# Patient Record
Sex: FEMALE | Race: WHITE | NOT HISPANIC OR LATINO
[De-identification: names, ages, dates, MRNs, and addresses within clinical notes are randomized per-mention and may not be internally consistent; named-entity substitution may affect disease eponyms.]

---

## 2017-04-26 ENCOUNTER — APPOINTMENT (OUTPATIENT)
Dept: ENDOCRINOLOGY | Facility: CLINIC | Age: 52
End: 2017-04-26

## 2017-11-16 ENCOUNTER — EMERGENCY (EMERGENCY)
Facility: HOSPITAL | Age: 52
LOS: 0 days | Discharge: NURSING, REHAB OR LONG-TERM CARE | End: 2017-11-16

## 2017-11-16 DIAGNOSIS — Z79.899 OTHER LONG TERM (CURRENT) DRUG THERAPY: ICD-10-CM

## 2017-11-16 DIAGNOSIS — Z91.013 ALLERGY TO SEAFOOD: ICD-10-CM

## 2017-11-16 DIAGNOSIS — K50.90 CROHN'S DISEASE, UNSPECIFIED, WITHOUT COMPLICATIONS: ICD-10-CM

## 2017-11-16 DIAGNOSIS — J44.1 CHRONIC OBSTRUCTIVE PULMONARY DISEASE WITH (ACUTE) EXACERBATION: ICD-10-CM

## 2017-11-16 DIAGNOSIS — K21.9 GASTRO-ESOPHAGEAL REFLUX DISEASE WITHOUT ESOPHAGITIS: ICD-10-CM

## 2017-11-16 DIAGNOSIS — E03.9 HYPOTHYROIDISM, UNSPECIFIED: ICD-10-CM

## 2017-11-16 DIAGNOSIS — E11.9 TYPE 2 DIABETES MELLITUS WITHOUT COMPLICATIONS: ICD-10-CM

## 2017-11-16 DIAGNOSIS — Z88.1 ALLERGY STATUS TO OTHER ANTIBIOTIC AGENTS STATUS: ICD-10-CM

## 2017-11-16 DIAGNOSIS — Z91.040 LATEX ALLERGY STATUS: ICD-10-CM

## 2017-11-16 DIAGNOSIS — J45.909 UNSPECIFIED ASTHMA, UNCOMPLICATED: ICD-10-CM

## 2017-11-16 DIAGNOSIS — R06.02 SHORTNESS OF BREATH: ICD-10-CM

## 2017-11-16 DIAGNOSIS — Z88.8 ALLERGY STATUS TO OTHER DRUGS, MEDICAMENTS AND BIOLOGICAL SUBSTANCES STATUS: ICD-10-CM

## 2017-11-16 DIAGNOSIS — F20.9 SCHIZOPHRENIA, UNSPECIFIED: ICD-10-CM

## 2017-11-16 DIAGNOSIS — R10.9 UNSPECIFIED ABDOMINAL PAIN: ICD-10-CM

## 2017-11-16 DIAGNOSIS — F32.9 MAJOR DEPRESSIVE DISORDER, SINGLE EPISODE, UNSPECIFIED: ICD-10-CM

## 2017-11-16 DIAGNOSIS — Z88.0 ALLERGY STATUS TO PENICILLIN: ICD-10-CM

## 2017-11-16 DIAGNOSIS — Z79.4 LONG TERM (CURRENT) USE OF INSULIN: ICD-10-CM

## 2018-02-13 ENCOUNTER — EMERGENCY (EMERGENCY)
Facility: HOSPITAL | Age: 53
LOS: 0 days | Discharge: NURSING, REHAB OR LONG-TERM CARE | End: 2018-02-13
Attending: EMERGENCY MEDICINE

## 2018-02-13 VITALS
RESPIRATION RATE: 17 BRPM | OXYGEN SATURATION: 97 % | HEART RATE: 109 BPM | WEIGHT: 195.99 LBS | HEIGHT: 64 IN | TEMPERATURE: 98 F | DIASTOLIC BLOOD PRESSURE: 78 MMHG | SYSTOLIC BLOOD PRESSURE: 117 MMHG

## 2018-02-13 DIAGNOSIS — Z79.899 OTHER LONG TERM (CURRENT) DRUG THERAPY: ICD-10-CM

## 2018-02-13 DIAGNOSIS — R07.89 OTHER CHEST PAIN: ICD-10-CM

## 2018-02-13 DIAGNOSIS — E11.9 TYPE 2 DIABETES MELLITUS WITHOUT COMPLICATIONS: ICD-10-CM

## 2018-02-13 DIAGNOSIS — Z98.890 OTHER SPECIFIED POSTPROCEDURAL STATES: Chronic | ICD-10-CM

## 2018-02-13 DIAGNOSIS — Z91.011 ALLERGY TO MILK PRODUCTS: ICD-10-CM

## 2018-02-13 DIAGNOSIS — Z90.49 ACQUIRED ABSENCE OF OTHER SPECIFIED PARTS OF DIGESTIVE TRACT: ICD-10-CM

## 2018-02-13 DIAGNOSIS — Z91.040 LATEX ALLERGY STATUS: ICD-10-CM

## 2018-02-13 DIAGNOSIS — Z88.5 ALLERGY STATUS TO NARCOTIC AGENT: ICD-10-CM

## 2018-02-13 DIAGNOSIS — Z91.013 ALLERGY TO SEAFOOD: ICD-10-CM

## 2018-02-13 DIAGNOSIS — Z79.2 LONG TERM (CURRENT) USE OF ANTIBIOTICS: ICD-10-CM

## 2018-02-13 DIAGNOSIS — R06.02 SHORTNESS OF BREATH: ICD-10-CM

## 2018-02-13 LAB
ALBUMIN SERPL ELPH-MCNC: 3.9 G/DL — SIGNIFICANT CHANGE UP (ref 3–5.5)
ALP SERPL-CCNC: 81 U/L — SIGNIFICANT CHANGE UP (ref 30–115)
ALT FLD-CCNC: 56 U/L — HIGH (ref 0–41)
ANION GAP SERPL CALC-SCNC: 11 MMOL/L — SIGNIFICANT CHANGE UP (ref 7–14)
AST SERPL-CCNC: 39 U/L — SIGNIFICANT CHANGE UP (ref 0–41)
BASOPHILS # BLD AUTO: 0.02 K/UL — SIGNIFICANT CHANGE UP (ref 0–0.2)
BASOPHILS NFR BLD AUTO: 0.3 % — SIGNIFICANT CHANGE UP (ref 0–1)
BILIRUB SERPL-MCNC: 0.2 MG/DL — SIGNIFICANT CHANGE UP (ref 0.2–1.2)
BUN SERPL-MCNC: 9 MG/DL — LOW (ref 10–20)
CALCIUM SERPL-MCNC: 9.9 MG/DL — SIGNIFICANT CHANGE UP (ref 8.5–10.1)
CHLORIDE SERPL-SCNC: 101 MMOL/L — SIGNIFICANT CHANGE UP (ref 98–110)
CO2 SERPL-SCNC: 28 MMOL/L — SIGNIFICANT CHANGE UP (ref 17–32)
CREAT SERPL-MCNC: 0.5 MG/DL — LOW (ref 0.7–1.5)
EOSINOPHIL # BLD AUTO: 0.07 K/UL — SIGNIFICANT CHANGE UP (ref 0–0.7)
EOSINOPHIL NFR BLD AUTO: 0.9 % — SIGNIFICANT CHANGE UP (ref 0–8)
GLUCOSE SERPL-MCNC: 193 MG/DL — HIGH (ref 70–110)
HCT VFR BLD CALC: 39.6 % — SIGNIFICANT CHANGE UP (ref 37–47)
HGB BLD-MCNC: 12.2 G/DL — LOW (ref 14–18)
IMM GRANULOCYTES NFR BLD AUTO: 0.3 % — SIGNIFICANT CHANGE UP (ref 0.1–0.3)
LIDOCAIN IGE QN: 24 U/L — SIGNIFICANT CHANGE UP (ref 7–60)
LYMPHOCYTES # BLD AUTO: 2.16 K/UL — SIGNIFICANT CHANGE UP (ref 1.2–3.4)
LYMPHOCYTES # BLD AUTO: 27.4 % — SIGNIFICANT CHANGE UP (ref 20.5–51.1)
MCHC RBC-ENTMCNC: 22.1 PG — LOW (ref 27–31)
MCHC RBC-ENTMCNC: 30.8 G/DL — LOW (ref 32–37)
MCV RBC AUTO: 71.7 FL — LOW (ref 81–91)
MONOCYTES # BLD AUTO: 0.45 K/UL — SIGNIFICANT CHANGE UP (ref 0.1–0.6)
MONOCYTES NFR BLD AUTO: 5.7 % — SIGNIFICANT CHANGE UP (ref 1.7–9.3)
NEUTROPHILS # BLD AUTO: 5.15 K/UL — SIGNIFICANT CHANGE UP (ref 1.4–6.5)
NEUTROPHILS NFR BLD AUTO: 65.4 % — SIGNIFICANT CHANGE UP (ref 42.2–75.2)
NRBC # BLD: 0 /100 WBCS — SIGNIFICANT CHANGE UP (ref 0–0)
PLATELET # BLD AUTO: 311 K/UL — SIGNIFICANT CHANGE UP (ref 130–400)
POTASSIUM SERPL-MCNC: 3.9 MMOL/L — SIGNIFICANT CHANGE UP (ref 3.5–5)
POTASSIUM SERPL-SCNC: 3.9 MMOL/L — SIGNIFICANT CHANGE UP (ref 3.5–5)
PROT SERPL-MCNC: 6.5 G/DL — SIGNIFICANT CHANGE UP (ref 6–8)
RBC # BLD: 5.52 M/UL — HIGH (ref 4.2–5.4)
RBC # FLD: 18.5 % — HIGH (ref 11.5–14.5)
SODIUM SERPL-SCNC: 140 MMOL/L — SIGNIFICANT CHANGE UP (ref 135–146)
TROPONIN I SERPL-MCNC: <0.02 NG/ML — SIGNIFICANT CHANGE UP (ref 0–0.05)
WBC # BLD: 7.87 K/UL — SIGNIFICANT CHANGE UP (ref 4.8–10.8)
WBC # FLD AUTO: 7.87 K/UL — SIGNIFICANT CHANGE UP (ref 4.8–10.8)

## 2018-02-13 RX ORDER — SODIUM CHLORIDE 9 MG/ML
3 INJECTION INTRAMUSCULAR; INTRAVENOUS; SUBCUTANEOUS ONCE
Qty: 0 | Refills: 0 | Status: COMPLETED | OUTPATIENT
Start: 2018-02-13 | End: 2018-02-13

## 2018-02-13 RX ADMIN — SODIUM CHLORIDE 3 MILLILITER(S): 9 INJECTION INTRAMUSCULAR; INTRAVENOUS; SUBCUTANEOUS at 16:36

## 2018-02-13 NOTE — ED PROVIDER NOTE - PHYSICAL EXAMINATION
Vital Signs: I have reviewed the initial vital signs.  Constitutional: well-nourished, appears stated age, no acute distress  Cardiovascular: regular rate, regular rhythm, well-perfused extremities  Respiratory: unlabored respiratory effort, clear to auscultation bilaterally  Gastrointestinal: soft, non-tender abdomen, no pulsatile mass  Musculoskeletal: supple neck, no lower extremity edema  Integumentary: warm, dry, no rash  Neurologic: awake, alert, cranial nerves II-XII grossly intact, extremities’ motor and sensory functions grossly intact  Psychiatric: appropriate mood, appropriate affect

## 2018-02-13 NOTE — ED PROVIDER NOTE - OBJECTIVE STATEMENT
53 y/o diabetic female c/o chest pain radiating to back started 30 min PTA while eating peanut butter and jelly sandwhich. patient had angiogram 2 weeks ago which was normal. patient denies any diaphoresis, sob

## 2018-02-13 NOTE — ED PROVIDER NOTE - ATTENDING CONTRIBUTION TO CARE
Pt is a 53yo female who comes in for mid-sternal chest pain radiating to back that began at noon while eating a PB&J sandwich and has been constant and non-radiating.  Mild SOB, no syncope.  Pt reports having normal coronary angiogram on 2/1 at [facility].  No other complaints.    Exam: soft abdomen, mild epigastric soreness, clear lungs, no LE edema, no calf tenderness, noraml cardiac exam, 2+ radial pulses  Imp: r/o ACS, likely esophageal spasm  Plan: labs, EKG, XR chest

## 2018-02-13 NOTE — ED ADULT NURSE NOTE - PMH
Diabetes    GERD (gastroesophageal reflux disease) Anxiety    Bladder disorder    Constipation    Dementia    Depression    Diabetes    Diabetes mellitus, type 2    GERD (gastroesophageal reflux disease)    Hypercholesteremia    Hypothyroid    Iron deficiency    ANISHA (obstructive sleep apnea)    Polyneuropathy    Schizophrenia    Sciatica    Vitamin D deficiency

## 2018-02-13 NOTE — ED PROVIDER NOTE - NS ED ROS FT
Review of Systems    Constitutional: (-) fever  Eyes/ENT: (-) blurry vision, (-) epistaxis  Cardiovascular: (-) chest pain, (-) syncope  Respiratory: (-) cough, (-) shortness of breath  Gastrointestinal: (-) vomiting, (-) diarrhea  Musculoskeletal: (-) neck pain, (-) back pain, (-) joint pain  Integumentary: (-) rash, (-) edema  Neurological: (-) headache, (-) altered mental status  Psychiatric: (-) hallucinations  Allergic/Immunologic: (-) pruritus

## 2018-02-13 NOTE — ED PROVIDER NOTE - MEDICAL DECISION MAKING DETAILS
Patient to be discharged from ED. Any available test results were discussed with patient and/or family. Verbal instructions given, including instructions to return to ED immediately for any new, worsening, or concerning symptoms. Patient endorsed understanding. Written discharge instructions additionally given, including follow-up plan.     Chart finished.

## 2018-03-17 ENCOUNTER — EMERGENCY (EMERGENCY)
Facility: HOSPITAL | Age: 53
LOS: 0 days | Discharge: NURSING, REHAB OR LONG-TERM CARE | End: 2018-03-17
Attending: EMERGENCY MEDICINE

## 2018-03-17 VITALS
RESPIRATION RATE: 18 BRPM | OXYGEN SATURATION: 95 % | HEART RATE: 101 BPM | TEMPERATURE: 99 F | DIASTOLIC BLOOD PRESSURE: 58 MMHG | SYSTOLIC BLOOD PRESSURE: 96 MMHG

## 2018-03-17 DIAGNOSIS — Z79.899 OTHER LONG TERM (CURRENT) DRUG THERAPY: ICD-10-CM

## 2018-03-17 DIAGNOSIS — E78.00 PURE HYPERCHOLESTEROLEMIA, UNSPECIFIED: ICD-10-CM

## 2018-03-17 DIAGNOSIS — F03.90 UNSPECIFIED DEMENTIA WITHOUT BEHAVIORAL DISTURBANCE: ICD-10-CM

## 2018-03-17 DIAGNOSIS — Z98.890 OTHER SPECIFIED POSTPROCEDURAL STATES: Chronic | ICD-10-CM

## 2018-03-17 DIAGNOSIS — K21.9 GASTRO-ESOPHAGEAL REFLUX DISEASE WITHOUT ESOPHAGITIS: ICD-10-CM

## 2018-03-17 DIAGNOSIS — E11.9 TYPE 2 DIABETES MELLITUS WITHOUT COMPLICATIONS: ICD-10-CM

## 2018-03-17 DIAGNOSIS — Z88.0 ALLERGY STATUS TO PENICILLIN: ICD-10-CM

## 2018-03-17 DIAGNOSIS — R07.89 OTHER CHEST PAIN: ICD-10-CM

## 2018-03-17 DIAGNOSIS — E03.9 HYPOTHYROIDISM, UNSPECIFIED: ICD-10-CM

## 2018-03-17 DIAGNOSIS — Z90.49 ACQUIRED ABSENCE OF OTHER SPECIFIED PARTS OF DIGESTIVE TRACT: ICD-10-CM

## 2018-03-17 DIAGNOSIS — Z88.5 ALLERGY STATUS TO NARCOTIC AGENT: ICD-10-CM

## 2018-03-17 DIAGNOSIS — F17.200 NICOTINE DEPENDENCE, UNSPECIFIED, UNCOMPLICATED: ICD-10-CM

## 2018-03-17 DIAGNOSIS — R06.02 SHORTNESS OF BREATH: ICD-10-CM

## 2018-03-17 LAB
ALBUMIN SERPL ELPH-MCNC: 4.5 G/DL — SIGNIFICANT CHANGE UP (ref 3.5–5.2)
ALP SERPL-CCNC: 83 U/L — SIGNIFICANT CHANGE UP (ref 30–115)
ALT FLD-CCNC: 45 U/L — HIGH (ref 0–41)
ANION GAP SERPL CALC-SCNC: 20 MMOL/L — HIGH (ref 7–14)
AST SERPL-CCNC: 29 U/L — SIGNIFICANT CHANGE UP (ref 0–41)
BASOPHILS # BLD AUTO: 0.03 K/UL — SIGNIFICANT CHANGE UP (ref 0–0.2)
BASOPHILS NFR BLD AUTO: 0.4 % — SIGNIFICANT CHANGE UP (ref 0–1)
BILIRUB DIRECT SERPL-MCNC: <0.2 MG/DL — SIGNIFICANT CHANGE UP (ref 0–0.2)
BILIRUB INDIRECT FLD-MCNC: >0 MG/DL — LOW (ref 0.2–1.2)
BILIRUB SERPL-MCNC: 0.2 MG/DL — SIGNIFICANT CHANGE UP (ref 0.2–1.2)
BUN SERPL-MCNC: 18 MG/DL — SIGNIFICANT CHANGE UP (ref 10–20)
CALCIUM SERPL-MCNC: 9.8 MG/DL — SIGNIFICANT CHANGE UP (ref 8.5–10.1)
CHLORIDE SERPL-SCNC: 97 MMOL/L — LOW (ref 98–110)
CK MB BLD-MCNC: <0 % — SIGNIFICANT CHANGE UP (ref 0–4)
CK MB BLD-MCNC: SIGNIFICANT CHANGE UP % (ref 0–4)
CK MB CFR SERPL CALC: <0.1 NG/ML — LOW (ref 0.6–6.3)
CK MB CFR SERPL CALC: <0.1 NG/ML — LOW (ref 0.6–6.3)
CK SERPL-CCNC: 43 U/L — SIGNIFICANT CHANGE UP (ref 0–225)
CK SERPL-CCNC: 47 U/L — SIGNIFICANT CHANGE UP (ref 0–225)
CO2 SERPL-SCNC: 24 MMOL/L — SIGNIFICANT CHANGE UP (ref 17–32)
CREAT SERPL-MCNC: 0.6 MG/DL — LOW (ref 0.7–1.5)
EOSINOPHIL # BLD AUTO: 0.09 K/UL — SIGNIFICANT CHANGE UP (ref 0–0.7)
EOSINOPHIL NFR BLD AUTO: 1.2 % — SIGNIFICANT CHANGE UP (ref 0–8)
GLUCOSE SERPL-MCNC: 125 MG/DL — HIGH (ref 70–110)
HCT VFR BLD CALC: 41.4 % — SIGNIFICANT CHANGE UP (ref 37–47)
HGB BLD-MCNC: 12.5 G/DL — SIGNIFICANT CHANGE UP (ref 12–16)
IMM GRANULOCYTES NFR BLD AUTO: 0.3 % — SIGNIFICANT CHANGE UP (ref 0.1–0.3)
LIDOCAIN IGE QN: 33 U/L — SIGNIFICANT CHANGE UP (ref 7–60)
LYMPHOCYTES # BLD AUTO: 2.73 K/UL — SIGNIFICANT CHANGE UP (ref 1.2–3.4)
LYMPHOCYTES # BLD AUTO: 37.3 % — SIGNIFICANT CHANGE UP (ref 20.5–51.1)
MCHC RBC-ENTMCNC: 21.9 PG — LOW (ref 27–31)
MCHC RBC-ENTMCNC: 30.2 G/DL — LOW (ref 32–37)
MCV RBC AUTO: 72.6 FL — LOW (ref 81–99)
MONOCYTES # BLD AUTO: 0.5 K/UL — SIGNIFICANT CHANGE UP (ref 0.1–0.6)
MONOCYTES NFR BLD AUTO: 6.8 % — SIGNIFICANT CHANGE UP (ref 1.7–9.3)
NEUTROPHILS # BLD AUTO: 3.94 K/UL — SIGNIFICANT CHANGE UP (ref 1.4–6.5)
NEUTROPHILS NFR BLD AUTO: 54 % — SIGNIFICANT CHANGE UP (ref 42.2–75.2)
NRBC # BLD: 0 /100 WBCS — SIGNIFICANT CHANGE UP (ref 0–0)
PLATELET # BLD AUTO: 414 K/UL — HIGH (ref 130–400)
POTASSIUM SERPL-MCNC: 4.2 MMOL/L — SIGNIFICANT CHANGE UP (ref 3.5–5)
POTASSIUM SERPL-SCNC: 4.2 MMOL/L — SIGNIFICANT CHANGE UP (ref 3.5–5)
PROT SERPL-MCNC: 6.4 G/DL — SIGNIFICANT CHANGE UP (ref 6–8)
RBC # BLD: 5.7 M/UL — HIGH (ref 4.2–5.4)
RBC # FLD: 18 % — HIGH (ref 11.5–14.5)
SODIUM SERPL-SCNC: 141 MMOL/L — SIGNIFICANT CHANGE UP (ref 135–146)
TROPONIN T SERPL-MCNC: <0.01 NG/ML — SIGNIFICANT CHANGE UP
TROPONIN T SERPL-MCNC: <0.01 NG/ML — SIGNIFICANT CHANGE UP
WBC # BLD: 7.31 K/UL — SIGNIFICANT CHANGE UP (ref 4.8–10.8)
WBC # FLD AUTO: 7.31 K/UL — SIGNIFICANT CHANGE UP (ref 4.8–10.8)

## 2018-03-17 RX ORDER — SODIUM CHLORIDE 9 MG/ML
3 INJECTION INTRAMUSCULAR; INTRAVENOUS; SUBCUTANEOUS ONCE
Qty: 0 | Refills: 0 | Status: COMPLETED | OUTPATIENT
Start: 2018-03-17 | End: 2018-03-17

## 2018-03-17 RX ADMIN — SODIUM CHLORIDE 3 MILLILITER(S): 9 INJECTION INTRAMUSCULAR; INTRAVENOUS; SUBCUTANEOUS at 15:42

## 2018-03-17 RX ADMIN — SODIUM CHLORIDE 3 MILLILITER(S): 9 INJECTION INTRAMUSCULAR; INTRAVENOUS; SUBCUTANEOUS at 15:41

## 2018-03-17 NOTE — ED PROVIDER NOTE - ATTENDING CONTRIBUTION TO CARE
I personally evaluated the patient. I reviewed the Resident’s or Physician Assistant’s note (as assigned above), and agree with the findings and plan except as documented in my note.  52y female above PMH had 1 hour episode of chest tightness while eating PBJ, since resolved, has h/o same, had clean catheterization (per pt) last month), has had EGD in past showing "inflammed stomach," pt currently asymptomatic and wants to eat, on exam vs appreciated (reports this is her baseline bp), conj pink neck supple cor rrr lungs sl diminished at bases otherwise clear abd snt/nd calves nontender, likely GI but will check ekg, enzymes x 2

## 2018-03-17 NOTE — ED PROVIDER NOTE - PROGRESS NOTE DETAILS
endorsed to Dr Gutierrez repeat enzymes No chest pain in ER. Cardiac enzymes x 2 negative. Repeat EKG no acute changes. Will D/C to follow up with pvt cardiology.

## 2018-03-17 NOTE — ED ADULT NURSE NOTE - PMH
Anxiety    Bladder disorder    Constipation    Dementia    Depression    Diabetes    Diabetes mellitus, type 2    GERD (gastroesophageal reflux disease)    Hypercholesteremia    Hypothyroid    Iron deficiency    ANISHA (obstructive sleep apnea)    Polyneuropathy    Schizophrenia    Sciatica    Vitamin D deficiency

## 2018-03-17 NOTE — ED ADULT NURSE NOTE - NS ED NURSE DC TEACHING
Bilateral Helical Rim Advancement Flap Text: The defect edges were debeveled with a #15 blade scalpel.  Given the location of the defect and the proximity to free margins (helical rim) a bilateral helical rim advancement flap was deemed most appropriate.  Using a sterile surgical marker, the appropriate advancement flaps were drawn incorporating the defect and placing the expected incisions between the helical rim and antihelix where possible.  The area thus outlined was incised through and through with a #15 scalpel blade.  With a skin hook and iris scissors, the flaps were gently and sharply undermined and freed up. Other:/see dc

## 2018-03-17 NOTE — ED ADULT TRIAGE NOTE - CHIEF COMPLAINT QUOTE
AS per patient: 'I was having lunch when I started having chest pain. I feel better now after the aspirin and Nitro."

## 2018-03-17 NOTE — ED PROVIDER NOTE - OBJECTIVE STATEMENT
Pertinent PMH/PSH/FHx/SHx and Review of Systems contained within:  Patient presents to the ED from [facility] for evaluation of shortness of breath and chest tightness since 12 pm today while eating a sandwich.  As per patient tightness and shortness of breath improved after treatment at home but called her cardiologist and instructed to come to the ED for evaluation.  Patient denies headache, chest pain, abdominal pain, nausea, vomiting, diarrhea, fever/chills.   Review of Systems  Constitutional: (-) fever  Cardiovascular: (-) chest pain  Respiratory: (-) cough  Gastrointestinal: (-) vomiting, (-) diarrhea  Integumentary: (-) edema  Neurological: (-) headache  Allergic/Immunologic: (-) pruritus  PE  Gen: Alert, NAD, well appearing  Neck: +supple  Pulm: Bilateral BS, normal resp effort, no wheeze/stridor/retractions  CV: RRR, no M/R/G, +dist pulses  Abd: soft, NT/ND, +BS  Mskel: no edema  Skin: no rash  Neuro: AAOx3

## 2018-03-17 NOTE — ED PROVIDER NOTE - MEDICAL DECISION MAKING DETAILS
I personally evaluated the patient. I reviewed the Resident’s or Physician Assistant’s note (as assigned above), and agree with the findings and plan except as documented in my note.  Chart reviewed. Cardiac enzymes x 2 negative. Repeat EKG no acute changes. Chest pain free in ER. Will D/C to follow up with t cardiologist.

## 2018-07-04 ENCOUNTER — EMERGENCY (EMERGENCY)
Facility: HOSPITAL | Age: 53
LOS: 0 days | Discharge: HOME | End: 2018-07-04
Attending: EMERGENCY MEDICINE | Admitting: EMERGENCY MEDICINE

## 2018-07-04 VITALS
SYSTOLIC BLOOD PRESSURE: 132 MMHG | DIASTOLIC BLOOD PRESSURE: 70 MMHG | HEART RATE: 90 BPM | RESPIRATION RATE: 20 BRPM | OXYGEN SATURATION: 96 % | TEMPERATURE: 98 F

## 2018-07-04 VITALS
RESPIRATION RATE: 18 BRPM | DIASTOLIC BLOOD PRESSURE: 70 MMHG | TEMPERATURE: 98 F | OXYGEN SATURATION: 93 % | SYSTOLIC BLOOD PRESSURE: 120 MMHG | HEIGHT: 64 IN | HEART RATE: 94 BPM | WEIGHT: 203.93 LBS

## 2018-07-04 DIAGNOSIS — F20.9 SCHIZOPHRENIA, UNSPECIFIED: ICD-10-CM

## 2018-07-04 DIAGNOSIS — J44.1 CHRONIC OBSTRUCTIVE PULMONARY DISEASE WITH (ACUTE) EXACERBATION: ICD-10-CM

## 2018-07-04 DIAGNOSIS — Z79.899 OTHER LONG TERM (CURRENT) DRUG THERAPY: ICD-10-CM

## 2018-07-04 DIAGNOSIS — Z91.040 LATEX ALLERGY STATUS: ICD-10-CM

## 2018-07-04 DIAGNOSIS — E78.00 PURE HYPERCHOLESTEROLEMIA, UNSPECIFIED: ICD-10-CM

## 2018-07-04 DIAGNOSIS — K21.9 GASTRO-ESOPHAGEAL REFLUX DISEASE WITHOUT ESOPHAGITIS: ICD-10-CM

## 2018-07-04 DIAGNOSIS — R07.89 OTHER CHEST PAIN: ICD-10-CM

## 2018-07-04 DIAGNOSIS — Z88.0 ALLERGY STATUS TO PENICILLIN: ICD-10-CM

## 2018-07-04 DIAGNOSIS — Z98.890 OTHER SPECIFIED POSTPROCEDURAL STATES: Chronic | ICD-10-CM

## 2018-07-04 DIAGNOSIS — Z88.1 ALLERGY STATUS TO OTHER ANTIBIOTIC AGENTS STATUS: ICD-10-CM

## 2018-07-04 DIAGNOSIS — E03.9 HYPOTHYROIDISM, UNSPECIFIED: ICD-10-CM

## 2018-07-04 DIAGNOSIS — Z79.84 LONG TERM (CURRENT) USE OF ORAL HYPOGLYCEMIC DRUGS: ICD-10-CM

## 2018-07-04 DIAGNOSIS — Z91.013 ALLERGY TO SEAFOOD: ICD-10-CM

## 2018-07-04 DIAGNOSIS — E11.9 TYPE 2 DIABETES MELLITUS WITHOUT COMPLICATIONS: ICD-10-CM

## 2018-07-04 RX ORDER — DILTIAZEM HCL 120 MG
1 CAPSULE, EXT RELEASE 24 HR ORAL
Qty: 0 | Refills: 0 | COMMUNITY

## 2018-07-04 RX ORDER — IPRATROPIUM/ALBUTEROL SULFATE 18-103MCG
3 AEROSOL WITH ADAPTER (GRAM) INHALATION ONCE
Qty: 0 | Refills: 0 | Status: COMPLETED | OUTPATIENT
Start: 2018-07-04 | End: 2018-07-04

## 2018-07-04 RX ORDER — IPRATROPIUM/ALBUTEROL SULFATE 18-103MCG
3 AEROSOL WITH ADAPTER (GRAM) INHALATION ONCE
Qty: 0 | Refills: 0 | Status: DISCONTINUED | OUTPATIENT
Start: 2018-07-04 | End: 2018-07-04

## 2018-07-04 RX ADMIN — Medication 3 MILLILITER(S): at 20:09

## 2018-07-04 RX ADMIN — Medication 60 MILLIGRAM(S): at 19:38

## 2018-07-04 RX ADMIN — Medication 3 MILLILITER(S): at 19:40

## 2018-07-04 NOTE — ED ADULT NURSE NOTE - PMH
Anxiety    Bladder disorder    Constipation    COPD (chronic obstructive pulmonary disease)    Depression    Diabetes mellitus, type 2    GERD (gastroesophageal reflux disease)    Hypercholesteremia    Hypothyroid    Iron deficiency    ANISHA (obstructive sleep apnea)    Polyneuropathy    Schizophrenia    Sciatica    Vitamin D deficiency

## 2018-07-04 NOTE — ED PROVIDER NOTE - ATTENDING CONTRIBUTION TO CARE
Pt is a 52yo female with 4d of cough productive of greenish phelgm and chest tightness consistent with COPD exacerbation.  No fever, no travel, no sick contacts.  No other complaints.    Exam: b/l wheezes with crackles on R, no tachypnea/retractions, speaking in full sentences, cap refill <2s  Imp: COPD exacerbation, r/o PNA  Plan: XR chest, nebs, steroid

## 2018-07-04 NOTE — ED PROVIDER NOTE - OBJECTIVE STATEMENT
52 y/o female with hx Asthma, COPD, DM presents to the ED c/o "I have cough, congestion, SOB and chest tightness since Friday. My cough has green phlegm." no fever/ chills/ weakness

## 2018-07-09 ENCOUNTER — OUTPATIENT (OUTPATIENT)
Dept: OUTPATIENT SERVICES | Facility: HOSPITAL | Age: 53
LOS: 1 days | Discharge: HOME | End: 2018-07-09

## 2018-07-09 DIAGNOSIS — E78.9 DISORDER OF LIPOPROTEIN METABOLISM, UNSPECIFIED: ICD-10-CM

## 2018-07-09 DIAGNOSIS — E11.65 TYPE 2 DIABETES MELLITUS WITH HYPERGLYCEMIA: ICD-10-CM

## 2018-07-09 DIAGNOSIS — K21.9 GASTRO-ESOPHAGEAL REFLUX DISEASE WITHOUT ESOPHAGITIS: ICD-10-CM

## 2018-07-09 DIAGNOSIS — E55.9 VITAMIN D DEFICIENCY, UNSPECIFIED: ICD-10-CM

## 2018-07-09 DIAGNOSIS — Z98.890 OTHER SPECIFIED POSTPROCEDURAL STATES: Chronic | ICD-10-CM

## 2018-07-09 DIAGNOSIS — E03.9 HYPOTHYROIDISM, UNSPECIFIED: ICD-10-CM

## 2018-07-16 ENCOUNTER — OUTPATIENT (OUTPATIENT)
Dept: OUTPATIENT SERVICES | Facility: HOSPITAL | Age: 53
LOS: 1 days | Discharge: HOME | End: 2018-07-16

## 2018-07-16 DIAGNOSIS — D64.9 ANEMIA, UNSPECIFIED: ICD-10-CM

## 2018-07-16 DIAGNOSIS — E03.9 HYPOTHYROIDISM, UNSPECIFIED: ICD-10-CM

## 2018-07-16 DIAGNOSIS — Z98.890 OTHER SPECIFIED POSTPROCEDURAL STATES: Chronic | ICD-10-CM

## 2018-07-16 DIAGNOSIS — R94.5 ABNORMAL RESULTS OF LIVER FUNCTION STUDIES: ICD-10-CM

## 2018-07-17 PROBLEM — F03.90 UNSPECIFIED DEMENTIA WITHOUT BEHAVIORAL DISTURBANCE: Chronic | Status: INACTIVE | Noted: 2018-02-13 | Resolved: 2018-07-04

## 2018-07-17 PROBLEM — E11.9 TYPE 2 DIABETES MELLITUS WITHOUT COMPLICATIONS: Chronic | Status: INACTIVE | Noted: 2018-02-13 | Resolved: 2018-07-04

## 2018-07-17 PROBLEM — F03.90 UNSPECIFIED DEMENTIA, UNSPECIFIED SEVERITY, WITHOUT BEHAVIORAL DISTURBANCE, PSYCHOTIC DISTURBANCE, MOOD DISTURBANCE, AND ANXIETY: Chronic | Status: INACTIVE | Noted: 2018-02-13 | Resolved: 2018-07-04

## 2018-08-24 PROBLEM — E03.9 HYPOTHYROIDISM, UNSPECIFIED: Chronic | Status: ACTIVE | Noted: 2018-02-13

## 2018-08-24 PROBLEM — J44.9 CHRONIC OBSTRUCTIVE PULMONARY DISEASE, UNSPECIFIED: Chronic | Status: ACTIVE | Noted: 2018-07-04

## 2018-08-24 PROBLEM — M54.30 SCIATICA, UNSPECIFIED SIDE: Chronic | Status: ACTIVE | Noted: 2018-02-13

## 2018-08-24 PROBLEM — K59.00 CONSTIPATION, UNSPECIFIED: Chronic | Status: ACTIVE | Noted: 2018-02-13

## 2018-08-24 PROBLEM — E61.1 IRON DEFICIENCY: Chronic | Status: ACTIVE | Noted: 2018-02-13

## 2018-08-24 PROBLEM — N32.9 BLADDER DISORDER, UNSPECIFIED: Chronic | Status: ACTIVE | Noted: 2018-02-13

## 2018-08-24 PROBLEM — F41.9 ANXIETY DISORDER, UNSPECIFIED: Chronic | Status: ACTIVE | Noted: 2018-02-13

## 2018-08-24 PROBLEM — G47.33 OBSTRUCTIVE SLEEP APNEA (ADULT) (PEDIATRIC): Chronic | Status: ACTIVE | Noted: 2018-02-13

## 2018-08-24 PROBLEM — F32.9 MAJOR DEPRESSIVE DISORDER, SINGLE EPISODE, UNSPECIFIED: Chronic | Status: ACTIVE | Noted: 2018-02-13

## 2018-08-24 PROBLEM — E55.9 VITAMIN D DEFICIENCY, UNSPECIFIED: Chronic | Status: ACTIVE | Noted: 2018-02-13

## 2018-08-24 PROBLEM — E78.00 PURE HYPERCHOLESTEROLEMIA, UNSPECIFIED: Chronic | Status: ACTIVE | Noted: 2018-02-13

## 2018-08-24 PROBLEM — K21.9 GASTRO-ESOPHAGEAL REFLUX DISEASE WITHOUT ESOPHAGITIS: Chronic | Status: ACTIVE | Noted: 2018-02-13

## 2018-08-24 PROBLEM — F20.9 SCHIZOPHRENIA, UNSPECIFIED: Chronic | Status: ACTIVE | Noted: 2018-02-13

## 2018-08-24 PROBLEM — G62.9 POLYNEUROPATHY, UNSPECIFIED: Chronic | Status: ACTIVE | Noted: 2018-02-13

## 2018-08-24 PROBLEM — E11.9 TYPE 2 DIABETES MELLITUS WITHOUT COMPLICATIONS: Chronic | Status: ACTIVE | Noted: 2018-02-13

## 2018-08-29 ENCOUNTER — OUTPATIENT (OUTPATIENT)
Dept: OUTPATIENT SERVICES | Facility: HOSPITAL | Age: 53
LOS: 1 days | Discharge: HOME | End: 2018-08-29

## 2018-08-29 DIAGNOSIS — K50.00 CROHN'S DISEASE OF SMALL INTESTINE WITHOUT COMPLICATIONS: ICD-10-CM

## 2018-08-29 DIAGNOSIS — Z98.890 OTHER SPECIFIED POSTPROCEDURAL STATES: Chronic | ICD-10-CM

## 2018-09-03 ENCOUNTER — EMERGENCY (EMERGENCY)
Facility: HOSPITAL | Age: 53
LOS: 0 days | Discharge: HOME | End: 2018-09-03
Attending: EMERGENCY MEDICINE | Admitting: EMERGENCY MEDICINE

## 2018-09-03 VITALS
WEIGHT: 203.93 LBS | TEMPERATURE: 98 F | HEIGHT: 64 IN | SYSTOLIC BLOOD PRESSURE: 100 MMHG | HEART RATE: 89 BPM | DIASTOLIC BLOOD PRESSURE: 58 MMHG | OXYGEN SATURATION: 96 % | RESPIRATION RATE: 17 BRPM

## 2018-09-03 DIAGNOSIS — R07.81 PLEURODYNIA: ICD-10-CM

## 2018-09-03 DIAGNOSIS — E11.9 TYPE 2 DIABETES MELLITUS WITHOUT COMPLICATIONS: ICD-10-CM

## 2018-09-03 DIAGNOSIS — M25.561 PAIN IN RIGHT KNEE: ICD-10-CM
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
## 2018-09-03 DIAGNOSIS — E78.00 PURE HYPERCHOLESTEROLEMIA, UNSPECIFIED: ICD-10-CM
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
## 2018-09-03 DIAGNOSIS — E03.9 HYPOTHYROIDISM, UNSPECIFIED: ICD-10-CM
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
## 2018-09-03 DIAGNOSIS — Z23 ENCOUNTER FOR IMMUNIZATION: ICD-10-CM

## 2018-09-03 DIAGNOSIS — K21.9 GASTRO-ESOPHAGEAL REFLUX DISEASE WITHOUT ESOPHAGITIS: ICD-10-CM

## 2018-09-03 DIAGNOSIS — J44.9 CHRONIC OBSTRUCTIVE PULMONARY DISEASE, UNSPECIFIED: ICD-10-CM

## 2018-09-03 DIAGNOSIS — Z98.890 OTHER SPECIFIED POSTPROCEDURAL STATES: Chronic | ICD-10-CM

## 2018-09-03 RX ORDER — KETOROLAC TROMETHAMINE 30 MG/ML
30 SYRINGE (ML) INJECTION ONCE
Qty: 0 | Refills: 0 | Status: DISCONTINUED | OUTPATIENT
Start: 2018-09-03 | End: 2018-09-03

## 2018-09-03 RX ORDER — TETANUS TOXOID, REDUCED DIPHTHERIA TOXOID AND ACELLULAR PERTUSSIS VACCINE, ADSORBED 5; 2.5; 8; 8; 2.5 [IU]/.5ML; [IU]/.5ML; UG/.5ML; UG/.5ML; UG/.5ML
0.5 SUSPENSION INTRAMUSCULAR ONCE
Qty: 0 | Refills: 0 | Status: COMPLETED | OUTPATIENT
Start: 2018-09-03 | End: 2018-09-03

## 2018-09-03 RX ADMIN — Medication 30 MILLIGRAM(S): at 11:30

## 2018-09-03 RX ADMIN — TETANUS TOXOID, REDUCED DIPHTHERIA TOXOID AND ACELLULAR PERTUSSIS VACCINE, ADSORBED 0.5 MILLILITER(S): 5; 2.5; 8; 8; 2.5 SUSPENSION INTRAMUSCULAR at 11:31

## 2018-09-03 NOTE — ED ADULT NURSE NOTE - NSIMPLEMENTINTERV_GEN_ALL_ED
Implemented All Universal Safety Interventions:  [place] to call system. Call bell, personal items and telephone within reach. Instruct patient to call for assistance. Room bathroom lighting operational. Non-slip footwear when patient is off stretcher. Physically safe environment: no spills, clutter or unnecessary equipment. Stretcher in lowest position, wheels locked, appropriate side rails in place.

## 2018-09-03 NOTE — ED PROVIDER NOTE - CARE PLAN
Principal Discharge DX:	Acute pain of right knee  Secondary Diagnosis:	Back pain  Secondary Diagnosis:	Fall, initial encounter

## 2018-09-03 NOTE — ED PROVIDER NOTE - ATTENDING CONTRIBUTION TO CARE
fall 6 days ago while walking, no loc, no vomiting she has righ tknee pain and left lower rib pain posteriorly since the fall, no swelling, no abd pain. pain has been persistent so she came to ED> on exam she has mild tenderness to right knee but able to bend to 90 degrees and able to ambulate wihtout difficulty, she minimal tenderness to mid to lower ribs on left side but lungs cta, abd soft, no cva tedneress, and no evidence of trauma. plan is for xray.

## 2018-09-03 NOTE — ED PROVIDER NOTE - OBJECTIVE STATEMENT
52 yo F with PMHx of anxiety, COPD, DM, GERD, hypothyroidism, schizophrenia and hypercholesterolemia presents to the ED c/o right knee pain and left mid-back pain. Pt tripped and fell 6 days ago and have had pain since. Pt denies taking medication to improve her symptoms. Pt denies fever, chills, nausea, vomiting, abdominal pain, headache, dizziness, weakness, chest pain, SOB, LOC, urinary symptoms, cough, calf pain/swelling, recent travel, recent surgery.

## 2018-09-03 NOTE — ED PROVIDER NOTE - PHYSICAL EXAMINATION
VITAL SIGNS: I have reviewed nursing notes and confirm.  CONSTITUTIONAL: Well-developed; well-nourished; in no acute distress.  SKIN: Skin exam is warm and dry, no acute rash.  HEAD: Normocephalic; atraumatic.  EYES: PERRL, EOM intact; conjunctiva and sclera clear.  ENT: No nasal discharge; airway clear.   NECK: Supple; non tender.  CARD: S1, S2 normal; no murmurs, gallops, or rubs. Regular rate and rhythm. (+) TTP to left lower posterior ribs. No crepitus or ecchymosis.   RESP: No wheezes, rales or rhonchi. Speaking in full sentences.   EXT: Normal ROM. No clubbing, cyanosis or edema. (+) TTP to right knee without deformity, ecchymosis or erythema. FROM. DP 2+.   BACK: No midline TTP.  NEURO: Alert, oriented. Grossly unremarkable. No focal deficits.

## 2018-11-05 ENCOUNTER — OUTPATIENT (OUTPATIENT)
Dept: OUTPATIENT SERVICES | Facility: HOSPITAL | Age: 53
LOS: 1 days | Discharge: HOME | End: 2018-11-05

## 2018-11-05 DIAGNOSIS — R42 DIZZINESS AND GIDDINESS: ICD-10-CM

## 2018-11-05 DIAGNOSIS — R94.5 ABNORMAL RESULTS OF LIVER FUNCTION STUDIES: ICD-10-CM

## 2018-11-05 DIAGNOSIS — Z98.890 OTHER SPECIFIED POSTPROCEDURAL STATES: Chronic | ICD-10-CM

## 2018-11-05 DIAGNOSIS — E11.65 TYPE 2 DIABETES MELLITUS WITH HYPERGLYCEMIA: ICD-10-CM

## 2018-11-05 DIAGNOSIS — E03.9 HYPOTHYROIDISM, UNSPECIFIED: ICD-10-CM

## 2018-11-05 DIAGNOSIS — E55.9 VITAMIN D DEFICIENCY, UNSPECIFIED: ICD-10-CM

## 2018-11-19 ENCOUNTER — OUTPATIENT (OUTPATIENT)
Dept: OUTPATIENT SERVICES | Facility: HOSPITAL | Age: 53
LOS: 1 days | Discharge: HOME | End: 2018-11-19

## 2018-11-19 DIAGNOSIS — Z98.890 OTHER SPECIFIED POSTPROCEDURAL STATES: Chronic | ICD-10-CM

## 2018-11-19 DIAGNOSIS — E03.9 HYPOTHYROIDISM, UNSPECIFIED: ICD-10-CM

## 2018-11-30 ENCOUNTER — EMERGENCY (EMERGENCY)
Facility: HOSPITAL | Age: 53
LOS: 0 days | Discharge: HOME | End: 2018-11-30
Attending: EMERGENCY MEDICINE | Admitting: INTERNAL MEDICINE

## 2018-11-30 VITALS — HEIGHT: 64 IN | WEIGHT: 184.97 LBS

## 2018-11-30 VITALS
RESPIRATION RATE: 19 BRPM | OXYGEN SATURATION: 95 % | TEMPERATURE: 98 F | DIASTOLIC BLOOD PRESSURE: 59 MMHG | HEART RATE: 93 BPM | SYSTOLIC BLOOD PRESSURE: 113 MMHG

## 2018-11-30 DIAGNOSIS — Z98.890 OTHER SPECIFIED POSTPROCEDURAL STATES: Chronic | ICD-10-CM

## 2018-11-30 DIAGNOSIS — Z02.9 ENCOUNTER FOR ADMINISTRATIVE EXAMINATIONS, UNSPECIFIED: ICD-10-CM

## 2018-11-30 LAB
ACETONE SERPL-MCNC: NEGATIVE — SIGNIFICANT CHANGE UP
ALBUMIN SERPL ELPH-MCNC: 5.2 G/DL — SIGNIFICANT CHANGE UP (ref 3.5–5.2)
ALP SERPL-CCNC: 85 U/L — SIGNIFICANT CHANGE UP (ref 30–115)
ALT FLD-CCNC: 14 U/L — SIGNIFICANT CHANGE UP (ref 0–41)
ANION GAP SERPL CALC-SCNC: 19 MMOL/L — HIGH (ref 7–14)
AST SERPL-CCNC: 12 U/L — SIGNIFICANT CHANGE UP (ref 0–41)
BASOPHILS # BLD AUTO: 0.02 K/UL — SIGNIFICANT CHANGE UP (ref 0–0.2)
BASOPHILS NFR BLD AUTO: 0.3 % — SIGNIFICANT CHANGE UP (ref 0–1)
BILIRUB SERPL-MCNC: 0.4 MG/DL — SIGNIFICANT CHANGE UP (ref 0.2–1.2)
BUN SERPL-MCNC: 22 MG/DL — HIGH (ref 10–20)
CALCIUM SERPL-MCNC: 10.8 MG/DL — HIGH (ref 8.5–10.1)
CHLORIDE SERPL-SCNC: 98 MMOL/L — SIGNIFICANT CHANGE UP (ref 98–110)
CO2 SERPL-SCNC: 28 MMOL/L — SIGNIFICANT CHANGE UP (ref 17–32)
CREAT SERPL-MCNC: 1 MG/DL — SIGNIFICANT CHANGE UP (ref 0.7–1.5)
EOSINOPHIL # BLD AUTO: 0.04 K/UL — SIGNIFICANT CHANGE UP (ref 0–0.7)
EOSINOPHIL NFR BLD AUTO: 0.6 % — SIGNIFICANT CHANGE UP (ref 0–8)
GLUCOSE SERPL-MCNC: 180 MG/DL — HIGH (ref 70–99)
HCT VFR BLD CALC: 47.8 % — HIGH (ref 37–47)
HGB BLD-MCNC: 14.4 G/DL — SIGNIFICANT CHANGE UP (ref 12–16)
IMM GRANULOCYTES NFR BLD AUTO: 0.1 % — SIGNIFICANT CHANGE UP (ref 0.1–0.3)
LYMPHOCYTES # BLD AUTO: 2.87 K/UL — SIGNIFICANT CHANGE UP (ref 1.2–3.4)
LYMPHOCYTES # BLD AUTO: 41.2 % — SIGNIFICANT CHANGE UP (ref 20.5–51.1)
MAGNESIUM SERPL-MCNC: 1.8 MG/DL — SIGNIFICANT CHANGE UP (ref 1.8–2.4)
MCHC RBC-ENTMCNC: 22.5 PG — LOW (ref 27–31)
MCHC RBC-ENTMCNC: 30.1 G/DL — LOW (ref 32–37)
MCV RBC AUTO: 74.8 FL — LOW (ref 81–99)
MONOCYTES # BLD AUTO: 0.5 K/UL — SIGNIFICANT CHANGE UP (ref 0.1–0.6)
MONOCYTES NFR BLD AUTO: 7.2 % — SIGNIFICANT CHANGE UP (ref 1.7–9.3)
NEUTROPHILS # BLD AUTO: 3.52 K/UL — SIGNIFICANT CHANGE UP (ref 1.4–6.5)
NEUTROPHILS NFR BLD AUTO: 50.6 % — SIGNIFICANT CHANGE UP (ref 42.2–75.2)
NRBC # BLD: 0 /100 WBCS — SIGNIFICANT CHANGE UP (ref 0–0)
PLATELET # BLD AUTO: 420 K/UL — HIGH (ref 130–400)
POTASSIUM SERPL-MCNC: 4.6 MMOL/L — SIGNIFICANT CHANGE UP (ref 3.5–5)
POTASSIUM SERPL-SCNC: 4.6 MMOL/L — SIGNIFICANT CHANGE UP (ref 3.5–5)
PROT SERPL-MCNC: 8.1 G/DL — HIGH (ref 6–8)
RBC # BLD: 6.39 M/UL — HIGH (ref 4.2–5.4)
RBC # FLD: 19 % — HIGH (ref 11.5–14.5)
SODIUM SERPL-SCNC: 145 MMOL/L — SIGNIFICANT CHANGE UP (ref 135–146)
WBC # BLD: 6.96 K/UL — SIGNIFICANT CHANGE UP (ref 4.8–10.8)
WBC # FLD AUTO: 6.96 K/UL — SIGNIFICANT CHANGE UP (ref 4.8–10.8)

## 2018-11-30 RX ORDER — DULOXETINE HYDROCHLORIDE 30 MG/1
0 CAPSULE, DELAYED RELEASE ORAL
Qty: 0 | Refills: 0 | COMMUNITY

## 2018-11-30 RX ORDER — MECLIZINE HCL 12.5 MG
25 TABLET ORAL ONCE
Qty: 0 | Refills: 0 | Status: COMPLETED | OUTPATIENT
Start: 2018-11-30 | End: 2018-11-30

## 2018-11-30 RX ORDER — MESALAMINE 400 MG
1 TABLET, DELAYED RELEASE (ENTERIC COATED) ORAL
Qty: 0 | Refills: 0 | COMMUNITY

## 2018-11-30 RX ORDER — SODIUM CHLORIDE 9 MG/ML
1000 INJECTION INTRAMUSCULAR; INTRAVENOUS; SUBCUTANEOUS ONCE
Qty: 0 | Refills: 0 | Status: COMPLETED | OUTPATIENT
Start: 2018-11-30 | End: 2018-11-30

## 2018-11-30 RX ADMIN — SODIUM CHLORIDE 1000 MILLILITER(S): 9 INJECTION INTRAMUSCULAR; INTRAVENOUS; SUBCUTANEOUS at 18:38

## 2018-11-30 RX ADMIN — Medication 25 MILLIGRAM(S): at 18:38

## 2018-11-30 NOTE — ED PROVIDER NOTE - OBJECTIVE STATEMENT
Patient c/o dizziness for several mos, Seen by Dr Martinez neuro multiple times, Work up neg, Saw Dr Martinez today. No HA, no chest pain, no fever or recent illness.   Patient Had FS of 350 today so came to ED for eval, Did not take her insulin after meal today.

## 2018-11-30 NOTE — ED PROVIDER NOTE - ATTENDING CONTRIBUTION TO CARE
Pt with chronic dizziness. Followed by neurology as outpatient. No new symptoms. No nystagmus on exam. No TM abnormality. No cardiac or respirtory symptoms. Consider central vs peripheral vertigo. Low suspicion for acute vascular abnormilty given chronic nature - Pt improved with meclizine. Ambulates without difficulty. Patient was given strict return and follow up precautions. The patient has been informed of all concerning signs and symptoms to return to Emergency Department, the necessity to follow up with PMD/Clinic/follow up provided within 2-3 days was explained, and the patient reports understanding of above with capacity and insight.

## 2018-11-30 NOTE — ED ADULT TRIAGE NOTE - CHIEF COMPLAINT QUOTE
BIBA via [employer] from home, patient complaining of dizziness x 3 months PTA, patient states she pressed the alert button because her blood glucose was elevated at 350.  patient states she has no other complaints at this time.

## 2018-11-30 NOTE — ED PROVIDER NOTE - NSFOLLOWUPINSTRUCTIONS_ED_ALL_ED_FT
take you meds as prescribed, Take you insulin after meals as prescribed . See you PMD this week for follow up.

## 2018-11-30 NOTE — ED ADULT NURSE NOTE - NSIMPLEMENTINTERV_GEN_ALL_ED
Implemented All Fall with Harm Risk Interventions:  [place] to call system. Call bell, personal items and telephone within reach. Instruct patient to call for assistance. Room bathroom lighting operational. Non-slip footwear when patient is off stretcher. Physically safe environment: no spills, clutter or unnecessary equipment. Stretcher in lowest position, wheels locked, appropriate side rails in place. Provide visual cue, wrist band, yellow gown, etc. Monitor gait and stability. Monitor for mental status changes and reorient to person, place, and time. Review medications for side effects contributing to fall risk. Reinforce activity limits and safety measures with patient and family. Provide visual clues: red socks.

## 2018-12-01 ENCOUNTER — INPATIENT (INPATIENT)
Facility: HOSPITAL | Age: 53
LOS: 4 days | Discharge: HOME HEALTH | End: 2018-12-06
Attending: INTERNAL MEDICINE | Admitting: INTERNAL MEDICINE

## 2018-12-01 VITALS
HEIGHT: 64 IN | SYSTOLIC BLOOD PRESSURE: 118 MMHG | RESPIRATION RATE: 20 BRPM | HEART RATE: 99 BPM | DIASTOLIC BLOOD PRESSURE: 65 MMHG | WEIGHT: 184.97 LBS | TEMPERATURE: 99 F | OXYGEN SATURATION: 98 %

## 2018-12-01 DIAGNOSIS — Z98.890 OTHER SPECIFIED POSTPROCEDURAL STATES: Chronic | ICD-10-CM

## 2018-12-01 RX ORDER — LEVOTHYROXINE SODIUM 125 MCG
100 TABLET ORAL DAILY
Qty: 0 | Refills: 0 | Status: DISCONTINUED | OUTPATIENT
Start: 2018-12-01 | End: 2018-12-06

## 2018-12-01 RX ORDER — METFORMIN HYDROCHLORIDE 850 MG/1
1000 TABLET ORAL
Qty: 0 | Refills: 0 | Status: DISCONTINUED | OUTPATIENT
Start: 2018-12-01 | End: 2018-12-02

## 2018-12-01 RX ORDER — CELECOXIB 200 MG/1
200 CAPSULE ORAL DAILY
Qty: 0 | Refills: 0 | Status: DISCONTINUED | OUTPATIENT
Start: 2018-12-01 | End: 2018-12-06

## 2018-12-01 RX ORDER — MIDODRINE HYDROCHLORIDE 2.5 MG/1
10 TABLET ORAL THREE TIMES A DAY
Qty: 0 | Refills: 0 | Status: DISCONTINUED | OUTPATIENT
Start: 2018-12-01 | End: 2018-12-06

## 2018-12-01 RX ORDER — PIOGLITAZONE HYDROCHLORIDE 15 MG/1
45 TABLET ORAL DAILY
Qty: 0 | Refills: 0 | Status: DISCONTINUED | OUTPATIENT
Start: 2018-12-01 | End: 2018-12-02

## 2018-12-01 RX ORDER — SODIUM CHLORIDE 9 MG/ML
1000 INJECTION INTRAMUSCULAR; INTRAVENOUS; SUBCUTANEOUS
Qty: 0 | Refills: 0 | Status: DISCONTINUED | OUTPATIENT
Start: 2018-12-01 | End: 2018-12-02

## 2018-12-01 RX ORDER — MECLIZINE HCL 12.5 MG
25 TABLET ORAL THREE TIMES A DAY
Qty: 0 | Refills: 0 | Status: DISCONTINUED | OUTPATIENT
Start: 2018-12-01 | End: 2018-12-06

## 2018-12-01 RX ORDER — MECLIZINE HCL 12.5 MG
25 TABLET ORAL ONCE
Qty: 0 | Refills: 0 | Status: COMPLETED | OUTPATIENT
Start: 2018-12-01 | End: 2018-12-01

## 2018-12-01 RX ORDER — DULOXETINE HYDROCHLORIDE 30 MG/1
60 CAPSULE, DELAYED RELEASE ORAL DAILY
Qty: 0 | Refills: 0 | Status: DISCONTINUED | OUTPATIENT
Start: 2018-12-01 | End: 2018-12-03

## 2018-12-01 RX ADMIN — MIDODRINE HYDROCHLORIDE 10 MILLIGRAM(S): 2.5 TABLET ORAL at 21:41

## 2018-12-01 RX ADMIN — Medication 15 MILLIGRAM(S): at 21:41

## 2018-12-01 RX ADMIN — SODIUM CHLORIDE 500 MILLILITER(S): 9 INJECTION INTRAMUSCULAR; INTRAVENOUS; SUBCUTANEOUS at 18:27

## 2018-12-01 RX ADMIN — SODIUM CHLORIDE 1000 MILLILITER(S): 9 INJECTION INTRAMUSCULAR; INTRAVENOUS; SUBCUTANEOUS at 20:10

## 2018-12-01 RX ADMIN — Medication 25 MILLIGRAM(S): at 21:41

## 2018-12-01 RX ADMIN — Medication 25 MILLIGRAM(S): at 02:31

## 2018-12-01 RX ADMIN — METFORMIN HYDROCHLORIDE 1000 MILLIGRAM(S): 850 TABLET ORAL at 21:41

## 2018-12-01 NOTE — ED CDU PROVIDER INITIAL DAY NOTE - ATTENDING CONTRIBUTION TO CARE
Pt placed into observation for dizziness. Had two episodes of near syncope yesterday. She has a history of DM, psych disorder, hypothyroidism, back pain, dizziness for the last 4 months, PT was evaluated by Dr. Aguiar of EP who did a tilt table test Nov 2018 which was positive and he prescribed Midodrine 5 mg TID. Pt states she is taking but still dizzy. Hx of diabetes for 27 years. Likely pt has autonomic dysfunction. On questioning pt states she lost 25 lbs over the last year.

## 2018-12-01 NOTE — ED PROVIDER NOTE - PROGRESS NOTE DETAILS
Discussed case with neurology.  recommends observation with psych consult.  will see pt this afternoon.

## 2018-12-01 NOTE — ED PROVIDER NOTE - ATTENDING CONTRIBUTION TO CARE
54 yo f with pmh of schizophrenia, dm, gerd, hypothyroidism, chronic dizziness, presents with c/o worsening dizziness x 2 days.  pt says she sees dr. llamas for neuro and had seen ent in the past.  [facility], but no explanation for dizziness.  reports dizziness worse with movement and feels her balance is off.  no cp, no sob, no headache, no neck pain, no abd pain, no n/v/d, no back pain, no leg swelling or pain.  exam: nad, ncat, perrl, eomi, mmm, rrr, ctab, abd soft, nt,nd, aox3, cn2-12 normal, 5/5 strength all ext, sensation intact, finger to nose normal, romberg neg, imp: pt with dizziness, will check labs and ct head 52 yo f with pmh of schizophrenia, dm, gerd, hypothyroidism, chronic dizziness, presents with c/o worsening dizziness x 2 days.  pt says she sees dr. llamas for neuro and had seen ent in the past.  had mri 3 months ago at [facility], but no explanation for dizziness.  reports dizziness worse with movement and feels her balance is off.  no cp, no sob, no headache, no neck pain, no abd pain, no n/v/d, no back pain, no leg swelling or pain.  tried 25mg of meclizine at home pta.  exam: nad, ncat, perrl, eomi, mmm, rrr, ctab, abd soft, nt,nd, aox3, cn2-12 normal, 5/5 strength all ext, sensation intact, finger to nose normal, romberg neg, imp: pt with dizziness, will check labs and ct head.  pt refuses to walk because feeling dizzy

## 2018-12-01 NOTE — CONSULT NOTE ADULT - ASSESSMENT
A 52 yo woman with multiple comorbidities came to ER 2x yesterday for chronic vertigo, started 3-4 mo ago, no improvement, was w/u by neurology, last visit was yesterday with Dr. Martinez, as per pt all w/o was neg. Pt is on polypharmacy including multiple psych meds. Dizziness is not changing with body position ( the same laying, moving, standing) no precipitating factors, no N/V, change in vision or speech, no focal neuro sxs. Non-focal neuro exam. Pt was on Midodrine in the past according to the records. Pt provides inconsistent hx (changing).    IMPRESSION:  - chronic dizziness/vertigo, prob side effect of polypharmacy, possibly psychogenic cause, r/o orthostatic BP changes, r/o cardiogenic causes, r/o vascular abnormality  - h/o depression, anxiety, schizophrenia, COPD, sciatica PN, hypothyroidism    АЛЕКСАНДР:  - orthostatic BP check  - MRA head w/o contrast  - cardiology eval  - change Meclizine to Phenergan ( if OK with psych)  - call psych to review meds  - f/u with neurology as OP after d/c  - please call us for any questions A 52 yo woman with multiple comorbidities came to ER 2x yesterday for chronic vertigo, started 3-4 mo ago, no improvement, was w/u by neurology, last visit was yesterday with Dr. Martinez, as per pt all w/o was neg. Pt is on polypharmacy including multiple psych meds. Dizziness is not changing with body position ( the same laying, moving, standing) no precipitating factors, no N/V, change in vision or speech, no focal neuro sxs. Non-focal neuro exam. Pt was on Midodrine in the past according to the records. Pt provides inconsistent hx (changing).    IMPRESSION:  - chronic dizziness/vertigo, prob side effect of polypharmacy, possibly psychogenic cause, r/o orthostatic BP changes, r/o cardiogenic causes, r/o vascular abnormality  - h/o depression, anxiety, schizophrenia, COPD, sciatica PN, hypothyroidism    АЛЕКСАНДР:  - orthostatic BP check  - MRA head w/o contrast  - cardiology eval  - change Meclizine to Phenergan ( if OK with psych) 25mg po bid prn  - call psych to review meds  - f/u with neurology as OP after d/c  - please call us for any questions

## 2018-12-01 NOTE — ED CDU PROVIDER INITIAL DAY NOTE - CHPI ED SYMPTOMS NEG
no numbness/no confusion/no vomiting/no change in level of consciousness/no loss of consciousness/no nausea/no blurred vision/no weakness

## 2018-12-01 NOTE — ED PROVIDER NOTE - NS ED ROS FT
Constitutional: See HPI.  Eyes: No visual changes, eye pain or discharge.   ENMT: No hearing changes, pain, discharge or infections.   Cardiac: No SOB or edema. No chest pain with exertion.  Respiratory: No cough or respiratory distress.   GI: No nausea, vomiting, diarrhea or abdominal pain.  : No dysuria, frequency or burning. No Discharge  MS: No myalgia, muscle weakness, joint pain or back pain.  Neuro: + dizziness. No headache or weakness. No LOC.  Skin: No skin rash.  Except as documented in the HPI, all other systems are negative.

## 2018-12-01 NOTE — ED PROVIDER NOTE - OBJECTIVE STATEMENT
53 y.o female with hx of schizophrenia, chronic dizziness presents to the ED for evaluation of dizziness x months.  Pt states that she has been dizzy for months which is currently being worked up by Dr. Martinez, neuro which includes negative CT/MRI.  Yesterday dizziness was worse so she saw Dr. Martinez who recommend no acute intervention.  Had blood glucose in the 200s so she went to [facility] ED with negative work up and was d/c.  Pt returns to ED tonight with same sxs.  Not worse in intensity.  Worse with movement of head.  States that gait in unsteady and does not feel safe walking.  Took meclizine 25 mg PTA.

## 2018-12-01 NOTE — ED PROVIDER NOTE - MEDICAL DECISION MAKING DETAILS
pt with chronic dizziness, says worse now, imaging and labs neg.  dr. anderson will see pt in obs today

## 2018-12-01 NOTE — ED PROVIDER NOTE - PHYSICAL EXAMINATION
CONST: Well appearing in NAD  EYES: PERRL, EOMI, Sclera and conjunctiva clear.  NECK: Non-tender, no meningeal signs, supple,   CARD: Normal S1 S2; Normal rate and rhythm  RESP: Equal BS B/L, No wheezes, rhonchi or rales. No distress  GI: Soft, non-tender, non-distended.  MS: Normal ROM in all extremities. No edema of lower extremities, no calf pain, radial pulses 2+ bilaterally  SKIN: Warm, dry, no acute rashes. Good turgor  NEURO: A&Ox3, CN II-XII intact, no focal deficits, no facial asymmetry, no pronator drift, normal finger to nose, no nystagmus, gross sensation intact, UE/LE strength 5/5, pt refuses to walk secondary to dizziness, no difficulty standing in place.

## 2018-12-01 NOTE — ED CDU PROVIDER INITIAL DAY NOTE - OBJECTIVE STATEMENT
53 y.o. female with pmx of dm, schizophrenia, gerd, copd, follows up with dr. llamas for dizziness x 3 months, states was recently at [facility] ER d/c home and returned to [place] ed for same complaint. as per ed initial eval states spoke to dr. llamas no workup immediate attention will see patient, recommend psych consultation.

## 2018-12-01 NOTE — CONSULT NOTE ADULT - SUBJECTIVE AND OBJECTIVE BOX
Neurology consult    SEJAL [id number]    HPI:  53 y.o female with hx of schizophrenia, chronic dizziness presents to the ED for evaluation of dizziness x months.  Pt states that she has been dizzy for months which is currently being worked up by Dr. Martinez, neuro which includes negative CT/MRI.  Yesterday dizziness was worse so she saw Dr. Martinez who recommend no acute intervention.  Had blood glucose in the 200s so she went to [facility] ED with negative work up and was d/c.  Pt returns to ED tonight with same sxs.  Not worse in intensity.  Worse with movement of head.  States that gait in unsteady and does not feel safe walking.  Took meclizine 25 mg PTA.    MEDICATIONS  ome Medications:   * Incomplete Medication History as of 04-Jul-2018 19:06 documented in Structured Notes  · 	predniSONE 50 mg oral tablet: 1 tab(s) orally once a day   · 	meclizine 25 mg oral tablet: 1 tab(s) orally 3 times a day  · 	busPIRone 15 mg oral tablet: 1 tab(s) orally 2 times a day  · 	atorvastatin 40 mg oral tablet: 1 tab(s) orally once a day  · 	levothyroxine 100 mcg (0.1 mg) oral tablet: 1 tab(s) orally once a day  · 	pioglitazone 45 mg oral tablet: 1 tab(s) orally once a day  · 	Apriso 0.375 g oral capsule, extended release: 1 tab(s) orally once a day (at bedtime)  · 	SUMAtriptan 100 mg oral tablet: 1 tab(s) orally once, As Needed  · 	traMADol 50 mg oral tablet:   · 	Cymbalta 60 mg oral delayed release capsule: orally once a day  · 	raNITIdine 150 mg oral tablet: orally once a day  · 	Amitiza 8 mcg oral capsule: orally once a day  · 	midodrine 5 mg oral tablet: orally 3 times a day  · 	meloxicam 7.5 mg oral tablet: 1 tab(s) orally once a day  · 	vitamnin d2:   · 	montelukast 10 mg oral tablet: 1 tab(s) orally once a day  · 	loperamide 2 mg oral capsule: orally once a day  · 	Invokana 300 mg oral tablet: 1 tab(s) orally once a day  · 	Feosol 325 mg (65 mg elemental iron) oral tablet: orally once a day  · 	folic acid 1 mg oral tablet: 1 tab(s) orally once a day  · 	Melatonin with Vitamin B6 oral tablet: 1 tab(s) orally once a day (at bedtime)  · 	Protonix 40 mg oral delayed release tablet: 1 tab(s) orally once a day  · 	pyridoxine 100 mg oral tablet: 2 tab(s) orally once a day  · 	Synthroid 150 mcg (0.15 mg) oral tablet: 1 tab(s) orally once a day  · 	Vitamin B12 100 mcg oral tablet: 1 tab(s) orally once a day  · 	cetirizine 10 mg oral tablet: 1 tab(s) orally once a day  · 	BuSpar 10 mg oral tablet: 1 tab(s) orally 2 times a day  · 	Haldol 2 mg oral tablet: 1 tab(s) orally 2 times a day  · 	metFORMIN 1000 mg oral tablet: 1 tab(s) orally 2 times a day  · 	gabapentin 300 mg oral capsule: orally 2 times a day  · 	Breo Ellipta 200 mcg-25 mcg/inh inhalation powder: 1 puff(s) inhaled once a day  · 	Incruse Ellipta 62.5 mcg/inh inhalation powder: inhaled once a day  · 	mirtazapine 15 mg oral tablet: 1 tab(s) orally once a day (at bedtime)  · 	ergocalciferol 50,000 intl units (1.25 mg) oral capsule: 1 cap(s) orally once a week  midodrine 10 milliGRAM(s) Oral three times a day  sodium chloride 0.9%. 1000 milliLiter(s) IV Continuous <Continuous>      PAST MEDICAL & SURGICAL HISTORY:  COPD (chronic obstructive pulmonary disease)  Depression  Anxiety  Schizophrenia  ANISHA (obstructive sleep apnea)  Polyneuropathy  Bladder disorder  Sciatica  Constipation  Iron deficiency  Hypercholesteremia  Hypothyroid  Diabetes mellitus, type 2  Vitamin D deficiency  GERD (gastroesophageal reflux disease)  History of cholecystectomy       Family history: No history of dementia, strokes, or seizures   FAMILY HISTORY:  No pertinent family history in first degree relatives    SOCIAL HISTORY --     Allergies:  ciprofloxacin (Unknown)  codeine (Unknown)  Fish Products (Unknown)  lactose (Unknown)  latex (Unknown)  penicillins (Unknown)  tetracycline (Unknown)  Zoloft (Other)    Height (cm): 162.56 (12-01 @ 01:16), 162.56 (11-30 @ 17:16)  Weight (kg): 83.9 (12-01 @ 01:16), 83.9 (11-30 @ 17:16)  BMI (kg/m2): 31.7 (12-01 @ 01:16), 31.7 (11-30 @ 17:16)    Vital Signs Last 24 Hrs  T(C): 37.1 (01 Dec 2018 07:14), Max: 37.1 (01 Dec 2018 01:16)  T(F): 98.8 (01 Dec 2018 07:14), Max: 98.8 (01 Dec 2018 01:16)  HR: 101 (01 Dec 2018 07:14) (93 - 101)  BP: 118/73 (01 Dec 2018 07:14) (113/59 - 118/73)  BP(mean): --  RR: 20 (01 Dec 2018 07:14) (19 - 20)  SpO2: 95% (01 Dec 2018 07:14) (95% - 98%)    ^DIZZY FAINT  No pertinent family history in first degree relatives  MEWS Score  COPD (chronic obstructive pulmonary disease)  Depression  Anxiety  Schizophrenia  ANISHA (obstructive sleep apnea)  Polyneuropathy  Bladder disorder  Sciatica  Constipation  Iron deficiency  Hypercholesteremia  Hypothyroid  Diabetes mellitus, type 2  Vitamin D deficiency  Dementia  GERD (gastroesophageal reflux disease)  Diabetes  No pertinent past medical history  Dizziness  History of cholecystectomy  DIZZY FAINT  0      REVIEW OF SYSTEMS:    Constitutional: No fever, chills, fatigue, weakness  Eyes: no eye pain, visual disturbances, or discharge  ENT:  No difficulty hearing, tinnitus, vertigo; No sinus or throat pain  Neck: No pain or stiffness  Respiratory: No cough, dyspnea, wheezing   Cardiovascular: No chest pain, palpitations,   Gastrointestinal: No abdominal or epigastric pain. No nausea, vomiting  No diarrhea or constipation.   Genitourinary: No dysuria, frequency, hematuria or incontinence  Neurological has lightheadedness, vertigo  Psychiatric: has mental d/o  Musculoskeletal: No joint pain or swelling; No muscle, back or extremity pain  Skin: No itching, burning, rashes or lesions   Lymph Nodes: No enlarged glands  Endocrine: No heat or cold intolerance; No hair loss, No h/o diabetes or thyroid dysfunction  Allergy and Immunologic: No hives or eczema    PHYSICAL EXAMINATION:  General:  Well-developed, well nourished, in no acute distress.  Eyes: Conjunctiva and sclera clear.  Cardiovascular: Regular rate and rhythm; S1 and S2 Normal; No murmurs, gallops or rubs.  Neurologic:  - Mental Status:  Alert, awake, oriented to person, place, and time; Speech is fluent with intact naming, repetition, and comprehension.  - Cranial Nerves II-XII:  PERRLA, EOMI, no nystagmus, face simmetrical, hearing intact to finger rub bl, tongue midline  - Motor:  Strength is 5/5 throughout.  There is no pronator drift.  Normal muscle bulk and tone throughout.  - Reflexes:  2+ symmetric throughout.  Plantar responses flexor.  - Sensory:  Intact to light touch, pin prick  sense throughout.  - Coordination:  Finger-nose-finger and heel-knee-shin intact without dysmetria.  Rapid alternating hand movements intact.  - Gait:  refused    LABS:  CBC Full  -  ( 30 Nov 2018 18:25 )  WBC Count : 6.96 K/uL  Hemoglobin : 14.4 g/dL  Hematocrit : 47.8 %  Platelet Count - Automated : 420 K/uL  Mean Cell Volume : 74.8 fL  Mean Cell Hemoglobin : 22.5 pg  Mean Cell Hemoglobin Concentration : 30.1 g/dL  Auto Neutrophil # : 3.52 K/uL  Auto Lymphocyte # : 2.87 K/uL  Auto Monocyte # : 0.50 K/uL  Auto Eosinophil # : 0.04 K/uL  Auto Basophil # : 0.02 K/uL  Auto Neutrophil % : 50.6 %  Auto Lymphocyte % : 41.2 %  Auto Monocyte % : 7.2 %  Auto Eosinophil % : 0.6 %  Auto Basophil % : 0.3 %      11-30    145  |  98  |  22<H>  ----------------------------<  180<H>  4.6   |  28  |  1.0    Ca    10.8<H>      30 Nov 2018 18:25  Mg     1.8     11-30    TPro  8.1<H>  /  Alb  5.2  /  TBili  0.4  /  DBili  x   /  AST  12  /  ALT  14  /  AlkPhos  85  11-30    Hemoglobin A1C:     LIVER FUNCTIONS - ( 30 Nov 2018 18:25 )  Alb: 5.2 g/dL / Pro: 8.1 g/dL / ALK PHOS: 85 U/L / ALT: 14 U/L / AST: 12 U/L / GGT: x           Vitamin B12       RADIOLOGY:  < from: CT Head No Cont (12.01.18 @ 03:30) >  No evidence of intracranial hemorrhage, territorial infarct, or mass   effect.  < end of copied text >    EKG:  < from: 12 Lead ECG (12.01.18 @ 01:19) >  Ventricular Rate 99 BPM    Atrial Rate 99 BPM    P-R Interval 122 ms    QRS Duration 78 ms    Q-T Interval 334 ms    QTC Calculation(Bezet) 428 ms    P Axis 47 degrees    R Axis 18 degrees    T Axis 57 degrees    Diagnosis Line Normal sinus rhythm  Nonspecific ST and T wave abnormality  Abnormal ECG    < end of copied text >

## 2018-12-01 NOTE — ED CDU PROVIDER INITIAL DAY NOTE - PROGRESS NOTE DETAILS
Psych called to inquire about seeing pt. Advised consult called earlier and pt should be seen . At this time I have not seen the pt. Pt complaining of chronic dizziness. no other complaints at this time. pending MRA.  will continue to monitor.

## 2018-12-01 NOTE — ED ADULT NURSE NOTE - NSIMPLEMENTINTERV_GEN_ALL_ED
Implemented All Fall with Harm Risk Interventions:  [place] to call system. Call bell, personal items and telephone within reach. Instruct patient to call for assistance. Room bathroom lighting operational. Non-slip footwear when patient is off stretcher. Physically safe environment: no spills, clutter or unnecessary equipment. Stretcher in lowest position, wheels locked, appropriate side rails in place. Provide visual cue, wrist band, yellow gown, etc. Monitor gait and stability. Monitor for mental status changes and reorient to person, place, and time. Review medications for side effects contributing to fall risk. Reinforce activity limits and safety measures with patient and family. Provide visual clues: red socks.

## 2018-12-01 NOTE — ED CDU PROVIDER INITIAL DAY NOTE - MEDICAL DECISION MAKING DETAILS
Spoke to Dr. Weiss> no neuro intervention. Advised psych to evaluate medications. I feel pt will likely need an increase in dose of midodrine, hydration.

## 2018-12-02 LAB — GLUCOSE BLDC GLUCOMTR-MCNC: 149 MG/DL — HIGH (ref 70–99)

## 2018-12-02 RX ORDER — CHLORHEXIDINE GLUCONATE 213 G/1000ML
1 SOLUTION TOPICAL
Qty: 0 | Refills: 0 | Status: DISCONTINUED | OUTPATIENT
Start: 2018-12-02 | End: 2018-12-06

## 2018-12-02 RX ORDER — PANTOPRAZOLE SODIUM 20 MG/1
40 TABLET, DELAYED RELEASE ORAL
Qty: 0 | Refills: 0 | Status: DISCONTINUED | OUTPATIENT
Start: 2018-12-02 | End: 2018-12-06

## 2018-12-02 RX ORDER — SUMATRIPTAN SUCCINATE 4 MG/.5ML
1 INJECTION, SOLUTION SUBCUTANEOUS
Qty: 0 | Refills: 0 | COMMUNITY

## 2018-12-02 RX ORDER — MIRTAZAPINE 45 MG/1
1 TABLET, ORALLY DISINTEGRATING ORAL
Qty: 0 | Refills: 0 | COMMUNITY

## 2018-12-02 RX ORDER — INSULIN LISPRO 100/ML
VIAL (ML) SUBCUTANEOUS
Qty: 0 | Refills: 0 | Status: DISCONTINUED | OUTPATIENT
Start: 2018-12-02 | End: 2018-12-06

## 2018-12-02 RX ORDER — UMECLIDINIUM 62.5 UG/1
0 AEROSOL, POWDER ORAL
Qty: 0 | Refills: 0 | COMMUNITY

## 2018-12-02 RX ORDER — GLUCAGON INJECTION, SOLUTION 0.5 MG/.1ML
1 INJECTION, SOLUTION SUBCUTANEOUS ONCE
Qty: 0 | Refills: 0 | Status: DISCONTINUED | OUTPATIENT
Start: 2018-12-02 | End: 2018-12-06

## 2018-12-02 RX ORDER — FOLIC ACID 0.8 MG
1 TABLET ORAL DAILY
Qty: 0 | Refills: 0 | Status: DISCONTINUED | OUTPATIENT
Start: 2018-12-02 | End: 2018-12-06

## 2018-12-02 RX ORDER — SODIUM CHLORIDE 9 MG/ML
1000 INJECTION, SOLUTION INTRAVENOUS
Qty: 0 | Refills: 0 | Status: DISCONTINUED | OUTPATIENT
Start: 2018-12-02 | End: 2018-12-06

## 2018-12-02 RX ORDER — DEXTROSE 50 % IN WATER 50 %
25 SYRINGE (ML) INTRAVENOUS ONCE
Qty: 0 | Refills: 0 | Status: DISCONTINUED | OUTPATIENT
Start: 2018-12-02 | End: 2018-12-06

## 2018-12-02 RX ORDER — FERROUS SULFATE 325(65) MG
325 TABLET ORAL DAILY
Qty: 0 | Refills: 0 | Status: DISCONTINUED | OUTPATIENT
Start: 2018-12-02 | End: 2018-12-06

## 2018-12-02 RX ORDER — PREGABALIN 225 MG/1
1 CAPSULE ORAL
Qty: 0 | Refills: 0 | COMMUNITY

## 2018-12-02 RX ORDER — INSULIN LISPRO 100/ML
5 VIAL (ML) SUBCUTANEOUS
Qty: 0 | Refills: 0 | Status: DISCONTINUED | OUTPATIENT
Start: 2018-12-02 | End: 2018-12-06

## 2018-12-02 RX ORDER — MESALAMINE 400 MG
400 TABLET, DELAYED RELEASE (ENTERIC COATED) ORAL AT BEDTIME
Qty: 0 | Refills: 0 | Status: DISCONTINUED | OUTPATIENT
Start: 2018-12-02 | End: 2018-12-06

## 2018-12-02 RX ORDER — ENOXAPARIN SODIUM 100 MG/ML
40 INJECTION SUBCUTANEOUS DAILY
Qty: 0 | Refills: 0 | Status: DISCONTINUED | OUTPATIENT
Start: 2018-12-02 | End: 2018-12-06

## 2018-12-02 RX ORDER — HALOPERIDOL DECANOATE 100 MG/ML
1 INJECTION INTRAMUSCULAR
Qty: 0 | Refills: 0 | COMMUNITY

## 2018-12-02 RX ORDER — TRAMADOL HYDROCHLORIDE 50 MG/1
0 TABLET ORAL
Qty: 0 | Refills: 0 | COMMUNITY

## 2018-12-02 RX ORDER — DEXTROSE 50 % IN WATER 50 %
12.5 SYRINGE (ML) INTRAVENOUS ONCE
Qty: 0 | Refills: 0 | Status: DISCONTINUED | OUTPATIENT
Start: 2018-12-02 | End: 2018-12-06

## 2018-12-02 RX ORDER — FLUTICASONE FUROATE AND VILANTEROL TRIFENATATE 100; 25 UG/1; UG/1
1 POWDER RESPIRATORY (INHALATION)
Qty: 0 | Refills: 0 | COMMUNITY

## 2018-12-02 RX ORDER — ATORVASTATIN CALCIUM 80 MG/1
40 TABLET, FILM COATED ORAL AT BEDTIME
Qty: 0 | Refills: 0 | Status: DISCONTINUED | OUTPATIENT
Start: 2018-12-02 | End: 2018-12-06

## 2018-12-02 RX ORDER — TITANIUM DIOXIDE, OCTINOXATE, ZINC OXIDE 4.61; 1.6; .78 G/40ML; G/40ML; G/40ML
1 CREAM TOPICAL
Qty: 0 | Refills: 0 | COMMUNITY

## 2018-12-02 RX ORDER — MONTELUKAST 4 MG/1
10 TABLET, CHEWABLE ORAL DAILY
Qty: 0 | Refills: 0 | Status: DISCONTINUED | OUTPATIENT
Start: 2018-12-02 | End: 2018-12-06

## 2018-12-02 RX ORDER — GABAPENTIN 400 MG/1
0 CAPSULE ORAL
Qty: 0 | Refills: 0 | COMMUNITY

## 2018-12-02 RX ORDER — LOPERAMIDE HCL 2 MG
2 TABLET ORAL DAILY
Qty: 0 | Refills: 0 | Status: DISCONTINUED | OUTPATIENT
Start: 2018-12-02 | End: 2018-12-06

## 2018-12-02 RX ORDER — DEXTROSE 50 % IN WATER 50 %
15 SYRINGE (ML) INTRAVENOUS ONCE
Qty: 0 | Refills: 0 | Status: DISCONTINUED | OUTPATIENT
Start: 2018-12-02 | End: 2018-12-06

## 2018-12-02 RX ORDER — PREGABALIN 225 MG/1
1000 CAPSULE ORAL DAILY
Qty: 0 | Refills: 0 | Status: DISCONTINUED | OUTPATIENT
Start: 2018-12-02 | End: 2018-12-06

## 2018-12-02 RX ORDER — LEVOTHYROXINE SODIUM 125 MCG
1 TABLET ORAL
Qty: 0 | Refills: 0 | COMMUNITY

## 2018-12-02 RX ORDER — IPRATROPIUM/ALBUTEROL SULFATE 18-103MCG
3 AEROSOL WITH ADAPTER (GRAM) INHALATION EVERY 6 HOURS
Qty: 0 | Refills: 0 | Status: DISCONTINUED | OUTPATIENT
Start: 2018-12-02 | End: 2018-12-06

## 2018-12-02 RX ORDER — PYRIDOXINE HCL (VITAMIN B6) 100 MG
2 TABLET ORAL
Qty: 0 | Refills: 0 | COMMUNITY

## 2018-12-02 RX ORDER — SODIUM CHLORIDE 9 MG/ML
1000 INJECTION INTRAMUSCULAR; INTRAVENOUS; SUBCUTANEOUS
Qty: 0 | Refills: 0 | Status: DISCONTINUED | OUTPATIENT
Start: 2018-12-02 | End: 2018-12-02

## 2018-12-02 RX ORDER — INSULIN GLARGINE 100 [IU]/ML
15 INJECTION, SOLUTION SUBCUTANEOUS AT BEDTIME
Qty: 0 | Refills: 0 | Status: DISCONTINUED | OUTPATIENT
Start: 2018-12-03 | End: 2018-12-06

## 2018-12-02 RX ADMIN — ATORVASTATIN CALCIUM 40 MILLIGRAM(S): 80 TABLET, FILM COATED ORAL at 22:47

## 2018-12-02 RX ADMIN — SODIUM CHLORIDE 500 MILLILITER(S): 9 INJECTION INTRAMUSCULAR; INTRAVENOUS; SUBCUTANEOUS at 07:07

## 2018-12-02 RX ADMIN — Medication 25 MILLIGRAM(S): at 22:47

## 2018-12-02 RX ADMIN — MIDODRINE HYDROCHLORIDE 10 MILLIGRAM(S): 2.5 TABLET ORAL at 22:47

## 2018-12-02 RX ADMIN — Medication 15 MILLIGRAM(S): at 06:23

## 2018-12-02 RX ADMIN — Medication 25 MILLIGRAM(S): at 06:23

## 2018-12-02 RX ADMIN — SODIUM CHLORIDE 1000 MILLILITER(S): 9 INJECTION INTRAMUSCULAR; INTRAVENOUS; SUBCUTANEOUS at 07:07

## 2018-12-02 RX ADMIN — SODIUM CHLORIDE 250 MILLILITER(S): 9 INJECTION INTRAMUSCULAR; INTRAVENOUS; SUBCUTANEOUS at 18:39

## 2018-12-02 RX ADMIN — Medication 100 MICROGRAM(S): at 06:23

## 2018-12-02 RX ADMIN — MIDODRINE HYDROCHLORIDE 10 MILLIGRAM(S): 2.5 TABLET ORAL at 13:43

## 2018-12-02 RX ADMIN — Medication 25 MILLIGRAM(S): at 11:30

## 2018-12-02 RX ADMIN — DULOXETINE HYDROCHLORIDE 60 MILLIGRAM(S): 30 CAPSULE, DELAYED RELEASE ORAL at 11:29

## 2018-12-02 RX ADMIN — Medication 400 MILLIGRAM(S): at 22:47

## 2018-12-02 RX ADMIN — PIOGLITAZONE HYDROCHLORIDE 45 MILLIGRAM(S): 15 TABLET ORAL at 11:29

## 2018-12-02 RX ADMIN — Medication 15 MILLIGRAM(S): at 18:32

## 2018-12-02 RX ADMIN — MIDODRINE HYDROCHLORIDE 10 MILLIGRAM(S): 2.5 TABLET ORAL at 06:23

## 2018-12-02 RX ADMIN — METFORMIN HYDROCHLORIDE 1000 MILLIGRAM(S): 850 TABLET ORAL at 06:23

## 2018-12-02 RX ADMIN — CELECOXIB 200 MILLIGRAM(S): 200 CAPSULE ORAL at 11:29

## 2018-12-02 NOTE — ED CDU PROVIDER DISPOSITION NOTE - CLINICAL COURSE
Pt placed into observation for lightheadedness. While in observation pt was on continuous cardiac monitoring. Pt on repeated checks became dizzy on standing. NO chest pain. EKG low voltage. Hx of + tilt table test. Pt states she passed out twice this Saturday.

## 2018-12-02 NOTE — ED ADULT NURSE REASSESSMENT NOTE - NS ED NURSE REASSESS COMMENT FT1
Patient is alert,oriented . MAR  at bedside doing interview . Patet is admitted, waiting for admitted bed

## 2018-12-02 NOTE — H&P ADULT - PMH
Anxiety    Bladder disorder    Constipation    COPD (chronic obstructive pulmonary disease)    Crohn disease    Depression    Diabetes mellitus, type 2    GERD (gastroesophageal reflux disease)    Hypercholesteremia    Hypothyroid    Iron deficiency    Migraine    ANISHA (obstructive sleep apnea)    Polyneuropathy    Schizophrenia    Sciatica    Vitamin D deficiency

## 2018-12-02 NOTE — ED CDU PROVIDER SUBSEQUENT DAY NOTE - HISTORY
2 MN pt here for dizziness. Given recommendations from neuro.  waiting for psych input.  MRI preformed.  waiting for read.  pt still complaining of dizziness with no nystagmus and is not made worse with moving head.  refusing to ambulate.

## 2018-12-02 NOTE — H&P ADULT - HISTORY OF PRESENT ILLNESS
53F with PMHx, DMII, hypothyroidism, ANISHA on CPAP, depression, gastritis, COPD, crohns disease, chronic back pain, migraines, chronic dizziness presents for syncopal episode on Saturday. Patient states she had 3 episodes of syncope after standing. Each episode lasted around 10 minutes. She did not have any preceding symptoms. She has not had these episodes in the past. She also admits to feeling unsteady while ambulating. She denies any dizziness at this time. She also denies any fevers, chills, nausea, vomiting, headaches, palpitations, visual changes. Of note she was recently evaluated by cardiology and neurology for dizziness, with tilt table test completed on 11/14 which was positive. She also had recent MRI/CT scans as per neurology which was negative.

## 2018-12-02 NOTE — H&P ADULT - NSHPLABSRESULTS_GEN_ALL_CORE
Vitals:    Vital Signs Last 24 Hrs  T(C): 36.8 (02 Dec 2018 19:36), Max: 36.8 (02 Dec 2018 19:36)  T(F): 98.2 (02 Dec 2018 19:36), Max: 98.2 (02 Dec 2018 19:36)  HR: 78 (02 Dec 2018 19:36) (58 - 78)  BP: 100/50 (02 Dec 2018 19:36) (100/50 - 111/57)  BP(mean): --  RR: 18 (02 Dec 2018 19:36) (18 - 18)  SpO2: 98% (02 Dec 2018 19:36) (98% - 98%)    Labs:     Comprehensive Metabolic Panel (11.30.18 @ 18:25)    Sodium, Serum: 145 mmol/L    Potassium, Serum: 4.6 mmol/L    Chloride, Serum: 98 mmol/L    Carbon Dioxide, Serum: 28 mmol/L    Anion Gap, Serum: 19 mmol/L    Blood Urea Nitrogen, Serum: 22 mg/dL    Creatinine, Serum: 1.0 mg/dL    Glucose, Serum: 180 mg/dL    Calcium, Total Serum: 10.8 mg/dL    Protein Total, Serum: 8.1 g/dL    Albumin, Serum: 5.2 g/dL    Bilirubin Total, Serum: 0.4 mg/dL    Alkaline Phosphatase, Serum: 85 U/L    Aspartate Aminotransferase (AST/SGOT): 12 U/L    Alanine Aminotransferase (ALT/SGPT): 14 U/L    Complete Blood Count + Automated Diff (11.30.18 @ 18:25)    WBC Count: 6.96 K/uL    RBC Count: 6.39 M/uL    Hemoglobin: 14.4 g/dL    Hematocrit: 47.8 %    Mean Cell Volume: 74.8 fL    Mean Cell Hemoglobin: 22.5 pg    Mean Cell Hemoglobin Conc: 30.1 g/dL    Red Cell Distrib Width: 19.0 %    Platelet Count - Automated: 420 K/uL    Auto Neutrophil #: 3.52 K/uL    Auto Lymphocyte #: 2.87 K/uL    Auto Monocyte #: 0.50 K/uL    Auto Eosinophil #: 0.04 K/uL    Auto Basophil #: 0.02 K/uL    Auto Neutrophil %: 50.6: Differential percentages must be correlated with absolute numbers for  clinical significance. %    Auto Lymphocyte %: 41.2 %    Auto Monocyte %: 7.2 %    Auto Eosinophil %: 0.6 %    Auto Basophil %: 0.3 %    Auto Immature Granulocyte %: 0.1 %    RADIOLOGY    < from: CT Head No Cont (12.01.18 @ 03:30) >    Impression:       No evidence of intracranial hemorrhage, territorial infarct, or mass   effect.      < end of copied text >        < from: MR Angio Head No Cont (12.01.18 @ 22:07) >    IMPRESSION:     Unremarkable MRA of the brain without contrast.    < end of copied text >

## 2018-12-02 NOTE — H&P ADULT - ATTENDING COMMENTS
53F with PMH of DMII, COPD, hypothyroidism, ANISHA on CPAP, depression, Crohn's disease, chronic back pain, migraines, chronic dizziness presents for syncopal episode on Saturday. Patient states she had 3 episodes of syncope in the AM after standing. She did not have any preceding symptoms. She denies seizure like activity, tongue bite, urinary or fecal incontinence. She has not had these episodes in the past. She also admits to feeling unsteady while ambulating. She denies fevers, chills, nausea, vomiting, headaches, palpitations, visual changes. Of note she was recently evaluated by cardiology and neurology for dizziness, with tilt table test completed on 11/14 which was positive. She also had recent MRI/CT scans as per neurology which was negative.    PHYSICAL EXAM:  GENERAL: NAD, well-developed, obese.   HEAD:  Atraumatic, Normocephalic  EYES: EOMI, PERRLA, conjunctiva and sclera clear  NECK: Supple, No JVD  CHEST/LUNG: Clear to auscultation bilaterally; No wheeze  HEART: Regular rate and rhythm; No murmurs, rubs, or gallops  ABDOMEN: Soft, Nontender, Nondistended; Bowel sounds present  EXTREMITIES:  2+ Peripheral Pulses, No clubbing, cyanosis, or edema  PSYCH: AAOx3  NEUROLOGY: non-focal    A/P:   1. Syncopal Episodes: story is unclear, unwitnessed episodes, unsure if real syncope.   Chronic Dizziness:   Heat CT negative, normal Neuro exam, normal EKG, No orthostatic changes.   Possible Vasovagal, she had positive Tilt test in the past.   Neurology follow up  Psych follow up for possible Psych medications adjustment.  Continue her medications for DM, COPD

## 2018-12-03 DIAGNOSIS — F25.9 SCHIZOAFFECTIVE DISORDER, UNSPECIFIED: ICD-10-CM

## 2018-12-03 DIAGNOSIS — F41.1 GENERALIZED ANXIETY DISORDER: ICD-10-CM

## 2018-12-03 LAB
ANION GAP SERPL CALC-SCNC: 12 MMOL/L — SIGNIFICANT CHANGE UP (ref 7–14)
BASOPHILS # BLD AUTO: 0.02 K/UL — SIGNIFICANT CHANGE UP (ref 0–0.2)
BASOPHILS NFR BLD AUTO: 0.3 % — SIGNIFICANT CHANGE UP (ref 0–1)
BUN SERPL-MCNC: 12 MG/DL — SIGNIFICANT CHANGE UP (ref 10–20)
CALCIUM SERPL-MCNC: 9.6 MG/DL — SIGNIFICANT CHANGE UP (ref 8.5–10.1)
CHLORIDE SERPL-SCNC: 104 MMOL/L — SIGNIFICANT CHANGE UP (ref 98–110)
CO2 SERPL-SCNC: 27 MMOL/L — SIGNIFICANT CHANGE UP (ref 17–32)
CREAT SERPL-MCNC: 0.5 MG/DL — LOW (ref 0.7–1.5)
EOSINOPHIL # BLD AUTO: 0.16 K/UL — SIGNIFICANT CHANGE UP (ref 0–0.7)
EOSINOPHIL NFR BLD AUTO: 2.6 % — SIGNIFICANT CHANGE UP (ref 0–8)
ESTIMATED AVERAGE GLUCOSE: 157 MG/DL — HIGH (ref 68–114)
GLUCOSE BLDC GLUCOMTR-MCNC: 156 MG/DL — HIGH (ref 70–99)
GLUCOSE BLDC GLUCOMTR-MCNC: 159 MG/DL — HIGH (ref 70–99)
GLUCOSE BLDC GLUCOMTR-MCNC: 173 MG/DL — HIGH (ref 70–99)
GLUCOSE BLDC GLUCOMTR-MCNC: 179 MG/DL — HIGH (ref 70–99)
GLUCOSE BLDC GLUCOMTR-MCNC: 262 MG/DL — HIGH (ref 70–99)
GLUCOSE SERPL-MCNC: 155 MG/DL — HIGH (ref 70–99)
HBA1C BLD-MCNC: 7.1 % — HIGH (ref 4–5.6)
HCT VFR BLD CALC: 41 % — SIGNIFICANT CHANGE UP (ref 37–47)
HGB BLD-MCNC: 12.6 G/DL — SIGNIFICANT CHANGE UP (ref 12–16)
IMM GRANULOCYTES NFR BLD AUTO: 0.2 % — SIGNIFICANT CHANGE UP (ref 0.1–0.3)
LYMPHOCYTES # BLD AUTO: 2.31 K/UL — SIGNIFICANT CHANGE UP (ref 1.2–3.4)
LYMPHOCYTES # BLD AUTO: 37.7 % — SIGNIFICANT CHANGE UP (ref 20.5–51.1)
MCHC RBC-ENTMCNC: 22.5 PG — LOW (ref 27–31)
MCHC RBC-ENTMCNC: 30.7 G/DL — LOW (ref 32–37)
MCV RBC AUTO: 73.3 FL — LOW (ref 81–99)
MONOCYTES # BLD AUTO: 0.38 K/UL — SIGNIFICANT CHANGE UP (ref 0.1–0.6)
MONOCYTES NFR BLD AUTO: 6.2 % — SIGNIFICANT CHANGE UP (ref 1.7–9.3)
NEUTROPHILS # BLD AUTO: 3.24 K/UL — SIGNIFICANT CHANGE UP (ref 1.4–6.5)
NEUTROPHILS NFR BLD AUTO: 53 % — SIGNIFICANT CHANGE UP (ref 42.2–75.2)
NRBC # BLD: 0 /100 WBCS — SIGNIFICANT CHANGE UP (ref 0–0)
PLATELET # BLD AUTO: 349 K/UL — SIGNIFICANT CHANGE UP (ref 130–400)
POTASSIUM SERPL-MCNC: 4.2 MMOL/L — SIGNIFICANT CHANGE UP (ref 3.5–5)
POTASSIUM SERPL-SCNC: 4.2 MMOL/L — SIGNIFICANT CHANGE UP (ref 3.5–5)
RBC # BLD: 5.59 M/UL — HIGH (ref 4.2–5.4)
RBC # FLD: 19.1 % — HIGH (ref 11.5–14.5)
SODIUM SERPL-SCNC: 143 MMOL/L — SIGNIFICANT CHANGE UP (ref 135–146)
WBC # BLD: 6.12 K/UL — SIGNIFICANT CHANGE UP (ref 4.8–10.8)
WBC # FLD AUTO: 6.12 K/UL — SIGNIFICANT CHANGE UP (ref 4.8–10.8)

## 2018-12-03 RX ORDER — DULOXETINE HYDROCHLORIDE 30 MG/1
30 CAPSULE, DELAYED RELEASE ORAL DAILY
Qty: 0 | Refills: 0 | Status: DISCONTINUED | OUTPATIENT
Start: 2018-12-04 | End: 2018-12-06

## 2018-12-03 RX ORDER — INSULIN GLARGINE 100 [IU]/ML
15 INJECTION, SOLUTION SUBCUTANEOUS ONCE
Qty: 0 | Refills: 0 | Status: COMPLETED | OUTPATIENT
Start: 2018-12-03 | End: 2018-12-03

## 2018-12-03 RX ORDER — HALOPERIDOL DECANOATE 100 MG/ML
2 INJECTION INTRAMUSCULAR
Qty: 0 | Refills: 0 | Status: DISCONTINUED | OUTPATIENT
Start: 2018-12-03 | End: 2018-12-06

## 2018-12-03 RX ADMIN — Medication 5 UNIT(S): at 09:02

## 2018-12-03 RX ADMIN — INSULIN GLARGINE 15 UNIT(S): 100 INJECTION, SOLUTION SUBCUTANEOUS at 00:32

## 2018-12-03 RX ADMIN — Medication 15 MILLIGRAM(S): at 17:59

## 2018-12-03 RX ADMIN — Medication 1: at 12:09

## 2018-12-03 RX ADMIN — ATORVASTATIN CALCIUM 40 MILLIGRAM(S): 80 TABLET, FILM COATED ORAL at 21:30

## 2018-12-03 RX ADMIN — Medication 1 MILLIGRAM(S): at 12:00

## 2018-12-03 RX ADMIN — CELECOXIB 200 MILLIGRAM(S): 200 CAPSULE ORAL at 12:00

## 2018-12-03 RX ADMIN — Medication 1: at 09:03

## 2018-12-03 RX ADMIN — Medication 25 MILLIGRAM(S): at 14:54

## 2018-12-03 RX ADMIN — Medication 15 MILLIGRAM(S): at 06:37

## 2018-12-03 RX ADMIN — PREGABALIN 1000 MICROGRAM(S): 225 CAPSULE ORAL at 12:00

## 2018-12-03 RX ADMIN — ENOXAPARIN SODIUM 40 MILLIGRAM(S): 100 INJECTION SUBCUTANEOUS at 12:01

## 2018-12-03 RX ADMIN — DULOXETINE HYDROCHLORIDE 60 MILLIGRAM(S): 30 CAPSULE, DELAYED RELEASE ORAL at 12:00

## 2018-12-03 RX ADMIN — Medication 25 MILLIGRAM(S): at 06:28

## 2018-12-03 RX ADMIN — Medication 1: at 17:59

## 2018-12-03 RX ADMIN — MIDODRINE HYDROCHLORIDE 10 MILLIGRAM(S): 2.5 TABLET ORAL at 21:30

## 2018-12-03 RX ADMIN — Medication 2 MILLIGRAM(S): at 12:00

## 2018-12-03 RX ADMIN — Medication 25 MILLIGRAM(S): at 21:30

## 2018-12-03 RX ADMIN — Medication 325 MILLIGRAM(S): at 12:01

## 2018-12-03 RX ADMIN — MIDODRINE HYDROCHLORIDE 10 MILLIGRAM(S): 2.5 TABLET ORAL at 14:54

## 2018-12-03 RX ADMIN — PANTOPRAZOLE SODIUM 40 MILLIGRAM(S): 20 TABLET, DELAYED RELEASE ORAL at 06:30

## 2018-12-03 RX ADMIN — HALOPERIDOL DECANOATE 2 MILLIGRAM(S): 100 INJECTION INTRAMUSCULAR at 17:59

## 2018-12-03 RX ADMIN — MIDODRINE HYDROCHLORIDE 10 MILLIGRAM(S): 2.5 TABLET ORAL at 06:27

## 2018-12-03 RX ADMIN — Medication 5 UNIT(S): at 12:08

## 2018-12-03 RX ADMIN — Medication 5 UNIT(S): at 17:58

## 2018-12-03 RX ADMIN — Medication 400 MILLIGRAM(S): at 21:30

## 2018-12-03 RX ADMIN — INSULIN GLARGINE 15 UNIT(S): 100 INJECTION, SOLUTION SUBCUTANEOUS at 21:29

## 2018-12-03 RX ADMIN — MONTELUKAST 10 MILLIGRAM(S): 4 TABLET, CHEWABLE ORAL at 12:09

## 2018-12-03 RX ADMIN — Medication 100 MICROGRAM(S): at 06:28

## 2018-12-03 NOTE — BEHAVIORAL HEALTH ASSESSMENT NOTE - RISK ASSESSMENT
Patient is at elevated risk given history of mental illness and history of inpatient admissions mitigated by lack of current mood episode, lack of acute thought disorder, positive therapeutic alliance, compliance with outpatient medications and willingness to seek ED services.

## 2018-12-03 NOTE — BEHAVIORAL HEALTH ASSESSMENT NOTE - DIFFERENTIAL
Schizoaffective DO   Depression  Schizophrenia Schizoaffective DO   Generalized Anxiety Disorder   Depression  Schizophrenia

## 2018-12-03 NOTE — BEHAVIORAL HEALTH ASSESSMENT NOTE - DETAILS
Patient reports history of suicidal thoughts-denies any attempts +Dizziness Stomach pain, constipation "My legs are always restless"

## 2018-12-03 NOTE — CONSULT NOTE ADULT - SUBJECTIVE AND OBJECTIVE BOX
Date of Admission:    CHIEF COMPLAINT:    HISTORY OF PRESENT ILLNESS: 53F with PMHx, DMII, hypothyroidism, ANISHA on CPAP, depression, gastritis, COPD, crohns disease, chronic back pain, migraines, chronic dizziness presents for syncopal episode on Saturday. Patient states she had 3 episodes of syncope after standing. Each episode lasted around 10 minutes. She did not have any preceding symptoms. She has not had these episodes in the past. She also admits to feeling unsteady while ambulating. She denies any dizziness at this time. She also denies any fevers, chills, nausea, vomiting, headaches, palpitations, visual changes. Of note she was recently evaluated by cardiology and neurology for dizziness, with tilt table test completed on 11/14 which was positive. She also had recent MRI/CT scans as per neurology which was negative.       PAST MEDICAL & SURGICAL HISTORY:  Migraine  Crohn disease  COPD (chronic obstructive pulmonary disease)  Depression  Anxiety  Schizophrenia  ANISHA (obstructive sleep apnea)  Polyneuropathy  Bladder disorder  Sciatica  Constipation  Iron deficiency  Hypercholesteremia  Hypothyroid  Diabetes mellitus, type 2  Vitamin D deficiency  GERD (gastroesophageal reflux disease)  History of cholecystectomy    HEALTH ISSUES - PROBLEM Dx:  Generalized anxiety disorder  Schizoaffective disorder        FAMILY HISTORY:  No pertinent family history in first degree relatives    Allergies    ciprofloxacin (Unknown)  codeine (Unknown)  Fish Products (Unknown)  lactose (Unknown)  latex (Unknown)  penicillins (Unknown)  tetracycline (Unknown)  Zoloft (Other)    Intolerances    	  Home Medications:  Amitiza 8 mcg oral capsule: orally once a day (30 Nov 2018 17:31)  Apriso 0.375 g oral capsule, extended release: 1 tab(s) orally once a day (at bedtime) (30 Nov 2018 17:27)  atorvastatin 40 mg oral tablet: 1 tab(s) orally once a day (30 Nov 2018 17:25)  busPIRone 15 mg oral tablet: 1 tab(s) orally 2 times a day (30 Nov 2018 17:25)  cetirizine 10 mg oral tablet: 1 tab(s) orally once a day (04 Jul 2018 19:06)  Cymbalta 60 mg oral delayed release capsule: orally once a day (30 Nov 2018 17:30)  ergocalciferol 50,000 intl units (1.25 mg) oral capsule: 1 cap(s) orally once a week (04 Jul 2018 19:06)  Feosol 325 mg (65 mg elemental iron) oral tablet: orally once a day (04 Jul 2018 19:06)  folic acid 1 mg oral tablet: 1 tab(s) orally once a day (04 Jul 2018 19:06)  Invokana 300 mg oral tablet: 1 tab(s) orally once a day (30 Nov 2018 17:35)  levothyroxine 100 mcg (0.1 mg) oral tablet: 1 tab(s) orally once a day (30 Nov 2018 17:26)  loperamide 2 mg oral capsule: orally once a day (30 Nov 2018 17:34)  meclizine 25 mg oral tablet: 1 tab(s) orally 3 times a day (30 Nov 2018 17:24)  meloxicam 7.5 mg oral tablet: 1 tab(s) orally once a day (30 Nov 2018 17:32)  metFORMIN 1000 mg oral tablet: 1 tab(s) orally 2 times a day (04 Jul 2018 19:06)  midodrine 5 mg oral tablet: orally 3 times a day (30 Nov 2018 17:31)  montelukast 10 mg oral tablet: 1 tab(s) orally once a day (30 Nov 2018 17:33)  pioglitazone 45 mg oral tablet: 1 tab(s) orally once a day (30 Nov 2018 17:26)  Protonix 40 mg oral delayed release tablet: 1 tab(s) orally once a day (04 Jul 2018 19:06)  raNITIdine 150 mg oral tablet: orally once a day (30 Nov 2018 17:30)  SUMAtriptan 100 mg oral tablet: 1 tab(s) orally every 2 hours, As Needed (02 Dec 2018 20:38)  traMADol 50 mg oral tablet: orally every 6 hours, As Needed (02 Dec 2018 20:38)  Vitamin B12 1000 mcg oral tablet: 1 tab(s) orally once a day (02 Dec 2018 20:47)    MEDICATIONS  (STANDING):  atorvastatin 40 milliGRAM(s) Oral at bedtime  busPIRone 15 milliGRAM(s) Oral two times a day  celecoxib 200 milliGRAM(s) Oral daily  chlorhexidine 4% Liquid 1 Application(s) Topical <User Schedule>  cyanocobalamin 1000 MICROGram(s) Oral daily  dextrose 5%. 1000 milliLiter(s) (50 mL/Hr) IV Continuous <Continuous>  dextrose 50% Injectable 12.5 Gram(s) IV Push once  dextrose 50% Injectable 25 Gram(s) IV Push once  dextrose 50% Injectable 25 Gram(s) IV Push once  enoxaparin Injectable 40 milliGRAM(s) SubCutaneous daily  ferrous    sulfate 325 milliGRAM(s) Oral daily  folic acid 1 milliGRAM(s) Oral daily  haloperidol     Tablet 2 milliGRAM(s) Oral two times a day  insulin glargine Injectable (LANTUS) 15 Unit(s) SubCutaneous at bedtime  insulin lispro (HumaLOG) corrective regimen sliding scale   SubCutaneous three times a day before meals  insulin lispro Injectable (HumaLOG) 5 Unit(s) SubCutaneous three times a day before meals  levothyroxine 100 MICROGram(s) Oral daily  loperamide 2 milliGRAM(s) Oral daily  meclizine 25 milliGRAM(s) Oral three times a day  mesalamine DR Capsule 400 milliGRAM(s) Oral at bedtime  midodrine 10 milliGRAM(s) Oral three times a day  montelukast 10 milliGRAM(s) Oral daily  pantoprazole    Tablet 40 milliGRAM(s) Oral before breakfast    MEDICATIONS  (PRN):  ALBUTerol/ipratropium for Nebulization 3 milliLiter(s) Nebulizer every 6 hours PRN Bronchospasm  dextrose 40% Gel 15 Gram(s) Oral once PRN Blood Glucose LESS THAN 70 milliGRAM(s)/deciliter  glucagon  Injectable 1 milliGRAM(s) IntraMuscular once PRN Glucose LESS THAN 70 milligrams/deciliter              SOCIAL HISTORY:    [ ] Non-smoker  [ ] Smoker  [ ] Alcohol      REVIEW OF SYSTEMS:  CONSTITUTIONAL: No fever, weight loss, or fatigue  CARDIOLOGY: PAtient denies chest pain, shortness of breath or syncopal episodes.   RESPIRATORY: denies shortness of breath, wheezing.   NEUROLOGICAL: NO weakness, no focal deficits to report.   GI: no BRBPR, no Vomiting, no diarrhea.    PSYCHIATRY: normal mood and affect  HEENT: no nose bleed,   SKIN: no ecchymosis, no breakdown  MUSCULOSKELETAL: normal range of motion ,no myalgia      PHYSICAL EXAM:  T(C): 36.8 (12-03-18 @ 15:43), Max: 37 (12-03-18 @ 07:17)  HR: 70 (12-03-18 @ 15:43) (70 - 94)  BP: 103/52 (12-03-18 @ 15:43) (103/52 - 111/57)  RR: 18 (12-03-18 @ 15:43) (18 - 18)  SpO2: 98% (12-03-18 @ 15:43) (98% - 100%)  Wt(kg): --  I&O's Summary    Daily     Daily     General Appearance: Normal	  Cardiovascular: Normal S1 S2, No JVD, No murmurs, No edema  Respiratory: Lungs clear to auscultation	  Psychiatry: A & O x 3, Mood & affect appropriate  Gastrointestinal:  Soft, Non-tender  Skin: No rashes, No ecchymoses, No cyanosis	  Neurologic: Non-focal  Extremities: Normal range of motion, No clubbing, cyanosis or edema  Vascular: Peripheral pulses palpable 2+ bilaterally        LABS:	 	                          12.6   6.12  )-----------( 349      ( 03 Dec 2018 04:30 )             41.0     12-03    143  |  104  |  12  ----------------------------<  155<H>  4.2   |  27  |  0.5<L>    Ca    9.6      03 Dec 2018 04:30              proBNP:   Lipid Profile:   HgA1c: Hemoglobin A1C, Whole Blood: 7.1 % (12-03 @ 04:30)      PREVIOUS DIAGNOSTIC TESTING:    [ ] Echocardiogram: Normal LVEF, no valvular abnormalities  [ ]  Catheterization: Normal coronaries    	  ASSESSMENT/PLAN: 	    1) Syncope /Dizziness      She had mild orthostatic changes on midodrine      F/U EP for loop recorder.      F/U neurology.

## 2018-12-03 NOTE — BEHAVIORAL HEALTH ASSESSMENT NOTE - NSBHCHARTREVIEWVS_PSY_A_CORE FT
Vital Signs Last 24 Hrs  T(C): 37 (03 Dec 2018 07:17), Max: 37 (03 Dec 2018 07:17)  T(F): 98.6 (03 Dec 2018 07:17), Max: 98.6 (03 Dec 2018 07:17)  HR: 94 (03 Dec 2018 07:17) (67 - 94)  BP: 110/54 (03 Dec 2018 07:17) (100/50 - 111/57)  BP(mean): --  RR: 18 (03 Dec 2018 07:17) (18 - 18)  SpO2: 100% (03 Dec 2018 07:17) (98% - 100%)

## 2018-12-03 NOTE — BEHAVIORAL HEALTH ASSESSMENT NOTE - NSBHSOCIALHXDETAILSFT_PSY_A_CORE
Patient grew up in foster homes, no family in [place]- Has a foster sister in [place] (Sandra [phone])

## 2018-12-03 NOTE — BEHAVIORAL HEALTH ASSESSMENT NOTE - SUMMARY
52 y/o single female, disabled, domiciled in housing provided by [facility], with PMhx of hypotension, DM, hypothyroidism and psych history of Schizoaffective DO, 3 prior IPP admissions, no suicide attempts who admitted to medicine for dizziness and syncope. Psych consulted for possible polypharmacy.     As per collateral, patient was stable on her psychotropic med regimen when residing at [facility] where she had assistance with med administration. Her symptoms seems to have started after her move to private housing, and it may be that she is mismanaging her medications (including her medical medications). It's unlikely that her dizziness is solely secondary to psychotropic polypharmacy, and her medical medications should be reviewed as well. Otherwise, she should remain on her outpatient psychiatric regimen to avoid decompensation. Patient at this time denies any acute concerns- her thought process is linear, her insight is fair, she denies any acute mood symptoms and suicidal ideations. She does endorse intermittent auditory hallucinations in the context of not being able to take Haldol for a week (it's on back order from pharmacy), but has insight that these are hallucinations and denies that she would act on them. She is requesting to re-initiate haldol as well as something to help her sleep, is amenable to Atarax PRN. There is no evidence that she is acutely a danger to self or others at this time, and does not warrant IPP admission.     Plan:   1) Recommend continuing psychotropic medications to prevent acute decompensation: Haldol 2mg PO BID, Buspar 15 mg PO Qd, Cymbalta 30mg PO QD.   2) Review of medical medications   3) May require higher level of care (increased HHA hours? dispo back to [facility]?)  4) Signing off, recall PRN  To be discussed with attending 52 y/o single female, disabled, domiciled in housing provided by [facility], with PMhx of hypotension, DM, hypothyroidism and psych history of Schizoaffective DO, 3 prior IPP admissions, no suicide attempts who admitted to medicine for dizziness and syncope. Psych consulted for possible polypharmacy.     Patient denies any acute concerns other than the dizziness, and per chart review, it's likely secondary to orthostatic hypotension. We recommend discontinuing Cymbalta as it can worsen orthostatic hypotension. Though Haldol can also contribute to orthostasis, patient requires an anti-psychotic to prevent acute psychosis and we recommend continuing outpatient dose. Buspar can also be continued for management of anxiety. In addition, as per collateral, patient was stable on her psychotropic med regimen when residing at [facility] where she had assistance with med administration. Her symptoms seems to have started after her move to private housing about three months ago, and it may be that she is mismanaging her medications (including her medical medications). Otherwise, patient at this time denies any acute concerns- her thought process is linear, her insight is fair, she denies any acute mood symptoms and suicidal ideations. She does endorse intermittent auditory hallucinations in the context of not being able to take Haldol for a week (it's on back order from pharmacy), but has insight that these are hallucinations and denies that she would act on them. There is no evidence that she is acutely a danger to self or others at this time, and does not warrant IPP admission. Plan was communicated to outpatient psychiatric clinic.     Plan:   1) Recommend continuing Haldol 2mg PO BID, Buspar 15 mg PO Qd. Decrease Cymbalta to 30mg PO QD x 2 days and stop.   2) Will follow. Thank you

## 2018-12-03 NOTE — BEHAVIORAL HEALTH ASSESSMENT NOTE - CASE SUMMARY
54 y/o single female, disabled, domiciled in housing provided by [employer], with PMhx of hypotension, DM, hypothyroidism and psych history of Schizoaffective DO, 3 prior IPP admissions, no suicide attempts who admitted to medicine for dizziness and syncope. Psych consulted for possible polypharmacy.    Patient presents with dizziness. She describes the dizziness as vertigo ongoing for 3 months, which also occurs at rest. Given that dizziness occurs at rest, orthostasis is likely not the only etiology for dizziness. Recommend following up with neurological work up for vertigo.  Patient does also present with orthostasis. Cymbalta can potentially worsen orthostasis although more likely to occur on initial titration of medication. However, it is possible that the patient is not taking correct dose of medication at home given that there is a discrepancy in her dose. We thus recommend weaning Cymbalta (as described above). For patient's auditory hallucinations, she should be restarted on Haldol 2mg PO BID. Haldol can potentially worsen orthostasis, however the benefit of alleviating patient's psychosis which has a history of profound impairment for her outweighs the risk. Furthermore, second generation antipsychotics are more likely to worsen/potentiate orthostasis. Agree with resident treatment plan.

## 2018-12-03 NOTE — PROGRESS NOTE ADULT - SUBJECTIVE AND OBJECTIVE BOX
Patient is a 53y old  Female who presents with a chief complaint of syncope (02 Dec 2018 20:34)      Overnight events: patient seen and examined, no events overnight. she still feels dizzy.    PAST MEDICAL & SURGICAL HISTORY:  Migraine  Crohn disease  COPD (chronic obstructive pulmonary disease)  Depression  Anxiety  Schizophrenia  ANISHA (obstructive sleep apnea)  Polyneuropathy  Bladder disorder  Sciatica  Constipation  Iron deficiency  Hypercholesteremia  Hypothyroid  Diabetes mellitus, type 2  Vitamin D deficiency  GERD (gastroesophageal reflux disease)  History of cholecystectomy      MEDICATIONS  (STANDING):  atorvastatin 40 milliGRAM(s) Oral at bedtime  busPIRone 15 milliGRAM(s) Oral two times a day  celecoxib 200 milliGRAM(s) Oral daily  chlorhexidine 4% Liquid 1 Application(s) Topical <User Schedule>  cyanocobalamin 1000 MICROGram(s) Oral daily  dextrose 5%. 1000 milliLiter(s) (50 mL/Hr) IV Continuous <Continuous>  dextrose 50% Injectable 12.5 Gram(s) IV Push once  dextrose 50% Injectable 25 Gram(s) IV Push once  dextrose 50% Injectable 25 Gram(s) IV Push once  DULoxetine 60 milliGRAM(s) Oral daily  enoxaparin Injectable 40 milliGRAM(s) SubCutaneous daily  ferrous    sulfate 325 milliGRAM(s) Oral daily  folic acid 1 milliGRAM(s) Oral daily  insulin glargine Injectable (LANTUS) 15 Unit(s) SubCutaneous at bedtime  insulin lispro (HumaLOG) corrective regimen sliding scale   SubCutaneous three times a day before meals  insulin lispro Injectable (HumaLOG) 5 Unit(s) SubCutaneous three times a day before meals  levothyroxine 100 MICROGram(s) Oral daily  loperamide 2 milliGRAM(s) Oral daily  meclizine 25 milliGRAM(s) Oral three times a day  mesalamine DR Capsule 400 milliGRAM(s) Oral at bedtime  midodrine 10 milliGRAM(s) Oral three times a day  montelukast 10 milliGRAM(s) Oral daily  pantoprazole    Tablet 40 milliGRAM(s) Oral before breakfast    MEDICATIONS  (PRN):  ALBUTerol/ipratropium for Nebulization 3 milliLiter(s) Nebulizer every 6 hours PRN Bronchospasm  dextrose 40% Gel 15 Gram(s) Oral once PRN Blood Glucose LESS THAN 70 milliGRAM(s)/deciliter  glucagon  Injectable 1 milliGRAM(s) IntraMuscular once PRN Glucose LESS THAN 70 milligrams/deciliter      Home medications  Amitiza 8 mcg oral capsule: orally once a day  Apriso 0.375 g oral capsule, extended release: 1 tab(s) orally once a day (at bedtime)  atorvastatin 40 mg oral tablet: 1 tab(s) orally once a day  busPIRone 15 mg oral tablet: 1 tab(s) orally 2 times a day  cetirizine 10 mg oral tablet: 1 tab(s) orally once a day  Cymbalta 60 mg oral delayed release capsule: orally once a day  ergocalciferol 50,000 intl units (1.25 mg) oral capsule: 1 cap(s) orally once a week  Feosol 325 mg (65 mg elemental iron) oral tablet: orally once a day  folic acid 1 mg oral tablet: 1 tab(s) orally once a day  Invokana 300 mg oral tablet: 1 tab(s) orally once a day  levothyroxine 100 mcg (0.1 mg) oral tablet: 1 tab(s) orally once a day  loperamide 2 mg oral capsule: orally once a day  meclizine 25 mg oral tablet: 1 tab(s) orally 3 times a day  meloxicam 7.5 mg oral tablet: 1 tab(s) orally once a day  metFORMIN 1000 mg oral tablet: 1 tab(s) orally 2 times a day  midodrine 5 mg oral tablet: orally 3 times a day  montelukast 10 mg oral tablet: 1 tab(s) orally once a day  pioglitazone 45 mg oral tablet: 1 tab(s) orally once a day  Protonix 40 mg oral delayed release tablet: 1 tab(s) orally once a day  raNITIdine 150 mg oral tablet: orally once a day  SUMAtriptan 100 mg oral tablet: 1 tab(s) orally every 2 hours, As Needed  traMADol 50 mg oral tablet: orally every 6 hours, As Needed  Vitamin B12 1000 mcg oral tablet: 1 tab(s) orally once a day      Vital Signs Last 24 Hrs  T(C): 37 (03 Dec 2018 07:17), Max: 37 (03 Dec 2018 07:17)  T(F): 98.6 (03 Dec 2018 07:17), Max: 98.6 (03 Dec 2018 07:17)  HR: 94 (03 Dec 2018 07:17) (60 - 94)  BP: 110/54 (03 Dec 2018 07:17) (100/50 - 111/57)  BP(mean): --  RR: 18 (03 Dec 2018 07:17) (18 - 18)  SpO2: 100% (03 Dec 2018 07:17) (98% - 100%)  CAPILLARY BLOOD GLUCOSE      POCT Blood Glucose.: 156 mg/dL (03 Dec 2018 07:58)  POCT Blood Glucose.: 262 mg/dL (03 Dec 2018 00:30)  POCT Blood Glucose.: 149 mg/dL (02 Dec 2018 22:28)  POCT Blood Glucose.: 165 mg/dL (02 Dec 2018 11:43)    I&O's Summary      Physical Exam:    	GEN: No acute distress  	LUNGS: Clear to auscultation bilaterally   	HEART: S1/S2 present. RRR.   	ABD: Soft, non-tender, non-distended. Bowel sounds present  	EXT: NC/NC/NE/2+PP/ARBOLEDA  NEURO: AAOX3          Imaging:    < from: Transthoracic Echocardiogram (12.03.18 @ 08:23) >   1. Left ventricular ejection fraction, by visual estimation, is 55 to   60%.   2. Normal global left ventricular systolic function.   3. Mild tricuspid regurgitation.    < end of copied text >    < from: CT Head No Cont (12.01.18 @ 03:30) >      No evidence of intracranial hemorrhage, territorial infarct, or mass   effect.      < end of copied text >    < from: MR Angio Head No Cont (12.01.18 @ 22:07) >  Unremarkable MRA of the brain without contrast.    < end of copied text >      ECG:    < from: 12 Lead ECG (12.02.18 @ 17:06) >  Ventricular Rate 76 BPM    Atrial Rate 76 BPM    P-R Interval 118 ms    QRS Duration 74 ms    Q-T Interval 404 ms    QTC Calculation(Bezet) 454 ms    P Axis 51 degrees    R Axis 41 degrees    T Axis 71 degrees    Diagnosis Line Normal sinus rhythm  Low voltage QRS  Nonspecific T wave abnormality  Abnormal ECG    Confirmed by Terence Harding ([id number]) on 12/3/2018 12:19:28 AM    < end of copied text >

## 2018-12-03 NOTE — BEHAVIORAL HEALTH ASSESSMENT NOTE - HPI (INCLUDE ILLNESS QUALITY, SEVERITY, DURATION, TIMING, CONTEXT, MODIFYING FACTORS, ASSOCIATED SIGNS AND SYMPTOMS)
52 y/o single female, disabled, domiciled in housing provided by [facility], with PMHx of hypotension, DM, hypothyroidism and psych history of Anxiety and Schizoaffective DO, 3 prior IPP admissions, no suicide attempts who admitted to medicine for dizziness and syncope. Psych consulted for possible polypharmacy.     Upon approach, patient is laying comfortably, pleasant and cooperative. Patient states she's doing "not good" as she's been feeling dizzy and weak for the past few weeks. Patient states she called 911 as she's been "passing out". She denies any acute complaints, but requests "Can I have Remeron?" as she's had trouble sleeping in hospitals in the past and now that she's admitted, she's worried she won't be able to sleep. Patient states she's also been on Atarax which also helps.     Patient states she's complaint with medications, except her Haldol because "the pharmacy doesn't have it". (As per pharmacy, dispensed to patient on 11/19/18 but currently it is on back order.) She states she has not taken it in 1 week, and now is starting to hear voices telling her to "hurt myself" but states "I'm not going to do it" as she knows it's her illness. Denies any thoughts of paranoia, or symptoms of tania/hypomania.     When screened for depression, patient reports her mood has been "good", she continues to sleep her baseline 12 hours (asking "is it too much?, I need my CPAP machine"), states her appetite is okay ("I just had my second plate"), endorses low energy but in the context of "I'm anemic", and denies anhedonia, stating her friends from [facility] came over for thanksgiving, she goes to bingo, goes to day time activities etc. She denies any suicidal ideation, intent or plan. She denies any history of substance abuse.     Obtained collateral from [facility] ([phone]): Patient's NP David Gates states that while she was residing at [facility], she was without any complaints of dizziness and was without psychosis. Patient moved to the community a few months ago, and since then she has been on her own and he is not sure how well she may be managing her medications. Patient was on Cymbalta 90 and NP has titrated it down to 30mg. Otherwise, she was stable on Haldol and Buspar while a resident at [facility].     Attempted to obtain collateral from Foster sister (Vero oLuise [phone]): Went to [facility].      Medications confirmed with patients pharmacy, [facility] (see med list below). 52 y/o single female, disabled, domiciled in housing provided by [facility], with PMHx of hypotension, DM, hypothyroidism and psych history of Anxiety and Schizoaffective DO, 3 prior IPP admissions, no suicide attempts who admitted to medicine for dizziness and syncope. Psych consulted for possible polypharmacy.     Upon approach, patient is laying comfortably, pleasant and cooperative. Patient states she's doing "not good" as she's been feeling dizzy and weak for the 3 months. She states she "passed out 3 times" on saturday and called EMS.     She denies any acute complaints other than dizziness, but requests "Can I have Remeron?" as she's had trouble sleeping in hospitals in the past and now that she's admitted, she's worried she won't be able to sleep. She adds, "I use CPAP at home".     Patient states she's complaint with medications, except her Haldol because "the pharmacy doesn't have it". (As per pharmacy, dispensed to patient on 11/19/18). She states she has not taken it in 1 week, and now is starting to hear voices telling her to "hurt myself" but states "I'm not going to do it" as she knows it's her illness. She can't describe the voices, but states there are two. Denies any thoughts of paranoia, or symptoms of tania/hypomania.     When screened for depression, patient reports her mood has been "good", she continues to sleep her baseline 12 hours (asking "is it too much?, I need my CPAP machine"), states her appetite is okay ("I just had my second plate"), endorses low energy but in the context of "I'm anemic", and denies anhedonia, stating her friends from [facility] came over for thanksgiving, she goes to bingo, goes to day time activities etc. She denies any suicidal ideation, intent or plan. She denies any history of substance abuse.     Obtained collateral from [facility] ([phone]): Patient's NP David Gates states that while she was residing at [facility], she was without any complaints of dizziness and was without psychosis. Patient moved to the community a few months ago, and since then she has been on her own and he is not sure how well she may be managing her medications. Patient was on Cymbalta 90 and NP has titrated it down to 30mg. Otherwise, she was stable on Haldol and Buspar while a resident at [facility].     Attempted to obtain collateral from Foster sister (Vero Louise [phone]): Went to [facility].      Medications confirmed with patients pharmacy, [facility] (see med list below).

## 2018-12-03 NOTE — BEHAVIORAL HEALTH ASSESSMENT NOTE - OTHER PAST PSYCHIATRIC HISTORY (INCLUDE DETAILS REGARDING ONSET, COURSE OF ILLNESS, INPATIENT/OUTPATIENT TREATMENT)
History of Schizophrenia, 3 IPP admissions (last one 2 years ago at [facility]). No history of prior suicide attempts. Follows with NP David Gates biweekly and Therapist Ada Perezekly at [facility].

## 2018-12-03 NOTE — PROGRESS NOTE ADULT - ASSESSMENT
53F with PMHx, DMII, hypothyroidism, ANISHA on CPAP, depression, gastritis, COPD, crohns disease, chronic back pain, migraines, chronic dizziness presents for syncopal episode and dizziness    #Syncope and dizziness  - Likely secondary to polypharmacy vs neuro mediated vs orthostatic vs cardiovascular  - Neuro recs appreciated  - CTH NC and MRA brain negative  - c/w tele for 24 hrs if no events can d/c  - 2D echo: normal EF, mild TR  - Cardiology eval  - Orthostatics were positive yesterday, will repeat  - change Meclizine to Phenergan once cleared by psychiatry 25mg po bid prn  - Psych evaluation to review meds  - f/u with neurology as OP after d/c  - midodrine increased to 10mg TID     #DMII  - c/w insulin regimen     #ANISHA on cpap  - continue at 12    #hypothyroidism  - c/w synthroid     #Depression/schizophrenia  - c/w current home meds.   - Psych eval pending     # gastritis  - c/w protonix    # COPD  - prn nebs     #chronic back pain  - nsaids prn     #migraines  - triptans prn     #Crohns disease  - c/w home meds     DVT PPx: Lovenox  GI PPx: protonix   PT rehab   Dispo: from home with [facility]

## 2018-12-04 LAB
ANION GAP SERPL CALC-SCNC: 12 MMOL/L — SIGNIFICANT CHANGE UP (ref 7–14)
BASOPHILS # BLD AUTO: 0.02 K/UL — SIGNIFICANT CHANGE UP (ref 0–0.2)
BASOPHILS NFR BLD AUTO: 0.3 % — SIGNIFICANT CHANGE UP (ref 0–1)
BUN SERPL-MCNC: 20 MG/DL — SIGNIFICANT CHANGE UP (ref 10–20)
CALCIUM SERPL-MCNC: 9.2 MG/DL — SIGNIFICANT CHANGE UP (ref 8.5–10.1)
CHLORIDE SERPL-SCNC: 102 MMOL/L — SIGNIFICANT CHANGE UP (ref 98–110)
CO2 SERPL-SCNC: 27 MMOL/L — SIGNIFICANT CHANGE UP (ref 17–32)
CREAT SERPL-MCNC: 0.7 MG/DL — SIGNIFICANT CHANGE UP (ref 0.7–1.5)
EOSINOPHIL # BLD AUTO: 0.18 K/UL — SIGNIFICANT CHANGE UP (ref 0–0.7)
EOSINOPHIL NFR BLD AUTO: 3 % — SIGNIFICANT CHANGE UP (ref 0–8)
GLUCOSE BLDC GLUCOMTR-MCNC: 179 MG/DL — HIGH (ref 70–99)
GLUCOSE BLDC GLUCOMTR-MCNC: 184 MG/DL — HIGH (ref 70–99)
GLUCOSE BLDC GLUCOMTR-MCNC: 210 MG/DL — HIGH (ref 70–99)
GLUCOSE BLDC GLUCOMTR-MCNC: 280 MG/DL — HIGH (ref 70–99)
GLUCOSE SERPL-MCNC: 202 MG/DL — HIGH (ref 70–99)
HCT VFR BLD CALC: 40.5 % — SIGNIFICANT CHANGE UP (ref 37–47)
HGB BLD-MCNC: 12.4 G/DL — SIGNIFICANT CHANGE UP (ref 12–16)
IMM GRANULOCYTES NFR BLD AUTO: 0.3 % — SIGNIFICANT CHANGE UP (ref 0.1–0.3)
LYMPHOCYTES # BLD AUTO: 2.14 K/UL — SIGNIFICANT CHANGE UP (ref 1.2–3.4)
LYMPHOCYTES # BLD AUTO: 35.7 % — SIGNIFICANT CHANGE UP (ref 20.5–51.1)
MAGNESIUM SERPL-MCNC: 1.9 MG/DL — SIGNIFICANT CHANGE UP (ref 1.8–2.4)
MCHC RBC-ENTMCNC: 22.4 PG — LOW (ref 27–31)
MCHC RBC-ENTMCNC: 30.6 G/DL — LOW (ref 32–37)
MCV RBC AUTO: 73.1 FL — LOW (ref 81–99)
MONOCYTES # BLD AUTO: 0.34 K/UL — SIGNIFICANT CHANGE UP (ref 0.1–0.6)
MONOCYTES NFR BLD AUTO: 5.7 % — SIGNIFICANT CHANGE UP (ref 1.7–9.3)
NEUTROPHILS # BLD AUTO: 3.3 K/UL — SIGNIFICANT CHANGE UP (ref 1.4–6.5)
NEUTROPHILS NFR BLD AUTO: 55 % — SIGNIFICANT CHANGE UP (ref 42.2–75.2)
NRBC # BLD: 0 /100 WBCS — SIGNIFICANT CHANGE UP (ref 0–0)
PLATELET # BLD AUTO: 343 K/UL — SIGNIFICANT CHANGE UP (ref 130–400)
POTASSIUM SERPL-MCNC: 4 MMOL/L — SIGNIFICANT CHANGE UP (ref 3.5–5)
POTASSIUM SERPL-SCNC: 4 MMOL/L — SIGNIFICANT CHANGE UP (ref 3.5–5)
RBC # BLD: 5.54 M/UL — HIGH (ref 4.2–5.4)
RBC # FLD: 18.9 % — HIGH (ref 11.5–14.5)
SODIUM SERPL-SCNC: 141 MMOL/L — SIGNIFICANT CHANGE UP (ref 135–146)
WBC # BLD: 6 K/UL — SIGNIFICANT CHANGE UP (ref 4.8–10.8)
WBC # FLD AUTO: 6 K/UL — SIGNIFICANT CHANGE UP (ref 4.8–10.8)

## 2018-12-04 RX ORDER — SUMATRIPTAN SUCCINATE 4 MG/.5ML
100 INJECTION, SOLUTION SUBCUTANEOUS ONCE
Qty: 0 | Refills: 0 | Status: DISCONTINUED | OUTPATIENT
Start: 2018-12-04 | End: 2018-12-06

## 2018-12-04 RX ADMIN — PANTOPRAZOLE SODIUM 40 MILLIGRAM(S): 20 TABLET, DELAYED RELEASE ORAL at 06:24

## 2018-12-04 RX ADMIN — Medication 100 MICROGRAM(S): at 06:25

## 2018-12-04 RX ADMIN — ATORVASTATIN CALCIUM 40 MILLIGRAM(S): 80 TABLET, FILM COATED ORAL at 22:35

## 2018-12-04 RX ADMIN — Medication 2 MILLIGRAM(S): at 11:43

## 2018-12-04 RX ADMIN — Medication 25 MILLIGRAM(S): at 22:35

## 2018-12-04 RX ADMIN — MIDODRINE HYDROCHLORIDE 10 MILLIGRAM(S): 2.5 TABLET ORAL at 13:15

## 2018-12-04 RX ADMIN — DULOXETINE HYDROCHLORIDE 30 MILLIGRAM(S): 30 CAPSULE, DELAYED RELEASE ORAL at 11:43

## 2018-12-04 RX ADMIN — MONTELUKAST 10 MILLIGRAM(S): 4 TABLET, CHEWABLE ORAL at 11:46

## 2018-12-04 RX ADMIN — Medication 15 MILLIGRAM(S): at 18:38

## 2018-12-04 RX ADMIN — INSULIN GLARGINE 15 UNIT(S): 100 INJECTION, SOLUTION SUBCUTANEOUS at 22:34

## 2018-12-04 RX ADMIN — CELECOXIB 200 MILLIGRAM(S): 200 CAPSULE ORAL at 18:53

## 2018-12-04 RX ADMIN — Medication 25 MILLIGRAM(S): at 06:24

## 2018-12-04 RX ADMIN — Medication 15 MILLIGRAM(S): at 06:24

## 2018-12-04 RX ADMIN — Medication 5 UNIT(S): at 08:45

## 2018-12-04 RX ADMIN — MIDODRINE HYDROCHLORIDE 10 MILLIGRAM(S): 2.5 TABLET ORAL at 06:24

## 2018-12-04 RX ADMIN — HALOPERIDOL DECANOATE 2 MILLIGRAM(S): 100 INJECTION INTRAMUSCULAR at 18:38

## 2018-12-04 RX ADMIN — Medication 5 UNIT(S): at 18:45

## 2018-12-04 RX ADMIN — Medication 325 MILLIGRAM(S): at 11:46

## 2018-12-04 RX ADMIN — Medication 3: at 18:43

## 2018-12-04 RX ADMIN — Medication 400 MILLIGRAM(S): at 22:36

## 2018-12-04 RX ADMIN — Medication 25 MILLIGRAM(S): at 13:15

## 2018-12-04 RX ADMIN — Medication 2: at 12:16

## 2018-12-04 RX ADMIN — Medication 1 MILLIGRAM(S): at 11:44

## 2018-12-04 RX ADMIN — HALOPERIDOL DECANOATE 2 MILLIGRAM(S): 100 INJECTION INTRAMUSCULAR at 06:23

## 2018-12-04 RX ADMIN — MIDODRINE HYDROCHLORIDE 10 MILLIGRAM(S): 2.5 TABLET ORAL at 22:35

## 2018-12-04 RX ADMIN — Medication 5 UNIT(S): at 12:16

## 2018-12-04 RX ADMIN — CELECOXIB 200 MILLIGRAM(S): 200 CAPSULE ORAL at 11:44

## 2018-12-04 RX ADMIN — Medication 1: at 08:44

## 2018-12-04 RX ADMIN — PREGABALIN 1000 MICROGRAM(S): 225 CAPSULE ORAL at 11:43

## 2018-12-04 RX ADMIN — ENOXAPARIN SODIUM 40 MILLIGRAM(S): 100 INJECTION SUBCUTANEOUS at 11:47

## 2018-12-04 NOTE — CONSULT NOTE ADULT - SUBJECTIVE AND OBJECTIVE BOX
Patient is a 53y old  Female who presents with a chief complaint of syncope (04 Dec 2018 09:29)    HPI:  53F with PMHx, DMII, hypothyroidism, ANISHA on CPAP, depression, gastritis, COPD, crohns disease, chronic back pain, migraines, chronic dizziness presents for syncopal episode on Saturday. Patient states she had 3 episodes of syncope after standing. Each episode lasted around 10 minutes. She did not have any preceding symptoms. She has not had these episodes in the past. She also admits to feeling unsteady while ambulating. She denies any dizziness at this time. She also denies any fevers, chills, nausea, vomiting, headaches, palpitations, visual changes. Of note she was recently evaluated by cardiology and neurology for dizziness, with tilt table test completed on 11/14 which was positive. She also had recent MRI/CT scans as per neurology which was negative. (02 Dec 2018 20:34)      PAST MEDICAL & SURGICAL HISTORY:  Migraine  Crohn disease  COPD (chronic obstructive pulmonary disease)  Depression  Anxiety  Schizophrenia  ANISHA (obstructive sleep apnea)  Polyneuropathy  Bladder disorder  Sciatica  Constipation  Iron deficiency  Hypercholesteremia  Hypothyroid  Diabetes mellitus, type 2  Vitamin D deficiency  GERD (gastroesophageal reflux disease)  History of cholecystectomy      Hospital Course:   yncope and dizziness  -possibly BPV, meclizine prn, taper slowly  - Likely secondary to polypharmacy vs neuro mediated vs orthostatic vs cardiovascular  - Neuro recs appreciated  - CTH NC and MRA brain negative  - no events on tele, can d/c  - 2D echo: normal EF, mild TR  - Cardiology recs appreciated  - EPS evaluation for loop recorder  - Orthostatics were positive yesterday, will repeat  - Psych recs appreciated  - started on haldol 2mg bid, decrease cymbalta to 30mg for 2 days then stop  - f/u with neurology as OP after d/c  - midodrine increased to 10mg TID   TODAY'S SUBJECTIVE & REVIEW OF SYMPTOMS:     Constitutional Weakness   Cardio WNL   Resp WNL   GI WNL  Heme WNL  Endo WNL  Skin WNL  MSK WNL  Neuro WNL  Cognitive WNL  Psych +  +urine incontinence    MEDICATIONS  (STANDING):  atorvastatin 40 milliGRAM(s) Oral at bedtime  busPIRone 15 milliGRAM(s) Oral two times a day  celecoxib 200 milliGRAM(s) Oral daily  chlorhexidine 4% Liquid 1 Application(s) Topical <User Schedule>  cyanocobalamin 1000 MICROGram(s) Oral daily  dextrose 5%. 1000 milliLiter(s) (50 mL/Hr) IV Continuous <Continuous>  dextrose 50% Injectable 12.5 Gram(s) IV Push once  dextrose 50% Injectable 25 Gram(s) IV Push once  dextrose 50% Injectable 25 Gram(s) IV Push once  DULoxetine 30 milliGRAM(s) Oral daily  enoxaparin Injectable 40 milliGRAM(s) SubCutaneous daily  ferrous    sulfate 325 milliGRAM(s) Oral daily  folic acid 1 milliGRAM(s) Oral daily  haloperidol     Tablet 2 milliGRAM(s) Oral two times a day  insulin glargine Injectable (LANTUS) 15 Unit(s) SubCutaneous at bedtime  insulin lispro (HumaLOG) corrective regimen sliding scale   SubCutaneous three times a day before meals  insulin lispro Injectable (HumaLOG) 5 Unit(s) SubCutaneous three times a day before meals  levothyroxine 100 MICROGram(s) Oral daily  loperamide 2 milliGRAM(s) Oral daily  meclizine 25 milliGRAM(s) Oral three times a day  mesalamine DR Capsule 400 milliGRAM(s) Oral at bedtime  midodrine 10 milliGRAM(s) Oral three times a day  montelukast 10 milliGRAM(s) Oral daily  pantoprazole    Tablet 40 milliGRAM(s) Oral before breakfast    MEDICATIONS  (PRN):  ALBUTerol/ipratropium for Nebulization 3 milliLiter(s) Nebulizer every 6 hours PRN Bronchospasm  dextrose 40% Gel 15 Gram(s) Oral once PRN Blood Glucose LESS THAN 70 milliGRAM(s)/deciliter  glucagon  Injectable 1 milliGRAM(s) IntraMuscular once PRN Glucose LESS THAN 70 milligrams/deciliter      FAMILY HISTORY:  No pertinent family history in first degree relatives      Allergies    ciprofloxacin (Unknown)  codeine (Unknown)  Fish Products (Unknown)  lactose (Unknown)  latex (Unknown)  penicillins (Unknown)  tetracycline (Unknown)  Zoloft (Other)    Intolerances        SOCIAL HISTORY:    [    ] Etoh  [    ] Smoking  [    ] Substance abuse     Home Environment:  [ x   ] Home Alone  [    ] Lives with Family  [x    ] Home Health Aid4x5    Dwelling:  [ x   ] Apartment  [    ] Private House  [    ] Adult Home  [    ] Skilled Nursing Facility      [    ] Short Term  [    ] Long Term  [ x   ] Stairs                           [    ] Elevator     FUNCTIONAL STATUS PTA: (Check all that apply)  Ambulation: [   x  ]Independent    [    ] Dependent     [    ] Non-Ambulatory  Assistive Device: [ x   ] SA Cane  [    ]  Q Cane  [    x] Walker  [    ]  Wheelchair  ADL : [  x  ] Independent  [    ]  Dependent       Vital Signs Last 24 Hrs  T(C): 36.1 (04 Dec 2018 15:58), Max: 36.7 (03 Dec 2018 19:45)  T(F): 97 (04 Dec 2018 15:58), Max: 98 (03 Dec 2018 19:45)  HR: 76 (04 Dec 2018 15:58) (60 - 90)  BP: 95/57 (04 Dec 2018 15:58) (95/57 - 116/53)  BP(mean): --  RR: 18 (04 Dec 2018 15:58) (18 - 20)  SpO2: 97% (04 Dec 2018 15:58) (95% - 99%)      PHYSICAL EXAM: Alert & Oriented X3  GENERAL: NAD, well-groomed, well-developed  HEAD:  Atraumatic, Normocephalic  EYES: EOMI, PERRLA, conjunctiva and sclera clear  NECK: Supple, No JVD, Normal thyroid  CHEST/LUNG: Clear bilaterally; No rales, rhonchi, wheezing, or rubs  HEART: Regular rate and rhythm; No murmurs, rubs, or gallops  ABDOMEN: Soft, Nontender, Nondistended; Bowel sounds present  EXTREMITIES:  2+ Peripheral Pulses, No clubbing, cyanosis, or edema    NERVOUS SYSTEM:  Cranial Nerves 2-12 intact [   x ] Abnormal  [    ]  ROM: WFL all extremities [ x   ]  Abnormal [     ]  Motor Strength: WFL all extremities  [  x  ]  Abnormal [    ]  Sensation: intact to light touch [ x   ] Abnormal [    ]      FUNCTIONAL STATUS:  Bed Mobility: [   ]  Independent [    ]  Supervision [   x ]  Needs Assistance [  ]  N/A  Transfers: [    ]  Independent [    ]  Supervision [  x  ]  Needs Assistance [    ]  N/A    Ambulation:  [    ]  Independent [    ]  Supervision [   x ]  Needs Assistance [    ]  N/A   ADL:  [    ]   Independent [  x  ] Requires Assistance [    ] N/A       LABS:                        12.4   6.00  )-----------( 343      ( 04 Dec 2018 08:58 )             40.5     12-04    141  |  102  |  20  ----------------------------<  202<H>  4.0   |  27  |  0.7    Ca    9.2      04 Dec 2018 08:58  Mg     1.9     12-04            RADIOLOGY & ADDITIONAL STUDIES:

## 2018-12-04 NOTE — CONSULT NOTE ADULT - ASSESSMENT
IMPRESSION: Rehab of gait ab syncope    PRECAUTIONS: [ x   ] Cardiac  [    ] Respiratory  [    ] Seizures [    ] Contact Isolation  [    ] Droplet Isolation  [    ] Other    Weight Bearing Status:     RECOMMENDATION:    Out of Bed to Chair     DVT/Decubiti Prophylaxis    REHAB PLAN:     [  x   ] Bedside P/T 3-5 times a week   [     ] Bedside O/T  2-3 times a week   [     ] No Rehab Therapy Indicated   [     ]  Speech Therapy   Conditioning/ROM                                 ADL  Bed Mobility                                            Conditioning/ROM  Transfers                                                  Bed Mobility  Sitting /Standing Balance                      Transfers                                        Gait Training                                            Sitting/Standing Balance  Stair Training [   ]Applicable                 Home equipment Eval                                                                     Splinting  [   ] Only      GOALS:   ADL   [  x  ]   Independent         Transfers  [x    ] Independent            Ambulation  [    x ] Independent     [ x    ] With device                            [    ]  CG                                               [    ]  CG                                                    [     ] CG                            [    ] Min A                                          [    ] Min A                                                [     ] Min  A          DISCHARGE PLAN:   [     ]  Good candidate for Intensive Rehabilitation/Hospital based                                             Will tolerate 3hrs Intensive Rehab Daily                                       [ x     ]  Short Term Rehab in Skilled Nursing Facility WANTS TO GO TO [facility]                                       [      ]  Home with Outpatient or  services                                         [      ]  Possible Candidate for Intensive Hospital based Rehab

## 2018-12-04 NOTE — PROGRESS NOTE ADULT - ATTENDING COMMENTS
Patient seen and examined independently. Agree with resident note/ history / physical exam and plan of care with no exceptions/additions/updates. Case discussed with house-staff, nursing and patient. above notes edited.

## 2018-12-04 NOTE — CONSULT NOTE ADULT - ASSESSMENT
Chronic Dizziness  - telemetry shows no events  - episodes happen frequently  - not a reliable historian  - orthostatic changes on previous tilt  - loop not unreasonable although still dizzy in the hospital  - would consider polypharmacy as a contributing factor to patient's symptoms.   - check thyroid panel Chronic Dizziness  - telemetry shows no events  - episodes happen frequently  - not a reliable historian  - orthostatic changes on previous tilt  - would consider polypharmacy as a contributing factor to patient's symptoms.   - check thyroid panel    Syncope  with fall  no preceding symptoms  could be related to bradyarrhythmias or pauses  not reliable candidate for use of 1 month event monitor  Recommend Loop recorder

## 2018-12-04 NOTE — PROGRESS NOTE ADULT - ASSESSMENT
1] Syncope - etiology unclear      Recurrent Dizziness  - Tilt table negative  - Await EP evaluation    2] Orthostatic Hypotension  - on Midodrine    3] Hyperlipidemia  - on statin therapy    4] Type II DM  - on medication    Will ask EP (Dr. Parr) to see patient.    Smooth Matias MD (covering for Dr. Woody Mcgee)

## 2018-12-04 NOTE — PROGRESS NOTE ADULT - SUBJECTIVE AND OBJECTIVE BOX
Patient was seen and examined by me in ED3 earlier today.    She appears comfortable in bed.  She offers no new complaints.    Vitals:  T(C): 36.1 (12-04-18 @ 15:58), Max: 37 (12-03-18 @ 07:17)  HR: 76 (12-04-18 @ 15:58) (60 - 94)  BP: 95/57 (12-04-18 @ 15:58) (95/57 - 116/53)  RR: 18 (12-04-18 @ 15:58) (18 - 20)  SpO2: 97% (12-04-18 @ 15:58) (95% - 100%)    Telemetry: Sinus    LABS:                        12.4   6.00  )-----------( 343      ( 04 Dec 2018 08:58 )             40.5     12-04    141  |  102  |  20  ----------------------------<  202<H>  4.0   |  27  |  0.7    Ca    9.2      04 Dec 2018 08:58  Mg     1.9     12-04        MEDICATIONS  (STANDING):  atorvastatin 40 milliGRAM(s) Oral at bedtime  busPIRone 15 milliGRAM(s) Oral two times a day  celecoxib 200 milliGRAM(s) Oral daily  chlorhexidine 4% Liquid 1 Application(s) Topical <User Schedule>  cyanocobalamin 1000 MICROGram(s) Oral daily  dextrose 5%. 1000 milliLiter(s) (50 mL/Hr) IV Continuous <Continuous>  dextrose 50% Injectable 12.5 Gram(s) IV Push once  dextrose 50% Injectable 25 Gram(s) IV Push once  dextrose 50% Injectable 25 Gram(s) IV Push once  DULoxetine 30 milliGRAM(s) Oral daily  enoxaparin Injectable 40 milliGRAM(s) SubCutaneous daily  ferrous    sulfate 325 milliGRAM(s) Oral daily  folic acid 1 milliGRAM(s) Oral daily  haloperidol     Tablet 2 milliGRAM(s) Oral two times a day  insulin glargine Injectable (LANTUS) 15 Unit(s) SubCutaneous at bedtime  insulin lispro (HumaLOG) corrective regimen sliding scale   SubCutaneous three times a day before meals  insulin lispro Injectable (HumaLOG) 5 Unit(s) SubCutaneous three times a day before meals  levothyroxine 100 MICROGram(s) Oral daily  loperamide 2 milliGRAM(s) Oral daily  meclizine 25 milliGRAM(s) Oral three times a day  mesalamine DR Capsule 400 milliGRAM(s) Oral at bedtime  midodrine 10 milliGRAM(s) Oral three times a day  montelukast 10 milliGRAM(s) Oral daily  pantoprazole    Tablet 40 milliGRAM(s) Oral before breakfast    MEDICATIONS  (PRN):  ALBUTerol/ipratropium for Nebulization 3 milliLiter(s) Nebulizer every 6 hours PRN Bronchospasm  dextrose 40% Gel 15 Gram(s) Oral once PRN Blood Glucose LESS THAN 70 milliGRAM(s)/deciliter  glucagon  Injectable 1 milliGRAM(s) IntraMuscular once PRN Glucose LESS THAN 70 milligrams/deciliter      PHYSICAL EXAM:  Not in distress  alert, oriented x 3  no JVD; regular rhythm; nl S1S2  bilateral breath sounds  abdomen soft  no edema

## 2018-12-04 NOTE — CONSULT NOTE ADULT - ATTENDING COMMENTS
Dizziness likely due to polypharmacy  Syncope    I recommend ILR implant  ILR will be scheduled on 12/5/2018  I discussed an ILR implantation with the patient in great detail. I discussed benefits such as monitoring the heart rate and rhythm for a prolonged period of time which could identify causes of syncope and assist in establishing a diagnosis for future management and treatment. In addition, ILR implantation procedure as well as risks such as infection and sensitivity to cardiac monitor material was discussed. Ample time was provided for questions/answers.     Patient has expressed that she would like to move forward with an ILR implant.

## 2018-12-04 NOTE — PROGRESS NOTE ADULT - SUBJECTIVE AND OBJECTIVE BOX
Patient is a 53y old  Female who presents with a chief complaint of syncope (04 Dec 2018 09:17)      Overnight events: patient still feels dizzy, otherwise she feels OK.    PAST MEDICAL & SURGICAL HISTORY:  Migraine  Crohn disease  COPD (chronic obstructive pulmonary disease)  Depression  Anxiety  Schizophrenia  ANISHA (obstructive sleep apnea)  Polyneuropathy  Bladder disorder  Sciatica  Constipation  Iron deficiency  Hypercholesteremia  Hypothyroid  Diabetes mellitus, type 2  Vitamin D deficiency  GERD (gastroesophageal reflux disease)  History of cholecystectomy      MEDICATIONS  (STANDING):  atorvastatin 40 milliGRAM(s) Oral at bedtime  busPIRone 15 milliGRAM(s) Oral two times a day  celecoxib 200 milliGRAM(s) Oral daily  chlorhexidine 4% Liquid 1 Application(s) Topical <User Schedule>  cyanocobalamin 1000 MICROGram(s) Oral daily  dextrose 5%. 1000 milliLiter(s) (50 mL/Hr) IV Continuous <Continuous>  dextrose 50% Injectable 12.5 Gram(s) IV Push once  dextrose 50% Injectable 25 Gram(s) IV Push once  dextrose 50% Injectable 25 Gram(s) IV Push once  DULoxetine 30 milliGRAM(s) Oral daily  enoxaparin Injectable 40 milliGRAM(s) SubCutaneous daily  ferrous    sulfate 325 milliGRAM(s) Oral daily  folic acid 1 milliGRAM(s) Oral daily  haloperidol     Tablet 2 milliGRAM(s) Oral two times a day  insulin glargine Injectable (LANTUS) 15 Unit(s) SubCutaneous at bedtime  insulin lispro (HumaLOG) corrective regimen sliding scale   SubCutaneous three times a day before meals  insulin lispro Injectable (HumaLOG) 5 Unit(s) SubCutaneous three times a day before meals  levothyroxine 100 MICROGram(s) Oral daily  loperamide 2 milliGRAM(s) Oral daily  meclizine 25 milliGRAM(s) Oral three times a day  mesalamine DR Capsule 400 milliGRAM(s) Oral at bedtime  midodrine 10 milliGRAM(s) Oral three times a day  montelukast 10 milliGRAM(s) Oral daily  pantoprazole    Tablet 40 milliGRAM(s) Oral before breakfast    MEDICATIONS  (PRN):  ALBUTerol/ipratropium for Nebulization 3 milliLiter(s) Nebulizer every 6 hours PRN Bronchospasm  dextrose 40% Gel 15 Gram(s) Oral once PRN Blood Glucose LESS THAN 70 milliGRAM(s)/deciliter  glucagon  Injectable 1 milliGRAM(s) IntraMuscular once PRN Glucose LESS THAN 70 milligrams/deciliter      Home medications  Amitiza 8 mcg oral capsule: orally once a day  Apriso 0.375 g oral capsule, extended release: 1 tab(s) orally once a day (at bedtime)  atorvastatin 40 mg oral tablet: 1 tab(s) orally once a day  busPIRone 15 mg oral tablet: 1 tab(s) orally 2 times a day  cetirizine 10 mg oral tablet: 1 tab(s) orally once a day  Cymbalta 60 mg oral delayed release capsule: orally once a day  ergocalciferol 50,000 intl units (1.25 mg) oral capsule: 1 cap(s) orally once a week  Feosol 325 mg (65 mg elemental iron) oral tablet: orally once a day  folic acid 1 mg oral tablet: 1 tab(s) orally once a day  Invokana 300 mg oral tablet: 1 tab(s) orally once a day  levothyroxine 100 mcg (0.1 mg) oral tablet: 1 tab(s) orally once a day  loperamide 2 mg oral capsule: orally once a day  meclizine 25 mg oral tablet: 1 tab(s) orally 3 times a day  meloxicam 7.5 mg oral tablet: 1 tab(s) orally once a day  metFORMIN 1000 mg oral tablet: 1 tab(s) orally 2 times a day  midodrine 5 mg oral tablet: orally 3 times a day  montelukast 10 mg oral tablet: 1 tab(s) orally once a day  pioglitazone 45 mg oral tablet: 1 tab(s) orally once a day  Protonix 40 mg oral delayed release tablet: 1 tab(s) orally once a day  raNITIdine 150 mg oral tablet: orally once a day  SUMAtriptan 100 mg oral tablet: 1 tab(s) orally every 2 hours, As Needed  traMADol 50 mg oral tablet: orally every 6 hours, As Needed  Vitamin B12 1000 mcg oral tablet: 1 tab(s) orally once a day      Vital Signs Last 24 Hrs  T(C): 36.3 (04 Dec 2018 07:39), Max: 36.8 (03 Dec 2018 15:43)  T(F): 97.3 (04 Dec 2018 07:39), Max: 98.2 (03 Dec 2018 15:43)  HR: 60 (04 Dec 2018 07:39) (60 - 90)  BP: 115/58 (04 Dec 2018 07:39) (103/52 - 116/53)  BP(mean): --  RR: 20 (04 Dec 2018 07:39) (18 - 20)  SpO2: 95% (04 Dec 2018 07:39) (95% - 99%)  CAPILLARY BLOOD GLUCOSE      POCT Blood Glucose.: 179 mg/dL (04 Dec 2018 08:05)  POCT Blood Glucose.: 159 mg/dL (03 Dec 2018 21:16)  POCT Blood Glucose.: 173 mg/dL (03 Dec 2018 17:52)  POCT Blood Glucose.: 179 mg/dL (03 Dec 2018 11:51)    I&O's Summary      Physical Exam:    General Appearance: Normal	  Cardiovascular: Normal S1 S2, No JVD, No murmurs, No edema  Respiratory: Lungs clear to auscultation	  Psychiatry: A & O x 3  Gastrointestinal:  Soft, Non-tender  Skin: No rashes, No ecchymoses, No cyanosis	  Neurologic: Non-focal  Musculoskeletal/ extremities: Normal range of motion, No clubbing, cyanosis or edema  Vascular: Peripheral pulses palpable 2+ bilaterally      Labs:                        12.4   6.00  )-----------( 343      ( 04 Dec 2018 08:58 )             40.5             12-03    143  |  104  |  12  ----------------------------<  155<H>  4.2   |  27  |  0.5<L>    Ca    9.6      03 Dec 2018 04:30                          Imaging:    < from: Transthoracic Echocardiogram (12.03.18 @ 08:23) >   1. Left ventricular ejection fraction, by visual estimation, is 55 to   60%.   2. Normal global left ventricular systolic function.   3. Mild tricuspid regurgitation.    < end of copied text >    < from: CT Head No Cont (12.01.18 @ 03:30) >      No evidence of intracranial hemorrhage, territorial infarct, or mass   effect.      < end of copied text >    < from: MR Angio Head No Cont (12.01.18 @ 22:07) >  Unremarkable MRA of the brain without contrast.    < end of copied text >      ECG:    < from: 12 Lead ECG (12.02.18 @ 17:06) >  Ventricular Rate 76 BPM    Atrial Rate 76 BPM    P-R Interval 118 ms    QRS Duration 74 ms    Q-T Interval 404 ms    QTC Calculation(Bezet) 454 ms    P Axis 51 degrees    R Axis 41 degrees    T Axis 71 degrees    Diagnosis Line Normal sinus rhythm  Low voltage QRS  Nonspecific T wave abnormality  Abnormal ECG    Confirmed by Terence Harding ([id number]) on 12/3/2018 12:19:28 AM    < end of copied text >

## 2018-12-04 NOTE — PROGRESS NOTE ADULT - ASSESSMENT
53F with PMHx, DMII, hypothyroidism, ANISHA on CPAP, depression, gastritis, COPD, crohns disease, chronic back pain, migraines, chronic dizziness presents for syncopal episode and dizziness    #Syncope and dizziness  - Likely secondary to polypharmacy vs neuro mediated vs orthostatic vs cardiovascular  - Neuro recs appreciated  - CTH NC and MRA brain negative  - no events on tele, can d/c  - 2D echo: normal EF, mild TR  - Cardiology recs appreciated  - EPS evaluation for loop recorder  - Orthostatics were positive yesterday, will repeat  - Psych recs appreciated  - started on haldol 2mg bid, decrease cymbalta to 30mg for 2 days then stop  - f/u with neurology as OP after d/c  - midodrine increased to 10mg TID     #DMII  - c/w insulin regimen     #ANISHA on cpap  - continue at 12    #hypothyroidism  - c/w synthroid     #Depression/schizophrenia  - c/w current home meds.   - Psych eval pending     # gastritis  - c/w protonix    # COPD  - prn nebs     #chronic back pain  - nsaids prn       #Crohns disease  - c/w home meds     DVT PPx: Lovenox  GI PPx: protonix   PT rehab   Dispo: [facility] placement 53F with PMHx, DMII, hypothyroidism, ANISHA on CPAP, depression, gastritis, COPD, crohns disease, chronic back pain, migraines, chronic dizziness presents for syncopal episode and dizziness    #Syncope and dizziness  - Likely secondary to polypharmacy vs neuro mediated vs orthostatic vs cardiovascular  - Neuro recs appreciated  - CTH NC and MRA brain negative  - no events on tele, can d/c  - 2D echo: normal EF, mild TR  - Cardiology recs appreciated  - EPS evaluation for loop recorder  - Orthostatics were positive yesterday, will repeat  - Psych recs appreciated  - started on haldol 2mg bid, decrease cymbalta to 30mg for 2 days then stop  - f/u with neurology as OP after d/c  - midodrine increased to 10mg TID     #DMII  - c/w insulin regimen     #ANISHA on cpap  - continue at 12    #hypothyroidism  - c/w synthroid     #Depression/schizophrenia  - c/w current home meds.   - Psych eval pending     # gastritis  - c/w protonix    # COPD  - prn nebs     #chronic back pain  - nsaids prn     #possible vit B12 def  on oral b12      #Crohns disease  - c/w home meds     DVT PPx: Lovenox  GI PPx: protonix   PT rehab   Dispo: [facility] placement 53F with PMHx, DMII, hypothyroidism, ANISHA on CPAP, depression, gastritis, COPD, crohns disease, chronic back pain, migraines, chronic dizziness presents for syncopal episode and dizziness    #Syncope and dizziness  -possibly BPV, meclizine prn, taper slowly  - Likely secondary to polypharmacy vs neuro mediated vs orthostatic vs cardiovascular  - Neuro recs appreciated  - CTH NC and MRA brain negative  - no events on tele, can d/c  - 2D echo: normal EF, mild TR  - Cardiology recs appreciated  - EPS evaluation for loop recorder  - Orthostatics were positive yesterday, will repeat  - Psych recs appreciated  - started on haldol 2mg bid, decrease cymbalta to 30mg for 2 days then stop  - f/u with neurology as OP after d/c  - midodrine increased to 10mg TID     #DMII  - c/w insulin regimen     #ANISHA on cpap  - continue at 12    #hypothyroidism  - c/w synthroid     #Depression/schizophrenia  - c/w current home meds.   - Psych eval pending     # gastritis  - c/w protonix    # COPD  - prn nebs     #chronic back pain  - nsaids prn     #possible vit B12 def  on oral b12      #Crohns disease  - c/w home meds     DVT PPx: Lovenox  GI PPx: protonix   PT rehab   Dispo: [facility] placement

## 2018-12-04 NOTE — CONSULT NOTE ADULT - SUBJECTIVE AND OBJECTIVE BOX
Date of Admission: 12/2    CHIEF COMPLAINT: syncope    HISTORY OF PRESENT ILLNESS: 53yFemale with PMH below presented to the hospital for syncope and dizziness. She states that she is dizzy at all times and sometimes passes out and sometimes remembers when she falls and how she feels. She has had a workup in the past with neurology for vertigo (although her dizziness she claims is non positional) and has had a tilt test with some orthostatic changes seen on it. She still feels dizzy. She denied prodrome for her most recent fall. Denies CP and denies SOB.     PAST MEDICAL & SURGICAL HISTORY:  Migraine  Crohn disease  COPD (chronic obstructive pulmonary disease)  Depression  Anxiety  Schizophrenia  ANISHA (obstructive sleep apnea)  Polyneuropathy  Bladder disorder  Sciatica  Constipation  Iron deficiency  Hypercholesteremia  Hypothyroid  Diabetes mellitus, type 2  Vitamin D deficiency  GERD (gastroesophageal reflux disease)  History of cholecystectomy    HEALTH ISSUES - PROBLEM Dx:  Generalized anxiety disorder  Schizoaffective disorder        FAMILY HISTORY:  No pertinent family history in first degree relatives    None [ ]  Mother:   Father:   Siblings:     SOCIAL HISTORY:    [ ] Non-smoker  [ ] Smoker  [ ] Alcohol    Allergies    ciprofloxacin (Unknown)  codeine (Unknown)  Fish Products (Unknown)  lactose (Unknown)  latex (Unknown)  penicillins (Unknown)  tetracycline (Unknown)  Zoloft (Other)    Intolerances    	    REVIEW OF SYSTEMS:  CONSTITUTIONAL: No fever, weight loss, or fatigue  CARDIOLOGY: see HPI.   RESPIRATORY: denies shortness of breath, wheezing.   NEUROLOGICAL: NO weakness, no focal deficits to report.  ENDOCRINOLOGICAL: no recent change in diabetic medications.   GI: no BRBPR, no N,V,diarrhea.    PSYCHIATRY: normal mood and affect  HEENT: no nasal discharge, no ecchymosis  SKIN: no ecchymosis, no breakdown  MUSCULOSKELETAL: Full range of motion x4.      PHYSICAL EXAM:  T(C): 36.3 (12-04-18 @ 07:39), Max: 36.8 (12-03-18 @ 15:43)  HR: 60 (12-04-18 @ 07:39) (60 - 90)  BP: 115/58 (12-04-18 @ 07:39) (103/52 - 116/53)  RR: 20 (12-04-18 @ 07:39) (18 - 20)  SpO2: 95% (12-04-18 @ 07:39) (95% - 99%)  Wt(kg): --  I&O's Summary    Daily     Daily     General Appearance: Normal	  Cardiovascular: Normal S1 S2, No JVD, No murmurs, No edema  Respiratory: Lungs clear to auscultation	  Psychiatry: A & O x 3, tangiential conversation  Gastrointestinal:  Soft, Non-tender  Skin: No rashes, No ecchymoses, No cyanosis	  Neurologic: Non-focal  Musculoskeletal/ extremities: Normal range of motion, No clubbing, cyanosis or edema  Vascular: Peripheral pulses palpable 2+ bilaterally    LABS:	 	                          12.6   6.12  )-----------( 349      ( 03 Dec 2018 04:30 )             41.0     12-03    143  |  104  |  12  ----------------------------<  155<H>  4.2   |  27  |  0.5<L>    Ca    9.6      03 Dec 2018 04:30              CARDIAC MARKERS:            TELEMETRY EVENTS: 	    ECG:  < from: 12 Lead ECG (12.02.18 @ 17:06) >  Diagnosis Line Normal sinus rhythm  Low voltage QRS  Nonspecific T wave abnormality  Abnormal ECG    < end of copied text >  	  RADIOLOGY:  OTHER: 	    PREVIOUS DIAGNOSTIC TESTING:    [ ] Echocardiogram:  [ ]  Catheterization:  [ ] Stress Test:  	  	    Home Medications:  Amitiza 8 mcg oral capsule: orally once a day (30 Nov 2018 17:31)  Apriso 0.375 g oral capsule, extended release: 1 tab(s) orally once a day (at bedtime) (30 Nov 2018 17:27)  atorvastatin 40 mg oral tablet: 1 tab(s) orally once a day (30 Nov 2018 17:25)  busPIRone 15 mg oral tablet: 1 tab(s) orally 2 times a day (30 Nov 2018 17:25)  cetirizine 10 mg oral tablet: 1 tab(s) orally once a day (04 Jul 2018 19:06)  Cymbalta 60 mg oral delayed release capsule: orally once a day (30 Nov 2018 17:30)  ergocalciferol 50,000 intl units (1.25 mg) oral capsule: 1 cap(s) orally once a week (04 Jul 2018 19:06)  Feosol 325 mg (65 mg elemental iron) oral tablet: orally once a day (04 Jul 2018 19:06)  folic acid 1 mg oral tablet: 1 tab(s) orally once a day (04 Jul 2018 19:06)  Invokana 300 mg oral tablet: 1 tab(s) orally once a day (30 Nov 2018 17:35)  levothyroxine 100 mcg (0.1 mg) oral tablet: 1 tab(s) orally once a day (30 Nov 2018 17:26)  loperamide 2 mg oral capsule: orally once a day (30 Nov 2018 17:34)  meclizine 25 mg oral tablet: 1 tab(s) orally 3 times a day (30 Nov 2018 17:24)  meloxicam 7.5 mg oral tablet: 1 tab(s) orally once a day (30 Nov 2018 17:32)  metFORMIN 1000 mg oral tablet: 1 tab(s) orally 2 times a day (04 Jul 2018 19:06)  midodrine 5 mg oral tablet: orally 3 times a day (30 Nov 2018 17:31)  montelukast 10 mg oral tablet: 1 tab(s) orally once a day (30 Nov 2018 17:33)  pioglitazone 45 mg oral tablet: 1 tab(s) orally once a day (30 Nov 2018 17:26)  Protonix 40 mg oral delayed release tablet: 1 tab(s) orally once a day (04 Jul 2018 19:06)  raNITIdine 150 mg oral tablet: orally once a day (30 Nov 2018 17:30)  SUMAtriptan 100 mg oral tablet: 1 tab(s) orally every 2 hours, As Needed (02 Dec 2018 20:38)  traMADol 50 mg oral tablet: orally every 6 hours, As Needed (02 Dec 2018 20:38)  Vitamin B12 1000 mcg oral tablet: 1 tab(s) orally once a day (02 Dec 2018 20:47)    MEDICATIONS  (STANDING):  atorvastatin 40 milliGRAM(s) Oral at bedtime  busPIRone 15 milliGRAM(s) Oral two times a day  celecoxib 200 milliGRAM(s) Oral daily  chlorhexidine 4% Liquid 1 Application(s) Topical <User Schedule>  cyanocobalamin 1000 MICROGram(s) Oral daily  dextrose 5%. 1000 milliLiter(s) (50 mL/Hr) IV Continuous <Continuous>  dextrose 50% Injectable 12.5 Gram(s) IV Push once  dextrose 50% Injectable 25 Gram(s) IV Push once  dextrose 50% Injectable 25 Gram(s) IV Push once  DULoxetine 30 milliGRAM(s) Oral daily  enoxaparin Injectable 40 milliGRAM(s) SubCutaneous daily  ferrous    sulfate 325 milliGRAM(s) Oral daily  folic acid 1 milliGRAM(s) Oral daily  haloperidol     Tablet 2 milliGRAM(s) Oral two times a day  insulin glargine Injectable (LANTUS) 15 Unit(s) SubCutaneous at bedtime  insulin lispro (HumaLOG) corrective regimen sliding scale   SubCutaneous three times a day before meals  insulin lispro Injectable (HumaLOG) 5 Unit(s) SubCutaneous three times a day before meals  levothyroxine 100 MICROGram(s) Oral daily  loperamide 2 milliGRAM(s) Oral daily  meclizine 25 milliGRAM(s) Oral three times a day  mesalamine DR Capsule 400 milliGRAM(s) Oral at bedtime  midodrine 10 milliGRAM(s) Oral three times a day  montelukast 10 milliGRAM(s) Oral daily  pantoprazole    Tablet 40 milliGRAM(s) Oral before breakfast    MEDICATIONS  (PRN):  ALBUTerol/ipratropium for Nebulization 3 milliLiter(s) Nebulizer every 6 hours PRN Bronchospasm  dextrose 40% Gel 15 Gram(s) Oral once PRN Blood Glucose LESS THAN 70 milliGRAM(s)/deciliter  glucagon  Injectable 1 milliGRAM(s) IntraMuscular once PRN Glucose LESS THAN 70 milligrams/deciliter Date of Admission: 12/2    CHIEF COMPLAINT: syncope    HISTORY OF PRESENT ILLNESS: 53yFemale with PMH below presented to the hospital for syncope and dizziness. She states that she is dizzy at all times and sometimes passes out and sometimes remembers when she falls and how she feels. She has had a workup in the past with neurology for vertigo (although her dizziness she claims is non positional) and has had a tilt test with some orthostatic changes seen on it. She still feels dizzy. She denied prodrome for her most recent fall. Denies CP and denies SOB.     PAST MEDICAL & SURGICAL HISTORY:  Migraine  Crohn disease  COPD (chronic obstructive pulmonary disease)  Depression  Anxiety  Schizophrenia  ANISHA (obstructive sleep apnea)  Polyneuropathy  Bladder disorder  Sciatica  Constipation  Iron deficiency  Hypercholesteremia  Hypothyroid  Diabetes mellitus, type 2  Vitamin D deficiency  GERD (gastroesophageal reflux disease)  History of cholecystectomy    HEALTH ISSUES - PROBLEM Dx:  Generalized anxiety disorder  Schizoaffective disorder        FAMILY HISTORY:  No pertinent family history in first degree relatives    None [ ]  Mother:   Father:   Siblings:     SOCIAL HISTORY:    Denies alcohol, drug use, or smoking    Allergies    ciprofloxacin (Unknown)  codeine (Unknown)  Fish Products (Unknown)  lactose (Unknown)  latex (Unknown)  penicillins (Unknown)  tetracycline (Unknown)  Zoloft (Other)    Intolerances    	    REVIEW OF SYSTEMS:  CONSTITUTIONAL: No fever, weight loss, or fatigue  CARDIOLOGY: see HPI.   RESPIRATORY: denies shortness of breath, wheezing.   NEUROLOGICAL: NO weakness, no focal deficits to report.  ENDOCRINOLOGICAL: no recent change in diabetic medications.   GI: no BRBPR, no N,V,diarrhea.    PSYCHIATRY: normal mood and affect  HEENT: no nasal discharge, no ecchymosis  SKIN: no ecchymosis, no breakdown  MUSCULOSKELETAL: Full range of motion x4.      PHYSICAL EXAM:  T(C): 36.3 (12-04-18 @ 07:39), Max: 36.8 (12-03-18 @ 15:43)  HR: 60 (12-04-18 @ 07:39) (60 - 90)  BP: 115/58 (12-04-18 @ 07:39) (103/52 - 116/53)  RR: 20 (12-04-18 @ 07:39) (18 - 20)  SpO2: 95% (12-04-18 @ 07:39) (95% - 99%)  Wt(kg): --  I&O's Summary    Daily     Daily     General Appearance: Normal	  Cardiovascular: Normal S1 S2, No JVD, No murmurs, No edema  Respiratory: Lungs clear to auscultation	  Psychiatry: A & O x 3, tangiential conversation  Gastrointestinal:  Soft, Non-tender  Skin: No rashes, No ecchymoses, No cyanosis	  Neurologic: Non-focal  Musculoskeletal/ extremities: Normal range of motion, No clubbing, cyanosis or edema  Vascular: Peripheral pulses palpable 2+ bilaterally    LABS:	 	                          12.6   6.12  )-----------( 349      ( 03 Dec 2018 04:30 )             41.0     12-03    143  |  104  |  12  ----------------------------<  155<H>  4.2   |  27  |  0.5<L>    Ca    9.6      03 Dec 2018 04:30              CARDIAC MARKERS:            TELEMETRY EVENTS: 	    ECG:  < from: 12 Lead ECG (12.02.18 @ 17:06) >  Diagnosis Line Normal sinus rhythm  Low voltage QRS  Nonspecific T wave abnormality  Abnormal ECG    < end of copied text >  	  RADIOLOGY:  OTHER: 	    PREVIOUS DIAGNOSTIC TESTING:    [ ] Echocardiogram:  [ ]  Catheterization:  [ ] Stress Test:  	  	    Home Medications:  Amitiza 8 mcg oral capsule: orally once a day (30 Nov 2018 17:31)  Apriso 0.375 g oral capsule, extended release: 1 tab(s) orally once a day (at bedtime) (30 Nov 2018 17:27)  atorvastatin 40 mg oral tablet: 1 tab(s) orally once a day (30 Nov 2018 17:25)  busPIRone 15 mg oral tablet: 1 tab(s) orally 2 times a day (30 Nov 2018 17:25)  cetirizine 10 mg oral tablet: 1 tab(s) orally once a day (04 Jul 2018 19:06)  Cymbalta 60 mg oral delayed release capsule: orally once a day (30 Nov 2018 17:30)  ergocalciferol 50,000 intl units (1.25 mg) oral capsule: 1 cap(s) orally once a week (04 Jul 2018 19:06)  Feosol 325 mg (65 mg elemental iron) oral tablet: orally once a day (04 Jul 2018 19:06)  folic acid 1 mg oral tablet: 1 tab(s) orally once a day (04 Jul 2018 19:06)  Invokana 300 mg oral tablet: 1 tab(s) orally once a day (30 Nov 2018 17:35)  levothyroxine 100 mcg (0.1 mg) oral tablet: 1 tab(s) orally once a day (30 Nov 2018 17:26)  loperamide 2 mg oral capsule: orally once a day (30 Nov 2018 17:34)  meclizine 25 mg oral tablet: 1 tab(s) orally 3 times a day (30 Nov 2018 17:24)  meloxicam 7.5 mg oral tablet: 1 tab(s) orally once a day (30 Nov 2018 17:32)  metFORMIN 1000 mg oral tablet: 1 tab(s) orally 2 times a day (04 Jul 2018 19:06)  midodrine 5 mg oral tablet: orally 3 times a day (30 Nov 2018 17:31)  montelukast 10 mg oral tablet: 1 tab(s) orally once a day (30 Nov 2018 17:33)  pioglitazone 45 mg oral tablet: 1 tab(s) orally once a day (30 Nov 2018 17:26)  Protonix 40 mg oral delayed release tablet: 1 tab(s) orally once a day (04 Jul 2018 19:06)  raNITIdine 150 mg oral tablet: orally once a day (30 Nov 2018 17:30)  SUMAtriptan 100 mg oral tablet: 1 tab(s) orally every 2 hours, As Needed (02 Dec 2018 20:38)  traMADol 50 mg oral tablet: orally every 6 hours, As Needed (02 Dec 2018 20:38)  Vitamin B12 1000 mcg oral tablet: 1 tab(s) orally once a day (02 Dec 2018 20:47)    MEDICATIONS  (STANDING):  atorvastatin 40 milliGRAM(s) Oral at bedtime  busPIRone 15 milliGRAM(s) Oral two times a day  celecoxib 200 milliGRAM(s) Oral daily  chlorhexidine 4% Liquid 1 Application(s) Topical <User Schedule>  cyanocobalamin 1000 MICROGram(s) Oral daily  dextrose 5%. 1000 milliLiter(s) (50 mL/Hr) IV Continuous <Continuous>  dextrose 50% Injectable 12.5 Gram(s) IV Push once  dextrose 50% Injectable 25 Gram(s) IV Push once  dextrose 50% Injectable 25 Gram(s) IV Push once  DULoxetine 30 milliGRAM(s) Oral daily  enoxaparin Injectable 40 milliGRAM(s) SubCutaneous daily  ferrous    sulfate 325 milliGRAM(s) Oral daily  folic acid 1 milliGRAM(s) Oral daily  haloperidol     Tablet 2 milliGRAM(s) Oral two times a day  insulin glargine Injectable (LANTUS) 15 Unit(s) SubCutaneous at bedtime  insulin lispro (HumaLOG) corrective regimen sliding scale   SubCutaneous three times a day before meals  insulin lispro Injectable (HumaLOG) 5 Unit(s) SubCutaneous three times a day before meals  levothyroxine 100 MICROGram(s) Oral daily  loperamide 2 milliGRAM(s) Oral daily  meclizine 25 milliGRAM(s) Oral three times a day  mesalamine DR Capsule 400 milliGRAM(s) Oral at bedtime  midodrine 10 milliGRAM(s) Oral three times a day  montelukast 10 milliGRAM(s) Oral daily  pantoprazole    Tablet 40 milliGRAM(s) Oral before breakfast    MEDICATIONS  (PRN):  ALBUTerol/ipratropium for Nebulization 3 milliLiter(s) Nebulizer every 6 hours PRN Bronchospasm  dextrose 40% Gel 15 Gram(s) Oral once PRN Blood Glucose LESS THAN 70 milliGRAM(s)/deciliter  glucagon  Injectable 1 milliGRAM(s) IntraMuscular once PRN Glucose LESS THAN 70 milligrams/deciliter

## 2018-12-05 LAB
GLUCOSE BLDC GLUCOMTR-MCNC: 173 MG/DL — HIGH (ref 70–99)
GLUCOSE BLDC GLUCOMTR-MCNC: 187 MG/DL — HIGH (ref 70–99)
GLUCOSE BLDC GLUCOMTR-MCNC: 218 MG/DL — HIGH (ref 70–99)

## 2018-12-05 RX ADMIN — MONTELUKAST 10 MILLIGRAM(S): 4 TABLET, CHEWABLE ORAL at 13:10

## 2018-12-05 RX ADMIN — ATORVASTATIN CALCIUM 40 MILLIGRAM(S): 80 TABLET, FILM COATED ORAL at 22:46

## 2018-12-05 RX ADMIN — DULOXETINE HYDROCHLORIDE 30 MILLIGRAM(S): 30 CAPSULE, DELAYED RELEASE ORAL at 13:09

## 2018-12-05 RX ADMIN — Medication 1 MILLIGRAM(S): at 13:10

## 2018-12-05 RX ADMIN — MIDODRINE HYDROCHLORIDE 10 MILLIGRAM(S): 2.5 TABLET ORAL at 22:46

## 2018-12-05 RX ADMIN — Medication 25 MILLIGRAM(S): at 06:12

## 2018-12-05 RX ADMIN — Medication 325 MILLIGRAM(S): at 13:10

## 2018-12-05 RX ADMIN — PREGABALIN 1000 MICROGRAM(S): 225 CAPSULE ORAL at 13:09

## 2018-12-05 RX ADMIN — Medication 1: at 08:55

## 2018-12-05 RX ADMIN — MIDODRINE HYDROCHLORIDE 10 MILLIGRAM(S): 2.5 TABLET ORAL at 13:10

## 2018-12-05 RX ADMIN — Medication 100 MICROGRAM(S): at 06:13

## 2018-12-05 RX ADMIN — HALOPERIDOL DECANOATE 2 MILLIGRAM(S): 100 INJECTION INTRAMUSCULAR at 06:12

## 2018-12-05 RX ADMIN — Medication 5 UNIT(S): at 13:11

## 2018-12-05 RX ADMIN — Medication 15 MILLIGRAM(S): at 06:12

## 2018-12-05 RX ADMIN — Medication 25 MILLIGRAM(S): at 22:46

## 2018-12-05 RX ADMIN — MIDODRINE HYDROCHLORIDE 10 MILLIGRAM(S): 2.5 TABLET ORAL at 06:12

## 2018-12-05 RX ADMIN — Medication 400 MILLIGRAM(S): at 22:46

## 2018-12-05 RX ADMIN — ENOXAPARIN SODIUM 40 MILLIGRAM(S): 100 INJECTION SUBCUTANEOUS at 13:09

## 2018-12-05 RX ADMIN — Medication 15 MILLIGRAM(S): at 18:20

## 2018-12-05 RX ADMIN — Medication 2 MILLIGRAM(S): at 13:10

## 2018-12-05 RX ADMIN — CELECOXIB 200 MILLIGRAM(S): 200 CAPSULE ORAL at 13:11

## 2018-12-05 RX ADMIN — Medication 25 MILLIGRAM(S): at 13:10

## 2018-12-05 RX ADMIN — INSULIN GLARGINE 15 UNIT(S): 100 INJECTION, SOLUTION SUBCUTANEOUS at 22:45

## 2018-12-05 RX ADMIN — Medication 1: at 13:11

## 2018-12-05 RX ADMIN — Medication 5 UNIT(S): at 18:24

## 2018-12-05 RX ADMIN — HALOPERIDOL DECANOATE 2 MILLIGRAM(S): 100 INJECTION INTRAMUSCULAR at 18:20

## 2018-12-05 RX ADMIN — Medication 5 UNIT(S): at 08:55

## 2018-12-05 RX ADMIN — Medication 2: at 18:24

## 2018-12-05 RX ADMIN — PANTOPRAZOLE SODIUM 40 MILLIGRAM(S): 20 TABLET, DELAYED RELEASE ORAL at 06:12

## 2018-12-05 NOTE — PROGRESS NOTE ADULT - SUBJECTIVE AND OBJECTIVE BOX
Patient is a 53y old  Female who presents with a chief complaint of syncope (04 Dec 2018 17:01)      Overnight events: patient seen and examined, no events overnight.    PAST MEDICAL & SURGICAL HISTORY:  Migraine  Crohn disease  COPD (chronic obstructive pulmonary disease)  Depression  Anxiety  Schizophrenia  ANISHA (obstructive sleep apnea)  Polyneuropathy  Bladder disorder  Sciatica  Constipation  Iron deficiency  Hypercholesteremia  Hypothyroid  Diabetes mellitus, type 2  Vitamin D deficiency  GERD (gastroesophageal reflux disease)  History of cholecystectomy      MEDICATIONS  (STANDING):  atorvastatin 40 milliGRAM(s) Oral at bedtime  busPIRone 15 milliGRAM(s) Oral two times a day  celecoxib 200 milliGRAM(s) Oral daily  chlorhexidine 4% Liquid 1 Application(s) Topical <User Schedule>  cyanocobalamin 1000 MICROGram(s) Oral daily  dextrose 5%. 1000 milliLiter(s) (50 mL/Hr) IV Continuous <Continuous>  dextrose 50% Injectable 12.5 Gram(s) IV Push once  dextrose 50% Injectable 25 Gram(s) IV Push once  dextrose 50% Injectable 25 Gram(s) IV Push once  DULoxetine 30 milliGRAM(s) Oral daily  enoxaparin Injectable 40 milliGRAM(s) SubCutaneous daily  ferrous    sulfate 325 milliGRAM(s) Oral daily  folic acid 1 milliGRAM(s) Oral daily  haloperidol     Tablet 2 milliGRAM(s) Oral two times a day  insulin glargine Injectable (LANTUS) 15 Unit(s) SubCutaneous at bedtime  insulin lispro (HumaLOG) corrective regimen sliding scale   SubCutaneous three times a day before meals  insulin lispro Injectable (HumaLOG) 5 Unit(s) SubCutaneous three times a day before meals  levothyroxine 100 MICROGram(s) Oral daily  loperamide 2 milliGRAM(s) Oral daily  meclizine 25 milliGRAM(s) Oral three times a day  mesalamine DR Capsule 400 milliGRAM(s) Oral at bedtime  midodrine 10 milliGRAM(s) Oral three times a day  montelukast 10 milliGRAM(s) Oral daily  pantoprazole    Tablet 40 milliGRAM(s) Oral before breakfast  SUMAtriptan 100 milliGRAM(s) Oral once    MEDICATIONS  (PRN):  ALBUTerol/ipratropium for Nebulization 3 milliLiter(s) Nebulizer every 6 hours PRN Bronchospasm  dextrose 40% Gel 15 Gram(s) Oral once PRN Blood Glucose LESS THAN 70 milliGRAM(s)/deciliter  glucagon  Injectable 1 milliGRAM(s) IntraMuscular once PRN Glucose LESS THAN 70 milligrams/deciliter      Home medications  Amitiza 8 mcg oral capsule: orally once a day  Apriso 0.375 g oral capsule, extended release: 1 tab(s) orally once a day (at bedtime)  atorvastatin 40 mg oral tablet: 1 tab(s) orally once a day  busPIRone 15 mg oral tablet: 1 tab(s) orally 2 times a day  cetirizine 10 mg oral tablet: 1 tab(s) orally once a day  Cymbalta 60 mg oral delayed release capsule: orally once a day  ergocalciferol 50,000 intl units (1.25 mg) oral capsule: 1 cap(s) orally once a week  Feosol 325 mg (65 mg elemental iron) oral tablet: orally once a day  folic acid 1 mg oral tablet: 1 tab(s) orally once a day  Invokana 300 mg oral tablet: 1 tab(s) orally once a day  levothyroxine 100 mcg (0.1 mg) oral tablet: 1 tab(s) orally once a day  loperamide 2 mg oral capsule: orally once a day  meclizine 25 mg oral tablet: 1 tab(s) orally 3 times a day  meloxicam 7.5 mg oral tablet: 1 tab(s) orally once a day  metFORMIN 1000 mg oral tablet: 1 tab(s) orally 2 times a day  midodrine 5 mg oral tablet: orally 3 times a day  montelukast 10 mg oral tablet: 1 tab(s) orally once a day  pioglitazone 45 mg oral tablet: 1 tab(s) orally once a day  Protonix 40 mg oral delayed release tablet: 1 tab(s) orally once a day  raNITIdine 150 mg oral tablet: orally once a day  SUMAtriptan 100 mg oral tablet: 1 tab(s) orally every 2 hours, As Needed  traMADol 50 mg oral tablet: orally every 6 hours, As Needed  Vitamin B12 1000 mcg oral tablet: 1 tab(s) orally once a day      Vital Signs Last 24 Hrs  T(C): 36.4 (05 Dec 2018 08:13), Max: 36.4 (05 Dec 2018 08:13)  T(F): 97.6 (05 Dec 2018 08:13), Max: 97.6 (05 Dec 2018 08:13)  HR: 70 (05 Dec 2018 08:13) (66 - 76)  BP: 102/65 (05 Dec 2018 08:13) (95/57 - 102/65)  BP(mean): --  RR: 17 (05 Dec 2018 08:13) (17 - 18)  SpO2: 91% (05 Dec 2018 08:13) (91% - 97%)  CAPILLARY BLOOD GLUCOSE      POCT Blood Glucose.: 184 mg/dL (04 Dec 2018 22:31)  POCT Blood Glucose.: 280 mg/dL (04 Dec 2018 18:42)  POCT Blood Glucose.: 210 mg/dL (04 Dec 2018 12:04)    I&O's Summary      Physical Exam:    General Appearance: Normal	  Cardiovascular: Normal S1 S2, No JVD, No murmurs, No edema  Respiratory: Lungs clear to auscultation	  Psychiatry: A & O x 3  Gastrointestinal:  Soft, Non-tender  Skin: No rashes, No ecchymoses, No cyanosis	  Neurologic: Non-focal  Musculoskeletal/ extremities: Normal range of motion, No clubbing, cyanosis or edema  Vascular: Peripheral pulses palpable 2+ bilaterally      Labs:                        12.4   6.00  )-----------( 343      ( 04 Dec 2018 08:58 )             40.5             12-04    141  |  102  |  20  ----------------------------<  202<H>  4.0   |  27  |  0.7    Ca    9.2      04 Dec 2018 08:58  Mg     1.9     12-04                            Imaging:    < from: Transthoracic Echocardiogram (12.03.18 @ 08:23) >   1. Left ventricular ejection fraction, by visual estimation, is 55 to   60%.   2. Normal global left ventricular systolic function.   3. Mild tricuspid regurgitation.    < end of copied text >    < from: CT Head No Cont (12.01.18 @ 03:30) >      No evidence of intracranial hemorrhage, territorial infarct, or mass   effect.      < end of copied text >    < from: MR Angio Head No Cont (12.01.18 @ 22:07) >  Unremarkable MRA of the brain without contrast.    < end of copied text >      ECG:    < from: 12 Lead ECG (12.02.18 @ 17:06) >  Ventricular Rate 76 BPM    Atrial Rate 76 BPM    P-R Interval 118 ms    QRS Duration 74 ms    Q-T Interval 404 ms    QTC Calculation(Bezet) 454 ms    P Axis 51 degrees    R Axis 41 degrees    T Axis 71 degrees    Diagnosis Line Normal sinus rhythm  Low voltage QRS  Nonspecific T wave abnormality  Abnormal ECG    Confirmed by Terence Harding ([id number]) on 12/3/2018 12:19:28 AM    < end of copied text >

## 2018-12-05 NOTE — PROGRESS NOTE ADULT - ASSESSMENT
53F with PMHx, DMII, hypothyroidism, ANISHA on CPAP, depression, gastritis, COPD, crohns disease, chronic back pain, migraines, chronic dizziness presents for syncopal episode and dizziness    #Syncope and dizziness  - Likely secondary to polypharmacy vs neuro mediated vs orthostatic vs cardiovascular  - dizziness possibly BPV?, meclizine prn, taper slowly  - Neuro recs appreciated  - CTH NC and MRA brain negative  - no events on tele, can d/c  - 2D echo: normal EF, mild TR  - Cardiology and EPS recs appreciated  - ILM today  - repeat Orthostatics   - Psych recs appreciated  - started on haldol 2mg bid, decrease cymbalta to 30mg for 2 days then stop  - f/u with neurology as OP after d/c  - midodrine increased to 10mg TID     #DMII  - c/w insulin regimen     #ANISHA on cpap  - continue at 12    #hypothyroidism  - c/w synthroid     #Depression/schizophrenia  - c/w current home meds.   - Psych eval pending     # gastritis  - c/w protonix    # COPD  - prn nebs     #chronic back pain  - nsaids prn     #possible vit B12 def  on oral b12      #Crohns disease  - c/w home meds     DVT PPx: Lovenox  GI PPx: protonix   PT rehab   Dispo: Nh placement

## 2018-12-05 NOTE — CHART NOTE - NSCHARTNOTEFT_GEN_A_CORE
Electrophysiology Brief Post-Operative Note    I have personally seen and examined the patient.  I agree with the history and physical which I have reviewed and noted any changes below.  12-05-18 @ 9:31    PRE-OP DIAGNOSIS:  - syncope      [id number] DIAGNOSIS:  - syncope      PROCEDURE: Implant of Implantable Loop Recorder     Physician: RACHELE Kilpatrick MD  Assistant: None    ESTIMATED BLOOD LOSS: 1 mL    ANESTHESIA TYPE:  [  ]General Anesthesia  [  ] Sedation  [X] Local    CONDITION  [  ] Critical  [  ] Serious  [  ]Fair  [X]Good      SPECIMENS REMOVED (IF APPLICABLE): None      IMPLANTS (IF APPLICABLE)  Implantable Loop Recorder      FINDINGS  No immediate complications    PLAN OF CARE  -Outpatient follow-up with EP in 4-6 weeks   -Appointment with NP Estefany Obando in 4-6 weeks          [facility] ([address])          [place] [address] [phone]

## 2018-12-06 ENCOUNTER — TRANSCRIPTION ENCOUNTER (OUTPATIENT)
Age: 53
End: 2018-12-06

## 2018-12-06 VITALS
TEMPERATURE: 97 F | OXYGEN SATURATION: 99 % | SYSTOLIC BLOOD PRESSURE: 93 MMHG | DIASTOLIC BLOOD PRESSURE: 58 MMHG | HEART RATE: 89 BPM | RESPIRATION RATE: 18 BRPM

## 2018-12-06 PROBLEM — G43.909 MIGRAINE, UNSPECIFIED, NOT INTRACTABLE, WITHOUT STATUS MIGRAINOSUS: Chronic | Status: ACTIVE | Noted: 2018-12-02

## 2018-12-06 PROBLEM — K50.90 CROHN'S DISEASE, UNSPECIFIED, WITHOUT COMPLICATIONS: Chronic | Status: ACTIVE | Noted: 2018-12-02

## 2018-12-06 LAB
ANION GAP SERPL CALC-SCNC: 13 MMOL/L — SIGNIFICANT CHANGE UP (ref 7–14)
BASOPHILS # BLD AUTO: 0.01 K/UL — SIGNIFICANT CHANGE UP (ref 0–0.2)
BASOPHILS NFR BLD AUTO: 0.2 % — SIGNIFICANT CHANGE UP (ref 0–1)
BUN SERPL-MCNC: 24 MG/DL — HIGH (ref 10–20)
CALCIUM SERPL-MCNC: 9.2 MG/DL — SIGNIFICANT CHANGE UP (ref 8.5–10.1)
CHLORIDE SERPL-SCNC: 103 MMOL/L — SIGNIFICANT CHANGE UP (ref 98–110)
CO2 SERPL-SCNC: 27 MMOL/L — SIGNIFICANT CHANGE UP (ref 17–32)
CREAT SERPL-MCNC: 0.6 MG/DL — LOW (ref 0.7–1.5)
EOSINOPHIL # BLD AUTO: 0.07 K/UL — SIGNIFICANT CHANGE UP (ref 0–0.7)
EOSINOPHIL NFR BLD AUTO: 1.3 % — SIGNIFICANT CHANGE UP (ref 0–8)
GLUCOSE BLDC GLUCOMTR-MCNC: 157 MG/DL — HIGH (ref 70–99)
GLUCOSE BLDC GLUCOMTR-MCNC: 165 MG/DL — HIGH (ref 70–99)
GLUCOSE BLDC GLUCOMTR-MCNC: 192 MG/DL — HIGH (ref 70–99)
GLUCOSE BLDC GLUCOMTR-MCNC: 205 MG/DL — HIGH (ref 70–99)
GLUCOSE BLDC GLUCOMTR-MCNC: 235 MG/DL — HIGH (ref 70–99)
GLUCOSE SERPL-MCNC: 208 MG/DL — HIGH (ref 70–99)
HCT VFR BLD CALC: 41.8 % — SIGNIFICANT CHANGE UP (ref 37–47)
HGB BLD-MCNC: 12.8 G/DL — SIGNIFICANT CHANGE UP (ref 12–16)
IMM GRANULOCYTES NFR BLD AUTO: 0.2 % — SIGNIFICANT CHANGE UP (ref 0.1–0.3)
LYMPHOCYTES # BLD AUTO: 2.14 K/UL — SIGNIFICANT CHANGE UP (ref 1.2–3.4)
LYMPHOCYTES # BLD AUTO: 40 % — SIGNIFICANT CHANGE UP (ref 20.5–51.1)
MAGNESIUM SERPL-MCNC: 2 MG/DL — SIGNIFICANT CHANGE UP (ref 1.8–2.4)
MCHC RBC-ENTMCNC: 22.6 PG — LOW (ref 27–31)
MCHC RBC-ENTMCNC: 30.6 G/DL — LOW (ref 32–37)
MCV RBC AUTO: 73.9 FL — LOW (ref 81–99)
MONOCYTES # BLD AUTO: 0.32 K/UL — SIGNIFICANT CHANGE UP (ref 0.1–0.6)
MONOCYTES NFR BLD AUTO: 6 % — SIGNIFICANT CHANGE UP (ref 1.7–9.3)
NEUTROPHILS # BLD AUTO: 2.8 K/UL — SIGNIFICANT CHANGE UP (ref 1.4–6.5)
NEUTROPHILS NFR BLD AUTO: 52.3 % — SIGNIFICANT CHANGE UP (ref 42.2–75.2)
NRBC # BLD: 0 /100 WBCS — SIGNIFICANT CHANGE UP (ref 0–0)
PLATELET # BLD AUTO: 349 K/UL — SIGNIFICANT CHANGE UP (ref 130–400)
POTASSIUM SERPL-MCNC: 4.7 MMOL/L — SIGNIFICANT CHANGE UP (ref 3.5–5)
POTASSIUM SERPL-SCNC: 4.7 MMOL/L — SIGNIFICANT CHANGE UP (ref 3.5–5)
RBC # BLD: 5.66 M/UL — HIGH (ref 4.2–5.4)
RBC # FLD: 19 % — HIGH (ref 11.5–14.5)
SODIUM SERPL-SCNC: 143 MMOL/L — SIGNIFICANT CHANGE UP (ref 135–146)
WBC # BLD: 5.35 K/UL — SIGNIFICANT CHANGE UP (ref 4.8–10.8)
WBC # FLD AUTO: 5.35 K/UL — SIGNIFICANT CHANGE UP (ref 4.8–10.8)

## 2018-12-06 RX ORDER — METFORMIN HYDROCHLORIDE 850 MG/1
1 TABLET ORAL
Qty: 60 | Refills: 0 | OUTPATIENT
Start: 2018-12-06

## 2018-12-06 RX ORDER — CETIRIZINE HYDROCHLORIDE 10 MG/1
1 TABLET ORAL
Qty: 0 | Refills: 0 | COMMUNITY

## 2018-12-06 RX ORDER — HALOPERIDOL DECANOATE 100 MG/ML
1 INJECTION INTRAMUSCULAR
Qty: 60 | Refills: 0 | OUTPATIENT
Start: 2018-12-06

## 2018-12-06 RX ORDER — MIDODRINE HYDROCHLORIDE 2.5 MG/1
1 TABLET ORAL
Qty: 0 | Refills: 0 | COMMUNITY
Start: 2018-12-06

## 2018-12-06 RX ORDER — RANITIDINE HYDROCHLORIDE 150 MG/1
0 TABLET, FILM COATED ORAL
Qty: 0 | Refills: 0 | COMMUNITY

## 2018-12-06 RX ORDER — DULOXETINE HYDROCHLORIDE 30 MG/1
0 CAPSULE, DELAYED RELEASE ORAL
Qty: 0 | Refills: 0 | COMMUNITY

## 2018-12-06 RX ORDER — PIOGLITAZONE HYDROCHLORIDE 15 MG/1
1 TABLET ORAL
Qty: 0 | Refills: 0 | COMMUNITY

## 2018-12-06 RX ORDER — CANAGLIFLOZIN 100 MG/1
1 TABLET, FILM COATED ORAL
Qty: 0 | Refills: 0 | COMMUNITY

## 2018-12-06 RX ORDER — METFORMIN HYDROCHLORIDE 850 MG/1
1 TABLET ORAL
Qty: 0 | Refills: 0 | COMMUNITY

## 2018-12-06 RX ORDER — HALOPERIDOL DECANOATE 100 MG/ML
2 INJECTION INTRAMUSCULAR
Qty: 120 | Refills: 0 | OUTPATIENT
Start: 2018-12-06

## 2018-12-06 RX ORDER — MIDODRINE HYDROCHLORIDE 2.5 MG/1
0 TABLET ORAL
Qty: 0 | Refills: 0 | COMMUNITY

## 2018-12-06 RX ORDER — MECLIZINE HCL 12.5 MG
1 TABLET ORAL
Qty: 0 | Refills: 0 | COMMUNITY

## 2018-12-06 RX ADMIN — Medication 25 MILLIGRAM(S): at 06:23

## 2018-12-06 RX ADMIN — Medication 1 MILLIGRAM(S): at 11:47

## 2018-12-06 RX ADMIN — Medication 2 MILLIGRAM(S): at 11:47

## 2018-12-06 RX ADMIN — Medication 325 MILLIGRAM(S): at 11:48

## 2018-12-06 RX ADMIN — ENOXAPARIN SODIUM 40 MILLIGRAM(S): 100 INJECTION SUBCUTANEOUS at 11:48

## 2018-12-06 RX ADMIN — DULOXETINE HYDROCHLORIDE 30 MILLIGRAM(S): 30 CAPSULE, DELAYED RELEASE ORAL at 11:47

## 2018-12-06 RX ADMIN — CHLORHEXIDINE GLUCONATE 1 APPLICATION(S): 213 SOLUTION TOPICAL at 06:24

## 2018-12-06 RX ADMIN — MONTELUKAST 10 MILLIGRAM(S): 4 TABLET, CHEWABLE ORAL at 11:47

## 2018-12-06 RX ADMIN — Medication 15 MILLIGRAM(S): at 06:23

## 2018-12-06 RX ADMIN — PREGABALIN 1000 MICROGRAM(S): 225 CAPSULE ORAL at 11:47

## 2018-12-06 RX ADMIN — HALOPERIDOL DECANOATE 2 MILLIGRAM(S): 100 INJECTION INTRAMUSCULAR at 06:24

## 2018-12-06 RX ADMIN — Medication 25 MILLIGRAM(S): at 14:49

## 2018-12-06 RX ADMIN — Medication 1: at 17:27

## 2018-12-06 RX ADMIN — Medication 100 MICROGRAM(S): at 06:24

## 2018-12-06 RX ADMIN — Medication 5 UNIT(S): at 12:48

## 2018-12-06 RX ADMIN — Medication 5 UNIT(S): at 17:27

## 2018-12-06 RX ADMIN — MIDODRINE HYDROCHLORIDE 10 MILLIGRAM(S): 2.5 TABLET ORAL at 06:23

## 2018-12-06 RX ADMIN — Medication 15 MILLIGRAM(S): at 17:26

## 2018-12-06 RX ADMIN — HALOPERIDOL DECANOATE 2 MILLIGRAM(S): 100 INJECTION INTRAMUSCULAR at 17:27

## 2018-12-06 RX ADMIN — CELECOXIB 200 MILLIGRAM(S): 200 CAPSULE ORAL at 11:47

## 2018-12-06 RX ADMIN — MIDODRINE HYDROCHLORIDE 10 MILLIGRAM(S): 2.5 TABLET ORAL at 14:49

## 2018-12-06 RX ADMIN — Medication 2: at 08:19

## 2018-12-06 RX ADMIN — PANTOPRAZOLE SODIUM 40 MILLIGRAM(S): 20 TABLET, DELAYED RELEASE ORAL at 06:23

## 2018-12-06 RX ADMIN — Medication 5 UNIT(S): at 08:19

## 2018-12-06 RX ADMIN — Medication 1: at 12:49

## 2018-12-06 NOTE — PROGRESS NOTE BEHAVIORAL HEALTH - NSBHCONSULTFOLLOWAFTERCARE_PSY_A_CORE FT
Follow up outpatient with KYLAH LEE at [facility] (established patient). Writer spoke to outpatient provider and communicated plan of discontinuing cymbalta. Follow up outpatient with KYLAH LEE at [facility] (established patient). Writer spoke to outpatient provider and communicated plan of discontinuing Cymbalta.

## 2018-12-06 NOTE — PROGRESS NOTE BEHAVIORAL HEALTH - NSBHFUPINTERVALHXFT_PSY_A_CORE
Patient evaluated at bedside, sitting calmly, talking on the phone. Patient reports feeling okay, is s/p Implantable Loop recorder. States she is to be discharged today as per primary team.     At this time, she denies any acute concerns, is looking forward to being discharged. States her previously reported intermittent hallucinations have dissipated, denies feeling anxious at this time. Denies low mood, denies suicidal ideations, intent or plan. Patient evaluated at bedside, sitting calmly, talking on the phone. Patient reports feeling a lot better, is s/p Implantable Loop recorder as per EP for further investigation of syncope. Currently, denies feeling dizzy. She continues to endorse poor sleep due to not having her CPAP machine. Otherwise, she denies any acute concerns, is looking forward to being discharged. States her previously reported intermittent hallucinations have dissipated since Haldol was restarted. She denies feeling anxious at this time, denies low mood, denies suicidal ideations, intent or plan.

## 2018-12-06 NOTE — DISCHARGE NOTE ADULT - MEDICATION SUMMARY - MEDICATIONS TO TAKE
I will START or STAY ON the medications listed below when I get home from the hospital:    Apriso 0.375 g oral capsule, extended release  -- 1 tab(s) by mouth once a day (at bedtime)  -- Indication: For Crohns    meloxicam 7.5 mg oral tablet  -- 1 tab(s) by mouth once a day  -- Indication: For pain    traMADol 50 mg oral tablet  -- orally every 6 hours, As Needed  -- Indication: For pain    SUMAtriptan 100 mg oral tablet  -- 1 tab(s) by mouth every 2 hours, As Needed  -- Indication: For Migraine    Tresiba FlexTouch 100 units/mL subcutaneous solution  -- 15 unit(s) subcutaneous once a day (at bedtime)  -- Indication: For diabetes    metFORMIN 500 mg oral tablet  -- 1 tab(s) by mouth 2 times a day   -- Check with your doctor before becoming pregnant.  Do not drink alcoholic beverages when taking this medication.  It is very important that you take or use this exactly as directed.  Do not skip doses or discontinue unless directed by your doctor.  Obtain medical advice before taking any non-prescription drugs as some may affect the action of this medication.  Take with food or milk.    -- Indication: For diabetes    loperamide 2 mg oral capsule  -- orally once a day  -- Indication: For GI    meclizine 25 mg oral tablet  -- 1 tab(s) by mouth 3 times a day, As Needed  -- Indication: For DIZZINESS    atorvastatin 40 mg oral tablet  -- 1 tab(s) by mouth once a day  -- Indication: For dld    haloperidol 2 mg oral tablet  -- 1 tab(s) by mouth 2 times a day  -- Indication: For Mood    busPIRone 15 mg oral tablet  -- 1 tab(s) by mouth 2 times a day  -- Indication: For antidepressant    Breo Ellipta  -- inhaled once a day  -- Indication: For bronchodilators    Incruse Ellipta 62.5 mcg/inh inhalation powder  -- inhaled once a day  -- Indication: For inhalers    ProAir HFA 90 mcg/inh inhalation aerosol  -- 2 puff(s) inhaled 4 times a day, As Needed  -- Indication: For bronchodilators    Amitiza 8 mcg oral capsule  -- orally once a day  -- Indication: For Gi    Feosol 325 mg (65 mg elemental iron) oral tablet  -- orally once a day  -- Indication: For iron    montelukast 10 mg oral tablet  -- 1 tab(s) by mouth once a day  -- Indication: For Copd    midodrine 10 mg oral tablet  -- 1 tab(s) by mouth 3 times a day  -- Indication: For hypotension    Protonix 40 mg oral delayed release tablet  -- 1 tab(s) by mouth once a day  -- Indication: For Stomach    levothyroxine 100 mcg (0.1 mg) oral tablet  -- 1 tab(s) by mouth once a day  -- Indication: For hypothyroid    ergocalciferol 50,000 intl units (1.25 mg) oral capsule  -- 1 cap(s) by mouth once a week  -- Indication: For vit d    folic acid 1 mg oral tablet  -- 1 tab(s) by mouth once a day  -- Indication: For folic acid    Vitamin B12 1000 mcg oral tablet  -- 1 tab(s) by mouth once a day  -- Indication: For vit b 12 I will START or STAY ON the medications listed below when I get home from the hospital:    Apriso 0.375 g oral capsule, extended release  -- 1 tab(s) by mouth once a day (at bedtime)  -- Indication: For Crohns    meloxicam 7.5 mg oral tablet  -- 1 tab(s) by mouth once a day  -- Indication: For pain    traMADol 50 mg oral tablet  -- orally every 6 hours, As Needed  -- Indication: For pain    SUMAtriptan 100 mg oral tablet  -- 1 tab(s) by mouth every 2 hours, As Needed  -- Indication: For Migraine    Tresiba FlexTouch 100 units/mL subcutaneous solution  -- 15 unit(s) subcutaneous once a day (at bedtime)  -- Indication: For diabetes    metFORMIN 500 mg oral tablet  -- 1 tab(s) by mouth 2 times a day   -- Check with your doctor before becoming pregnant.  Do not drink alcoholic beverages when taking this medication.  It is very important that you take or use this exactly as directed.  Do not skip doses or discontinue unless directed by your doctor.  Obtain medical advice before taking any non-prescription drugs as some may affect the action of this medication.  Take with food or milk.    -- Indication: For diabetes    loperamide 2 mg oral capsule  -- orally once a day  -- Indication: For GI    meclizine 25 mg oral tablet  -- 1 tab(s) by mouth 3 times a day, As Needed  -- Indication: For DIZZINESS    atorvastatin 40 mg oral tablet  -- 1 tab(s) by mouth once a day  -- Indication: For dld    haloperidol 1 mg oral tablet  -- 2 tab(s) by mouth 2 times a day   -- It is very important that you take or use this exactly as directed.  Do not skip doses or discontinue unless directed by your doctor.  May cause drowsiness.  Alcohol may intensify this effect.  Use care when operating dangerous machinery.  Obtain medical advice before taking any non-prescription drugs as some may affect the action of this medication.    -- Indication: For Mood    busPIRone 15 mg oral tablet  -- 1 tab(s) by mouth 2 times a day  -- Indication: For antidepressant    Breo Ellipta  -- inhaled once a day  -- Indication: For bronchodilators    Incruse Ellipta 62.5 mcg/inh inhalation powder  -- inhaled once a day  -- Indication: For inhalers    ProAir HFA 90 mcg/inh inhalation aerosol  -- 2 puff(s) inhaled 4 times a day, As Needed  -- Indication: For bronchodilators    Amitiza 8 mcg oral capsule  -- orally once a day  -- Indication: For Gi    Feosol 325 mg (65 mg elemental iron) oral tablet  -- orally once a day  -- Indication: For iron    montelukast 10 mg oral tablet  -- 1 tab(s) by mouth once a day  -- Indication: For Copd    midodrine 10 mg oral tablet  -- 1 tab(s) by mouth 3 times a day  -- Indication: For hypotension    Protonix 40 mg oral delayed release tablet  -- 1 tab(s) by mouth once a day  -- Indication: For Stomach    levothyroxine 100 mcg (0.1 mg) oral tablet  -- 1 tab(s) by mouth once a day  -- Indication: For hypothyroid    ergocalciferol 50,000 intl units (1.25 mg) oral capsule  -- 1 cap(s) by mouth once a week  -- Indication: For vit d    folic acid 1 mg oral tablet  -- 1 tab(s) by mouth once a day  -- Indication: For folic acid    Vitamin B12 1000 mcg oral tablet  -- 1 tab(s) by mouth once a day  -- Indication: For vit b 12 I will START or STAY ON the medications listed below when I get home from the hospital:    Apriso 0.375 g oral capsule, extended release  -- 1 tab(s) by mouth once a day (at bedtime)  -- Indication: For Crohns    meloxicam 7.5 mg oral tablet  -- 1 tab(s) by mouth once a day  -- Indication: For pain    traMADol 50 mg oral tablet  -- orally every 6 hours, As Needed  -- Indication: For pain    SUMAtriptan 100 mg oral tablet  -- 1 tab(s) by mouth every 2 hours, As Needed  -- Indication: For Migraine    Tresiba FlexTouch 100 units/mL subcutaneous solution  -- 15 unit(s) subcutaneous once a day (at bedtime)  -- Indication: For diabetes    metFORMIN 500 mg oral tablet  -- 1 tab(s) by mouth 2 times a day   -- Check with your doctor before becoming pregnant.  Do not drink alcoholic beverages when taking this medication.  It is very important that you take or use this exactly as directed.  Do not skip doses or discontinue unless directed by your doctor.  Obtain medical advice before taking any non-prescription drugs as some may affect the action of this medication.  Take with food or milk.    -- Indication: For diabetes    loperamide 2 mg oral capsule  -- orally once a day  -- Indication: For GI    meclizine 25 mg oral tablet  -- 1 tab(s) by mouth 3 times a day, As Needed  -- Indication: For DIZZINESS    atorvastatin 40 mg oral tablet  -- 1 tab(s) by mouth once a day  -- Indication: For dld    haloperidol 2 mg/mL oral concentrate  -- 1 milliliter(s) by mouth 2 times a day   -- Dilute this medication with liquid before administration.  It is very important that you take or use this exactly as directed.  Do not skip doses or discontinue unless directed by your doctor.  May cause drowsiness.  Alcohol may intensify this effect.  Use care when operating dangerous machinery.  Obtain medical advice before taking any non-prescription drugs as some may affect the action of this medication.    -- Indication: For Mood    busPIRone 15 mg oral tablet  -- 1 tab(s) by mouth 2 times a day  -- Indication: For antidepressant    Breo Ellipta  -- inhaled once a day  -- Indication: For bronchodilators    Incruse Ellipta 62.5 mcg/inh inhalation powder  -- inhaled once a day  -- Indication: For inhalers    ProAir HFA 90 mcg/inh inhalation aerosol  -- 2 puff(s) inhaled 4 times a day, As Needed  -- Indication: For bronchodilators    Amitiza 8 mcg oral capsule  -- orally once a day  -- Indication: For Gi    Feosol 325 mg (65 mg elemental iron) oral tablet  -- orally once a day  -- Indication: For iron    montelukast 10 mg oral tablet  -- 1 tab(s) by mouth once a day  -- Indication: For Copd    midodrine 10 mg oral tablet  -- 1 tab(s) by mouth 3 times a day  -- Indication: For hypotension    Protonix 40 mg oral delayed release tablet  -- 1 tab(s) by mouth once a day  -- Indication: For Stomach    levothyroxine 100 mcg (0.1 mg) oral tablet  -- 1 tab(s) by mouth once a day  -- Indication: For hypothyroid    ergocalciferol 50,000 intl units (1.25 mg) oral capsule  -- 1 cap(s) by mouth once a week  -- Indication: For vit d    folic acid 1 mg oral tablet  -- 1 tab(s) by mouth once a day  -- Indication: For folic acid    Vitamin B12 1000 mcg oral tablet  -- 1 tab(s) by mouth once a day  -- Indication: For vit b 12

## 2018-12-06 NOTE — DISCHARGE NOTE ADULT - MEDICATION SUMMARY - MEDICATIONS TO STOP TAKING
I will STOP taking the medications listed below when I get home from the hospital:    cetirizine 10 mg oral tablet  -- 1 tab(s) by mouth once a day    gabapentin 300 mg oral capsule  -- orally 2 times a day    mirtazapine 15 mg oral tablet  -- 1 tab(s) by mouth once a day (at bedtime)    predniSONE 50 mg oral tablet  -- 1 tab(s) by mouth once a day   -- It is very important that you take or use this exactly as directed.  Do not skip doses or discontinue unless directed by your doctor.  Obtain medical advice before taking any non-prescription drugs as some may affect the action of this medication.  Take with food or milk.    pioglitazone 45 mg oral tablet  -- 1 tab(s) by mouth once a day    Cymbalta 60 mg oral delayed release capsule  -- orally once a day    raNITIdine 150 mg oral tablet  -- orally once a day    Invokana 300 mg oral tablet  -- 1 tab(s) by mouth once a day

## 2018-12-06 NOTE — DISCHARGE NOTE ADULT - HOSPITAL COURSE
#Syncope and dizziness s/p implantable loop recorder  - Likely secondary to polypharmacy vs neuro mediated vs orthostatic vs cardiovascular arrythmia  - dizziness possibly BPV, meclizine prn, taper slowly  - CTH NC and MRA brain negative  - 2D echo: normal EF, mild TR  - orthostatics were positive at one point  - midodrine increased to 10mg TID  - started on haldol 2mg bid, decrease cymbalta to 30mg for 2 days then stop  - f/u with neurology Dr Lagos as OP after d/c  - f/u with Dr Mcgee in 2 weeks  - f/u PMD

## 2018-12-06 NOTE — DISCHARGE NOTE ADULT - CARE PLAN
Principal Discharge DX:	Syncope, unspecified syncope type  Goal:	symptom relief and prevent recurrence  Assessment and plan of treatment:	#Syncope and dizziness s/p implantable loop recorder  - Likely secondary to polypharmacy vs neuro mediated vs orthostatic vs cardiovascular  - dizziness possibly BPV, meclizine prn, taper slowly  - CTH NC and MRA brain negative  - 2D echo: normal EF, mild TR  - orthostatics were positive at one point  - midodrine increased to 10mg TID  - started on haldol 2mg bid, decrease cymbalta to 30mg for 2 days then stop  - f/u with neurology Dr Lagos as OP after d/c  - f/u with Dr Mcgee in 2 weeks  - f/u PMD

## 2018-12-06 NOTE — PROGRESS NOTE BEHAVIORAL HEALTH - CASE SUMMARY
Patient is much improved since admission.  Cymbalta can now be discontinued following taper due to possible contribution to orthostasis.  There is no psychiatric barrier to discharge at this time.

## 2018-12-06 NOTE — DISCHARGE NOTE ADULT - HOME CARE AGENCY
[facility] [phone] X Size Of Lesion In Cm (Optional): 0 Date Scheduled For Mohs (Optional): 4-23-18 @ 8:30 with Jose Shell DO Incorporate Mauc In Note: Yes Body Location Override (Optional - Billing Will Still Be Based On Selected Body Map Location If Applicable): right distal pretibial region Detail Level: Detailed

## 2018-12-06 NOTE — PROGRESS NOTE ADULT - ASSESSMENT
1] Syncope - etiology unclear      Recurrent Dizziness      S/P Loop Implant Recorder  - May go home from cardiac standpoint  - Case discussed with Dr. Kilpatrick last night.    2] Orthostatic Hypotension  - on Midodrine    3] Hyperlipidemia  - on statin therapy    4] Type II DM  - on medication    Follow up with primary cardiologist, Dr. Mcgee in 2 weeks    Smooth Matias MD (covering for Dr. Woody Mcgee)

## 2018-12-06 NOTE — PROGRESS NOTE ADULT - SUBJECTIVE AND OBJECTIVE BOX
pt seen and examined, yesterday she was dizzy but no dizziness today. she is feeling better. no complaints today.     Vital Signs Last 24 Hrs  T(C): 36.4 (06 Dec 2018 07:23), Max: 36.4 (06 Dec 2018 07:23)  T(F): 97.5 (06 Dec 2018 07:23), Max: 97.5 (06 Dec 2018 07:23)  HR: 79 (06 Dec 2018 07:23) (68 - 84)  BP: 97/63 (06 Dec 2018 07:23) (97/55 - 115/62)  RR: 18 (06 Dec 2018 07:23) (18 - 18)  SpO2: 97% (06 Dec 2018 07:23) (95% - 98%)    Physical exam:   constitutional NAD, AAOX3, Respiratory  lungs CTA, CVS heart RRR, GI: abdomen Soft NT, ND, BS+, skin: intact  neuro exam non focal. no nystagmus, able to ambulate.                           12.8   5.35  )-----------( 349      ( 06 Dec 2018 06:15 )             41.8     12-06    143  |  103  |  24<H>  ----------------------------<  208<H>  4.7   |  27  |  0.6<L>    Ca    9.2      06 Dec 2018 06:15  Mg     2.0     12-06    S/P Loop Recorder Implantation 12/5/18    a/p  #syncope, sp loop recorder,   #dizziness, vertigo, resolved, ddx is arrythmia vs bpv.   # comorbidities: cont meds,   Crohn disease  COPD (chronic obstructive pulmonary disease)  Depression  Anxiety  Schizophrenia, meds adjusted by psychiatry in this admission.     medically stable for outpt fu. dc home with home care  Fu with pmd, psychiatrist and cardiology .   Time spent 35min

## 2018-12-06 NOTE — PROGRESS NOTE BEHAVIORAL HEALTH - NSBHCONSULTMEDS_PSY_A_CORE FT
Continue Buspar 15mg PO QD  Continue Haldol 2mg PO BID Continue Buspar 15mg PO QD  Continue Haldol 2mg PO BID  STOP CYMBALTA

## 2018-12-06 NOTE — PROGRESS NOTE BEHAVIORAL HEALTH - NSBHCHARTREVIEWVS_PSY_A_CORE FT
Vital Signs Last 24 Hrs  T(C): 36.4 (06 Dec 2018 07:23), Max: 36.4 (06 Dec 2018 07:23)  T(F): 97.5 (06 Dec 2018 07:23), Max: 97.5 (06 Dec 2018 07:23)  HR: 79 (06 Dec 2018 07:23) (68 - 84)  BP: 97/63 (06 Dec 2018 07:23) (97/55 - 115/62)  BP(mean): --  RR: 18 (06 Dec 2018 07:23) (18 - 18)  SpO2: 97% (06 Dec 2018 07:23) (95% - 98%)

## 2018-12-06 NOTE — DISCHARGE NOTE ADULT - PROVIDER TOKENS
FREE:[LAST:[altshuler],FIRST:[Lory],PHONE:[phone]:[(   )    -]],FREE:[LAST:[ahilan],PHONE:[phone]:[(   )    -]],FREE:[LAST:[PMD],PHONE:[(   )    -],FAX:[(   )    -]]

## 2018-12-06 NOTE — DISCHARGE NOTE ADULT - MEDICATION SUMMARY - MEDICATIONS TO CHANGE
I will SWITCH the dose or number of times a day I take the medications listed below when I get home from the hospital:    Synthroid 150 mcg (0.15 mg) oral tablet  -- 1 tab(s) by mouth once a day    BuSpar 10 mg oral tablet  -- 1 tab(s) by mouth 2 times a day    metFORMIN 1000 mg oral tablet  -- 1 tab(s) by mouth 2 times a day    midodrine 5 mg oral tablet  -- orally 3 times a day

## 2018-12-06 NOTE — PROGRESS NOTE ADULT - SUBJECTIVE AND OBJECTIVE BOX
Patient was seen and examined by me in in ED3.    She appears comfortable in bed.  S/P Loop Recorder Implantation.  She offers no new complaints.    Vitals:  T(C): 36.4 (12-06-18 @ 07:23), Max: 36.4 (12-05-18 @ 08:13)  HR: 79 (12-06-18 @ 07:23) (66 - 84)  BP: 97/63 (12-06-18 @ 07:23) (95/57 - 115/62)  RR: 18 (12-06-18 @ 07:23) (17 - 18)  SpO2: 97% (12-06-18 @ 07:23) (91% - 98%)      LABS:                        12.8   5.35  )-----------( 349      ( 06 Dec 2018 06:15 )             41.8     12-06    143  |  103  |  24<H>  ----------------------------<  208<H>  4.7   |  27  |  0.6<L>    Ca    9.2      06 Dec 2018 06:15  Mg     2.0     12-06        MEDICATIONS  (STANDING):  atorvastatin 40 milliGRAM(s) Oral at bedtime  busPIRone 15 milliGRAM(s) Oral two times a day  celecoxib 200 milliGRAM(s) Oral daily  chlorhexidine 4% Liquid 1 Application(s) Topical <User Schedule>  cyanocobalamin 1000 MICROGram(s) Oral daily  dextrose 5%. 1000 milliLiter(s) (50 mL/Hr) IV Continuous <Continuous>  dextrose 50% Injectable 12.5 Gram(s) IV Push once  dextrose 50% Injectable 25 Gram(s) IV Push once  dextrose 50% Injectable 25 Gram(s) IV Push once  DULoxetine 30 milliGRAM(s) Oral daily  enoxaparin Injectable 40 milliGRAM(s) SubCutaneous daily  ferrous    sulfate 325 milliGRAM(s) Oral daily  folic acid 1 milliGRAM(s) Oral daily  haloperidol     Tablet 2 milliGRAM(s) Oral two times a day  insulin glargine Injectable (LANTUS) 15 Unit(s) SubCutaneous at bedtime  insulin lispro (HumaLOG) corrective regimen sliding scale   SubCutaneous three times a day before meals  insulin lispro Injectable (HumaLOG) 5 Unit(s) SubCutaneous three times a day before meals  levothyroxine 100 MICROGram(s) Oral daily  loperamide 2 milliGRAM(s) Oral daily  meclizine 25 milliGRAM(s) Oral three times a day  mesalamine DR Capsule 400 milliGRAM(s) Oral at bedtime  midodrine 10 milliGRAM(s) Oral three times a day  montelukast 10 milliGRAM(s) Oral daily  pantoprazole    Tablet 40 milliGRAM(s) Oral before breakfast    MEDICATIONS  (PRN):  ALBUTerol/ipratropium for Nebulization 3 milliLiter(s) Nebulizer every 6 hours PRN Bronchospasm  dextrose 40% Gel 15 Gram(s) Oral once PRN Blood Glucose LESS THAN 70 milliGRAM(s)/deciliter  glucagon  Injectable 1 milliGRAM(s) IntraMuscular once PRN Glucose LESS THAN 70 milligrams/deciliter      PHYSICAL EXAM:  Not in distress   alert, oriented x 3  no JVD; regular rhythm; nl S1S2  bilateral breath sounds  abdomen soft  no edema  moving all extremities; follow commands

## 2018-12-06 NOTE — DISCHARGE NOTE ADULT - PATIENT PORTAL LINK FT
You can access the [id number] Patient Portal, offered by [facility], by registering with the following website: [email or web address]

## 2018-12-06 NOTE — DISCHARGE NOTE ADULT - PLAN OF CARE
symptom relief and prevent recurrence #Syncope and dizziness s/p implantable loop recorder  - Likely secondary to polypharmacy vs neuro mediated vs orthostatic vs cardiovascular  - dizziness possibly BPV, meclizine prn, taper slowly  - CTH NC and MRA brain negative  - 2D echo: normal EF, mild TR  - orthostatics were positive at one point  - midodrine increased to 10mg TID  - started on haldol 2mg bid, decrease cymbalta to 30mg for 2 days then stop  - f/u with neurology Dr Lagos as OP after d/c  - f/u with Dr Mcgee in 2 weeks  - f/u PMD

## 2018-12-06 NOTE — PROGRESS NOTE BEHAVIORAL HEALTH - SUMMARY
54 y/o single female, disabled, domiciled in housing provided by [employer], with PMhx of hypotension, DM, hypothyroidism and psych history of Schizoaffective DO, 3 prior IPP admissions, no suicide attempts who admitted to medicine for dizziness and syncope. Psych consulted for possible polypharmacy.     Patient on evaluation today reports feeling improved, denies any acute concerns. Her thought process remains liner and she without any acute mood episode or thought disorder, no longer reporting intermitted auditory hallucinations. She reports her anxiety has been under control. Looking forward ot being discharged. Agrees to follow up outpatient with KYLAH Hernandez at [facility] as soon as possible upon discharge. Patient not considered to be a danger to self or others. 54 y/o single female, disabled, domiciled in housing provided by [employer], with PMhx of hypotension, DM, hypothyroidism and psych history of Schizoaffective DO, 3 prior IPP admissions, no suicide attempts who admitted to medicine for dizziness and syncope. Psych consulted for possible polypharmacy.    Patient on evaluation today reports feeling much improved, denies any acute concerns. Her thought process remains liner and she is without any acute mood episode or thought disorder, no longer reporting intermitted auditory hallucinations after her Haldol was restarted. She reports her anxiety has been under control. Looking forward ot being discharged. Agrees to follow up outpatient with KYLAH Hernandez at [facility] as soon as possible upon discharge. Patient not considered to be a danger to self or others.

## 2018-12-10 ENCOUNTER — APPOINTMENT (OUTPATIENT)
Dept: CARDIOLOGY | Facility: CLINIC | Age: 53
End: 2018-12-10

## 2018-12-10 VITALS
WEIGHT: 196 LBS | SYSTOLIC BLOOD PRESSURE: 100 MMHG | BODY MASS INDEX: 33.46 KG/M2 | DIASTOLIC BLOOD PRESSURE: 68 MMHG | HEIGHT: 64 IN | HEART RATE: 80 BPM

## 2018-12-10 DIAGNOSIS — E11.9 TYPE 2 DIABETES MELLITUS W/OUT COMPLICATIONS: ICD-10-CM

## 2018-12-10 DIAGNOSIS — Z82.49 FAMILY HISTORY OF ISCHEMIC HEART DISEASE AND OTHER DISEASES OF THE CIRCULATORY SYSTEM: ICD-10-CM

## 2018-12-10 DIAGNOSIS — Z78.9 OTHER SPECIFIED HEALTH STATUS: ICD-10-CM

## 2018-12-10 DIAGNOSIS — J45.909 UNSPECIFIED ASTHMA, UNCOMPLICATED: ICD-10-CM

## 2018-12-10 DIAGNOSIS — J44.9 CHRONIC OBSTRUCTIVE PULMONARY DISEASE, UNSPECIFIED: ICD-10-CM

## 2018-12-10 RX ORDER — CANAGLIFLOZIN 300 MG/1
300 TABLET, FILM COATED ORAL
Qty: 30 | Refills: 0 | Status: COMPLETED | COMMUNITY
Start: 2018-05-22 | End: 2018-12-10

## 2018-12-10 RX ORDER — LUBIPROSTONE 8 UG/1
8 CAPSULE, GELATIN COATED ORAL DAILY
Refills: 0 | Status: ACTIVE | COMMUNITY

## 2018-12-10 RX ORDER — CETIRIZINE HYDROCHLORIDE 10 MG/1
10 TABLET, COATED ORAL
Qty: 30 | Refills: 0 | Status: COMPLETED | COMMUNITY
Start: 2018-07-03 | End: 2018-12-10

## 2018-12-10 RX ORDER — BUSPIRONE HYDROCHLORIDE 15 MG/1
15 TABLET ORAL TWICE DAILY
Refills: 0 | Status: ACTIVE | COMMUNITY

## 2018-12-10 RX ORDER — UMECLIDINIUM 62.5 UG/1
62.5 AEROSOL, POWDER ORAL DAILY
Refills: 0 | Status: ACTIVE | COMMUNITY

## 2018-12-10 RX ORDER — ERGOCALCIFEROL 1.25 MG/1
1.25 MG CAPSULE ORAL
Refills: 0 | Status: ACTIVE | COMMUNITY

## 2018-12-10 RX ORDER — UMECLIDINIUM 62.5 UG/1
62.5 AEROSOL, POWDER ORAL
Qty: 30 | Refills: 0 | Status: COMPLETED | COMMUNITY
Start: 2018-07-03 | End: 2018-12-10

## 2018-12-10 RX ORDER — METOPROLOL TARTRATE 25 MG/1
25 TABLET, FILM COATED ORAL
Qty: 60 | Refills: 0 | Status: COMPLETED | COMMUNITY
Start: 2018-02-26 | End: 2018-12-10

## 2018-12-10 RX ORDER — ALBUTEROL SULFATE 90 UG/1
108 (90 BASE) AEROSOL, METERED RESPIRATORY (INHALATION)
Qty: 1 | Refills: 0 | Status: COMPLETED | COMMUNITY
Start: 2018-07-09 | End: 2018-12-10

## 2018-12-10 RX ORDER — MIRTAZAPINE 15 MG/1
15 TABLET, FILM COATED ORAL
Qty: 30 | Refills: 0 | Status: COMPLETED | COMMUNITY
Start: 2018-07-03 | End: 2018-12-10

## 2018-12-11 DIAGNOSIS — T43.215A ADVERSE EFFECT OF SELECTIVE SEROTONIN AND NOREPINEPHRINE REUPTAKE INHIBITORS, INITIAL ENCOUNTER: ICD-10-CM

## 2018-12-11 DIAGNOSIS — E03.9 HYPOTHYROIDISM, UNSPECIFIED: ICD-10-CM

## 2018-12-11 DIAGNOSIS — R42 DIZZINESS AND GIDDINESS: ICD-10-CM

## 2018-12-11 DIAGNOSIS — T43.595A ADVERSE EFFECT OF OTHER ANTIPSYCHOTICS AND NEUROLEPTICS, INITIAL ENCOUNTER: ICD-10-CM

## 2018-12-11 DIAGNOSIS — Z99.89 DEPENDENCE ON OTHER ENABLING MACHINES AND DEVICES: ICD-10-CM

## 2018-12-11 DIAGNOSIS — G89.29 OTHER CHRONIC PAIN: ICD-10-CM

## 2018-12-11 DIAGNOSIS — Z88.1 ALLERGY STATUS TO OTHER ANTIBIOTIC AGENTS STATUS: ICD-10-CM

## 2018-12-11 DIAGNOSIS — Z88.8 ALLERGY STATUS TO OTHER DRUGS, MEDICAMENTS AND BIOLOGICAL SUBSTANCES: ICD-10-CM

## 2018-12-11 DIAGNOSIS — G47.33 OBSTRUCTIVE SLEEP APNEA (ADULT) (PEDIATRIC): ICD-10-CM

## 2018-12-11 DIAGNOSIS — G43.909 MIGRAINE, UNSPECIFIED, NOT INTRACTABLE, WITHOUT STATUS MIGRAINOSUS: ICD-10-CM

## 2018-12-11 DIAGNOSIS — K50.90 CROHN'S DISEASE, UNSPECIFIED, WITHOUT COMPLICATIONS: ICD-10-CM

## 2018-12-11 DIAGNOSIS — Z79.4 LONG TERM (CURRENT) USE OF INSULIN: ICD-10-CM

## 2018-12-11 DIAGNOSIS — M54.30 SCIATICA, UNSPECIFIED SIDE: ICD-10-CM

## 2018-12-11 DIAGNOSIS — E11.42 TYPE 2 DIABETES MELLITUS WITH DIABETIC POLYNEUROPATHY: ICD-10-CM

## 2018-12-11 DIAGNOSIS — R55 SYNCOPE AND COLLAPSE: ICD-10-CM

## 2018-12-11 DIAGNOSIS — E78.00 PURE HYPERCHOLESTEROLEMIA, UNSPECIFIED: ICD-10-CM

## 2018-12-11 DIAGNOSIS — Z88.0 ALLERGY STATUS TO PENICILLIN: ICD-10-CM

## 2018-12-11 DIAGNOSIS — F32.9 MAJOR DEPRESSIVE DISORDER, SINGLE EPISODE, UNSPECIFIED: ICD-10-CM

## 2018-12-11 DIAGNOSIS — J44.9 CHRONIC OBSTRUCTIVE PULMONARY DISEASE, UNSPECIFIED: ICD-10-CM

## 2018-12-11 DIAGNOSIS — Z88.5 ALLERGY STATUS TO NARCOTIC AGENT: ICD-10-CM

## 2018-12-11 DIAGNOSIS — F41.1 GENERALIZED ANXIETY DISORDER: ICD-10-CM

## 2018-12-11 DIAGNOSIS — D50.9 IRON DEFICIENCY ANEMIA, UNSPECIFIED: ICD-10-CM

## 2018-12-11 DIAGNOSIS — Z91.040 LATEX ALLERGY STATUS: ICD-10-CM

## 2018-12-11 DIAGNOSIS — F25.9 SCHIZOAFFECTIVE DISORDER, UNSPECIFIED: ICD-10-CM

## 2018-12-11 DIAGNOSIS — N32.9 BLADDER DISORDER, UNSPECIFIED: ICD-10-CM

## 2018-12-15 ENCOUNTER — INPATIENT (INPATIENT)
Facility: HOSPITAL | Age: 53
LOS: 3 days | Discharge: NURSING, REHAB OR LONG-TERM CARE | End: 2018-12-19
Attending: HOSPITALIST | Admitting: HOSPITALIST

## 2018-12-15 VITALS
DIASTOLIC BLOOD PRESSURE: 64 MMHG | RESPIRATION RATE: 20 BRPM | SYSTOLIC BLOOD PRESSURE: 118 MMHG | OXYGEN SATURATION: 100 % | TEMPERATURE: 98 F | HEART RATE: 98 BPM | WEIGHT: 184.97 LBS

## 2018-12-15 DIAGNOSIS — Z98.890 OTHER SPECIFIED POSTPROCEDURAL STATES: Chronic | ICD-10-CM

## 2018-12-15 LAB
ALBUMIN SERPL ELPH-MCNC: 4.1 G/DL — SIGNIFICANT CHANGE UP (ref 3.5–5.2)
ALBUMIN SERPL ELPH-MCNC: 4.5 G/DL — SIGNIFICANT CHANGE UP (ref 3.5–5.2)
ALP SERPL-CCNC: 101 U/L — SIGNIFICANT CHANGE UP (ref 30–115)
ALP SERPL-CCNC: 106 U/L — SIGNIFICANT CHANGE UP (ref 30–115)
ALT FLD-CCNC: 21 U/L — SIGNIFICANT CHANGE UP (ref 0–41)
ALT FLD-CCNC: 23 U/L — SIGNIFICANT CHANGE UP (ref 0–41)
ANION GAP SERPL CALC-SCNC: 16 MMOL/L — HIGH (ref 7–14)
ANION GAP SERPL CALC-SCNC: 17 MMOL/L — HIGH (ref 7–14)
APPEARANCE UR: CLEAR — SIGNIFICANT CHANGE UP
APTT BLD: 31.7 SEC — SIGNIFICANT CHANGE UP (ref 27–39.2)
AST SERPL-CCNC: 11 U/L — SIGNIFICANT CHANGE UP (ref 0–41)
AST SERPL-CCNC: 12 U/L — SIGNIFICANT CHANGE UP (ref 0–41)
BASE EXCESS BLDV CALC-SCNC: 1.7 MMOL/L — SIGNIFICANT CHANGE UP (ref -2–2)
BASE EXCESS BLDV CALC-SCNC: 5.3 MMOL/L — HIGH (ref -2–2)
BASOPHILS # BLD AUTO: 0.02 K/UL — SIGNIFICANT CHANGE UP (ref 0–0.2)
BASOPHILS NFR BLD AUTO: 0.4 % — SIGNIFICANT CHANGE UP (ref 0–1)
BILIRUB SERPL-MCNC: 0.2 MG/DL — SIGNIFICANT CHANGE UP (ref 0.2–1.2)
BILIRUB SERPL-MCNC: 0.3 MG/DL — SIGNIFICANT CHANGE UP (ref 0.2–1.2)
BILIRUB UR-MCNC: NEGATIVE — SIGNIFICANT CHANGE UP
BUN SERPL-MCNC: 12 MG/DL — SIGNIFICANT CHANGE UP (ref 10–20)
BUN SERPL-MCNC: 9 MG/DL — LOW (ref 10–20)
CA-I SERPL-SCNC: 1.2 MMOL/L — SIGNIFICANT CHANGE UP (ref 1.12–1.3)
CA-I SERPL-SCNC: 1.29 MMOL/L — SIGNIFICANT CHANGE UP (ref 1.12–1.3)
CALCIUM SERPL-MCNC: 10.1 MG/DL — SIGNIFICANT CHANGE UP (ref 8.5–10.1)
CALCIUM SERPL-MCNC: 9.1 MG/DL — SIGNIFICANT CHANGE UP (ref 8.5–10.1)
CHLORIDE SERPL-SCNC: 96 MMOL/L — LOW (ref 98–110)
CHLORIDE SERPL-SCNC: 98 MMOL/L — SIGNIFICANT CHANGE UP (ref 98–110)
CO2 SERPL-SCNC: 27 MMOL/L — SIGNIFICANT CHANGE UP (ref 17–32)
CO2 SERPL-SCNC: 28 MMOL/L — SIGNIFICANT CHANGE UP (ref 17–32)
COLOR SPEC: YELLOW — SIGNIFICANT CHANGE UP
CREAT SERPL-MCNC: 0.5 MG/DL — LOW (ref 0.7–1.5)
CREAT SERPL-MCNC: 0.6 MG/DL — LOW (ref 0.7–1.5)
DIFF PNL FLD: NEGATIVE — SIGNIFICANT CHANGE UP
EOSINOPHIL # BLD AUTO: 0.11 K/UL — SIGNIFICANT CHANGE UP (ref 0–0.7)
EOSINOPHIL NFR BLD AUTO: 2.5 % — SIGNIFICANT CHANGE UP (ref 0–8)
GAS PNL BLDV: 138 MMOL/L — SIGNIFICANT CHANGE UP (ref 136–145)
GAS PNL BLDV: 140 MMOL/L — SIGNIFICANT CHANGE UP (ref 136–145)
GAS PNL BLDV: SIGNIFICANT CHANGE UP
GAS PNL BLDV: SIGNIFICANT CHANGE UP
GLUCOSE BLDC GLUCOMTR-MCNC: 302 MG/DL — HIGH (ref 70–99)
GLUCOSE BLDC GLUCOMTR-MCNC: 319 MG/DL — HIGH (ref 70–99)
GLUCOSE BLDC GLUCOMTR-MCNC: 440 MG/DL — HIGH (ref 70–99)
GLUCOSE SERPL-MCNC: 266 MG/DL — HIGH (ref 70–99)
GLUCOSE SERPL-MCNC: 496 MG/DL — CRITICAL HIGH (ref 70–99)
GLUCOSE UR QL: >=1000
HCO3 BLDV-SCNC: 31 MMOL/L — HIGH (ref 22–29)
HCO3 BLDV-SCNC: 32 MMOL/L — HIGH (ref 22–29)
HCT VFR BLD CALC: 42 % — SIGNIFICANT CHANGE UP (ref 37–47)
HCT VFR BLD CALC: 42.1 % — SIGNIFICANT CHANGE UP (ref 37–47)
HCT VFR BLDA CALC: 39.5 % — SIGNIFICANT CHANGE UP (ref 34–44)
HCT VFR BLDA CALC: 44.5 % — HIGH (ref 34–44)
HGB BLD CALC-MCNC: 12.9 G/DL — LOW (ref 14–18)
HGB BLD CALC-MCNC: 14.5 G/DL — SIGNIFICANT CHANGE UP (ref 14–18)
HGB BLD-MCNC: 12.9 G/DL — SIGNIFICANT CHANGE UP (ref 12–16)
HGB BLD-MCNC: 13.2 G/DL — SIGNIFICANT CHANGE UP (ref 12–16)
IMM GRANULOCYTES NFR BLD AUTO: 0.7 % — HIGH (ref 0.1–0.3)
INR BLD: 0.89 RATIO — SIGNIFICANT CHANGE UP (ref 0.65–1.3)
KETONES UR-MCNC: NEGATIVE — SIGNIFICANT CHANGE UP
LACTATE BLDV-MCNC: 1.9 MMOL/L — HIGH (ref 0.5–1.6)
LACTATE BLDV-MCNC: 5 MMOL/L — HIGH (ref 0.5–1.6)
LACTATE SERPL-SCNC: 3.3 MMOL/L — HIGH (ref 0.5–2.2)
LEUKOCYTE ESTERASE UR-ACNC: NEGATIVE — SIGNIFICANT CHANGE UP
LYMPHOCYTES # BLD AUTO: 1.16 K/UL — LOW (ref 1.2–3.4)
LYMPHOCYTES # BLD AUTO: 26 % — SIGNIFICANT CHANGE UP (ref 20.5–51.1)
MAGNESIUM SERPL-MCNC: 1.6 MG/DL — LOW (ref 1.8–2.4)
MAGNESIUM SERPL-MCNC: 2.1 MG/DL — SIGNIFICANT CHANGE UP (ref 1.8–2.4)
MCHC RBC-ENTMCNC: 22.9 PG — LOW (ref 27–31)
MCHC RBC-ENTMCNC: 23.4 PG — LOW (ref 27–31)
MCHC RBC-ENTMCNC: 30.7 G/DL — LOW (ref 32–37)
MCHC RBC-ENTMCNC: 31.4 G/DL — LOW (ref 32–37)
MCV RBC AUTO: 74.5 FL — LOW (ref 81–99)
MCV RBC AUTO: 74.6 FL — LOW (ref 81–99)
MONOCYTES # BLD AUTO: 0.12 K/UL — SIGNIFICANT CHANGE UP (ref 0.1–0.6)
MONOCYTES NFR BLD AUTO: 2.7 % — SIGNIFICANT CHANGE UP (ref 1.7–9.3)
NEUTROPHILS # BLD AUTO: 3.02 K/UL — SIGNIFICANT CHANGE UP (ref 1.4–6.5)
NEUTROPHILS NFR BLD AUTO: 67.7 % — SIGNIFICANT CHANGE UP (ref 42.2–75.2)
NITRITE UR-MCNC: NEGATIVE — SIGNIFICANT CHANGE UP
NRBC # BLD: 0 /100 WBCS — SIGNIFICANT CHANGE UP (ref 0–0)
NRBC # BLD: 0 /100 WBCS — SIGNIFICANT CHANGE UP (ref 0–0)
NT-PROBNP SERPL-SCNC: 189 PG/ML — SIGNIFICANT CHANGE UP (ref 0–300)
PCO2 BLDV: 58 MMHG — HIGH (ref 41–51)
PCO2 BLDV: 71 MMHG — HIGH (ref 41–51)
PH BLDV: 7.25 — LOW (ref 7.26–7.43)
PH BLDV: 7.36 — SIGNIFICANT CHANGE UP (ref 7.26–7.43)
PH UR: 6 — SIGNIFICANT CHANGE UP (ref 5–8)
PLATELET # BLD AUTO: 335 K/UL — SIGNIFICANT CHANGE UP (ref 130–400)
PLATELET # BLD AUTO: 354 K/UL — SIGNIFICANT CHANGE UP (ref 130–400)
PO2 BLDV: 15 MMHG — LOW (ref 20–40)
PO2 BLDV: 28 MMHG — SIGNIFICANT CHANGE UP (ref 20–40)
POTASSIUM BLDV-SCNC: 4 MMOL/L — SIGNIFICANT CHANGE UP (ref 3.3–5.6)
POTASSIUM BLDV-SCNC: 4.3 MMOL/L — SIGNIFICANT CHANGE UP (ref 3.3–5.6)
POTASSIUM SERPL-MCNC: 3.8 MMOL/L — SIGNIFICANT CHANGE UP (ref 3.5–5)
POTASSIUM SERPL-MCNC: 4.3 MMOL/L — SIGNIFICANT CHANGE UP (ref 3.5–5)
POTASSIUM SERPL-SCNC: 3.8 MMOL/L — SIGNIFICANT CHANGE UP (ref 3.5–5)
POTASSIUM SERPL-SCNC: 4.3 MMOL/L — SIGNIFICANT CHANGE UP (ref 3.5–5)
PROT SERPL-MCNC: 6.6 G/DL — SIGNIFICANT CHANGE UP (ref 6–8)
PROT SERPL-MCNC: 7.1 G/DL — SIGNIFICANT CHANGE UP (ref 6–8)
PROT UR-MCNC: NEGATIVE — SIGNIFICANT CHANGE UP
PROTHROM AB SERPL-ACNC: 10.2 SEC — SIGNIFICANT CHANGE UP (ref 9.95–12.87)
RBC # BLD: 5.64 M/UL — HIGH (ref 4.2–5.4)
RBC # BLD: 5.64 M/UL — HIGH (ref 4.2–5.4)
RBC # FLD: 18.6 % — HIGH (ref 11.5–14.5)
RBC # FLD: 19 % — HIGH (ref 11.5–14.5)
SAO2 % BLDV: 17 % — SIGNIFICANT CHANGE UP
SAO2 % BLDV: 39 % — SIGNIFICANT CHANGE UP
SODIUM SERPL-SCNC: 141 MMOL/L — SIGNIFICANT CHANGE UP (ref 135–146)
SODIUM SERPL-SCNC: 141 MMOL/L — SIGNIFICANT CHANGE UP (ref 135–146)
SP GR SPEC: >=1.03 — SIGNIFICANT CHANGE UP (ref 1.01–1.03)
TROPONIN T SERPL-MCNC: <0.01 NG/ML — SIGNIFICANT CHANGE UP
UROBILINOGEN FLD QL: 0.2 — SIGNIFICANT CHANGE UP (ref 0.2–0.2)
WBC # BLD: 4.46 K/UL — LOW (ref 4.8–10.8)
WBC # BLD: 5.45 K/UL — SIGNIFICANT CHANGE UP (ref 4.8–10.8)
WBC # FLD AUTO: 4.46 K/UL — LOW (ref 4.8–10.8)
WBC # FLD AUTO: 5.45 K/UL — SIGNIFICANT CHANGE UP (ref 4.8–10.8)

## 2018-12-15 RX ORDER — DEXTROSE 50 % IN WATER 50 %
12.5 SYRINGE (ML) INTRAVENOUS ONCE
Qty: 0 | Refills: 0 | Status: DISCONTINUED | OUTPATIENT
Start: 2018-12-15 | End: 2018-12-19

## 2018-12-15 RX ORDER — CELECOXIB 200 MG/1
200 CAPSULE ORAL DAILY
Qty: 0 | Refills: 0 | Status: DISCONTINUED | OUTPATIENT
Start: 2018-12-15 | End: 2018-12-19

## 2018-12-15 RX ORDER — LOPERAMIDE HCL 2 MG
2 TABLET ORAL AT BEDTIME
Qty: 0 | Refills: 0 | Status: DISCONTINUED | OUTPATIENT
Start: 2018-12-15 | End: 2018-12-19

## 2018-12-15 RX ORDER — IPRATROPIUM/ALBUTEROL SULFATE 18-103MCG
3 AEROSOL WITH ADAPTER (GRAM) INHALATION ONCE
Qty: 0 | Refills: 0 | Status: DISCONTINUED | OUTPATIENT
Start: 2018-12-15 | End: 2018-12-15

## 2018-12-15 RX ORDER — INSULIN GLARGINE 100 [IU]/ML
45 INJECTION, SOLUTION SUBCUTANEOUS EVERY MORNING
Qty: 0 | Refills: 0 | Status: DISCONTINUED | OUTPATIENT
Start: 2018-12-15 | End: 2018-12-17

## 2018-12-15 RX ORDER — FOLIC ACID 0.8 MG
1 TABLET ORAL DAILY
Qty: 0 | Refills: 0 | Status: DISCONTINUED | OUTPATIENT
Start: 2018-12-15 | End: 2018-12-19

## 2018-12-15 RX ORDER — MIDODRINE HYDROCHLORIDE 2.5 MG/1
10 TABLET ORAL THREE TIMES A DAY
Qty: 0 | Refills: 0 | Status: DISCONTINUED | OUTPATIENT
Start: 2018-12-15 | End: 2018-12-19

## 2018-12-15 RX ORDER — MECLIZINE HCL 12.5 MG
25 TABLET ORAL THREE TIMES A DAY
Qty: 0 | Refills: 0 | Status: DISCONTINUED | OUTPATIENT
Start: 2018-12-15 | End: 2018-12-19

## 2018-12-15 RX ORDER — DEXTROSE 50 % IN WATER 50 %
25 SYRINGE (ML) INTRAVENOUS ONCE
Qty: 0 | Refills: 0 | Status: DISCONTINUED | OUTPATIENT
Start: 2018-12-15 | End: 2018-12-19

## 2018-12-15 RX ORDER — HALOPERIDOL DECANOATE 100 MG/ML
1 INJECTION INTRAMUSCULAR
Qty: 0 | Refills: 0 | Status: DISCONTINUED | OUTPATIENT
Start: 2018-12-15 | End: 2018-12-19

## 2018-12-15 RX ORDER — DEXTROSE 50 % IN WATER 50 %
15 SYRINGE (ML) INTRAVENOUS ONCE
Qty: 0 | Refills: 0 | Status: DISCONTINUED | OUTPATIENT
Start: 2018-12-15 | End: 2018-12-19

## 2018-12-15 RX ORDER — AZITHROMYCIN 500 MG/1
500 TABLET, FILM COATED ORAL ONCE
Qty: 0 | Refills: 0 | Status: COMPLETED | OUTPATIENT
Start: 2018-12-15 | End: 2018-12-15

## 2018-12-15 RX ORDER — IPRATROPIUM/ALBUTEROL SULFATE 18-103MCG
3 AEROSOL WITH ADAPTER (GRAM) INHALATION EVERY 4 HOURS
Qty: 0 | Refills: 0 | Status: DISCONTINUED | OUTPATIENT
Start: 2018-12-15 | End: 2018-12-19

## 2018-12-15 RX ORDER — SODIUM CHLORIDE 9 MG/ML
1000 INJECTION INTRAMUSCULAR; INTRAVENOUS; SUBCUTANEOUS ONCE
Qty: 0 | Refills: 0 | Status: COMPLETED | OUTPATIENT
Start: 2018-12-15 | End: 2018-12-15

## 2018-12-15 RX ORDER — GLUCAGON INJECTION, SOLUTION 0.5 MG/.1ML
1 INJECTION, SOLUTION SUBCUTANEOUS ONCE
Qty: 0 | Refills: 0 | Status: DISCONTINUED | OUTPATIENT
Start: 2018-12-15 | End: 2018-12-19

## 2018-12-15 RX ORDER — FERROUS SULFATE 325(65) MG
325 TABLET ORAL DAILY
Qty: 0 | Refills: 0 | Status: DISCONTINUED | OUTPATIENT
Start: 2018-12-15 | End: 2018-12-19

## 2018-12-15 RX ORDER — ATORVASTATIN CALCIUM 80 MG/1
40 TABLET, FILM COATED ORAL AT BEDTIME
Qty: 0 | Refills: 0 | Status: DISCONTINUED | OUTPATIENT
Start: 2018-12-15 | End: 2018-12-19

## 2018-12-15 RX ORDER — CHLORHEXIDINE GLUCONATE 213 G/1000ML
1 SOLUTION TOPICAL
Qty: 0 | Refills: 0 | Status: DISCONTINUED | OUTPATIENT
Start: 2018-12-15 | End: 2018-12-19

## 2018-12-15 RX ORDER — MAGNESIUM SULFATE 500 MG/ML
2 VIAL (ML) INJECTION ONCE
Qty: 0 | Refills: 0 | Status: COMPLETED | OUTPATIENT
Start: 2018-12-15 | End: 2018-12-15

## 2018-12-15 RX ORDER — ERGOCALCIFEROL 1.25 MG/1
50000 CAPSULE ORAL
Qty: 0 | Refills: 0 | Status: DISCONTINUED | OUTPATIENT
Start: 2018-12-15 | End: 2018-12-19

## 2018-12-15 RX ORDER — PANTOPRAZOLE SODIUM 20 MG/1
40 TABLET, DELAYED RELEASE ORAL
Qty: 0 | Refills: 0 | Status: DISCONTINUED | OUTPATIENT
Start: 2018-12-15 | End: 2018-12-19

## 2018-12-15 RX ORDER — INSULIN HUMAN 100 [IU]/ML
10 INJECTION, SOLUTION SUBCUTANEOUS ONCE
Qty: 0 | Refills: 0 | Status: COMPLETED | OUTPATIENT
Start: 2018-12-15 | End: 2018-12-15

## 2018-12-15 RX ORDER — SUMATRIPTAN SUCCINATE 4 MG/.5ML
100 INJECTION, SOLUTION SUBCUTANEOUS
Qty: 0 | Refills: 0 | Status: DISCONTINUED | OUTPATIENT
Start: 2018-12-15 | End: 2018-12-15

## 2018-12-15 RX ORDER — IPRATROPIUM/ALBUTEROL SULFATE 18-103MCG
3 AEROSOL WITH ADAPTER (GRAM) INHALATION ONCE
Qty: 0 | Refills: 0 | Status: COMPLETED | OUTPATIENT
Start: 2018-12-15 | End: 2018-12-15

## 2018-12-15 RX ORDER — INSULIN LISPRO 100/ML
15 VIAL (ML) SUBCUTANEOUS
Qty: 0 | Refills: 0 | Status: DISCONTINUED | OUTPATIENT
Start: 2018-12-15 | End: 2018-12-17

## 2018-12-15 RX ORDER — ENOXAPARIN SODIUM 100 MG/ML
40 INJECTION SUBCUTANEOUS AT BEDTIME
Qty: 0 | Refills: 0 | Status: DISCONTINUED | OUTPATIENT
Start: 2018-12-15 | End: 2018-12-19

## 2018-12-15 RX ORDER — DEXAMETHASONE 0.5 MG/5ML
10 ELIXIR ORAL ONCE
Qty: 0 | Refills: 0 | Status: DISCONTINUED | OUTPATIENT
Start: 2018-12-15 | End: 2018-12-15

## 2018-12-15 RX ORDER — SODIUM CHLORIDE 9 MG/ML
1000 INJECTION, SOLUTION INTRAVENOUS
Qty: 0 | Refills: 0 | Status: DISCONTINUED | OUTPATIENT
Start: 2018-12-15 | End: 2018-12-19

## 2018-12-15 RX ORDER — AZITHROMYCIN 500 MG/1
TABLET, FILM COATED ORAL
Qty: 0 | Refills: 0 | Status: DISCONTINUED | OUTPATIENT
Start: 2018-12-15 | End: 2018-12-17

## 2018-12-15 RX ORDER — TRAMADOL HYDROCHLORIDE 50 MG/1
50 TABLET ORAL EVERY 6 HOURS
Qty: 0 | Refills: 0 | Status: DISCONTINUED | OUTPATIENT
Start: 2018-12-15 | End: 2018-12-19

## 2018-12-15 RX ORDER — PREGABALIN 225 MG/1
1000 CAPSULE ORAL ONCE
Qty: 0 | Refills: 0 | Status: COMPLETED | OUTPATIENT
Start: 2018-12-15 | End: 2018-12-15

## 2018-12-15 RX ORDER — LEVOTHYROXINE SODIUM 125 MCG
100 TABLET ORAL DAILY
Qty: 0 | Refills: 0 | Status: DISCONTINUED | OUTPATIENT
Start: 2018-12-15 | End: 2018-12-19

## 2018-12-15 RX ORDER — MONTELUKAST 4 MG/1
10 TABLET, CHEWABLE ORAL DAILY
Qty: 0 | Refills: 0 | Status: DISCONTINUED | OUTPATIENT
Start: 2018-12-15 | End: 2018-12-19

## 2018-12-15 RX ORDER — AZITHROMYCIN 500 MG/1
500 TABLET, FILM COATED ORAL EVERY 24 HOURS
Qty: 0 | Refills: 0 | Status: DISCONTINUED | OUTPATIENT
Start: 2018-12-16 | End: 2018-12-17

## 2018-12-15 RX ADMIN — ENOXAPARIN SODIUM 40 MILLIGRAM(S): 100 INJECTION SUBCUTANEOUS at 22:10

## 2018-12-15 RX ADMIN — Medication 125 MILLIGRAM(S): at 11:29

## 2018-12-15 RX ADMIN — PANTOPRAZOLE SODIUM 40 MILLIGRAM(S): 20 TABLET, DELAYED RELEASE ORAL at 15:14

## 2018-12-15 RX ADMIN — Medication 15 UNIT(S): at 17:04

## 2018-12-15 RX ADMIN — ATORVASTATIN CALCIUM 40 MILLIGRAM(S): 80 TABLET, FILM COATED ORAL at 22:10

## 2018-12-15 RX ADMIN — SODIUM CHLORIDE 1000 MILLILITER(S): 9 INJECTION INTRAMUSCULAR; INTRAVENOUS; SUBCUTANEOUS at 06:39

## 2018-12-15 RX ADMIN — MIDODRINE HYDROCHLORIDE 10 MILLIGRAM(S): 2.5 TABLET ORAL at 15:13

## 2018-12-15 RX ADMIN — Medication 2 MILLIGRAM(S): at 22:08

## 2018-12-15 RX ADMIN — AZITHROMYCIN 255 MILLIGRAM(S): 500 TABLET, FILM COATED ORAL at 13:58

## 2018-12-15 RX ADMIN — MIDODRINE HYDROCHLORIDE 10 MILLIGRAM(S): 2.5 TABLET ORAL at 22:09

## 2018-12-15 RX ADMIN — Medication 325 MILLIGRAM(S): at 15:11

## 2018-12-15 RX ADMIN — Medication 3 MILLILITER(S): at 08:38

## 2018-12-15 RX ADMIN — Medication 3 MILLILITER(S): at 11:29

## 2018-12-15 RX ADMIN — INSULIN GLARGINE 45 UNIT(S): 100 INJECTION, SOLUTION SUBCUTANEOUS at 13:56

## 2018-12-15 RX ADMIN — PREGABALIN 1000 MICROGRAM(S): 225 CAPSULE ORAL at 15:09

## 2018-12-15 RX ADMIN — ERGOCALCIFEROL 50000 UNIT(S): 1.25 CAPSULE ORAL at 15:11

## 2018-12-15 RX ADMIN — Medication 3 MILLILITER(S): at 15:14

## 2018-12-15 RX ADMIN — INSULIN HUMAN 10 UNIT(S): 100 INJECTION, SOLUTION SUBCUTANEOUS at 08:38

## 2018-12-15 RX ADMIN — Medication 3 MILLILITER(S): at 20:07

## 2018-12-15 RX ADMIN — CELECOXIB 200 MILLIGRAM(S): 200 CAPSULE ORAL at 15:10

## 2018-12-15 RX ADMIN — Medication 50 GRAM(S): at 10:05

## 2018-12-15 RX ADMIN — Medication 1 MILLIGRAM(S): at 15:11

## 2018-12-15 RX ADMIN — Medication 15 MILLIGRAM(S): at 17:05

## 2018-12-15 NOTE — CONSULT NOTE ADULT - SUBJECTIVE AND OBJECTIVE BOX
Patient is a 53y old  Female who presents with a chief complaint of     HPI:    52 yo F with COPD, never smoker, DLD, DM, HTN, migraine, hypothyroid, 'ministrokes,' arrythmia, presented to the ED for worsening dyspnea on exertion, dizziness and hyperglycemia noted at home. Denies fever chills cough diarrhea weight change dysuria abdominal pain leg pain or edema.    PAST MEDICAL & SURGICAL HISTORY:  Migraine  Crohn disease  COPD (chronic obstructive pulmonary disease)  Depression  Anxiety  Schizophrenia  ANISHA (obstructive sleep apnea)  Polyneuropathy  Bladder disorder  Sciatica  Constipation  Iron deficiency  Hypercholesteremia  Hypothyroid  Diabetes mellitus, type 2  Vitamin D deficiency  GERD (gastroesophageal reflux disease)  History of cholecystectomy      SOCIAL HX:   Smoking    no                     ETOH    no                        Other    FAMILY HISTORY:  No pertinent family history in first degree relatives  :  No known cardiovacular family hisotry     ROS:  See HPI     Allergies    ciprofloxacin (Unknown)  codeine (Unknown)  Fish Products (Unknown)  lactose (Unknown)  latex (Unknown)  penicillins (Unknown)  tetracycline (Unknown)  Zoloft (Other)    Intolerances          PHYSICAL EXAM    ICU Vital Signs Last 24 Hrs  T(C): 36.4 (15 Dec 2018 10:05), Max: 36.4 (15 Dec 2018 05:04)  T(F): 97.5 (15 Dec 2018 10:05), Max: 97.6 (15 Dec 2018 05:04)  HR: 75 (15 Dec 2018 10:05) (75 - 98)  BP: 123/37 (15 Dec 2018 10:05) (100/60 - 123/37)  BP(mean): --  ABP: --  ABP(mean): --  RR: 18 (15 Dec 2018 10:05) (18 - 20)  SpO2: 100% (15 Dec 2018 10:05) (98% - 100%) on room air      General: In NAD   HEENT:  PHAM              Lymphatic system: No cervical LN   Lungs: Bilateral BS CTA no wheeze no crackles  Cardiovascular: Regular  Gastrointestinal: Soft, Positive BS  Musculoskeletal: No clubbing.  Moves all extremities.  Full range of motion no edema  Skin: Warm.  Intact  Neurological: No motor or sensory deficit         LABS:                          13.2   5.45  )-----------( 354      ( 15 Dec 2018 05:49 )             42.1                                               12-15    141  |  96<L>  |  12  ----------------------------<  496<HH>  4.3   |  28  |  0.6<L>    Ca    10.1      15 Dec 2018 05:49  Mg     1.6     12-15    TPro  6.6  /  Alb  4.1  /  TBili  0.2  /  DBili  x   /  AST  11  /  ALT  21  /  AlkPhos  106  12-15      PT/INR - ( 15 Dec 2018 05:49 )   PT: 10.20 sec;   INR: 0.89 ratio         PTT - ( 15 Dec 2018 05:49 )  PTT:31.7 sec                                       Urinalysis Basic - ( 15 Dec 2018 08:03 )    Color: Yellow / Appearance: Clear / SG: >=1.030 / pH: x  Gluc: x / Ketone: Negative  / Bili: Negative / Urobili: 0.2   Blood: x / Protein: Negative / Nitrite: Negative   Leuk Esterase: Negative / RBC: x / WBC x   Sq Epi: x / Non Sq Epi: x / Bacteria: x        CARDIAC MARKERS ( 15 Dec 2018 05:49 )  x     / <0.01 ng/mL / x     / x     / x                                                LIVER FUNCTIONS - ( 15 Dec 2018 05:49 )  Alb: 4.1 g/dL / Pro: 6.6 g/dL / ALK PHOS: 106 U/L / ALT: 21 U/L / AST: 11 U/L / GGT: x                                                     Blood Gas Profile - Venous (12.15.18 @ 10:04)    pH, Venous: 7.36    pCO2, Venous: 58 mmHg    pO2, Venous: 15 mmHg    HCO3, Venous: 32 mmoL/L    Base Excess, Venous: 5.3 mmoL/L    Oxygen Saturation, Venous: 17 %                                                                                      X-Rays:  no acute CP disease                                                     MEDICATIONS  (STANDING):    MEDICATIONS  (PRN):

## 2018-12-15 NOTE — CONSULT NOTE ADULT - ASSESSMENT
IMPRESSION:    Acute on chronic hypercapnic respiratory failure improved with nebs and solumedrol never required NIV  Sepsis ruled out  CXR clear  Hyperglycemia no DKA    PLAN:    - Nebs, solumedrol, oxygen prn  - Insulin basal bolus  - No need for MICU monitoring  - DVT ppx IMPRESSION:    Acute on chronic hypercapnic respiratory failure improved with nebs and solumedrol never required NIV  Sepsis ruled out  CXR clear  Hyperglycemia no DKA  HO ANISHA on cpap    PLAN:    - duonebs q4h and prn, solumedrol 60mg daily, caution with uncontrolled hyperglycemia  - oxygen prn  - Insulin basal bolus  - No need for MICU monitoring  - Consult Dr Llanos/Dr Radha Okeefe for pulmonary follow up as patient see as outpatient  - CPAP qhs 12cm h2o  - DVT ppx IMPRESSION:    Acute on chronic hypercapnic respiratory failure improved with nebs and solumedrol never required NIV  COPD exacerbation  Sepsis ruled out  CXR clear  Hyperglycemia no DKA  HO ANISHA on cpap    PLAN:    - duonebs q4h and prn, solumedrol 60mg daily, caution with uncontrolled hyperglycemia  - oxygen prn  - Insulin basal bolus  - No need for MICU monitoring  - Consult Dr Llanos/Dr Radha Okeefe for pulmonary follow up as patient see as outpatient  - CPAP qhs 12cm h2o  - DVT ppx

## 2018-12-15 NOTE — H&P ADULT - NSHPPHYSICALEXAM_GEN_ALL_CORE
Constitutional: anxious appearing, well-nourished, appears stated age  HEENT: Airway patent, moist MM, no erythema/swelling/deformity of oral structures. EOMI, PERRLA.  CV: tachycardic rate, regular rhythm, well-perfused extremities, 2+ b/l DP and radial pulses equal. temporary aicd in place in L chest   Lungs: BCTA, no increased WOB.  ABD: NTND, no guarding or rebound, no pulsatile mass, no hernias.   MSK: Neck supple, nontender, nl ROM, no stepoff. Chest nontender. Back nontender in TLS spine or to b/l bony structures or flanks. Ext nontender, nl rom, no deformity.   INTEG: Skin warm, dry, no rash.  NEURO: A&Ox3, moving all extremities, normal speech  PSYCH: anxious, cooperative, normal affect and interaction.

## 2018-12-15 NOTE — ED ADULT NURSE NOTE - NSIMPLEMENTINTERV_GEN_ALL_ED
Implemented All Universal Safety Interventions:  Normal to call system. Call bell, personal items and telephone within reach. Instruct patient to call for assistance. Room bathroom lighting operational. Non-slip footwear when patient is off stretcher. Physically safe environment: no spills, clutter or unnecessary equipment. Stretcher in lowest position, wheels locked, appropriate side rails in place.

## 2018-12-15 NOTE — H&P ADULT - ATTENDING COMMENTS
53 y.o. F, PMH crohn's dz, COPD, migraine, arrhythmia s/p loop recorder (Dr. Cruz), DM, hypothyroidism, comes in c/o hyperglycemia, lightheadedness and shortness of breath. Pt checked her FS last night, it was in 400s so wanted to come to hospital to have it controlled. She was here this month (12/2018) for elevated fingersticks and lightheadedness and Dr. Kilpatrick placed a loop recorder to monitor for arrhythmia. Pt also reports SOB that started yesterday while she was laying down in bed, not exerting herself. Patient states last time this happened and she came to hospital was in 07/2018 and was diagnosed with COPD exacerbation. Patient denies any chest pain or wheezing or palpitations. Patient denies any fevers, chills, or night sweats. Patient denies any nausea, vomiting, or diarrhea.    Pulm- Dr. Llanos  Pharm- [facility]    REVIEW OF SYSTEMS:  CONSTITUTIONAL: No weakness, fevers or chills  EYES/ENT: No visual changes;  No vertigo or throat pain   NECK: No pain or stiffness  RESPIRATORY: No cough, wheezing, hemoptysis; No shortness of breath  CARDIOVASCULAR: No chest pain or palpitations  GASTROINTESTINAL: No abdominal or epigastric pain. No nausea, vomiting, or hematemesis; No diarrhea or constipation. No melena or hematochezia.  GENITOURINARY: No dysuria, frequency or hematuria  NEUROLOGICAL: No numbness or weakness  SKIN: No itching, rashes    Physical Exam:  General: WN/WD NAD  Neurology: A&Ox3, nonfocal, follows commands  Eyes: PERRLA/ EOMI  ENT/Neck: Neck supple, trachea midline, No JVD  Respiratory: CTA B/L, No wheezing, rales, rhonchi  CV: Normal rate regular rhythm, S1S2, no murmurs, rubs or gallops  Abdominal: Soft, NT, ND +BS,   Extremities: No edema, + peripheral pulses  Skin: No Rashes, Hematoma, Ecchymosis  Incisions:   Tubes: 53 y.o. F, PMH crohn's dz, COPD, migraine, arrhythmia s/p loop recorder (Dr. Cruz), DM, hypothyroidism, comes in c/o hyperglycemia, lightheadedness and shortness of breath. Pt checked her FS last night, it was in 400s so wanted to come to hospital to have it controlled. She was here this month (12/2018) for elevated fingersticks and lightheadedness and Dr. Kilpatrick placed a loop recorder to monitor for arrhythmia. Pt also reports SOB that started yesterday while she was laying down in bed, not exerting herself. Patient states last time this happened and she came to hospital was in 07/2018 and was diagnosed with COPD exacerbation. Patient denies any chest pain or wheezing or palpitations. Patient denies any fevers, chills, or night sweats. Patient denies any nausea, vomiting, or diarrhea.    Pulm- Dr. Llanos  Pharm- [facility]    REVIEW OF SYSTEMS: see CC/HPI   CONSTITUTIONAL: No weakness, fevers or chills, hyperglycemia   EYES/ENT: No visual changes;  No vertigo or throat pain   NECK: No pain or stiffness  RESPIRATORY: No cough, wheezing, hemoptysis; (+) shortness of breath  CARDIOVASCULAR: No chest pain or palpitations  GASTROINTESTINAL: No abdominal or epigastric pain. No nausea, vomiting, or hematemesis; No diarrhea or constipation. No melena or hematochezia.  GENITOURINARY: No dysuria, frequency or hematuria  NEUROLOGICAL: No numbness or weakness  SKIN: No itching, rashes    Physical Exam:  General: WN/WD NAD  Neurology: A&Ox3, nonfocal, follows commands  Eyes: PERRLA/ EOMI  ENT/Neck: Neck supple, trachea midline, No JVD  Respiratory: CTA B/L, No wheezing, rales, rhonchi  CV: Normal rate regular rhythm, S1S2, no murmurs, rubs or gallops  Abdominal: Soft, NT, ND +BS,   Extremities: No edema, + peripheral pulses  Skin: No Rashes, Hematoma, Ecchymosis  Incisions:   Tubes:    A/P  COPD exacerbation /Hypoxia  - O2 w/ BiPAP  -Nebs and IV steroids  -change Azithromycin to oral     DM - uncontrolled  -F/S monitoring and insulin   -dietary eval for compliance w/ diet     ANISHA   -CPAP at night   -would benefit from weight loss    Hypothyroidism/GERD  -c/w outpatient Rx    Migraine HA   -c/w outpatient Rx    DVT prophylaxis

## 2018-12-15 NOTE — ED ADULT NURSE NOTE - CHIEF COMPLAINT QUOTE
Patient c/o of hypergylcemia. Was discharged from [facility] just this evening for same problem.

## 2018-12-15 NOTE — H&P ADULT - ASSESSMENT
53 y.o. F, PMH crohn's dz, COPD, migraine, arrhythmia s/p temporary aicd placement (Dr. Cruz), DM, hypothyroidism, comes in c/o hyperglycemia and lightheadedness.    #) COPD Exacerbation  -solumedrol 60mg q8  -duonebs q4h and PRN  -azithromycin 500mg IV x5 days  -titrate O2 to 90-92%    #) Uncontrolled DM  -pt takes metformin 500 bid, lispro 10-5-10, degludec insulin, victoza 0.6, tresiba.  -s/p 2 doses of 10 units IV regular insulin  -started basal/preprandial insulin regimen  -target FS<180    #) Migraine  -c/w home med (sumatriptan)    #) Hypothyroidism  -c/w home med (synthroid)    #) GERD  -c/w home med (protonix)    #) DVT/ GI ppx  -lovenox, protonix    #) Code Status  -full code    #) Dispo  -from home 53 y.o. F, PMH crohn's dz, COPD, migraine, arrhythmia s/p loop recorder (Dr. Cruz), DM, hypothyroidism, comes in c/o hyperglycemia, lightheadedness and shortness of breath.    #) COPD Exacerbation  -Never smoker  -Pulmonologist- Dr. Serrano  -currently satting at 92% ion 2L O2, not on home O2  -solumedrol 60mg q8  -duonebs q4h and PRN  -azithromycin 500mg IV x5 days  -titrate O2 to 90-92%    #) Uncontrolled DM  -pt takes metformin 500 bid, lispro 10-5-10, degludec insulin, victoza 0.6, tresiba.  -s/p 2 doses of 10 units IV regular insulin  -started basal/preprandial insulin regimen  -target FS<180    #) ANISHA  -on CPAP at home (12 cm H2O)    #) Migraine  -c/w home med (sumatriptan)    #) Hypothyroidism  -c/w home med (synthroid)    #) GERD  -c/w home med (protonix)    #) DVT/ GI ppx  -lovenox, protonix    #) Code Status  -full code    #) Dispo  -from home  -typically ambulates with walker and cane, requesting rehab  -Physiatry c/s pending  -PT eval pending  -Activity- ambulate as tolerated 53 y.o. F, PMH crohn's dz, COPD, migraine, arrhythmia s/p loop recorder (Dr. Cruz), DM, hypothyroidism, comes in c/o hyperglycemia, lightheadedness and shortness of breath.    #) COPD Exacerbation  -Never smoker  -Pulmonologist- Dr. Llanos  -currently satting at 92% on 2L O2, not on home O2  -solumedrol 60mg q24h started  -duonebs q4h and PRN  -azithromycin 500mg IV x5 days  -titrate O2 to 90-92%  -Critical care said no need for MICU monitoring    #) Uncontrolled DM  -pt takes metformin 500 bid, lispro 10-5-10, degludec insulin, victoza 0.6, tresiba.  -s/p 2 doses of 10 units IV regular insulin  -started basal/preprandial insulin regimen  -target FS<180    #) ANISHA  -on CPAP at home (12 cm H2O)    #) Migraine  -c/w home med (sumatriptan)    #) Hypothyroidism  -c/w home med (synthroid)    #) GERD  -c/w home med (protonix)    #) DVT/ GI ppx  -lovenox, protonix    #) Code Status  -full code    #) Dispo  -from home  -typically ambulates with walker and cane, requesting rehab  -Physiatry c/s pending  -PT eval pending  -Activity- ambulate as tolerated

## 2018-12-15 NOTE — ED PROVIDER NOTE - MEDICAL DECISION MAKING DETAILS
pt found to have COPD exac- acidotic and hypercarbic, no DKA (bicarb 30s) but uncontrolled hyperglycemia, pt VBG improved w nebs but still symptomatic, will admit for further treatment. ICU cleared for floor admission. Spoke w Dr Quintero who states pt has poor diet at home, recently dc from rehab 2 weeks ago, has HHA 5 hrs daily pt is not interested in placement ([facility]) and states they are currently working on getting her 10 hrs daily HHA. pt states she is taking her meds as instructed for diabetes. lactate improved s/p ivf.

## 2018-12-15 NOTE — H&P ADULT - NSHPLABSRESULTS_GEN_ALL_CORE
13.2   5.45  )-----------( 354      ( 15 Dec 2018 05:49 )             42.1       12-15    141  |  96<L>  |  12  ----------------------------<  496<HH>  4.3   |  28  |  0.6<L>    Ca    10.1      15 Dec 2018 05:49  Mg     1.6     12-15    TPro  6.6  /  Alb  4.1  /  TBili  0.2  /  DBili  x   /  AST  11  /  ALT  21  /  AlkPhos  106  12-15    LIVER FUNCTIONS - ( 15 Dec 2018 05:49 )  Alb: 4.1 g/dL / Pro: 6.6 g/dL / ALK PHOS: 106 U/L / ALT: 21 U/L / AST: 11 U/L / GGT: x             PT/INR - ( 15 Dec 2018 05:49 )   PT: 10.20 sec;   INR: 0.89 ratio         PTT - ( 15 Dec 2018 05:49 )  PTT:31.7 sec    Urinalysis Basic - ( 15 Dec 2018 08:03 )    Color: Yellow / Appearance: Clear / SG: >=1.030 / pH: x  Gluc: x / Ketone: Negative  / Bili: Negative / Urobili: 0.2   Blood: x / Protein: Negative / Nitrite: Negative   Leuk Esterase: Negative / RBC: x / WBC x   Sq Epi: x / Non Sq Epi: x / Bacteria: x    CARDIAC MARKERS ( 15 Dec 2018 05:49 )  x     / <0.01 ng/mL / x     / x     / x        < from: Xray Chest 2 Views PA/Lat (12.15.18 @ 06:54) >      No radiographic evidence of acute cardiopulmonary disease.    < end of copied text >

## 2018-12-15 NOTE — ED PROVIDER NOTE - OBJECTIVE STATEMENT
53yF with PMH crohn dz, copd, migraine, arrhythmia s/p temporary aicd placement (Dr. Cruz), dm, p/w lightheadedness, sob and hyperglycemia X 1 day. no shocks from aicd felt. no fever/chills cp abd pain urinary sx nausea vomiting. pt takes metformin 500 bid, lispro 10-5-10, degludec insulin, victoza 0.6, tresiba.

## 2018-12-15 NOTE — ED ADULT TRIAGE NOTE - CHIEF COMPLAINT QUOTE
Patient c/o of hypergylcemia. Was discharged from [facility] just prior for same problem. Patient c/o of hypergylcemia. Was discharged from [email or web address] just this evening for same problem.

## 2018-12-15 NOTE — ED PROVIDER NOTE - PHYSICAL EXAMINATION
Vital Signs: I have reviewed the initial vital signs.  Constitutional: anxious appearing, well-nourished, appears stated age  HEENT: Airway patent, moist MM, no erythema/swelling/deformity of oral structures. EOMI, PERRLA.  CV: tachycardic rate, regular rhythm, well-perfused extremities, 2+ b/l DP and radial pulses equal. temporary aicd in place in L chest   Lungs: BCTA, no increased WOB.  ABD: NTND, no guarding or rebound, no pulsatile mass, no hernias.   MSK: Neck supple, nontender, nl ROM, no stepoff. Chest nontender. Back nontender in TLS spine or to b/l bony structures or flanks. Ext nontender, nl rom, no deformity.   INTEG: Skin warm, dry, no rash.  NEURO: A&Ox3, moving all extremities, normal speech  PSYCH: anxious, cooperative, normal affect and interaction.

## 2018-12-15 NOTE — ED PROVIDER NOTE - ATTENDING CONTRIBUTION TO CARE
53 y.o. F, PMH crohn's dz, COPD, migraine, arrhythmia, DM, comes in c/o hyperglycemia. Pt checked her FS last night, it was in 400s. Pt also reports SOB, no CP. NO fever/chills, cough, abdominal pain, n/v/c/d. On exam, pt in NAD, AAOx3, head NC/AT, CN II-XII intact, lungs CTA B/L, CV S1S2 regular, abdomen soft/NT/ND/(+)BS, ext (-) edema. Will do labs, CXR, EKG and reevaluate.

## 2018-12-15 NOTE — ED PROVIDER NOTE - PROGRESS NOTE DETAILS
signout hanny to renato Pt signed out to Dr. Bee pending labs, reevaluation, dispo. Patient awake, alert, sitting up, pending labs ICU fellow no unit at this time

## 2018-12-15 NOTE — H&P ADULT - HISTORY OF PRESENT ILLNESS
53 y.o. F, PMH crohn's dz, COPD, migraine, arrhythmia s/p temporary aicd placement (Dr. Cruz), DM, hypothyroidism, comes in c/o hyperglycemia and lightheadedness. Pt checked her FS last night, it was in 400s. Pt also reports SOB a/w wheezing that started yesterday. no fever/chills cp abd pain urinary sx nausea vomiting. 53 y.o. F, PMH crohn's dz, COPD, migraine, arrhythmia s/p loop recorder (Dr. Cruz), DM, hypothyroidism, comes in c/o hyperglycemia, lightheadedness and shortness of breath. Pt checked her FS last night, it was in 400s so wanted to come to hospital to have it controlled. She was here this month (12/2018) for elevated fingersticks and lightheadedness and Dr. Kilpatrick placed a loop recorder to monitor for arrhythmia. Pt also reports SOB that started yesterday while she was laying down in bed, not exerting herself. Patient states last time this happened and she came to hospital was in 07/2018 and was diagnosed with COPD exacerbation. Patient denies any chest pain or wheezing or palpitations. Patient denies any fevers, chills, or night sweats. Patient denies any nausea, vomiting, or diarrhea.    Pulm- Dr. Serrano  Pharm- [facility] 53 y.o. F, PMH crohn's dz, COPD, migraine, arrhythmia s/p loop recorder (Dr. Cruz), DM, hypothyroidism, comes in c/o hyperglycemia, lightheadedness and shortness of breath. Pt checked her FS last night, it was in 400s so wanted to come to hospital to have it controlled. She was here this month (12/2018) for elevated fingersticks and lightheadedness and Dr. Kilpatrick placed a loop recorder to monitor for arrhythmia. Pt also reports SOB that started yesterday while she was laying down in bed, not exerting herself. Patient states last time this happened and she came to hospital was in 07/2018 and was diagnosed with COPD exacerbation. Patient denies any chest pain or wheezing or palpitations. Patient denies any fevers, chills, or night sweats. Patient denies any nausea, vomiting, or diarrhea.    Pulm- Dr. Llanos  Pharm- [facility]

## 2018-12-16 LAB
ANION GAP SERPL CALC-SCNC: 17 MMOL/L — HIGH (ref 7–14)
BUN SERPL-MCNC: 19 MG/DL — SIGNIFICANT CHANGE UP (ref 10–20)
CALCIUM SERPL-MCNC: 9.7 MG/DL — SIGNIFICANT CHANGE UP (ref 8.5–10.1)
CHLORIDE SERPL-SCNC: 94 MMOL/L — LOW (ref 98–110)
CO2 SERPL-SCNC: 25 MMOL/L — SIGNIFICANT CHANGE UP (ref 17–32)
CREAT SERPL-MCNC: 0.7 MG/DL — SIGNIFICANT CHANGE UP (ref 0.7–1.5)
CULTURE RESULTS: SIGNIFICANT CHANGE UP
GLUCOSE BLDC GLUCOMTR-MCNC: 299 MG/DL — HIGH (ref 70–99)
GLUCOSE BLDC GLUCOMTR-MCNC: 314 MG/DL — HIGH (ref 70–99)
GLUCOSE BLDC GLUCOMTR-MCNC: 378 MG/DL — HIGH (ref 70–99)
GLUCOSE BLDC GLUCOMTR-MCNC: 422 MG/DL — HIGH (ref 70–99)
GLUCOSE BLDC GLUCOMTR-MCNC: 423 MG/DL — HIGH (ref 70–99)
GLUCOSE BLDC GLUCOMTR-MCNC: 444 MG/DL — HIGH (ref 70–99)
GLUCOSE BLDC GLUCOMTR-MCNC: 496 MG/DL — CRITICAL HIGH (ref 70–99)
GLUCOSE SERPL-MCNC: 431 MG/DL — HIGH (ref 70–99)
POTASSIUM SERPL-MCNC: 4.5 MMOL/L — SIGNIFICANT CHANGE UP (ref 3.5–5)
POTASSIUM SERPL-SCNC: 4.5 MMOL/L — SIGNIFICANT CHANGE UP (ref 3.5–5)
SODIUM SERPL-SCNC: 136 MMOL/L — SIGNIFICANT CHANGE UP (ref 135–146)
SPECIMEN SOURCE: SIGNIFICANT CHANGE UP

## 2018-12-16 RX ORDER — INSULIN LISPRO 100/ML
8 VIAL (ML) SUBCUTANEOUS ONCE
Qty: 0 | Refills: 0 | Status: COMPLETED | OUTPATIENT
Start: 2018-12-16 | End: 2018-12-16

## 2018-12-16 RX ORDER — INSULIN LISPRO 100/ML
10 VIAL (ML) SUBCUTANEOUS ONCE
Qty: 0 | Refills: 0 | Status: COMPLETED | OUTPATIENT
Start: 2018-12-16 | End: 2018-12-16

## 2018-12-16 RX ORDER — INSULIN LISPRO 100/ML
15 VIAL (ML) SUBCUTANEOUS ONCE
Qty: 0 | Refills: 0 | Status: COMPLETED | OUTPATIENT
Start: 2018-12-16 | End: 2018-12-16

## 2018-12-16 RX ADMIN — Medication 15 UNIT(S): at 16:35

## 2018-12-16 RX ADMIN — Medication 3 MILLILITER(S): at 21:49

## 2018-12-16 RX ADMIN — MONTELUKAST 10 MILLIGRAM(S): 4 TABLET, CHEWABLE ORAL at 11:05

## 2018-12-16 RX ADMIN — ENOXAPARIN SODIUM 40 MILLIGRAM(S): 100 INJECTION SUBCUTANEOUS at 21:49

## 2018-12-16 RX ADMIN — Medication 15 UNIT(S): at 14:51

## 2018-12-16 RX ADMIN — TRAMADOL HYDROCHLORIDE 50 MILLIGRAM(S): 50 TABLET ORAL at 12:13

## 2018-12-16 RX ADMIN — Medication 15 UNIT(S): at 08:31

## 2018-12-16 RX ADMIN — MIDODRINE HYDROCHLORIDE 10 MILLIGRAM(S): 2.5 TABLET ORAL at 12:59

## 2018-12-16 RX ADMIN — Medication 8 UNIT(S): at 01:57

## 2018-12-16 RX ADMIN — Medication 15 MILLIGRAM(S): at 06:12

## 2018-12-16 RX ADMIN — PANTOPRAZOLE SODIUM 40 MILLIGRAM(S): 20 TABLET, DELAYED RELEASE ORAL at 06:12

## 2018-12-16 RX ADMIN — Medication 3 MILLILITER(S): at 09:19

## 2018-12-16 RX ADMIN — ATORVASTATIN CALCIUM 40 MILLIGRAM(S): 80 TABLET, FILM COATED ORAL at 21:48

## 2018-12-16 RX ADMIN — MIDODRINE HYDROCHLORIDE 10 MILLIGRAM(S): 2.5 TABLET ORAL at 21:49

## 2018-12-16 RX ADMIN — Medication 325 MILLIGRAM(S): at 11:05

## 2018-12-16 RX ADMIN — Medication 2 MILLIGRAM(S): at 21:48

## 2018-12-16 RX ADMIN — AZITHROMYCIN 255 MILLIGRAM(S): 500 TABLET, FILM COATED ORAL at 12:57

## 2018-12-16 RX ADMIN — Medication 60 MILLIGRAM(S): at 06:13

## 2018-12-16 RX ADMIN — Medication 1 MILLIGRAM(S): at 11:05

## 2018-12-16 RX ADMIN — Medication 15 UNIT(S): at 12:05

## 2018-12-16 RX ADMIN — INSULIN GLARGINE 45 UNIT(S): 100 INJECTION, SOLUTION SUBCUTANEOUS at 09:14

## 2018-12-16 RX ADMIN — CHLORHEXIDINE GLUCONATE 1 APPLICATION(S): 213 SOLUTION TOPICAL at 05:31

## 2018-12-16 RX ADMIN — Medication 3 MILLILITER(S): at 18:08

## 2018-12-16 RX ADMIN — CELECOXIB 200 MILLIGRAM(S): 200 CAPSULE ORAL at 11:05

## 2018-12-16 RX ADMIN — Medication 10 UNIT(S): at 23:51

## 2018-12-16 RX ADMIN — Medication 100 MICROGRAM(S): at 06:13

## 2018-12-16 RX ADMIN — MIDODRINE HYDROCHLORIDE 10 MILLIGRAM(S): 2.5 TABLET ORAL at 06:13

## 2018-12-16 RX ADMIN — Medication 3 MILLILITER(S): at 12:06

## 2018-12-16 NOTE — PROVIDER CONTACT NOTE (OTHER) - BACKGROUND
pt is diabetic, has been hyperglycemic since the morning, insulin has been administered throughout the day

## 2018-12-17 LAB
ANION GAP SERPL CALC-SCNC: 19 MMOL/L — HIGH (ref 7–14)
BUN SERPL-MCNC: 24 MG/DL — HIGH (ref 10–20)
CALCIUM SERPL-MCNC: 10.2 MG/DL — HIGH (ref 8.5–10.1)
CHLORIDE SERPL-SCNC: 95 MMOL/L — LOW (ref 98–110)
CO2 SERPL-SCNC: 22 MMOL/L — SIGNIFICANT CHANGE UP (ref 17–32)
CREAT SERPL-MCNC: 0.7 MG/DL — SIGNIFICANT CHANGE UP (ref 0.7–1.5)
GLUCOSE BLDC GLUCOMTR-MCNC: 215 MG/DL — HIGH (ref 70–99)
GLUCOSE BLDC GLUCOMTR-MCNC: 281 MG/DL — HIGH (ref 70–99)
GLUCOSE BLDC GLUCOMTR-MCNC: 329 MG/DL — HIGH (ref 70–99)
GLUCOSE BLDC GLUCOMTR-MCNC: 356 MG/DL — HIGH (ref 70–99)
GLUCOSE BLDC GLUCOMTR-MCNC: 394 MG/DL — HIGH (ref 70–99)
GLUCOSE BLDC GLUCOMTR-MCNC: 400 MG/DL — HIGH (ref 70–99)
GLUCOSE BLDC GLUCOMTR-MCNC: 407 MG/DL — HIGH (ref 70–99)
GLUCOSE BLDC GLUCOMTR-MCNC: 439 MG/DL — HIGH (ref 70–99)
GLUCOSE BLDC GLUCOMTR-MCNC: 494 MG/DL — CRITICAL HIGH (ref 70–99)
GLUCOSE SERPL-MCNC: 495 MG/DL — CRITICAL HIGH (ref 70–99)
HCT VFR BLD CALC: 41.3 % — SIGNIFICANT CHANGE UP (ref 37–47)
HGB BLD-MCNC: 12.6 G/DL — SIGNIFICANT CHANGE UP (ref 12–16)
LACTATE SERPL-SCNC: 3.4 MMOL/L — HIGH (ref 0.5–2.2)
MAGNESIUM SERPL-MCNC: 1.9 MG/DL — SIGNIFICANT CHANGE UP (ref 1.8–2.4)
MCHC RBC-ENTMCNC: 22.1 PG — LOW (ref 27–31)
MCHC RBC-ENTMCNC: 30.5 G/DL — LOW (ref 32–37)
MCV RBC AUTO: 72.5 FL — LOW (ref 81–99)
NRBC # BLD: 0 /100 WBCS — SIGNIFICANT CHANGE UP (ref 0–0)
PLATELET # BLD AUTO: 356 K/UL — SIGNIFICANT CHANGE UP (ref 130–400)
POTASSIUM SERPL-MCNC: 5 MMOL/L — SIGNIFICANT CHANGE UP (ref 3.5–5)
POTASSIUM SERPL-SCNC: 5 MMOL/L — SIGNIFICANT CHANGE UP (ref 3.5–5)
RBC # BLD: 5.7 M/UL — HIGH (ref 4.2–5.4)
RBC # FLD: 18.8 % — HIGH (ref 11.5–14.5)
SODIUM SERPL-SCNC: 136 MMOL/L — SIGNIFICANT CHANGE UP (ref 135–146)
WBC # BLD: 9.48 K/UL — SIGNIFICANT CHANGE UP (ref 4.8–10.8)
WBC # FLD AUTO: 9.48 K/UL — SIGNIFICANT CHANGE UP (ref 4.8–10.8)

## 2018-12-17 RX ORDER — ACETAMINOPHEN 500 MG
650 TABLET ORAL ONCE
Qty: 0 | Refills: 0 | Status: COMPLETED | OUTPATIENT
Start: 2018-12-17 | End: 2018-12-17

## 2018-12-17 RX ORDER — INSULIN LISPRO 100/ML
10 VIAL (ML) SUBCUTANEOUS ONCE
Qty: 0 | Refills: 0 | Status: COMPLETED | OUTPATIENT
Start: 2018-12-17 | End: 2018-12-17

## 2018-12-17 RX ORDER — INSULIN GLARGINE 100 [IU]/ML
60 INJECTION, SOLUTION SUBCUTANEOUS EVERY MORNING
Qty: 0 | Refills: 0 | Status: DISCONTINUED | OUTPATIENT
Start: 2018-12-17 | End: 2018-12-19

## 2018-12-17 RX ORDER — KETOROLAC TROMETHAMINE 30 MG/ML
15 SYRINGE (ML) INJECTION ONCE
Qty: 0 | Refills: 0 | Status: DISCONTINUED | OUTPATIENT
Start: 2018-12-17 | End: 2018-12-17

## 2018-12-17 RX ORDER — AZITHROMYCIN 500 MG/1
500 TABLET, FILM COATED ORAL DAILY
Qty: 0 | Refills: 0 | Status: DISCONTINUED | OUTPATIENT
Start: 2018-12-17 | End: 2018-12-19

## 2018-12-17 RX ORDER — INSULIN LISPRO 100/ML
20 VIAL (ML) SUBCUTANEOUS
Qty: 0 | Refills: 0 | Status: DISCONTINUED | OUTPATIENT
Start: 2018-12-17 | End: 2018-12-19

## 2018-12-17 RX ORDER — SUMATRIPTAN SUCCINATE 4 MG/.5ML
100 INJECTION, SOLUTION SUBCUTANEOUS
Qty: 0 | Refills: 0 | Status: DISCONTINUED | OUTPATIENT
Start: 2018-12-17 | End: 2018-12-17

## 2018-12-17 RX ADMIN — Medication 15 MILLIGRAM(S): at 01:07

## 2018-12-17 RX ADMIN — AZITHROMYCIN 500 MILLIGRAM(S): 500 TABLET, FILM COATED ORAL at 11:10

## 2018-12-17 RX ADMIN — Medication 10 UNIT(S): at 22:06

## 2018-12-17 RX ADMIN — MIDODRINE HYDROCHLORIDE 10 MILLIGRAM(S): 2.5 TABLET ORAL at 05:17

## 2018-12-17 RX ADMIN — Medication 25 MILLIGRAM(S): at 13:27

## 2018-12-17 RX ADMIN — Medication 3 MILLILITER(S): at 19:30

## 2018-12-17 RX ADMIN — CELECOXIB 200 MILLIGRAM(S): 200 CAPSULE ORAL at 11:12

## 2018-12-17 RX ADMIN — Medication 325 MILLIGRAM(S): at 11:11

## 2018-12-17 RX ADMIN — TRAMADOL HYDROCHLORIDE 50 MILLIGRAM(S): 50 TABLET ORAL at 17:19

## 2018-12-17 RX ADMIN — Medication 10 UNIT(S): at 01:19

## 2018-12-17 RX ADMIN — ENOXAPARIN SODIUM 40 MILLIGRAM(S): 100 INJECTION SUBCUTANEOUS at 21:06

## 2018-12-17 RX ADMIN — MONTELUKAST 10 MILLIGRAM(S): 4 TABLET, CHEWABLE ORAL at 11:11

## 2018-12-17 RX ADMIN — Medication 60 MILLIGRAM(S): at 11:09

## 2018-12-17 RX ADMIN — Medication 3 MILLILITER(S): at 16:15

## 2018-12-17 RX ADMIN — TRAMADOL HYDROCHLORIDE 50 MILLIGRAM(S): 50 TABLET ORAL at 11:08

## 2018-12-17 RX ADMIN — Medication 3 MILLILITER(S): at 00:05

## 2018-12-17 RX ADMIN — Medication 20 UNIT(S): at 17:16

## 2018-12-17 RX ADMIN — PANTOPRAZOLE SODIUM 40 MILLIGRAM(S): 20 TABLET, DELAYED RELEASE ORAL at 05:17

## 2018-12-17 RX ADMIN — Medication 1 MILLIGRAM(S): at 11:10

## 2018-12-17 RX ADMIN — Medication 100 MICROGRAM(S): at 05:17

## 2018-12-17 RX ADMIN — Medication 15 UNIT(S): at 11:24

## 2018-12-17 RX ADMIN — Medication 60 MILLIGRAM(S): at 05:17

## 2018-12-17 RX ADMIN — Medication 15 MILLIGRAM(S): at 05:16

## 2018-12-17 RX ADMIN — INSULIN GLARGINE 45 UNIT(S): 100 INJECTION, SOLUTION SUBCUTANEOUS at 08:09

## 2018-12-17 RX ADMIN — Medication 3 MILLILITER(S): at 09:11

## 2018-12-17 RX ADMIN — Medication 10 UNIT(S): at 21:22

## 2018-12-17 RX ADMIN — ATORVASTATIN CALCIUM 40 MILLIGRAM(S): 80 TABLET, FILM COATED ORAL at 21:06

## 2018-12-17 RX ADMIN — Medication 15 UNIT(S): at 08:09

## 2018-12-17 RX ADMIN — Medication 650 MILLIGRAM(S): at 20:14

## 2018-12-17 NOTE — CONSULT NOTE ADULT - ASSESSMENT
IMPRESSION: Rehab for deconditioning secondary to COPD exacerbation    PRECAUTIONS: [  ] Cardiac  [  ] Respiratory  [  ] Seizures [  ] Contact Isolation  [  ] Droplet Isolation  [  ] Other    Weight Bearing Status:  AWBAT    RECOMMENDATION:    Out of Bed to Chair     DVT/Decubiti Prophylaxis    REHAB PLAN:     [  X ] Bedside P/T 3-5 times a week   [   ]   Bedside O/T  2-3 times a week             [   ] No Rehab Therapy Indicated                   [   ]  Speech Therapy   Conditioning/ROM                                    ADL  Bed Mobility                                               Conditioning/ROM  Transfers                                                     Bed Mobility  Sitting /Standing Balance                         Transfers                                        Gait Training                                               Sitting/Standing Balance  Stair Training [   ]Applicable                    Home equipment Eval                                                                        Splinting  [   ] Only      GOALS:   ADL   [   ]   Independent                    Transfers  [  X ] Independent                          Ambulation  [ X  ] Independent     [  X  ] With device                            [   ]  CG                                                         [   ]  CG                                                                  [   ] CG                            [    ] Min A                                                   [   ] Min A                                                              [   ] Min  A          DISCHARGE PLAN:   [   ]  Good candidate for Intensive Rehabilitation/Hospital based-[facility]A [facility]                                             Will tolerate 3hrs Intensive Rehab Daily                                       [  X  ]  Short Term Rehab in Skilled Nursing Facility                                       [  X ]  Home with Outpatient or  services                                         [    ]  Possible Candidate for Intensive Hospital based Rehab      Thank you. IMPRESSION: Rehab for deconditioning secondary to COPD exacerbation    PRECAUTIONS: [  ] Cardiac  [  ] Respiratory  [  ] Seizures [  ] Contact Isolation  [  ] Droplet Isolation  [  ] Other    Weight Bearing Status:  AWBAT    RECOMMENDATION:    Out of Bed to Chair     DVT/Decubiti Prophylaxis    REHAB PLAN:     [  X ] Bedside P/T 3-5 times a week   [   ]   Bedside O/T  2-3 times a week             [   ] No Rehab Therapy Indicated                   [   ]  Speech Therapy   Conditioning/ROM                                    ADL  Bed Mobility                                               Conditioning/ROM  Transfers                                                     Bed Mobility  Sitting /Standing Balance                         Transfers                                        Gait Training                                               Sitting/Standing Balance  Stair Training [ X  ]Applicable                    Home equipment Eval                                                                        Splinting  [   ] Only      GOALS:   ADL   [   ]   Independent                    Transfers  [  X ] Independent                          Ambulation  [ X  ] Independent     [  X  ] With device                            [   ]  CG                                                         [   ]  CG                                                                  [   ] CG                            [    ] Min A                                                   [   ] Min A                                                              [   ] Min  A          DISCHARGE PLAN:   [   ]  Good candidate for Intensive Rehabilitation/Hospital based-[facility]A [facility]                                             Will tolerate 3hrs Intensive Rehab Daily                                       [  X  ]  Short Term Rehab in Skilled Nursing Facility                                       [  X ]  Home with Outpatient or VN services                                         [    ]  Possible Candidate for Intensive Hospital based Rehab      Thank you.

## 2018-12-17 NOTE — PROGRESS NOTE ADULT - SUBJECTIVE AND OBJECTIVE BOX
SEJAL BARNARD [id number]    Hospitalist Note  52 yo F with Past Medical History Crohn's Disease, Schizophrenia, COPD, migraine, Arrhythmia s/p loop recorder (Dr. Cruz), DM, and hypothyroidism admitted for uncontrolled diabetes and shortness of breath secondary to acute COPD exacerbation.    Overnight events/Updates: The patient suffers from a cough though she is breathing comfortably without wheezes.  Her fingersticks have remained elevated from admission.    Vital Signs Last 24 Hrs  T(C): 36.8 (17 Dec 2018 13:59), Max: 36.8 (17 Dec 2018 13:59)  T(F): 98.2 (17 Dec 2018 13:59), Max: 98.2 (17 Dec 2018 13:59)  HR: 66 (17 Dec 2018 13:59) (51 - 66)  BP: 158/70 (17 Dec 2018 13:59) (138/67 - 163/70)  BP(mean): --  RR: 18 (17 Dec 2018 13:59) (18 - 18)  SpO2: 92% (17 Dec 2018 06:42) (92% - 98%)    Physical Examination:  General: AAO x 3  HEENT: PERRLA, EOMI  CV= S1 & S2 appreciated  Lungs= + cough without wheezes or rales  Abdominal Examination= + BS, Soft, NT/ND  Extremity Examination= No C/C/E    ROS: No chest pain.  + Anxiety with poor insight  All other systems reviewed and are within normal limits except for the complaints in the HPI.    MEDICATIONS  (STANDING):  ALBUTerol/ipratropium for Nebulization 3 milliLiter(s) Nebulizer every 4 hours  atorvastatin 40 milliGRAM(s) Oral at bedtime  azithromycin   Tablet 500 milliGRAM(s) Oral daily  busPIRone 15 milliGRAM(s) Oral two times a day  celecoxib 200 milliGRAM(s) Oral daily  chlorhexidine 2% Cloths 1 Application(s) Topical <User Schedule>  dextrose 5%. 1000 milliLiter(s) (50 mL/Hr) IV Continuous <Continuous>  dextrose 50% Injectable 12.5 Gram(s) IV Push once  dextrose 50% Injectable 25 Gram(s) IV Push once  dextrose 50% Injectable 25 Gram(s) IV Push once  enoxaparin Injectable 40 milliGRAM(s) SubCutaneous at bedtime  ergocalciferol 74572 Unit(s) Oral every week  ferrous    sulfate 325 milliGRAM(s) Oral daily  folic acid 1 milliGRAM(s) Oral daily  insulin glargine Injectable (LANTUS) 60 Unit(s) SubCutaneous every morning  insulin lispro Injectable (HumaLOG) 20 Unit(s) SubCutaneous three times a day before meals  levothyroxine 100 MICROGram(s) Oral daily  loperamide 2 milliGRAM(s) Oral at bedtime  midodrine 10 milliGRAM(s) Oral three times a day  montelukast 10 milliGRAM(s) Oral daily  pantoprazole    Tablet 40 milliGRAM(s) Oral before breakfast    MEDICATIONS  (PRN):  dextrose 40% Gel 15 Gram(s) Oral once PRN Blood Glucose LESS THAN 70 milliGRAM(s)/deciliter  glucagon  Injectable 1 milliGRAM(s) IntraMuscular once PRN Glucose LESS THAN 70 milligrams/deciliter  haloperidol    Concentrate 1 milliGRAM(s) Oral two times a day PRN agitations, restlessness  meclizine 25 milliGRAM(s) Oral three times a day PRN Dizziness  traMADol 50 milliGRAM(s) Oral every 6 hours PRN Moderate Pain (4 - 6)                            12.6   9.48  )-----------( 356      ( 17 Dec 2018 12:17 )             41.3     12-17    136  |  95<L>  |  24<H>  ----------------------------<  495<HH>  5.0   |  22  |  0.7    Ca    10.2<H>      17 Dec 2018 12:17  Mg     1.9     12-17        Case discussed with housestaff & family  JUAN A Osullivan [id number]

## 2018-12-17 NOTE — PROGRESS NOTE ADULT - ASSESSMENT
54 yo F with Past Medical History Crohn's Disease, Schizophrenia, COPD, migraine, Arrhythmia s/p loop recorder (Dr. Cruz), DM, and hypothyroidism admitted for uncontrolled diabetes and shortness of breath secondary to acute COPD exacerbation.    Uncontrolled DMII: fingersticks remain labile from admission, which is likely exacerbated by steroids.  Lantus was increased to 60u at bedtime with Lispro 20u prandial dosage.  Check hemoglobin A1c.  Although the patient reports compliance, and well controlled fingersticks at home, she appears to have poor insight into her disease.    Acute COPD exacerbation: + persistent cough without wheezing.  Taper Prednisone to 40mg PO q24 tomorrow. Continue Symbicort and Singulair as directed.  Complete treatment with Azithromycin x 1 week.  Nebulizers PRN.  Check pulse oximetry on room air.  Crohn's Disease: no active flare appreciated  Schizophrenia: continue Buspar.  Haldol PRN for increased agitation  Hypothyroidism: continue Synthroid as directed  GI/DVT prophylaxis  Discharge planning in 24-48 hours

## 2018-12-17 NOTE — PROVIDER CONTACT NOTE (OTHER) - SITUATION
md x [phone] made aware of pts fs [phone] . awaiting further orders by md.
patient with [phone], also patient's  from [facility] came, expressed that patient does not take cymbalta and buspar any more due to dizziness and was placed on haldol. left phone number
pt complains of blurred vision, fingerstick check, blood glucose=496

## 2018-12-17 NOTE — PROGRESS NOTE ADULT - ASSESSMENT
SEJAL BARNARD   53y   Female    MRN#: [id number]         SUBJECTIVE  Patient is a 53y old Female who presents with a chief complaint of shortness of breath and elevated fingersticks (17 Dec 2018 16:13)  Currently admitted to medicine with the primary diagnosis of COPD exacerbation  Hospital course has been complicated by _______.   Today is hospital day 2d, and this morning she is _________ and reports ________ overnight events.     Present Today:           Bustamante Catheter           Central Line          IV Fluids           Drains      OBJECTIVE  ------------------------------------    PAST MEDICAL & SURGICAL HISTORY  Migraine  Crohn disease  COPD (chronic obstructive pulmonary disease)  Depression  Anxiety  Schizophrenia  ANISHA (obstructive sleep apnea)  Polyneuropathy  Bladder disorder  Sciatica  Constipation  Iron deficiency  Hypercholesteremia  Hypothyroid  Diabetes mellitus, type 2  Vitamin D deficiency  GERD (gastroesophageal reflux disease)  History of cholecystectomy      ALLERGIES:  ciprofloxacin (Unknown)  codeine (Unknown)  Fish Products (Unknown)  lactose (Unknown)  latex (Unknown)  penicillins (Unknown)  tetracycline (Unknown)  Zoloft (Other)      MEDICATIONS:  STANDING MEDICATIONS  ALBUTerol/ipratropium for Nebulization 3 milliLiter(s) Nebulizer every 4 hours  atorvastatin 40 milliGRAM(s) Oral at bedtime  azithromycin   Tablet 500 milliGRAM(s) Oral daily  busPIRone 15 milliGRAM(s) Oral two times a day  celecoxib 200 milliGRAM(s) Oral daily  chlorhexidine 2% Cloths 1 Application(s) Topical <User Schedule>  dextrose 5%. 1000 milliLiter(s) IV Continuous <Continuous>  dextrose 50% Injectable 12.5 Gram(s) IV Push once  dextrose 50% Injectable 25 Gram(s) IV Push once  dextrose 50% Injectable 25 Gram(s) IV Push once  enoxaparin Injectable 40 milliGRAM(s) SubCutaneous at bedtime  ergocalciferol 52894 Unit(s) Oral every week  ferrous    sulfate 325 milliGRAM(s) Oral daily  folic acid 1 milliGRAM(s) Oral daily  insulin glargine Injectable (LANTUS) 60 Unit(s) SubCutaneous every morning  insulin lispro Injectable (HumaLOG) 20 Unit(s) SubCutaneous three times a day before meals  levothyroxine 100 MICROGram(s) Oral daily  loperamide 2 milliGRAM(s) Oral at bedtime  midodrine 10 milliGRAM(s) Oral three times a day  montelukast 10 milliGRAM(s) Oral daily  pantoprazole    Tablet 40 milliGRAM(s) Oral before breakfast    PRN MEDICATIONS  dextrose 40% Gel 15 Gram(s) Oral once PRN  glucagon  Injectable 1 milliGRAM(s) IntraMuscular once PRN  haloperidol    Concentrate 1 milliGRAM(s) Oral two times a day PRN  meclizine 25 milliGRAM(s) Oral three times a day PRN  traMADol 50 milliGRAM(s) Oral every 6 hours PRN        LABS:                        12.6   9.48  )-----------( 356      ( 17 Dec 2018 12:17 )             41.3     12-17    136  |  95<L>  |  24<H>  ----------------------------<  495<HH>  5.0   |  22  |  0.7    Ca    10.2<H>      17 Dec 2018 12:17  Mg     1.9     12-17            Lactate, Blood: 3.4 mmol/L <H> (12-17-18 @ 12:17)      Culture - Urine (collected 15 Dec 2018 08:03)  Source: .Urine Clean Catch (Midstream)  Final Report (16 Dec 2018 20:07):    Culture grew 3 or more types of organisms which indicate    collection contamination; consider recollection only if clinically    indicated.            RADIOLOGY:      PHYSICAL EXAM:  VITAL SIGNS: Last 24 Hours  T(C): 36.8 (17 Dec 2018 13:59), Max: 36.8 (17 Dec 2018 13:59)  T(F): 98.2 (17 Dec 2018 13:59), Max: 98.2 (17 Dec 2018 13:59)  HR: 66 (17 Dec 2018 13:59) (51 - 66)  BP: 158/70 (17 Dec 2018 13:59) (138/67 - 163/70)  BP(mean): --  RR: 18 (17 Dec 2018 13:59) (18 - 18)  SpO2: 92% (17 Dec 2018 06:42) (92% - 98%)    GENERAL: NAD, well-developed, AAOx3  HEENT:  Atraumatic, Normocephalic. EOMI, PERRLA, conjunctiva and sclera clear, No JVD  PULMONARY: Clear to auscultation bilaterally; No wheeze  CARDIOVASCULAR: Regular rate and rhythm; No murmurs, rubs, or gallops  GASTROINTESTINAL: Soft, Nontender, Nondistended; Bowel sounds present  MUSCULOSKELETAL:  2+ Peripheral Pulses, No clubbing, cyanosis, or edema  NEUROLOGY: non-focal  SKIN: No rashes or lesions      ASSESSMENT & PLAN      # DVT prophylaxis :  # GI prophylaxis :  # Activity :  # Diet :  # Code :  # Disposition : SEJAL BARNARD   53y   Female    MRN#: [id number]         SUBJECTIVE  Patient is a 53y old Female who presents with a chief complaint of shortness of breath and elevated fingersticks (17 Dec 2018 16:13)  Currently admitted to medicine with the primary diagnosis of COPD exacerbation  Hospital course : The patient has been started on IV azithromycin for COPD exacerbation as well as solumedrol IV. Fingersticks have not been well controlled since admission, insulin regimen being optimized. No major events since admission   Today is hospital day 2d, and this morning she reports generalized abdominal pain that is not new, rated 10/10, associated with nausea but no vomiting, improving with pain medication and worsening after meals. She hasn't had a bowel movement, she denies dyspnea and urine symptoms. She feels unsteady when she ambulates.     Present Today:           Bustamante Catheter X          Central Line X          IV Fluids X          Drains X      OBJECTIVE  ------------------------------------    PAST MEDICAL & SURGICAL HISTORY  Migraine  Crohn disease  COPD (chronic obstructive pulmonary disease)  Depression  Anxiety  Schizophrenia  ANISHA (obstructive sleep apnea)  Polyneuropathy  Bladder disorder  Sciatica  Constipation  Iron deficiency  Hypercholesteremia  Hypothyroid  Diabetes mellitus, type 2  Vitamin D deficiency  GERD (gastroesophageal reflux disease)  History of cholecystectomy      ALLERGIES:  ciprofloxacin (Unknown)  codeine (Unknown)  Fish Products (Unknown)  lactose (Unknown)  latex (Unknown)  penicillins (Unknown)  tetracycline (Unknown)  Zoloft (Other)      MEDICATIONS:  STANDING MEDICATIONS  ALBUTerol/ipratropium for Nebulization 3 milliLiter(s) Nebulizer every 4 hours  atorvastatin 40 milliGRAM(s) Oral at bedtime  azithromycin   Tablet 500 milliGRAM(s) Oral daily  busPIRone 15 milliGRAM(s) Oral two times a day  celecoxib 200 milliGRAM(s) Oral daily  chlorhexidine 2% Cloths 1 Application(s) Topical <User Schedule>  dextrose 5%. 1000 milliLiter(s) IV Continuous <Continuous>  dextrose 50% Injectable 12.5 Gram(s) IV Push once  dextrose 50% Injectable 25 Gram(s) IV Push once  dextrose 50% Injectable 25 Gram(s) IV Push once  enoxaparin Injectable 40 milliGRAM(s) SubCutaneous at bedtime  ergocalciferol 87339 Unit(s) Oral every week  ferrous    sulfate 325 milliGRAM(s) Oral daily  folic acid 1 milliGRAM(s) Oral daily  insulin glargine Injectable (LANTUS) 60 Unit(s) SubCutaneous every morning  insulin lispro Injectable (HumaLOG) 20 Unit(s) SubCutaneous three times a day before meals  levothyroxine 100 MICROGram(s) Oral daily  loperamide 2 milliGRAM(s) Oral at bedtime  midodrine 10 milliGRAM(s) Oral three times a day  montelukast 10 milliGRAM(s) Oral daily  pantoprazole    Tablet 40 milliGRAM(s) Oral before breakfast    PRN MEDICATIONS  dextrose 40% Gel 15 Gram(s) Oral once PRN  glucagon  Injectable 1 milliGRAM(s) IntraMuscular once PRN  haloperidol    Concentrate 1 milliGRAM(s) Oral two times a day PRN  meclizine 25 milliGRAM(s) Oral three times a day PRN  traMADol 50 milliGRAM(s) Oral every 6 hours PRN        LABS:                        12.6   9.48  )-----------( 356      ( 17 Dec 2018 12:17 )             41.3     12-17    136  |  95<L>  |  24<H>  ----------------------------<  495<HH>  5.0   |  22  |  0.7    Ca    10.2<H>      17 Dec 2018 12:17  Mg     1.9     12-17            Lactate, Blood: 3.4 mmol/L <H> (12-17-18 @ 12:17)      Culture - Urine (collected 15 Dec 2018 08:03)  Source: .Urine Clean Catch (Midstream)  Final Report (16 Dec 2018 20:07):    Culture grew 3 or more types of organisms which indicate    collection contamination; consider recollection only if clinically    indicated.            RADIOLOGY:    < from: Xray Chest 2 Views PA/Lat (12.15.18 @ 06:54) >  No radiographic evidence of acute cardiopulmonary disease.        PHYSICAL EXAM:  VITAL SIGNS: Last 24 Hours  T(C): 36.8 (17 Dec 2018 13:59), Max: 36.8 (17 Dec 2018 13:59)  T(F): 98.2 (17 Dec 2018 13:59), Max: 98.2 (17 Dec 2018 13:59)  HR: 66 (17 Dec 2018 13:59) (51 - 66)  BP: 158/70 (17 Dec 2018 13:59) (138/67 - 163/70)  BP(mean): --  RR: 18 (17 Dec 2018 13:59) (18 - 18)  SpO2: 92% (17 Dec 2018 06:42) (92% - 98%)    GENERAL: No acute distress, alert   PULMONARY: Clear to auscultation bilaterally; No wheeze  CARDIOVASCULAR: Regular rate and rhythm  GASTROINTESTINAL: Soft, generalized tenderness, Nondistended; Bowel sounds present  VASCULAR:  + Peripheral Pulses, No lower extremity edema       ASSESSMENT & PLAN    53 y.o. F, PMH crohn's dz, COPD, migraine, arrhythmia s/p loop recorder (Dr. Cruz), DM, hypothyroidism, comes in c/o hyperglycemia, lightheadedness and shortness of breath.    #) COPD Exacerbation  -Never smoker  -Good SpO2 on room air and on supplemental oxygen - will wean off supplemental O2   -solumedrol 60mg q24h switched to prednisone 60 mg PO - tomorrow 40 mg PO   -duonebs q4h and PRN + Singulair 10 mg PO daily   -azithromycin 500mg IV x5 days switched to PO today   -Symptomatically and clinically stable     #) Uncontrolled DM  - metformin 500 bid, lispro 10-5-10, degludec insulin, Victoza 0.6, Tresiba at home   -s/p 2 doses of 10 units IV regular insulin  -Lantus 60 units at bedtime + Lispro 20/20/20   -target FS<180    #) ANISHA  -on CPAP at home (12 cm H2O)    #) Migraine  -Tramadol 50 mg PO q6h PRN + Celecoxib 200 mg PO daily     #) Hypothyroidism  -Synthroid 100 mcg PO daily     #) GERD  -Protonix 40 mg PO daily       # DVT prophylaxis : Lovenox 40 mg sq daily   # GI prophylaxis : Protonix 40 mg PO daily   # Activity : Ambulating with a cane and walker - Ambulate as tolerated - PT recommending STR in SNF or home with outpatient PT   # Diet : Consistent carbohydrate no snacks   # Code : FULL  # Disposition : From home - Anticipated for discharge tomorrow

## 2018-12-17 NOTE — CONSULT NOTE ADULT - SUBJECTIVE AND OBJECTIVE BOX
HPI:  53 y.o. F, PMH Crohn's dz, COPD, migraine, arrhythmia s/p loop recorder (Dr. Cruz), DM, hypothyroidism, comes in c/o hyperglycemia, lightheadedness and shortness of breath. Pt checked her FS last night, it was in 400s so wanted to come to hospital to have it controlled. She was here this month (12/2018) for elevated fingersticks and lightheadedness and Dr. Kilpatrick placed a loop recorder to monitor for arrhythmia. Pt also reports SOB that started yesterday while she was laying down in bed, not exerting herself. Patient states last time this happened and she came to hospital was in 07/2018 and was diagnosed with COPD exacerbation. Patient denies any chest pain or wheezing or palpitations. Patient denies any fevers, chills, or night sweats. Patient denies any nausea, vomiting, or diarrhea.    Pulm- Dr. Llanos  Pharm- [facility] (15 Dec 2018 12:50)      PAST MEDICAL & SURGICAL HISTORY:  Migraine  Crohn disease  COPD (chronic obstructive pulmonary disease)  Depression  Anxiety  Schizophrenia  ANISHA (obstructive sleep apnea)  Polyneuropathy  Bladder disorder  Sciatica  Constipation  Iron deficiency  Hypercholesteremia  Hypothyroid  Diabetes mellitus, type 2  Vitamin D deficiency  GERD (gastroesophageal reflux disease)  History of cholecystectomy      Hospital Course:  Receiving oral prednisone 60 mg and oral azithromycin    TODAY'S SUBJECTIVE & REVIEW OF SYMPTOMS:     Constitutional WNL   Cardio WNL   Resp WNL   GI WNL  Heme WNL  Endo WNL  Skin WNL  MSK WNL  Neuro WNL  Cognitive WNL  Psych WNL      MEDICATIONS  (STANDING):  ALBUTerol/ipratropium for Nebulization 3 milliLiter(s) Nebulizer every 4 hours  atorvastatin 40 milliGRAM(s) Oral at bedtime  azithromycin   Tablet 500 milliGRAM(s) Oral daily  busPIRone 15 milliGRAM(s) Oral two times a day  celecoxib 200 milliGRAM(s) Oral daily  chlorhexidine 2% Cloths 1 Application(s) Topical <User Schedule>  dextrose 5%. 1000 milliLiter(s) (50 mL/Hr) IV Continuous <Continuous>  dextrose 50% Injectable 12.5 Gram(s) IV Push once  dextrose 50% Injectable 25 Gram(s) IV Push once  dextrose 50% Injectable 25 Gram(s) IV Push once  enoxaparin Injectable 40 milliGRAM(s) SubCutaneous at bedtime  ergocalciferol 30415 Unit(s) Oral every week  ferrous    sulfate 325 milliGRAM(s) Oral daily  folic acid 1 milliGRAM(s) Oral daily  insulin glargine Injectable (LANTUS) 45 Unit(s) SubCutaneous every morning  insulin lispro Injectable (HumaLOG) 15 Unit(s) SubCutaneous three times a day before meals  levothyroxine 100 MICROGram(s) Oral daily  loperamide 2 milliGRAM(s) Oral at bedtime  midodrine 10 milliGRAM(s) Oral three times a day  montelukast 10 milliGRAM(s) Oral daily  pantoprazole    Tablet 40 milliGRAM(s) Oral before breakfast  predniSONE   Tablet 60 milliGRAM(s) Oral daily    MEDICATIONS  (PRN):  dextrose 40% Gel 15 Gram(s) Oral once PRN Blood Glucose LESS THAN 70 milliGRAM(s)/deciliter  glucagon  Injectable 1 milliGRAM(s) IntraMuscular once PRN Glucose LESS THAN 70 milligrams/deciliter  haloperidol    Concentrate 1 milliGRAM(s) Oral two times a day PRN agitations, restlessness  meclizine 25 milliGRAM(s) Oral three times a day PRN Dizziness  traMADol 50 milliGRAM(s) Oral every 6 hours PRN Moderate Pain (4 - 6)      FAMILY HISTORY:  No pertinent family history in first degree relatives      Allergies    ciprofloxacin (Unknown)  codeine (Unknown)  Fish Products (Unknown)  lactose (Unknown)  latex (Unknown)  penicillins (Unknown)  tetracycline (Unknown)  Zoloft (Other)    Intolerances        SOCIAL HISTORY:    [  ] EtOH  [  ] Smoking  [  ] Substance abuse     Home Environment:  [  ] Home Alone  [  ] Lives with Family  [  ] Home Health Aid    Dwelling:  [  ] Apartment  [  ] Private House  [  ] Adult Home  [  ] Skilled Nursing Facility      [  ] Short Term  [  ] Long Term  [  ] Stairs       Elevator [  ]    FUNCTIONAL STATUS PTA: (Check all that apply)  Ambulation: [   ]Independent    [  ] Dependent     [  ] Non-Ambulatory  Assistive Device: [  ] SA Cane  [  ]  Q Cane  [  ] Walker  [  ]  Wheelchair  ADL : [  ] Independent  [  ]  Dependent       Vital Signs Last 24 Hrs  T(C): 35.8 (17 Dec 2018 06:42), Max: 36.4 (16 Dec 2018 23:07)  T(F): 96.5 (17 Dec 2018 06:42), Max: 97.5 (16 Dec 2018 23:07)  HR: 51 (17 Dec 2018 06:42) (51 - 59)  BP: 138/67 (17 Dec 2018 06:42) (137/66 - 163/70)  BP(mean): --  RR: 18 (17 Dec 2018 06:42) (18 - 18)  SpO2: 92% (17 Dec 2018 06:42) (92% - 98%)      PHYSICAL EXAM: Alert & Oriented X3  GENERAL: NAD, well-groomed, well-developed  HEAD:  Atraumatic, Normocephalic  EYES: EOMI, PERRLA, conjunctiva and sclera clear  NECK: Supple, No JVD, Normal thyroid  CHEST/LUNG: Clear to percussion bilaterally; No rales, rhonchi, wheezing, or rubs  HEART: Regular rate and rhythm; No murmurs, rubs, or gallops  ABDOMEN: Soft, Nontender, Nondistended; Bowel sounds present  EXTREMITIES:  2+ Peripheral Pulses, No clubbing, cyanosis, or edema    NERVOUS SYSTEM:  Cranial Nerves 2-12 intact [  ] Abnormal  [  ]  ROM: WFL all extremities [  ]  Abnormal [  ]  Motor Strength: WFL all extremities  [  ]  Abnormal [  ]  Sensation: intact to light touch [  ] Abnormal [  ]  Reflexes: Symmetric [  ]  Abnormal [  ]    FUNCTIONAL STATUS:  Bed Mobility: Independent [  ]  Supervision [  ]  Needs Assistance [  ]  N/A [  ]  Transfers: Independent [  ]  Supervision [  ]  Needs Assistance [  ]  N/A [  ]   Ambulation: Independent [  ]  Supervision [  ]  Needs Assistance [  ]  N/A [  ]  ADL: Independent [  ] Requires Assistance [  ] N/A [  ]      LABS:                        12.9   4.46  )-----------( 335      ( 15 Dec 2018 13:43 )             42.0     12-16    136  |  94<L>  |  19  ----------------------------<  431<H>  4.5   |  25  |  0.7    Ca    9.7      16 Dec 2018 21:13  Mg     2.1     12-15    TPro  7.1  /  Alb  4.5  /  TBili  0.3  /  DBili  x   /  AST  12  /  ALT  23  /  AlkPhos  101  12-15          RADIOLOGY & ADDITIONAL STUDIES:    EXAM:  XR CHEST PA LAT 2V            PROCEDURE DATE:  12/15/2018            INTERPRETATION:  Clinical History / Reason for exam: Chest pain    Comparison : Chest radiograph 7/4/2018.    Technique/Positioning: Satisfactory.    Findings:    Support devices: Loop recorder overlies the left hemithorax.    Cardiac/mediastinum/hilum: Stable.    Lung parenchyma/Pleura: Within normal limits.    Skeleton/soft tissues: Stable.    Impression:      No radiographic evidence of acute cardiopulmonary disease. HPI:  53 y.o. left-handed F, PMH Crohn's dz, COPD, migraine, arrhythmia s/p loop recorder (Dr. Cruz), DM, hypothyroidism, comes in c/o hyperglycemia, lightheadedness and shortness of breath. Pt checked her FS last night, it was in 400s so wanted to come to hospital to have it controlled. She was here this month (12/2018) for elevated fingersticks and lightheadedness and Dr. Kilpatrick placed a loop recorder to monitor for arrhythmia. Pt also reports SOB that started yesterday while she was laying down in bed, not exerting herself. Patient states last time this happened and she came to hospital was in 07/2018 and was diagnosed with COPD exacerbation. Patient denies any chest pain or wheezing or palpitations. Patient denies any fevers, chills, or night sweats. Patient denies any nausea, vomiting, or diarrhea.    Pulm- Dr. Llanos  Pharm- [facility] (15 Dec 2018 12:50)      PAST MEDICAL & SURGICAL HISTORY:  Migraine  Crohn disease  COPD (chronic obstructive pulmonary disease)  Depression  Anxiety  Schizophrenia  ANISHA (obstructive sleep apnea)  Polyneuropathy  Bladder disorder  Sciatica  Constipation  Iron deficiency  Hypercholesteremia  Hypothyroid  Diabetes mellitus, type 2  Vitamin D deficiency  GERD (gastroesophageal reflux disease)  History of cholecystectomy      Hospital Course:  Receiving oral prednisone 60 mg and oral azithromycin    TODAY'S SUBJECTIVE & REVIEW OF SYMPTOMS:     Constitutional WNL   Cardio WNL   Resp +SOB   GI WNL  Heme WNL  Endo WNL  Skin WNL  MSK WNL  Neuro WNL  Cognitive WNL  Psych WNL      MEDICATIONS  (STANDING):  ALBUTerol/ipratropium for Nebulization 3 milliLiter(s) Nebulizer every 4 hours  atorvastatin 40 milliGRAM(s) Oral at bedtime  azithromycin   Tablet 500 milliGRAM(s) Oral daily  busPIRone 15 milliGRAM(s) Oral two times a day  celecoxib 200 milliGRAM(s) Oral daily  chlorhexidine 2% Cloths 1 Application(s) Topical <User Schedule>  dextrose 5%. 1000 milliLiter(s) (50 mL/Hr) IV Continuous <Continuous>  dextrose 50% Injectable 12.5 Gram(s) IV Push once  dextrose 50% Injectable 25 Gram(s) IV Push once  dextrose 50% Injectable 25 Gram(s) IV Push once  enoxaparin Injectable 40 milliGRAM(s) SubCutaneous at bedtime  ergocalciferol 92672 Unit(s) Oral every week  ferrous    sulfate 325 milliGRAM(s) Oral daily  folic acid 1 milliGRAM(s) Oral daily  insulin glargine Injectable (LANTUS) 45 Unit(s) SubCutaneous every morning  insulin lispro Injectable (HumaLOG) 15 Unit(s) SubCutaneous three times a day before meals  levothyroxine 100 MICROGram(s) Oral daily  loperamide 2 milliGRAM(s) Oral at bedtime  midodrine 10 milliGRAM(s) Oral three times a day  montelukast 10 milliGRAM(s) Oral daily  pantoprazole    Tablet 40 milliGRAM(s) Oral before breakfast  predniSONE   Tablet 60 milliGRAM(s) Oral daily    MEDICATIONS  (PRN):  dextrose 40% Gel 15 Gram(s) Oral once PRN Blood Glucose LESS THAN 70 milliGRAM(s)/deciliter  glucagon  Injectable 1 milliGRAM(s) IntraMuscular once PRN Glucose LESS THAN 70 milligrams/deciliter  haloperidol    Concentrate 1 milliGRAM(s) Oral two times a day PRN agitations, restlessness  meclizine 25 milliGRAM(s) Oral three times a day PRN Dizziness  traMADol 50 milliGRAM(s) Oral every 6 hours PRN Moderate Pain (4 - 6)      FAMILY HISTORY:  No pertinent family history in first degree relatives      Allergies    ciprofloxacin (Unknown)  codeine (Unknown)  Fish Products (Unknown)  lactose (Unknown)  latex (Unknown)  penicillins (Unknown)  tetracycline (Unknown)  Zoloft (Other)    Intolerances        SOCIAL HISTORY:    [  ] EtOH  [  ] Smoking  [  ] Substance abuse     Home Environment:  [ X ] Home Alone  [  ] Lives with Family  [ X ] Home Health Aide--5 hours/day x seven days/week    Dwelling:  [  ] Apartment  [ X ] Private House  [  ] Adult Home  [  ] Skilled Nursing Facility      [  ] Short Term  [  ] Long Term  [ X ] Stairs  4 step entry     Elevator [  ]    FUNCTIONAL STATUS PTA: (Check all that apply)  Ambulation: [ X  ]Independent    [  ] Dependent     [  ] Non-Ambulatory  Assistive Device: [X  ] SA Cane  [  ]  Q Cane  [ X ] Rollator Walker  [  ]  Wheelchair  ADL : [ X ] Independent  [  ]  Dependent       Vital Signs Last 24 Hrs  T(C): 35.8 (17 Dec 2018 06:42), Max: 36.4 (16 Dec 2018 23:07)  T(F): 96.5 (17 Dec 2018 06:42), Max: 97.5 (16 Dec 2018 23:07)  HR: 51 (17 Dec 2018 06:42) (51 - 59)  BP: 138/67 (17 Dec 2018 06:42) (137/66 - 163/70)  BP(mean): --  RR: 18 (17 Dec 2018 06:42) (18 - 18)  SpO2: 92% (17 Dec 2018 06:42) (92% - 98%)      PHYSICAL EXAM: Alert & Oriented X 3  GENERAL: NAD, well-groomed, well-developed  HEAD:  Atraumatic, Normocephalic  EYES: EOMI, PERRLA, conjunctiva and sclera clear  NECK: Supple, No JVD, Normal thyroid  CHEST/LUNG: Clear to percussion bilaterally; No rales, rhonchi, wheezing, or rubs  HEART: Regular rate and rhythm; No murmurs, rubs, or gallops  ABDOMEN: Soft, Nontender, Nondistended; Bowel sounds present  EXTREMITIES:  2+ Peripheral Pulses, No clubbing, cyanosis, or edema    NERVOUS SYSTEM:  Cranial Nerves 2-12 intact [  ] Abnormal  [  ]  ROM: WFL all extremities [ X ]  Abnormal [  ]  Motor Strength: WFL all extremities  [  ]  Abnormal [ X ]  Sensation: intact to light touch [  ] Abnormal [ X ]  Reflexes: Symmetric [  ]  Abnormal [  ]    FUNCTIONAL STATUS:  Bed Mobility: Independent [ X ]  Supervision [  ]  Needs Assistance [  ]  N/A [  ]  Transfers: Independent [  ]  Supervision [ X ]  Needs Assistance [  ]  N/A [  ]   Ambulation: Independent [  ]  Supervision [  ]  Needs Assistance [X  ]  N/A [  ]  ADL: Independent [ X ] Requires Assistance [  ] N/A [  ]      LABS:                        12.9   4.46  )-----------( 335      ( 15 Dec 2018 13:43 )             42.0     12-16    136  |  94<L>  |  19  ----------------------------<  431<H>  4.5   |  25  |  0.7    Ca    9.7      16 Dec 2018 21:13  Mg     2.1     12-15    TPro  7.1  /  Alb  4.5  /  TBili  0.3  /  DBili  x   /  AST  12  /  ALT  23  /  AlkPhos  101  12-15          RADIOLOGY & ADDITIONAL STUDIES:    EXAM:  XR CHEST PA LAT 2V            PROCEDURE DATE:  12/15/2018            INTERPRETATION:  Clinical History / Reason for exam: Chest pain    Comparison : Chest radiograph 7/4/2018.    Technique/Positioning: Satisfactory.    Findings:    Support devices: Loop recorder overlies the left hemithorax.    Cardiac/mediastinum/hilum: Stable.    Lung parenchyma/Pleura: Within normal limits.    Skeleton/soft tissues: Stable.    Impression:      No radiographic evidence of acute cardiopulmonary disease.

## 2018-12-17 NOTE — CHART NOTE - NSCHARTNOTEFT_GEN_A_CORE
CHO Consistent Diet Education:  -RD spoke to pt at bedside as per pt request. Pt very tearful and frustrated as serum blood glucose levels have been persistently high and requesting diet education. RD discussed basics of CHO consistent diet, identified CHO rich foods, demonstrated CHO counting with meal tray at bedside and addressed pts questions. Pt with steroids in place likely causing increase in serum glucose as well, d/w pt. Pt appeared more calm after conversation with RD. Left informational packet at bedside. Will f/u need for further ed during RD initial assessment.

## 2018-12-17 NOTE — PROVIDER CONTACT NOTE (MEDICATION) - ACTION/TREATMENT ORDERED:
per  asked to hold midrodrine and antivert 25mg given. patient instructed to call for assist for safety and not to get oob. call bell in reach

## 2018-12-18 ENCOUNTER — TRANSCRIPTION ENCOUNTER (OUTPATIENT)
Age: 53
End: 2018-12-18

## 2018-12-18 LAB
ANION GAP SERPL CALC-SCNC: 16 MMOL/L — HIGH (ref 7–14)
BUN SERPL-MCNC: 24 MG/DL — HIGH (ref 10–20)
CALCIUM SERPL-MCNC: 9.1 MG/DL — SIGNIFICANT CHANGE UP (ref 8.5–10.1)
CHLORIDE SERPL-SCNC: 98 MMOL/L — SIGNIFICANT CHANGE UP (ref 98–110)
CO2 SERPL-SCNC: 26 MMOL/L — SIGNIFICANT CHANGE UP (ref 17–32)
CREAT SERPL-MCNC: 0.6 MG/DL — LOW (ref 0.7–1.5)
GLUCOSE BLDC GLUCOMTR-MCNC: 189 MG/DL — HIGH (ref 70–99)
GLUCOSE BLDC GLUCOMTR-MCNC: 241 MG/DL — HIGH (ref 70–99)
GLUCOSE BLDC GLUCOMTR-MCNC: 269 MG/DL — HIGH (ref 70–99)
GLUCOSE BLDC GLUCOMTR-MCNC: 292 MG/DL — HIGH (ref 70–99)
GLUCOSE BLDC GLUCOMTR-MCNC: 308 MG/DL — HIGH (ref 70–99)
GLUCOSE BLDC GLUCOMTR-MCNC: 336 MG/DL — HIGH (ref 70–99)
GLUCOSE SERPL-MCNC: 258 MG/DL — HIGH (ref 70–99)
HCT VFR BLD CALC: 37.6 % — SIGNIFICANT CHANGE UP (ref 37–47)
HGB BLD-MCNC: 11.9 G/DL — LOW (ref 12–16)
MAGNESIUM SERPL-MCNC: 1.8 MG/DL — SIGNIFICANT CHANGE UP (ref 1.8–2.4)
MCHC RBC-ENTMCNC: 22.8 PG — LOW (ref 27–31)
MCHC RBC-ENTMCNC: 31.6 G/DL — LOW (ref 32–37)
MCV RBC AUTO: 72.2 FL — LOW (ref 81–99)
NRBC # BLD: 0 /100 WBCS — SIGNIFICANT CHANGE UP (ref 0–0)
PLATELET # BLD AUTO: 317 K/UL — SIGNIFICANT CHANGE UP (ref 130–400)
POTASSIUM SERPL-MCNC: 4.2 MMOL/L — SIGNIFICANT CHANGE UP (ref 3.5–5)
POTASSIUM SERPL-SCNC: 4.2 MMOL/L — SIGNIFICANT CHANGE UP (ref 3.5–5)
RBC # BLD: 5.21 M/UL — SIGNIFICANT CHANGE UP (ref 4.2–5.4)
RBC # FLD: 18.1 % — HIGH (ref 11.5–14.5)
SODIUM SERPL-SCNC: 140 MMOL/L — SIGNIFICANT CHANGE UP (ref 135–146)
WBC # BLD: 8.63 K/UL — SIGNIFICANT CHANGE UP (ref 4.8–10.8)
WBC # FLD AUTO: 8.63 K/UL — SIGNIFICANT CHANGE UP (ref 4.8–10.8)

## 2018-12-18 RX ORDER — INSULIN LISPRO 100/ML
8 VIAL (ML) SUBCUTANEOUS ONCE
Qty: 0 | Refills: 0 | Status: COMPLETED | OUTPATIENT
Start: 2018-12-18 | End: 2018-12-18

## 2018-12-18 RX ORDER — INSULIN LISPRO 100/ML
20 VIAL (ML) SUBCUTANEOUS
Qty: 1 | Refills: 2 | OUTPATIENT
Start: 2018-12-18 | End: 2019-03-17

## 2018-12-18 RX ORDER — ACETAMINOPHEN 500 MG
650 TABLET ORAL ONCE
Qty: 0 | Refills: 0 | Status: COMPLETED | OUTPATIENT
Start: 2018-12-18 | End: 2018-12-18

## 2018-12-18 RX ORDER — DOCUSATE SODIUM 100 MG
1 CAPSULE ORAL
Qty: 15 | Refills: 0
Start: 2018-12-18 | End: 2019-01-01

## 2018-12-18 RX ORDER — SENNA PLUS 8.6 MG/1
2 TABLET ORAL AT BEDTIME
Qty: 0 | Refills: 0 | Status: DISCONTINUED | OUTPATIENT
Start: 2018-12-18 | End: 2018-12-19

## 2018-12-18 RX ORDER — SENNA PLUS 8.6 MG/1
2 TABLET ORAL
Qty: 30 | Refills: 0
Start: 2018-12-18 | End: 2019-01-01

## 2018-12-18 RX ORDER — AZITHROMYCIN 500 MG/1
1 TABLET, FILM COATED ORAL
Qty: 5 | Refills: 0
Start: 2018-12-18 | End: 2018-12-22

## 2018-12-18 RX ORDER — HALOPERIDOL DECANOATE 100 MG/ML
1 INJECTION INTRAMUSCULAR EVERY 12 HOURS
Qty: 0 | Refills: 0 | Status: DISCONTINUED | OUTPATIENT
Start: 2018-12-18 | End: 2018-12-18

## 2018-12-18 RX ORDER — INSULIN LISPRO 100/ML
5 VIAL (ML) SUBCUTANEOUS ONCE
Qty: 0 | Refills: 0 | Status: COMPLETED | OUTPATIENT
Start: 2018-12-18 | End: 2018-12-18

## 2018-12-18 RX ORDER — DOCUSATE SODIUM 100 MG
100 CAPSULE ORAL DAILY
Qty: 0 | Refills: 0 | Status: DISCONTINUED | OUTPATIENT
Start: 2018-12-18 | End: 2018-12-19

## 2018-12-18 RX ORDER — INSULIN GLARGINE 100 [IU]/ML
60 INJECTION, SOLUTION SUBCUTANEOUS
Qty: 1 | Refills: 2 | OUTPATIENT
Start: 2018-12-18 | End: 2019-03-17

## 2018-12-18 RX ADMIN — TRAMADOL HYDROCHLORIDE 50 MILLIGRAM(S): 50 TABLET ORAL at 02:27

## 2018-12-18 RX ADMIN — Medication 3 MILLILITER(S): at 09:50

## 2018-12-18 RX ADMIN — Medication 325 MILLIGRAM(S): at 12:16

## 2018-12-18 RX ADMIN — ENOXAPARIN SODIUM 40 MILLIGRAM(S): 100 INJECTION SUBCUTANEOUS at 21:01

## 2018-12-18 RX ADMIN — MONTELUKAST 10 MILLIGRAM(S): 4 TABLET, CHEWABLE ORAL at 12:15

## 2018-12-18 RX ADMIN — ATORVASTATIN CALCIUM 40 MILLIGRAM(S): 80 TABLET, FILM COATED ORAL at 21:01

## 2018-12-18 RX ADMIN — TRAMADOL HYDROCHLORIDE 50 MILLIGRAM(S): 50 TABLET ORAL at 16:35

## 2018-12-18 RX ADMIN — PANTOPRAZOLE SODIUM 40 MILLIGRAM(S): 20 TABLET, DELAYED RELEASE ORAL at 06:01

## 2018-12-18 RX ADMIN — Medication 3 MILLILITER(S): at 12:05

## 2018-12-18 RX ADMIN — Medication 20 UNIT(S): at 08:15

## 2018-12-18 RX ADMIN — SENNA PLUS 2 TABLET(S): 8.6 TABLET ORAL at 21:01

## 2018-12-18 RX ADMIN — CELECOXIB 200 MILLIGRAM(S): 200 CAPSULE ORAL at 12:15

## 2018-12-18 RX ADMIN — Medication 650 MILLIGRAM(S): at 19:56

## 2018-12-18 RX ADMIN — Medication 3 MILLILITER(S): at 21:25

## 2018-12-18 RX ADMIN — Medication 20 UNIT(S): at 12:14

## 2018-12-18 RX ADMIN — Medication 5 UNIT(S): at 02:47

## 2018-12-18 RX ADMIN — Medication 20 UNIT(S): at 16:35

## 2018-12-18 RX ADMIN — Medication 1 MILLIGRAM(S): at 12:15

## 2018-12-18 RX ADMIN — INSULIN GLARGINE 60 UNIT(S): 100 INJECTION, SOLUTION SUBCUTANEOUS at 08:15

## 2018-12-18 RX ADMIN — Medication 3 MILLILITER(S): at 17:46

## 2018-12-18 RX ADMIN — Medication 8 UNIT(S): at 21:13

## 2018-12-18 RX ADMIN — Medication 40 MILLIGRAM(S): at 05:12

## 2018-12-18 RX ADMIN — AZITHROMYCIN 500 MILLIGRAM(S): 500 TABLET, FILM COATED ORAL at 12:15

## 2018-12-18 RX ADMIN — Medication 100 MICROGRAM(S): at 05:11

## 2018-12-18 NOTE — DISCHARGE NOTE ADULT - ADDITIONAL INSTRUCTIONS
- Take your medication as prescribed and call your doctor for refills   - Follow up with your primary care provider and lung doctor as outpatient   - Make sure that your blood sugar levels are well controlled and do not skip your diabetes medication  - AVoid excessive sugar in your diet   - Keep your immunizations up to date and avoid sick contacts   - Call 911 or report to the nearest hospital in case of emergency

## 2018-12-18 NOTE — DISCHARGE NOTE ADULT - HOSPITAL COURSE
The patient has been started on IV azithromycin for COPD exacerbation as well as solumedrol IV, switched to PO azithromycin and prednisone on hospital day2. Fingersticks have not been well controlled since admission, insulin regimen being optimized    #) COPD Exacerbation  -Good SpO2 on room air and on supplemental oxygen   -IV solumedrol switched to PO prednisone 40 mg daily for 2 days followed by 20 mg daily for 2 days   -Duoneb q4h and PRN + Singulair 10 mg PO daily   -azithromycin 500mg PO daily    -Symptomatically and clinically stable     #) Uncontrolled DM  - metformin 500 bid, lispro 10-5-10, degludec insulin, Victoza 0.6, Tresiba at home   -s/p 2 doses of 10 units IV regular insulin on admission  -Lantus 60 units at bedtime + Lispro 20/20/20   -target FS<180  -HBA1C 7.1 (12/03/18)    #) ANISHA  -on CPAP at home (12 cm H2O)    #) Migraine  -Tramadol 50 mg PO q6h PRN + Celecoxib 200 mg PO daily     #) Hypothyroidism  -Synthroid 100 mcg PO daily     #) GERD  -Protonix 40 mg PO daily 54 yo F with Past Medical History Crohn's Disease, Schizophrenia, COPD, migraine, Arrhythmia s/p loop recorder (Dr. Cruz), DM, and hypothyroidism admitted for uncontrolled diabetes and shortness of breath secondary to acute COPD exacerbation.  The patient responded to treatment with IV Solumedrol, and was later transitioned to PO Prednisone.  She also received a course of Azithromycin.  Her hospital course was complicated by labile fingersticks--we increased her Lantus dosage to 60u QHS with Lispro 20u during meals.     There was a discrepancy between her home medications, and hospital regimen.  She was taking Metformin 1000mg q12 on a previous admission which was later reduced to 500mg PO q12.  The patient will be restarted on 1gm q12 upon discharge.       Her other home medications were unchanged.  The patient is saturating well on room air at time of discharge.

## 2018-12-18 NOTE — PROGRESS NOTE ADULT - SUBJECTIVE AND OBJECTIVE BOX
SEJAL BARNARD MRN-[id number]    Hospitalist Note  52 yo F with Past Medical History Crohn's Disease, Schizophrenia, COPD, migraine, Arrhythmia s/p loop recorder (Dr. Cruz), DM, and hypothyroidism admitted for uncontrolled diabetes and shortness of breath secondary to acute COPD exacerbation.    Overnight events/Updates: The patient has been saturating >90% on room air.  She suffers from a persistent cough.  FS remain elevated though improved over the past 24 hours after increasing basal/bolus insulin.    Vital Signs Last 24 Hrs  T(C): 35.8 (18 Dec 2018 05:00), Max: 36.8 (17 Dec 2018 13:59)  T(F): 96.4 (18 Dec 2018 05:00), Max: 98.2 (17 Dec 2018 13:59)  HR: 64 (18 Dec 2018 05:00) (64 - 80)  BP: 126/63 (18 Dec 2018 05:00) (121/56 - 158/70)  BP(mean): --  RR: 20 (18 Dec 2018 05:00) (16 - 20)  SpO2: 93% (18 Dec 2018 09:56) (93% - 93%)    Physical Examination:  General: AAO x 3, poor insight  HEENT: PERRLA, EOMI  CV= S1 & S2 appreciated  Lungs= + cough without wheezes  Abdominal Examination= + BS, Soft, NT/ND  Extremity Examination= No C/C/E    ROS: No chest pain, no shortness of breath.  All other systems reviewed and are within normal limits except for the complaints in the HPI.    MEDICATIONS  (STANDING):  ALBUTerol/ipratropium for Nebulization 3 milliLiter(s) Nebulizer every 4 hours  atorvastatin 40 milliGRAM(s) Oral at bedtime  azithromycin   Tablet 500 milliGRAM(s) Oral daily  busPIRone 15 milliGRAM(s) Oral two times a day  celecoxib 200 milliGRAM(s) Oral daily  chlorhexidine 2% Cloths 1 Application(s) Topical <User Schedule>  dextrose 5%. 1000 milliLiter(s) (50 mL/Hr) IV Continuous <Continuous>  dextrose 50% Injectable 12.5 Gram(s) IV Push once  dextrose 50% Injectable 25 Gram(s) IV Push once  dextrose 50% Injectable 25 Gram(s) IV Push once  enoxaparin Injectable 40 milliGRAM(s) SubCutaneous at bedtime  ergocalciferol 66925 Unit(s) Oral every week  ferrous    sulfate 325 milliGRAM(s) Oral daily  folic acid 1 milliGRAM(s) Oral daily  insulin glargine Injectable (LANTUS) 60 Unit(s) SubCutaneous every morning  insulin lispro Injectable (HumaLOG) 20 Unit(s) SubCutaneous three times a day before meals  levothyroxine 100 MICROGram(s) Oral daily  loperamide 2 milliGRAM(s) Oral at bedtime  midodrine 10 milliGRAM(s) Oral three times a day  montelukast 10 milliGRAM(s) Oral daily  pantoprazole    Tablet 40 milliGRAM(s) Oral before breakfast  predniSONE   Tablet 40 milliGRAM(s) Oral daily    MEDICATIONS  (PRN):  dextrose 40% Gel 15 Gram(s) Oral once PRN Blood Glucose LESS THAN 70 milliGRAM(s)/deciliter  glucagon  Injectable 1 milliGRAM(s) IntraMuscular once PRN Glucose LESS THAN 70 milligrams/deciliter  haloperidol    Concentrate 1 milliGRAM(s) Oral two times a day PRN agitations, restlessness  meclizine 25 milliGRAM(s) Oral three times a day PRN Dizziness  traMADol 50 milliGRAM(s) Oral every 6 hours PRN Moderate Pain (4 - 6)                            11.9   8.63  )-----------( 317      ( 18 Dec 2018 08:49 )             37.6     12-18    140  |  98  |  24<H>  ----------------------------<  258<H>  4.2   |  26  |  0.6<L>    Ca    9.1      18 Dec 2018 08:49  Mg     1.8     12-18        Case discussed with housestaff & family  UJAN A Shi [id number]

## 2018-12-18 NOTE — DISCHARGE NOTE ADULT - CARE PROVIDERS DIRECT ADDRESSES
[email or web address]

## 2018-12-18 NOTE — PROGRESS NOTE ADULT - ASSESSMENT
SEJAL BARNARD   53y   Female    MRN#: [id number]         SUBJECTIVE  Patient is a 53y old Female who presents with a chief complaint of shortness of breath and elevated fingersticks (18 Dec 2018 12:29)  Currently admitted to medicine with the primary diagnosis of COPD exacerbation  Hospital course : The patient has been started on IV azithromycin for COPD exacerbation as well as solumedrol IV, switched to PO azithromycin and prednisone on hospital day2. Fingersticks have not been well controlled since admission, insulin regimen being optimized. No major events since admission   Today is hospital day 3d, and this morning she is still reporting abdominal pain and shortness of breath at rest and on exertion. She is having bowel movements, no diarrhea. She is walking. She denies chest pain, dysuria, chills. No major events overnight     Present Today:           Bustamante Catheter X          Central Line X          IV Fluids X          Drains X      OBJECTIVE  ------------------------------------    PAST MEDICAL & SURGICAL HISTORY  Migraine  Crohn disease  COPD (chronic obstructive pulmonary disease)  Depression  Anxiety  Schizophrenia  ANISHA (obstructive sleep apnea)  Polyneuropathy  Bladder disorder  Sciatica  Constipation  Iron deficiency  Hypercholesteremia  Hypothyroid  Diabetes mellitus, type 2  Vitamin D deficiency  GERD (gastroesophageal reflux disease)  History of cholecystectomy      ALLERGIES:  ciprofloxacin (Unknown)  codeine (Unknown)  Fish Products (Unknown)  lactose (Unknown)  latex (Unknown)  penicillins (Unknown)  tetracycline (Unknown)  Zoloft (Other)      MEDICATIONS:  STANDING MEDICATIONS  ALBUTerol/ipratropium for Nebulization 3 milliLiter(s) Nebulizer every 4 hours  atorvastatin 40 milliGRAM(s) Oral at bedtime  azithromycin   Tablet 500 milliGRAM(s) Oral daily  busPIRone 15 milliGRAM(s) Oral two times a day  celecoxib 200 milliGRAM(s) Oral daily  chlorhexidine 2% Cloths 1 Application(s) Topical <User Schedule>  dextrose 5%. 1000 milliLiter(s) IV Continuous <Continuous>  dextrose 50% Injectable 12.5 Gram(s) IV Push once  dextrose 50% Injectable 25 Gram(s) IV Push once  dextrose 50% Injectable 25 Gram(s) IV Push once  enoxaparin Injectable 40 milliGRAM(s) SubCutaneous at bedtime  ergocalciferol 57013 Unit(s) Oral every week  ferrous    sulfate 325 milliGRAM(s) Oral daily  folic acid 1 milliGRAM(s) Oral daily  insulin glargine Injectable (LANTUS) 60 Unit(s) SubCutaneous every morning  insulin lispro Injectable (HumaLOG) 20 Unit(s) SubCutaneous three times a day before meals  levothyroxine 100 MICROGram(s) Oral daily  loperamide 2 milliGRAM(s) Oral at bedtime  midodrine 10 milliGRAM(s) Oral three times a day  montelukast 10 milliGRAM(s) Oral daily  pantoprazole    Tablet 40 milliGRAM(s) Oral before breakfast  predniSONE   Tablet 40 milliGRAM(s) Oral daily    PRN MEDICATIONS  dextrose 40% Gel 15 Gram(s) Oral once PRN  glucagon  Injectable 1 milliGRAM(s) IntraMuscular once PRN  haloperidol    Concentrate 1 milliGRAM(s) Oral two times a day PRN  meclizine 25 milliGRAM(s) Oral three times a day PRN  traMADol 50 milliGRAM(s) Oral every 6 hours PRN        LABS:                        11.9   8.63  )-----------( 317      ( 18 Dec 2018 08:49 )             37.6     12-18    140  |  98  |  24<H>  ----------------------------<  258<H>  4.2   |  26  |  0.6<L>    Ca    9.1      18 Dec 2018 08:49  Mg     1.8     12-18    RADIOLOGY:    < from: Xray Chest 2 Views PA/Lat (12.15.18 @ 06:54) >  No radiographic evidence of acute cardiopulmonary disease.    PHYSICAL EXAM:  VITAL SIGNS: Last 24 Hours  T(C): 35.8 (18 Dec 2018 05:00), Max: 36.8 (17 Dec 2018 13:59)  T(F): 96.4 (18 Dec 2018 05:00), Max: 98.2 (17 Dec 2018 13:59)  HR: 64 (18 Dec 2018 05:00) (64 - 80)  BP: 126/63 (18 Dec 2018 05:00) (121/56 - 158/70)  BP(mean): --  RR: 20 (18 Dec 2018 05:00) (16 - 20)  SpO2: 93% (18 Dec 2018 09:56) (93% - 93%)    GENERAL: No acute distress, alert   PULMONARY: Clear to auscultation bilaterally; No wheeze  CARDIOVASCULAR: Regular rate and rhythm  GASTROINTESTINAL: Soft, epigastric tenderness, Nondistended; Bowel sounds present  VASCULAR:  + Peripheral Pulses, No lower extremity edema       ASSESSMENT & PLAN    53 y.o. F, PMH crohn's dz, COPD, migraine, arrhythmia s/p loop recorder (Dr. Cruz), DM, hypothyroidism, comes in c/o hyperglycemia, lightheadedness and shortness of breath.    #) COPD Exacerbation  -Never smoker  -Good SpO2 on room air and on supplemental oxygen   -Prednisone 40 mg PO daily for 2 days followed by 20 mg daily for 2 more days   Duoneb q4h and PRN + Singulair 10 mg PO daily   -azithromycin 500mg PO daily    -Symptomatically and clinically stable     #) Uncontrolled DM  - metformin 500 bid, lispro 10-5-10, degludec insulin, Victoza 0.6, Tresiba at home   -s/p 2 doses of 10 units IV regular insulin  -Lantus 60 units at bedtime + Lispro 20/20/20   -target FS<180  -FS still not optimized   -HBA1C 7.1 (12/03/18)    #) ANISHA  -on CPAP at home (12 cm H2O)    #) Migraine  -Tramadol 50 mg PO q6h PRN + Celecoxib 200 mg PO daily     #) Hypothyroidism  -Synthroid 100 mcg PO daily     #) GERD  -Protonix 40 mg PO daily       # DVT prophylaxis : Lovenox 40 mg sq daily   # GI prophylaxis : Protonix 40 mg PO daily   # Activity : Ambulating with a cane and walker - Ambulate as tolerated - PT recommending STR in SNF or home with outpatient PT   # Diet : Consistent carbohydrate no snacks   # Code : FULL  # Disposition : From home - can be discharged once fingersticks controlled

## 2018-12-18 NOTE — DISCHARGE NOTE ADULT - CARE PROVIDER_API CALL
Farhan Quintero), [address] [address]  [address] [facility]  [address]  [place] [address]  Phone: [phone]  Fax: [phone]

## 2018-12-18 NOTE — DISCHARGE NOTE ADULT - PATIENT PORTAL LINK FT
You can access the [facility] Patient Portal, offered by [facility], by registering with the following website: [email or web address]

## 2018-12-18 NOTE — DISCHARGE NOTE ADULT - SECONDARY DIAGNOSIS.
Diabetes mellitus, type 2 Anxiety Crohn disease ANISHA (obstructive sleep apnea) Hypothyroid Iron deficiency

## 2018-12-18 NOTE — DISCHARGE NOTE ADULT - PLAN OF CARE
Treat and prevent complications You were admitted to the hospital because of an acute exacerbation of your COPD and while in the hospital you were treated with IV antibiotics, inhalers and Prednisone   - Continue taking the prednisone as prescribed for 2 more days   - continue with your inhaler treatment and with the oral antibiotic azithromycin as prescribed   - Follow up with your lung doctor as outpatient   - Call your doctor if you have worsening shortness of breath, cough with yellow phlegm or blood tinged phlegm, swelling of the feet, fever / chills or chest pain   - Avoid smoking and don't let anyone smoke next to you  - Keep all your immunizations up to date and avoid sick contacts Control the fingersticks - Take your diabetes medication as prescribed. Do not run out of your medicine. call your doctor if you need refills  - Avoid excessive sugar and fat in your diet.   - Check your blood sugar level every day before meals. If the levels are persistently high call your doctor as your medication may need to be re-adjusted   - Diabetes can predispose to eye, feet, kidney and cardiovascular disease. Follow-up with a podiatrist, nephrologist optometrist for screening for diabetic foot, eye and kidney disease. If you don't know any podiatrist, nephrologist or optometrist ask your primary doctor for referrals   - Always inspect your feet especially if you start to feel numb in your toes. Inspect your feet for any signs of infection such as redness, swelling, skin changes, discharges   - If you have high blood pressure and / or high cholesterol levels make sure to treat them and keep them under control as they can , like diabetes, predispose to cardiovascular disease such as heart attack and stroke   - keep a healthy weight. Weight loss is a result of a combination of lifestyle changes such as healthy diet and regular physical exercise. If you find it difficult to lose weight or maintain a healthy weight ask for help or ask your doctor for referrals   - If your blood sugar level is low stop taking the diabetes medication and call your doctor for help Control the symptoms - Continue taking your medication and follow up with your doctor as outpatient Control - Follow up with your gastroenterologist as outpatient  - Call your doctor if you have mouth sores, bell pain, vomiting or blood in vomit, diarrhea, blood in the stool or black tarry stool, burning sensation or pain or swelling on the tana; area - Continue using the CPAP at night and follow up with your lung doctor as outpatient  - Maintain a healthy weight as excessive weight can predispose to ANISHA Treat - Continue taking the synthroid as prescribed and follow up with your doctor as outpatient treat - Continue taking the iron supplementation as prescribed and follow up with your doctor as outpatient

## 2018-12-18 NOTE — DISCHARGE NOTE ADULT - CARE PLAN
Principal Discharge DX:	COPD (chronic obstructive pulmonary disease)  Goal:	Treat and prevent complications  Assessment and plan of treatment:	You were admitted to the hospital because of an acute exacerbation of your COPD and while in the hospital you were treated with IV antibiotics, inhalers and Prednisone   - Continue taking the prednisone as prescribed for 2 more days   - continue with your inhaler treatment and with the oral antibiotic azithromycin as prescribed   - Follow up with your lung doctor as outpatient   - Call your doctor if you have worsening shortness of breath, cough with yellow phlegm or blood tinged phlegm, swelling of the feet, fever / chills or chest pain   - Avoid smoking and don't let anyone smoke next to you  - Keep all your immunizations up to date and avoid sick contacts  Secondary Diagnosis:	Diabetes mellitus, type 2  Goal:	Control the fingersticks  Assessment and plan of treatment:	- Take your diabetes medication as prescribed. Do not run out of your medicine. call your doctor if you need refills  - Avoid excessive sugar and fat in your diet.   - Check your blood sugar level every day before meals. If the levels are persistently high call your doctor as your medication may need to be re-adjusted   - Diabetes can predispose to eye, feet, kidney and cardiovascular disease. Follow-up with a podiatrist, nephrologist optometrist for screening for diabetic foot, eye and kidney disease. If you don't know any podiatrist, nephrologist or optometrist ask your primary doctor for referrals   - Always inspect your feet especially if you start to feel numb in your toes. Inspect your feet for any signs of infection such as redness, swelling, skin changes, discharges   - If you have high blood pressure and / or high cholesterol levels make sure to treat them and keep them under control as they can , like diabetes, predispose to cardiovascular disease such as heart attack and stroke   - keep a healthy weight. Weight loss is a result of a combination of lifestyle changes such as healthy diet and regular physical exercise. If you find it difficult to lose weight or maintain a healthy weight ask for help or ask your doctor for referrals   - If your blood sugar level is low stop taking the diabetes medication and call your doctor for help  Secondary Diagnosis:	Anxiety  Goal:	Control the symptoms  Assessment and plan of treatment:	- Continue taking your medication and follow up with your doctor as outpatient  Secondary Diagnosis:	Crohn disease  Goal:	Control  Assessment and plan of treatment:	- Follow up with your gastroenterologist as outpatient  - Call your doctor if you have mouth sores, bell pain, vomiting or blood in vomit, diarrhea, blood in the stool or black tarry stool, burning sensation or pain or swelling on the tana; area  Secondary Diagnosis:	ANISHA (obstructive sleep apnea)  Goal:	Control  Assessment and plan of treatment:	- Continue using the CPAP at night and follow up with your lung doctor as outpatient  - Maintain a healthy weight as excessive weight can predispose to ANISHA  Secondary Diagnosis:	Hypothyroid  Goal:	Treat  Assessment and plan of treatment:	- Continue taking the synthroid as prescribed and follow up with your doctor as outpatient  Secondary Diagnosis:	Iron deficiency  Goal:	treat  Assessment and plan of treatment:	- Continue taking the iron supplementation as prescribed and follow up with your doctor as outpatient

## 2018-12-18 NOTE — DISCHARGE NOTE ADULT - MEDICATION SUMMARY - MEDICATIONS TO TAKE
I will START or STAY ON the medications listed below when I get home from the hospital:    Apriso 0.375 g oral capsule, extended release  -- 1 tab(s) by mouth once a day (at bedtime)  -- Indication: For Inflammatory bowel disease     predniSONE 20 mg oral tablet  -- 1 tab(s) by mouth once a day   -- Indication: For COPD EXACERBATION    meloxicam 7.5 mg oral tablet  -- 1 tab(s) by mouth once a day  -- Indication: For Pain    traMADol 50 mg oral tablet  -- orally every 6 hours, As Needed  -- Indication: For Pain    SUMAtriptan 100 mg oral tablet  -- 1 tab(s) by mouth every 2 hours, As Needed  -- Indication: For Headache / migraine     mirtazapine 15 mg oral tablet  -- 1 tab(s) by mouth once a day   -- It is very important that you take or use this exactly as directed.  Do not skip doses or discontinue unless directed by your doctor.  May cause drowsiness.  Alcohol may intensify this effect.  Use care when operating dangerous machinery.  Obtain medical advice before taking any non-prescription drugs as some may affect the action of this medication.    -- Indication: For Mood disorder     Invokana 300 mg oral tablet  -- 1 tab(s) by mouth once a day   -- Check with your doctor before becoming pregnant.  Medication should be taken with plenty of water.  Obtain medical advice before taking any non-prescription drugs as some may affect the action of this medication.    -- Indication: For Diabetes mellitus, type 2    metFORMIN 500 mg oral tablet  -- 2 tab(s) by mouth 2 times a day   -- Check with your doctor before becoming pregnant.  Do not drink alcoholic beverages when taking this medication.  It is very important that you take or use this exactly as directed.  Do not skip doses or discontinue unless directed by your doctor.  Obtain medical advice before taking any non-prescription drugs as some may affect the action of this medication.  Take with food or milk.    -- Indication: For Diabetes mellitus, type 2    Tresiba FlexTouch 100 units/mL subcutaneous solution  -- 50 unit(s) subcutaneous once a day (at bedtime)   -- Indication: For Diabetes mellitus, type 2    Victoza 18 mg/3 mL subcutaneous solution  -- 0.3 milliliter(s) subcutaneously once a day   -- Check with your doctor before becoming pregnant.  Keep in refrigerator.  Do not freeze.    -- Indication: For Diabetes mellitus, type 2    HumaLOG KwikPen 100 units/mL injectable solution  -- 19 unit(s) subcutaneous 3 times a day (before meals)   -- Indication: For Diabetes mellitus, type 2    loperamide 2 mg oral capsule  -- orally once a day  -- Indication: For Irritable bowel syndrome    meclizine 25 mg oral tablet  -- 1 tab(s) by mouth 3 times a day, As Needed  -- Indication: For Dizziness and vertigo    atorvastatin 40 mg oral tablet  -- 1 tab(s) by mouth once a day  -- Indication: For Dyslipidemia     haloperidol 2 mg oral tablet  -- 1 tab(s) by mouth once a day   -- It is very important that you take or use this exactly as directed.  Do not skip doses or discontinue unless directed by your doctor.  May cause drowsiness.  Alcohol may intensify this effect.  Use care when operating dangerous machinery.  Obtain medical advice before taking any non-prescription drugs as some may affect the action of this medication.    -- Indication: For Mood disorder     Breo Ellipta  -- inhaled once a day  -- Indication: For COPD (chronic obstructive pulmonary disease)    Incruse Ellipta 62.5 mcg/inh inhalation powder  -- inhaled once a day  -- Indication: For COPD (chronic obstructive pulmonary disease)    ProAir HFA 90 mcg/inh inhalation aerosol  -- 2 puff(s) inhaled 4 times a day, As Needed  -- Indication: For COPD (chronic obstructive pulmonary disease)    Amitiza 8 mcg oral capsule  -- orally once a day  -- Indication: For Irritable bowel syndrome     Feosol 325 mg (65 mg elemental iron) oral tablet  -- orally once a day  -- Indication: For Iron deficiency    docusate sodium 100 mg oral capsule  -- 1 cap(s) by mouth once a day  -- Indication: For COnstipation    senna oral tablet  -- 2 tab(s) by mouth once a day (at bedtime)  -- Indication: For COnstipation     montelukast 10 mg oral tablet  -- 1 tab(s) by mouth once a day  -- Indication: For COPD (chronic obstructive pulmonary disease)    azithromycin 500 mg oral tablet  -- 1 tab(s) by mouth once a day  -- Indication: For COPD EXACERBATION    Protonix 40 mg oral delayed release tablet  -- 1 tab(s) by mouth once a day  -- Indication: For gastric upset and heartburn     levothyroxine 100 mcg (0.1 mg) oral tablet  -- 1 tab(s) by mouth once a day  -- Indication: For Hypothyroidism     ergocalciferol 50,000 intl units (1.25 mg) oral capsule  -- 1 cap(s) by mouth once a week  -- Indication: For Vitamin D deficiency    folic acid 1 mg oral tablet  -- 1 tab(s) by mouth once a day  -- Indication: For Anemia     Vitamin B12 1000 mcg oral tablet  -- 1 tab(s) by mouth once a day  -- Indication: For Anemia

## 2018-12-18 NOTE — DISCHARGE NOTE ADULT - MEDICATION SUMMARY - MEDICATIONS TO STOP TAKING
I will STOP taking the medications listed below when I get home from the hospital:    busPIRone 15 mg oral tablet  -- 1 tab(s) by mouth 2 times a day    midodrine 10 mg oral tablet  -- 1 tab(s) by mouth 3 times a day

## 2018-12-18 NOTE — PROGRESS NOTE ADULT - ASSESSMENT
52 yo F with Past Medical History Crohn's Disease, Schizophrenia, COPD, migraine, Arrhythmia s/p loop recorder (Dr. Cruz), DM, and hypothyroidism admitted for uncontrolled diabetes and shortness of breath secondary to acute COPD exacerbation.    Uncontrolled DMII: hemoglobin A1c was 7.1% on December 3rd, which is consistent with satisfactory control.  Continue Lantus/Lispro at present dosages and follow-up with dietary.  Monitor FS with meals.   Acute COPD exacerbation: + persistent cough without wheezing.  Continue Prednisone 40mg PO q24 x2 more days, then decrease to 20mg q24. Continue Symbicort and Singulair as directed.  Complete treatment with Azithromycin x 1 week.  Nebulizers PRN.    Crohn's Disease: no active flare appreciated  Schizophrenia: home medications were clarified--discontinue Buspar and start Haldol 1mg PO q12.  Hypothyroidism: continue Synthroid as directed  GI/DVT prophylaxis  Discharge planning to SNF if FS stabilize

## 2018-12-19 VITALS — SYSTOLIC BLOOD PRESSURE: 143 MMHG | DIASTOLIC BLOOD PRESSURE: 65 MMHG | HEART RATE: 67 BPM

## 2018-12-19 LAB
GLUCOSE BLDC GLUCOMTR-MCNC: 172 MG/DL — HIGH (ref 70–99)
GLUCOSE BLDC GLUCOMTR-MCNC: 211 MG/DL — HIGH (ref 70–99)
GLUCOSE BLDC GLUCOMTR-MCNC: 243 MG/DL — HIGH (ref 70–99)
GLUCOSE BLDC GLUCOMTR-MCNC: 367 MG/DL — HIGH (ref 70–99)

## 2018-12-19 RX ORDER — LIRAGLUTIDE 6 MG/ML
0.3 INJECTION SUBCUTANEOUS
Qty: 1 | Refills: 3
Start: 2018-12-19

## 2018-12-19 RX ORDER — INSULIN DEGLUDEC 100 U/ML
15 INJECTION, SOLUTION SUBCUTANEOUS
Qty: 0 | Refills: 0 | COMMUNITY

## 2018-12-19 RX ORDER — INSULIN DEGLUDEC 100 U/ML
50 INJECTION, SOLUTION SUBCUTANEOUS
Qty: 1 | Refills: 3
Start: 2018-12-19

## 2018-12-19 RX ORDER — INSULIN LISPRO 100/ML
19 VIAL (ML) SUBCUTANEOUS
Qty: 1 | Refills: 0
Start: 2018-12-19 | End: 2019-01-17

## 2018-12-19 RX ORDER — HALOPERIDOL DECANOATE 100 MG/ML
1 INJECTION INTRAMUSCULAR
Qty: 30 | Refills: 0
Start: 2018-12-19 | End: 2019-01-17

## 2018-12-19 RX ORDER — MIRTAZAPINE 45 MG/1
1 TABLET, ORALLY DISINTEGRATING ORAL
Qty: 30 | Refills: 0
Start: 2018-12-19 | End: 2019-01-17

## 2018-12-19 RX ORDER — METFORMIN HYDROCHLORIDE 850 MG/1
2 TABLET ORAL
Qty: 120 | Refills: 0
Start: 2018-12-19 | End: 2019-01-17

## 2018-12-19 RX ORDER — CANAGLIFLOZIN 100 MG/1
1 TABLET, FILM COATED ORAL
Qty: 30 | Refills: 0
Start: 2018-12-19 | End: 2019-01-17

## 2018-12-19 RX ADMIN — Medication 20 UNIT(S): at 17:29

## 2018-12-19 RX ADMIN — Medication 20 UNIT(S): at 09:09

## 2018-12-19 RX ADMIN — MIDODRINE HYDROCHLORIDE 10 MILLIGRAM(S): 2.5 TABLET ORAL at 13:49

## 2018-12-19 RX ADMIN — Medication 100 MICROGRAM(S): at 05:15

## 2018-12-19 RX ADMIN — Medication 100 MILLIGRAM(S): at 11:10

## 2018-12-19 RX ADMIN — Medication 3 MILLILITER(S): at 12:03

## 2018-12-19 RX ADMIN — CELECOXIB 200 MILLIGRAM(S): 200 CAPSULE ORAL at 11:11

## 2018-12-19 RX ADMIN — Medication 20 UNIT(S): at 12:25

## 2018-12-19 RX ADMIN — TRAMADOL HYDROCHLORIDE 50 MILLIGRAM(S): 50 TABLET ORAL at 14:34

## 2018-12-19 RX ADMIN — MONTELUKAST 10 MILLIGRAM(S): 4 TABLET, CHEWABLE ORAL at 11:10

## 2018-12-19 RX ADMIN — PANTOPRAZOLE SODIUM 40 MILLIGRAM(S): 20 TABLET, DELAYED RELEASE ORAL at 06:16

## 2018-12-19 RX ADMIN — Medication 40 MILLIGRAM(S): at 05:15

## 2018-12-19 RX ADMIN — Medication 3 MILLILITER(S): at 09:20

## 2018-12-19 RX ADMIN — INSULIN GLARGINE 60 UNIT(S): 100 INJECTION, SOLUTION SUBCUTANEOUS at 09:08

## 2018-12-19 RX ADMIN — AZITHROMYCIN 500 MILLIGRAM(S): 500 TABLET, FILM COATED ORAL at 11:11

## 2018-12-19 RX ADMIN — TRAMADOL HYDROCHLORIDE 50 MILLIGRAM(S): 50 TABLET ORAL at 13:49

## 2018-12-19 RX ADMIN — CELECOXIB 200 MILLIGRAM(S): 200 CAPSULE ORAL at 11:40

## 2018-12-19 RX ADMIN — Medication 1 MILLIGRAM(S): at 11:10

## 2018-12-19 RX ADMIN — Medication 3 MILLILITER(S): at 16:28

## 2018-12-19 RX ADMIN — TRAMADOL HYDROCHLORIDE 50 MILLIGRAM(S): 50 TABLET ORAL at 02:40

## 2018-12-19 RX ADMIN — Medication 325 MILLIGRAM(S): at 11:10

## 2018-12-19 NOTE — PROGRESS NOTE ADULT - SUBJECTIVE AND OBJECTIVE BOX
SEJAL BARNARD MRN-[id number]    Hospitalist Note  54 yo F with Past Medical History Crohn's Disease, Schizophrenia, COPD, migraine, Arrhythmia s/p loop recorder (Dr. Cruz), DM, and hypothyroidism admitted for uncontrolled diabetes and shortness of breath secondary to acute COPD exacerbation.    Overnight events/Updates: Her respiratory status has returned to baseline.  She suffers from persistent hyperglycemia though FS have improved after adjusting dosages of basal/bolus insulin.    Vital Signs Last 24 Hrs  T(C): 36.6 (18 Dec 2018 20:56), Max: 36.6 (18 Dec 2018 20:56)  T(F): 97.9 (18 Dec 2018 20:56), Max: 97.9 (18 Dec 2018 20:56)  HR: 63 (19 Dec 2018 05:00) (63 - 84)  BP: 129/65 (19 Dec 2018 05:00) (117/69 - 129/65)  BP(mean): --  RR: 18 (19 Dec 2018 05:00) (18 - 20)  SpO2: 95% (18 Dec 2018 19:36) (95% - 95%)    Physical Examination:  General: AAO x 3  HEENT: PERRLA, EOMI  CV= S1 & S2 appreciated  Lungs= no cough or wheezing  Abdominal Examination= + BS, Soft, NT/ND  Extremity Examination= No C/C/E    ROS: No chest pain, no shortness of breath.  All other systems reviewed and are within normal limits except for the complaints in the HPI.    MEDICATIONS  (STANDING):  ALBUTerol/ipratropium for Nebulization 3 milliLiter(s) Nebulizer every 4 hours  atorvastatin 40 milliGRAM(s) Oral at bedtime  azithromycin   Tablet 500 milliGRAM(s) Oral daily  celecoxib 200 milliGRAM(s) Oral daily  chlorhexidine 2% Cloths 1 Application(s) Topical <User Schedule>  dextrose 5%. 1000 milliLiter(s) (50 mL/Hr) IV Continuous <Continuous>  dextrose 50% Injectable 12.5 Gram(s) IV Push once  dextrose 50% Injectable 25 Gram(s) IV Push once  dextrose 50% Injectable 25 Gram(s) IV Push once  docusate sodium 100 milliGRAM(s) Oral daily  enoxaparin Injectable 40 milliGRAM(s) SubCutaneous at bedtime  ergocalciferol 04971 Unit(s) Oral every week  ferrous    sulfate 325 milliGRAM(s) Oral daily  folic acid 1 milliGRAM(s) Oral daily  insulin glargine Injectable (LANTUS) 60 Unit(s) SubCutaneous every morning  insulin lispro Injectable (HumaLOG) 20 Unit(s) SubCutaneous three times a day before meals  levothyroxine 100 MICROGram(s) Oral daily  loperamide 2 milliGRAM(s) Oral at bedtime  midodrine 10 milliGRAM(s) Oral three times a day  montelukast 10 milliGRAM(s) Oral daily  pantoprazole    Tablet 40 milliGRAM(s) Oral before breakfast  predniSONE   Tablet 20 milliGRAM(s) Oral daily  senna 2 Tablet(s) Oral at bedtime    MEDICATIONS  (PRN):  dextrose 40% Gel 15 Gram(s) Oral once PRN Blood Glucose LESS THAN 70 milliGRAM(s)/deciliter  glucagon  Injectable 1 milliGRAM(s) IntraMuscular once PRN Glucose LESS THAN 70 milligrams/deciliter  haloperidol    Concentrate 1 milliGRAM(s) Oral two times a day PRN agitations, restlessness  meclizine 25 milliGRAM(s) Oral three times a day PRN Dizziness  traMADol 50 milliGRAM(s) Oral every 6 hours PRN Moderate Pain (4 - 6)                            11.9   8.63  )-----------( 317      ( 18 Dec 2018 08:49 )             37.6     12-18    140  |  98  |  24<H>  ----------------------------<  258<H>  4.2   |  26  |  0.6<L>    Ca    9.1      18 Dec 2018 08:49  Mg     1.8     12-18        Case discussed with housestaff & family  JUAN A Shi [id number]

## 2018-12-19 NOTE — PROGRESS NOTE ADULT - ASSESSMENT
54 yo F with Past Medical History Crohn's Disease, Schizophrenia, COPD, migraine, Arrhythmia s/p loop recorder (Dr. Cruz), DM, and hypothyroidism admitted for uncontrolled diabetes and shortness of breath secondary to acute COPD exacerbation.    Uncontrolled DMII: diabetic regimen was clarified with outpatient .  Restart Metformin 1gm q12, Victoza, Trulicity 50u QHS, and Lispro with meals upon discharge.  Monitor FS with meals.   Acute COPD exacerbation: + persistent cough without wheezing.  Complete Prednisone taper 20mg PO q24 x3 more days. Continue Symbicort and Singulair as directed.  Complete treatment with Azithromycin.  Nebulizers PRN.    Crohn's Disease: no active flare appreciated  Schizophrenia: continue Haldol 1mg PO q12.  Hypothyroidism: continue Synthroid as directed  GI/DVT prophylaxis  Discharge to SNF; time spent = 35 minutes

## 2018-12-24 DIAGNOSIS — E03.9 HYPOTHYROIDISM, UNSPECIFIED: ICD-10-CM

## 2018-12-24 DIAGNOSIS — F39 UNSPECIFIED MOOD [AFFECTIVE] DISORDER: ICD-10-CM

## 2018-12-24 DIAGNOSIS — D50.9 IRON DEFICIENCY ANEMIA, UNSPECIFIED: ICD-10-CM

## 2018-12-24 DIAGNOSIS — F41.9 ANXIETY DISORDER, UNSPECIFIED: ICD-10-CM

## 2018-12-24 DIAGNOSIS — E78.5 HYPERLIPIDEMIA, UNSPECIFIED: ICD-10-CM

## 2018-12-24 DIAGNOSIS — E11.65 TYPE 2 DIABETES MELLITUS WITH HYPERGLYCEMIA: ICD-10-CM

## 2018-12-24 DIAGNOSIS — I49.9 CARDIAC ARRHYTHMIA, UNSPECIFIED: ICD-10-CM

## 2018-12-24 DIAGNOSIS — Z90.49 ACQUIRED ABSENCE OF OTHER SPECIFIED PARTS OF DIGESTIVE TRACT: ICD-10-CM

## 2018-12-24 DIAGNOSIS — Z99.89 DEPENDENCE ON OTHER ENABLING MACHINES AND DEVICES: ICD-10-CM

## 2018-12-24 DIAGNOSIS — K58.1 IRRITABLE BOWEL SYNDROME WITH CONSTIPATION: ICD-10-CM

## 2018-12-24 DIAGNOSIS — G47.33 OBSTRUCTIVE SLEEP APNEA (ADULT) (PEDIATRIC): ICD-10-CM

## 2018-12-24 DIAGNOSIS — K50.90 CROHN'S DISEASE, UNSPECIFIED, WITHOUT COMPLICATIONS: ICD-10-CM

## 2018-12-24 DIAGNOSIS — Z79.4 LONG TERM (CURRENT) USE OF INSULIN: ICD-10-CM

## 2018-12-24 DIAGNOSIS — E11.42 TYPE 2 DIABETES MELLITUS WITH DIABETIC POLYNEUROPATHY: ICD-10-CM

## 2018-12-24 DIAGNOSIS — J44.1 CHRONIC OBSTRUCTIVE PULMONARY DISEASE WITH (ACUTE) EXACERBATION: ICD-10-CM

## 2018-12-24 DIAGNOSIS — F20.9 SCHIZOPHRENIA, UNSPECIFIED: ICD-10-CM

## 2018-12-24 DIAGNOSIS — E55.9 VITAMIN D DEFICIENCY, UNSPECIFIED: ICD-10-CM

## 2018-12-24 DIAGNOSIS — J96.22 ACUTE AND CHRONIC RESPIRATORY FAILURE WITH HYPERCAPNIA: ICD-10-CM

## 2019-01-06 RX ORDER — FLUTICASONE FUROATE AND VILANTEROL TRIFENATATE 100; 25 UG/1; UG/1
100-25 POWDER RESPIRATORY (INHALATION)
Refills: 0 | Status: ACTIVE | COMMUNITY

## 2019-01-06 NOTE — HISTORY OF PRESENT ILLNESS
[Dizziness] : dizziness [Palpitations] : no palpitations [SOB] : no dyspnea [Syncope] : no syncope [Chest Pain] : no chest pain or discomfort [Pain at Site] : no pain at device site [Erythema at Site] : no erythema at device site [Swelling at Site] : no swelling at device site [de-identified] : used trigger button 3 times for dizziness: NSR\par \par CARDIAC TESTING:\par ECG (12/10/2018): sinus rhythm at 80 bpm, non-sp ST/T abnormalities

## 2019-01-06 NOTE — PHYSICAL EXAM
[General Appearance - Well Developed] : well developed [Normal Appearance] : normal appearance [Well Groomed] : well groomed [General Appearance - Well Nourished] : well nourished [No Deformities] : no deformities [General Appearance - In No Acute Distress] : no acute distress [Heart Rate And Rhythm] : heart rate and rhythm were normal [Heart Sounds] : normal S1 and S2 [Murmurs] : no murmurs present [Respiration, Rhythm And Depth] : normal respiratory rhythm and effort [Exaggerated Use Of Accessory Muscles For Inspiration] : no accessory muscle use [Auscultation Breath Sounds / Voice Sounds] : lungs were clear to auscultation bilaterally [Clean] : clean [Dry] : dry [Well-Healed] : well-healed [Abdomen Soft] : soft [Abdomen Tenderness] : non-tender [Abdomen Mass (___ Cm)] : no abdominal mass palpated [Nail Clubbing] : no clubbing of the fingernails [Cyanosis, Localized] : no localized cyanosis [Petechial Hemorrhages (___cm)] : no petechial hemorrhages [] : no ischemic changes [FreeTextEntry1] : Left Parasternal

## 2019-01-06 NOTE — DISCUSSION/SUMMARY
[FreeTextEntry1] : Ms. Tiki Hurtado is a 53 year-old woman with Migraine, Crohn disease, COPD (chronic obstructive pulmonary disease), Depression, Anxiety, Schizophrenia, ANISHA (obstructive sleep apnea), Polyneuropathy, hyperlipidemia, Hypothyroid, Diabetes mellitus type 2, presents for dizziness and recurrent syncope. I recommend an ILR implant for this patient for long term detection of etiology of syncope. She underwent the placement of an implantable loop recorder. on 12/5/2018.\par \par I interrogated and reprogrammed her device as described in procedure. Her wound is healed properly, with no signs of inflammation, infection or bleeding. I discussed with patient plan of care in great details. I discussed remote monitoring with her, and need to call office if she does manual transmission I answered all her questions to her satisfaction. Patient was pleased with the visit.  \par \par Patient will follow with me in 6 months’ time. Please do not hesitate to contact me at [phone] if you have any further questions regarding this patient care.

## 2019-01-09 ENCOUNTER — APPOINTMENT (OUTPATIENT)
Dept: CARDIOLOGY | Facility: CLINIC | Age: 54
End: 2019-01-09

## 2019-01-25 ENCOUNTER — OUTPATIENT (OUTPATIENT)
Dept: OUTPATIENT SERVICES | Facility: HOSPITAL | Age: 54
LOS: 1 days | Discharge: HOME | End: 2019-01-25

## 2019-01-25 DIAGNOSIS — E11.65 TYPE 2 DIABETES MELLITUS WITH HYPERGLYCEMIA: ICD-10-CM

## 2019-01-25 DIAGNOSIS — I10 ESSENTIAL (PRIMARY) HYPERTENSION: ICD-10-CM

## 2019-01-25 DIAGNOSIS — E55.9 VITAMIN D DEFICIENCY, UNSPECIFIED: ICD-10-CM

## 2019-01-25 DIAGNOSIS — Z98.890 OTHER SPECIFIED POSTPROCEDURAL STATES: Chronic | ICD-10-CM

## 2019-01-25 DIAGNOSIS — D64.9 ANEMIA, UNSPECIFIED: ICD-10-CM

## 2019-01-25 DIAGNOSIS — E53.8 DEFICIENCY OF OTHER SPECIFIED B GROUP VITAMINS: ICD-10-CM

## 2019-01-25 DIAGNOSIS — E03.9 HYPOTHYROIDISM, UNSPECIFIED: ICD-10-CM

## 2019-02-25 ENCOUNTER — OUTPATIENT (OUTPATIENT)
Dept: OUTPATIENT SERVICES | Facility: HOSPITAL | Age: 54
LOS: 1 days | Discharge: HOME | End: 2019-02-25

## 2019-02-25 DIAGNOSIS — Z98.890 OTHER SPECIFIED POSTPROCEDURAL STATES: Chronic | ICD-10-CM

## 2019-02-25 DIAGNOSIS — N39.0 URINARY TRACT INFECTION, SITE NOT SPECIFIED: ICD-10-CM

## 2019-03-21 ENCOUNTER — OUTPATIENT (OUTPATIENT)
Dept: OUTPATIENT SERVICES | Facility: HOSPITAL | Age: 54
LOS: 1 days | Discharge: HOME | End: 2019-03-21

## 2019-03-21 ENCOUNTER — OTHER (OUTPATIENT)
Age: 54
End: 2019-03-21

## 2019-03-21 DIAGNOSIS — Z98.890 OTHER SPECIFIED POSTPROCEDURAL STATES: Chronic | ICD-10-CM

## 2019-05-07 ENCOUNTER — OUTPATIENT (OUTPATIENT)
Dept: OUTPATIENT SERVICES | Facility: HOSPITAL | Age: 54
LOS: 1 days | Discharge: HOME | End: 2019-05-07

## 2019-05-07 DIAGNOSIS — E03.9 HYPOTHYROIDISM, UNSPECIFIED: ICD-10-CM

## 2019-05-07 DIAGNOSIS — E11.65 TYPE 2 DIABETES MELLITUS WITH HYPERGLYCEMIA: ICD-10-CM

## 2019-05-07 DIAGNOSIS — I10 ESSENTIAL (PRIMARY) HYPERTENSION: ICD-10-CM

## 2019-05-07 DIAGNOSIS — Z98.890 OTHER SPECIFIED POSTPROCEDURAL STATES: Chronic | ICD-10-CM

## 2019-05-08 ENCOUNTER — OTHER (OUTPATIENT)
Age: 54
End: 2019-05-08

## 2019-05-08 ENCOUNTER — OUTPATIENT (OUTPATIENT)
Dept: OUTPATIENT SERVICES | Facility: HOSPITAL | Age: 54
LOS: 1 days | Discharge: HOME | End: 2019-05-08

## 2019-05-08 DIAGNOSIS — Z98.890 OTHER SPECIFIED POSTPROCEDURAL STATES: Chronic | ICD-10-CM

## 2019-05-22 ENCOUNTER — APPOINTMENT (OUTPATIENT)
Dept: HEMATOLOGY ONCOLOGY | Facility: CLINIC | Age: 54
End: 2019-05-22

## 2019-05-22 VITALS
HEIGHT: 63 IN | SYSTOLIC BLOOD PRESSURE: 126 MMHG | HEART RATE: 63 BPM | BODY MASS INDEX: 34.38 KG/M2 | RESPIRATION RATE: 16 BRPM | DIASTOLIC BLOOD PRESSURE: 66 MMHG | TEMPERATURE: 97.7 F | WEIGHT: 194 LBS

## 2019-05-22 NOTE — ASSESSMENT
[FreeTextEntry1] : # Anemia\par - she saw GI yesterday, she has a follow-up in 2 weeks, we requested report from GI (she does not know name)\par - blood work reviewed from 5/7/19\par - she was instructed to STOP oral iron\par - we will arrange for venofer IV weekly x 5\par \par RTC in 1 month

## 2019-05-22 NOTE — OB HISTORY
[Normal Amount/Duration] : was of a normal amount and duration [Regular Cycle Intervals] : periods have been regular [Menarche Age: ____] : age at menarche was [unfilled] [Menopause Age: ____] : age at menopause was [unfilled] [Spotting Between  Menses] : no spotting between menses

## 2019-05-22 NOTE — REVIEW OF SYSTEMS
[Negative] : Allergic/Immunologic [Fatigue] : fatigue [Fever] : no fever [Recent Change In Weight] : ~T no recent weight change [Shortness Of Breath] : no shortness of breath [Abdominal Pain] : no abdominal pain [Constipation] : no constipation [Easy Bleeding] : no tendency for easy bleeding [Easy Bruising] : no tendency for easy bruising

## 2019-05-22 NOTE — CONSULT LETTER
[Dear  ___] : Dear  [unfilled], [Courtesy Letter:] : I had the pleasure of seeing your patient, [unfilled], in my office today. [Please see my note below.] : Please see my note below. [Referral Closing:] : Thank you very much for seeing this patient.  If you have any questions, please do not hesitate to contact me. [Sincerely,] : Sincerely, [FreeTextEntry3] : Dr. Boateng

## 2019-05-24 ENCOUNTER — OUTPATIENT (OUTPATIENT)
Dept: OUTPATIENT SERVICES | Facility: HOSPITAL | Age: 54
LOS: 1 days | Discharge: HOME | End: 2019-05-24
Payer: COMMERCIAL

## 2019-05-24 DIAGNOSIS — Z98.890 OTHER SPECIFIED POSTPROCEDURAL STATES: Chronic | ICD-10-CM

## 2019-05-24 DIAGNOSIS — G44.59 OTHER COMPLICATED HEADACHE SYNDROME: ICD-10-CM

## 2019-05-24 PROCEDURE — 70450 CT HEAD/BRAIN W/O DYE: CPT | Mod: 26

## 2019-05-31 ENCOUNTER — APPOINTMENT (OUTPATIENT)
Dept: INFUSION THERAPY | Facility: CLINIC | Age: 54
End: 2019-05-31

## 2019-05-31 RX ORDER — IRON SUCROSE 20 MG/ML
200 INJECTION, SOLUTION INTRAVENOUS ONCE
Refills: 0 | Status: COMPLETED | OUTPATIENT
Start: 2019-05-31 | End: 2019-05-31

## 2019-05-31 RX ADMIN — IRON SUCROSE 200 MILLIGRAM(S): 20 INJECTION, SOLUTION INTRAVENOUS at 16:20

## 2019-05-31 RX ADMIN — IRON SUCROSE 220 MILLIGRAM(S): 20 INJECTION, SOLUTION INTRAVENOUS at 15:50

## 2019-06-07 ENCOUNTER — APPOINTMENT (OUTPATIENT)
Dept: INFUSION THERAPY | Facility: CLINIC | Age: 54
End: 2019-06-07

## 2019-06-07 VITALS
RESPIRATION RATE: 18 BRPM | DIASTOLIC BLOOD PRESSURE: 56 MMHG | TEMPERATURE: 96.6 F | HEART RATE: 100 BPM | SYSTOLIC BLOOD PRESSURE: 109 MMHG

## 2019-06-07 RX ORDER — IRON SUCROSE 20 MG/ML
200 INJECTION, SOLUTION INTRAVENOUS ONCE
Refills: 0 | Status: COMPLETED | OUTPATIENT
Start: 2019-06-07 | End: 2019-06-07

## 2019-06-07 RX ADMIN — IRON SUCROSE 220 MILLIGRAM(S): 20 INJECTION, SOLUTION INTRAVENOUS at 15:27

## 2019-06-07 RX ADMIN — IRON SUCROSE 200 MILLIGRAM(S): 20 INJECTION, SOLUTION INTRAVENOUS at 16:00

## 2019-06-14 ENCOUNTER — APPOINTMENT (OUTPATIENT)
Dept: INFUSION THERAPY | Facility: CLINIC | Age: 54
End: 2019-06-14

## 2019-06-14 RX ORDER — IRON SUCROSE 20 MG/ML
200 INJECTION, SOLUTION INTRAVENOUS ONCE
Refills: 0 | Status: COMPLETED | OUTPATIENT
Start: 2019-06-14 | End: 2019-06-14

## 2019-06-14 RX ADMIN — IRON SUCROSE 200 MILLIGRAM(S): 20 INJECTION, SOLUTION INTRAVENOUS at 15:30

## 2019-06-14 RX ADMIN — IRON SUCROSE 220 MILLIGRAM(S): 20 INJECTION, SOLUTION INTRAVENOUS at 14:56

## 2019-06-15 NOTE — ED ADULT NURSE NOTE - NSFALLRSKUNASSIST_ED_ALL_ED
Christen Morgan)  Pediatric HematologyOncology; Pediatrics  [address] [place] [address], [address]  [place] [address]  Phone: [phone]  Fax: [phone]  Follow Up Time:
no

## 2019-06-19 ENCOUNTER — OUTPATIENT (OUTPATIENT)
Dept: OUTPATIENT SERVICES | Facility: HOSPITAL | Age: 54
LOS: 1 days | Discharge: HOME | End: 2019-06-19

## 2019-06-19 ENCOUNTER — OTHER (OUTPATIENT)
Age: 54
End: 2019-06-19

## 2019-06-19 DIAGNOSIS — Z98.890 OTHER SPECIFIED POSTPROCEDURAL STATES: Chronic | ICD-10-CM

## 2019-06-21 ENCOUNTER — APPOINTMENT (OUTPATIENT)
Dept: INFUSION THERAPY | Facility: CLINIC | Age: 54
End: 2019-06-21

## 2019-06-21 RX ORDER — IRON SUCROSE 20 MG/ML
200 INJECTION, SOLUTION INTRAVENOUS ONCE
Refills: 0 | Status: COMPLETED | OUTPATIENT
Start: 2019-06-21 | End: 2019-06-21

## 2019-06-21 RX ADMIN — IRON SUCROSE 200 MILLIGRAM(S): 20 INJECTION, SOLUTION INTRAVENOUS at 16:30

## 2019-06-21 RX ADMIN — IRON SUCROSE 220 MILLIGRAM(S): 20 INJECTION, SOLUTION INTRAVENOUS at 15:56

## 2019-06-21 NOTE — BEHAVIORAL HEALTH ASSESSMENT NOTE - HYGIENE
1530- Patient arrived ambulatory to the unit to have labs drawn from her picc.  Patient shows no signs of distress, complaints of itching to antecubital area of picc dressing.  Labs drawn from picc line then dressing partially changed for improved comfort.  Patient discharged to home setting, will return Tuesday for labs.  
Fair

## 2019-06-25 ENCOUNTER — OUTPATIENT (OUTPATIENT)
Dept: OUTPATIENT SERVICES | Facility: HOSPITAL | Age: 54
LOS: 1 days | Discharge: HOME | End: 2019-06-25

## 2019-06-25 DIAGNOSIS — E55.9 VITAMIN D DEFICIENCY, UNSPECIFIED: ICD-10-CM

## 2019-06-25 DIAGNOSIS — D64.9 ANEMIA, UNSPECIFIED: ICD-10-CM

## 2019-06-25 DIAGNOSIS — I10 ESSENTIAL (PRIMARY) HYPERTENSION: ICD-10-CM

## 2019-06-25 DIAGNOSIS — E11.65 TYPE 2 DIABETES MELLITUS WITH HYPERGLYCEMIA: ICD-10-CM

## 2019-06-25 DIAGNOSIS — Z98.890 OTHER SPECIFIED POSTPROCEDURAL STATES: Chronic | ICD-10-CM

## 2019-06-25 DIAGNOSIS — E78.9 DISORDER OF LIPOPROTEIN METABOLISM, UNSPECIFIED: ICD-10-CM

## 2019-06-28 ENCOUNTER — APPOINTMENT (OUTPATIENT)
Dept: INFUSION THERAPY | Facility: CLINIC | Age: 54
End: 2019-06-28

## 2019-06-28 RX ORDER — IRON SUCROSE 20 MG/ML
200 INJECTION, SOLUTION INTRAVENOUS ONCE
Refills: 0 | Status: COMPLETED | OUTPATIENT
Start: 2019-06-28 | End: 2019-06-28

## 2019-06-28 RX ADMIN — IRON SUCROSE 220 MILLIGRAM(S): 20 INJECTION, SOLUTION INTRAVENOUS at 15:35

## 2019-06-28 RX ADMIN — IRON SUCROSE 200 MILLIGRAM(S): 20 INJECTION, SOLUTION INTRAVENOUS at 16:05

## 2019-07-25 ENCOUNTER — OTHER (OUTPATIENT)
Age: 54
End: 2019-07-25

## 2019-07-25 ENCOUNTER — OUTPATIENT (OUTPATIENT)
Dept: OUTPATIENT SERVICES | Facility: HOSPITAL | Age: 54
LOS: 1 days | Discharge: HOME | End: 2019-07-25

## 2019-07-25 DIAGNOSIS — Z98.890 OTHER SPECIFIED POSTPROCEDURAL STATES: Chronic | ICD-10-CM

## 2019-07-25 DIAGNOSIS — E11.9 TYPE 2 DIABETES MELLITUS WITHOUT COMPLICATIONS: ICD-10-CM

## 2019-07-31 ENCOUNTER — LABORATORY RESULT (OUTPATIENT)
Age: 54
End: 2019-07-31

## 2019-07-31 ENCOUNTER — APPOINTMENT (OUTPATIENT)
Dept: HEMATOLOGY ONCOLOGY | Facility: CLINIC | Age: 54
End: 2019-07-31

## 2019-07-31 LAB
HCT VFR BLD CALC: 42.5 %
HGB BLD-MCNC: 13.4 G/DL
IRON SATN MFR SERPL: 20 %
IRON SERPL-MCNC: 66 UG/DL
MCHC RBC-ENTMCNC: 24.4 PG
MCHC RBC-ENTMCNC: 31.5 G/DL
MCV RBC AUTO: 77.3 FL
PLATELET # BLD AUTO: 301 K/UL
PMV BLD: 9.9 FL
RBC # BLD: 5.5 M/UL
RBC # FLD: 19.7 %
TIBC SERPL-MCNC: 322 UG/DL
UIBC SERPL-MCNC: 256 UG/DL
WBC # FLD AUTO: 6.03 K/UL

## 2019-08-01 LAB — FERRITIN SERPL-MCNC: 167 NG/ML

## 2019-08-02 ENCOUNTER — OUTPATIENT (OUTPATIENT)
Dept: OUTPATIENT SERVICES | Facility: HOSPITAL | Age: 54
LOS: 1 days | Discharge: HOME | End: 2019-08-02

## 2019-08-02 ENCOUNTER — APPOINTMENT (OUTPATIENT)
Dept: HEMATOLOGY ONCOLOGY | Facility: CLINIC | Age: 54
End: 2019-08-02
Payer: MEDICARE

## 2019-08-02 VITALS
SYSTOLIC BLOOD PRESSURE: 109 MMHG | DIASTOLIC BLOOD PRESSURE: 58 MMHG | HEART RATE: 83 BPM | HEIGHT: 63 IN | BODY MASS INDEX: 34.02 KG/M2 | WEIGHT: 192 LBS | TEMPERATURE: 98.1 F | RESPIRATION RATE: 18 BRPM

## 2019-08-02 DIAGNOSIS — Z98.890 OTHER SPECIFIED POSTPROCEDURAL STATES: Chronic | ICD-10-CM

## 2019-08-02 DIAGNOSIS — D64.9 ANEMIA, UNSPECIFIED: ICD-10-CM

## 2019-08-02 PROCEDURE — 99213 OFFICE O/P EST LOW 20 MIN: CPT

## 2019-08-02 NOTE — HISTORY OF PRESENT ILLNESS
[de-identified] : Ms. SEJAL BARNARD is a 54 year old female here today for evaluation and treatment of Anemia.\par \par The patient was referred by Dr. Quintero.  She presents with her health aide from the Apartment Housing complex she resides on [facility].  She reports that she was seen by her PCP, who referred her due to iron deficiency anemia.  She currently takes iron supplement, 325mg daily and is tolerating without complaint.  She states she feels tired on occasion.  \par \par LAB WORKUP:\par (5/7/19) WBC 6.23, Hgb 13.7, MCV 76.7, RDW 16.1, , Na 147, ALT 42, Ferritin 36, TIBC 349, ironsat 15%, B12 normal\par \par HCM:\par Mammogram (07/2018) patient reports it was benign\par Colonoscopy ([facility] - 2018) removed 2 polyps, as per patient\par

## 2019-08-02 NOTE — REVIEW OF SYSTEMS
[Fatigue] : fatigue [Negative] : Allergic/Immunologic [Fever] : no fever [Recent Change In Weight] : ~T no recent weight change [Shortness Of Breath] : no shortness of breath [Abdominal Pain] : no abdominal pain [Constipation] : no constipation [Easy Bleeding] : no tendency for easy bleeding [Easy Bruising] : no tendency for easy bruising

## 2019-08-02 NOTE — ASSESSMENT
[FreeTextEntry1] : 53 yo woman with iron deficiency anemia. She is being seen by a private GI; unfortunately were not able to obtain official records of EGD/Colonoscopy which were done in 2018; as per patient, the result was not remarkable. we need to get official results; however, if small bowel series were not done, I recommend it to r/o celiac sprue as the cause of poor iron absorption\par \par s/p iv iron replacement with resolution of low iron; no iron at this time\par RTC in 6 weeks

## 2019-08-02 NOTE — CONSULT LETTER
[Dear  ___] : Dear  [unfilled], [Courtesy Letter:] : I had the pleasure of seeing your patient, [unfilled], in my office today. [Please see my note below.] : Please see my note below. [Referral Closing:] : Thank you very much for seeing this patient.  If you have any questions, please do not hesitate to contact me. [Sincerely,] : Sincerely, [FreeTextEntry3] : John Boateng DO\par Attending Physician,\par Hematology/ Medical Oncology\par [phone] office\par \par

## 2019-08-09 DIAGNOSIS — D50.9 IRON DEFICIENCY ANEMIA, UNSPECIFIED: ICD-10-CM

## 2019-08-14 NOTE — ED ADULT NURSE NOTE - NS ED NURSE DISCH DISPOSITION
DATE OF PROCEDURE:  08/13/2019    PREOPERATIVE DIAGNOSIS:  Gangrene of the left foot.    POSTOPERATIVE DIAGNOSIS:  Gangrene of the left foot.    PROCEDURE PERFORMED:  Incisional debridement of wound, left heel.    SURGEON:  Robert Wilson DPM    ASSISTANT:  No surgical assist.    ESTIMATED BLOOD LOSS:  Minimal, being approximately 25 mL.    HEMOSTASIS:  Anatomic dissection, direct pressure manually, electrocautery and   Gelfoam.    ANESTHESIA:  The procedure was done under local with monitored sedation.    PROCEDURE IN DETAIL:  The patient was brought in the Operating Room and placed   on the operating table in the supine position.  After positioning in a partially   supine and partially lateral position, timeout was performed.  All patient   identifiers, site markings and procedures were noted to be in agreement.  At   this time, the left foot was scrubbed, prepped and draped in the usual aseptic   manner.  Attention was directed to the posterior aspect of the patient's left   heel where a transverse incision was made around the posterior border and a good   skin border of the necrotic wound.  Pus was then encountered, which was noted   to proceed into the plantar midfoot and arch.  Full thickness necrosis was noted of all soft tissues down to the calcaneus periosteum.  Neurovascular bundle soft tissues were also noted to be in very poor condition.  All necrotic tissue was removed   with sharp instrumentation including scalpel and rongeurs.  The wound was then   copiously irrigated with 3 liters of sterile normal saline under pulse lavage.    It was then packed open with Gelfoam, Kerlix, ABD and Ace bandages.  This   patient appeared to tolerate the procedure and anesthesia well.  He was   transferred to the Recovery Room with all vital signs stable and vascular status   intact to all toes of the left foot.  Following a period of postoperative   monitoring, he will be readmitted to his room here at [facility].  Per the Podiatry service, we will follow this patient until his   discharge and possibly thereafter depending on placement.      NIKHIL  dd: 08/13/2019 18:43:14 (CDT)  td: 08/13/2019 21:19:00 (CDT)  Doc ID   #[id number]  Job ID #[id number]    CC:      Discharged

## 2019-08-26 ENCOUNTER — OUTPATIENT (OUTPATIENT)
Dept: OUTPATIENT SERVICES | Facility: HOSPITAL | Age: 54
LOS: 1 days | Discharge: HOME | End: 2019-08-26

## 2019-08-26 DIAGNOSIS — Z98.890 OTHER SPECIFIED POSTPROCEDURAL STATES: Chronic | ICD-10-CM

## 2019-08-27 DIAGNOSIS — E55.9 VITAMIN D DEFICIENCY, UNSPECIFIED: ICD-10-CM

## 2019-08-27 DIAGNOSIS — E03.9 HYPOTHYROIDISM, UNSPECIFIED: ICD-10-CM

## 2019-08-27 DIAGNOSIS — I10 ESSENTIAL (PRIMARY) HYPERTENSION: ICD-10-CM

## 2019-08-27 DIAGNOSIS — E11.65 TYPE 2 DIABETES MELLITUS WITH HYPERGLYCEMIA: ICD-10-CM

## 2019-08-27 DIAGNOSIS — R94.5 ABNORMAL RESULTS OF LIVER FUNCTION STUDIES: ICD-10-CM

## 2019-09-09 ENCOUNTER — APPOINTMENT (OUTPATIENT)
Dept: HEMATOLOGY ONCOLOGY | Facility: CLINIC | Age: 54
End: 2019-09-09

## 2019-09-09 ENCOUNTER — LABORATORY RESULT (OUTPATIENT)
Age: 54
End: 2019-09-09

## 2019-09-09 DIAGNOSIS — Z00.00 ENCOUNTER FOR GENERAL ADULT MEDICAL EXAMINATION W/OUT ABNORMAL FINDINGS: ICD-10-CM

## 2019-09-09 LAB
HCT VFR BLD CALC: 45.7 %
HGB BLD-MCNC: 14.1 G/DL
MCHC RBC-ENTMCNC: 24.7 PG
MCHC RBC-ENTMCNC: 30.9 G/DL
MCV RBC AUTO: 80.2 FL
PLATELET # BLD AUTO: 229 K/UL
PMV BLD: 10.4 FL
RBC # BLD: 5.7 M/UL
RBC # FLD: 19.7 %
WBC # FLD AUTO: 4.87 K/UL

## 2019-09-10 LAB
IRON SATN MFR SERPL: 24 %
IRON SERPL-MCNC: 82 UG/DL
TIBC SERPL-MCNC: 346 UG/DL
UIBC SERPL-MCNC: 264 UG/DL

## 2019-09-11 LAB — FERRITIN SERPL-MCNC: 132 NG/ML

## 2019-09-13 ENCOUNTER — APPOINTMENT (OUTPATIENT)
Dept: HEMATOLOGY ONCOLOGY | Facility: CLINIC | Age: 54
End: 2019-09-13
Payer: MEDICARE

## 2019-09-13 VITALS
HEART RATE: 78 BPM | HEIGHT: 63 IN | BODY MASS INDEX: 32.78 KG/M2 | TEMPERATURE: 98 F | SYSTOLIC BLOOD PRESSURE: 99 MMHG | RESPIRATION RATE: 16 BRPM | DIASTOLIC BLOOD PRESSURE: 57 MMHG | WEIGHT: 185 LBS

## 2019-09-13 PROCEDURE — 99213 OFFICE O/P EST LOW 20 MIN: CPT

## 2019-09-13 NOTE — ASSESSMENT
[FreeTextEntry1] : 55 yo woman with PMH of ulcerative colitis is seen by me for iron deficiency anemia. She is being seen by a private GI; colonocopy from April 2018 showed colon adenoma; no records of EGD/ small bowel series\par \par  however, if small bowel series were not done, I recommend it to r/o celiac sprue as the cause of poor iron absorption\par \par s/p iv iron replacement (completed in June 2019) with resolution of low iron; no iron at this time\par \par will get endomyasial ab\par \par RTC in 2 caro

## 2019-09-13 NOTE — CONSULT LETTER
[Dear  ___] : Dear  [unfilled], [Consult Letter:] : I had the pleasure of evaluating your patient, [unfilled]. [Please see my note below.] : Please see my note below. [Sincerely,] : Sincerely, [Referral Closing:] : Thank you very much for seeing this patient.  If you have any questions, please do not hesitate to contact me. [FreeTextEntry3] : John Boateng DO\par Attending Physician,\par Hematology/ Medical Oncology\par [phone] office\par \par

## 2019-09-13 NOTE — HISTORY OF PRESENT ILLNESS
[de-identified] : Ms. SEJAL BARNARD is a 54 year old female here today for evaluation and treatment of Anemia.\par \par The patient was referred by Dr. Quintero.  She presents with her health aide from the Apartment Housing complex she resides on [facility].  She reports that she was seen by her PCP, who referred her due to iron deficiency anemia.  She currently takes iron supplement, 325mg daily and is tolerating without complaint.  She states she feels tired on occasion.  \par \par LAB WORKUP:\par (5/7/19) WBC 6.23, Hgb 13.7, MCV 76.7, RDW 16.1, , Na 147, ALT 42, Ferritin 36, TIBC 349, ironsat 15%, B12 normal\par \par HCM:\par Mammogram (07/2018) patient reports it was benign\par Colonoscopy ([facility] - 2018) removed 2 polyps, as per patient\par

## 2019-09-16 LAB
ENDOMYSIUM IGA SER QL: NEGATIVE
ENDOMYSIUM IGA TITR SER: NORMAL

## 2019-09-30 ENCOUNTER — APPOINTMENT (OUTPATIENT)
Dept: CARDIOLOGY | Facility: CLINIC | Age: 54
End: 2019-09-30
Payer: COMMERCIAL

## 2019-09-30 VITALS
DIASTOLIC BLOOD PRESSURE: 68 MMHG | SYSTOLIC BLOOD PRESSURE: 101 MMHG | HEART RATE: 80 BPM | BODY MASS INDEX: 32.78 KG/M2 | HEIGHT: 63 IN | WEIGHT: 185 LBS

## 2019-09-30 DIAGNOSIS — R55 SYNCOPE AND COLLAPSE: ICD-10-CM

## 2019-09-30 DIAGNOSIS — E03.9 HYPOTHYROIDISM, UNSPECIFIED: ICD-10-CM

## 2019-09-30 PROCEDURE — 93000 ELECTROCARDIOGRAM COMPLETE: CPT

## 2019-09-30 PROCEDURE — 99213 OFFICE O/P EST LOW 20 MIN: CPT

## 2019-09-30 NOTE — HISTORY OF PRESENT ILLNESS
[SOB] : no dyspnea [Palpitations] : no palpitations [Syncope] : no syncope [Dizziness] : no dizziness [Chest Pain] : no chest pain or discomfort [Pain at Site] : no pain at device site [Erythema at Site] : no erythema at device site [Swelling at Site] : no swelling at device site [de-identified] : \par \par CARDIAC TESTING:\par ECG (9/30/2019): sinus rhythm at 84 bpm, non-sp ST/T abnormalities\par ECG (12/10/2018): sinus rhythm at 80 bpm, non-sp ST/T abnormalities

## 2019-09-30 NOTE — DISCUSSION/SUMMARY
[FreeTextEntry1] : Ms. Tkii Hurtado is a 54 year-old woman with Migraine, Crohn disease, COPD (chronic obstructive pulmonary disease), Depression, Anxiety, Schizophrenia, ANISHA (obstructive sleep apnea), Polyneuropathy, hyperlipidemia, Hypothyroid, Diabetes mellitus type 2, presents for dizziness and recurrent syncope. I recommend an ILR implant for this patient for long term detection of etiology of syncope. She underwent the placement of an implantable loop recorder. on 12/5/2018. Patient transferred care to Dr. Easley at [facility] as it is more convenient for her. Her remote monitor is connected to Dr. Easley.\par \par I interrogated and reprogrammed her device as described in procedure. Her wound is healed properly, with no signs of inflammation, infection or bleeding. I discussed with patient plan of care in great details. I discussed remote monitoring with her, and need to call office if she does manual transmission I answered all her questions to her satisfaction. Patient was pleased with the visit.  \par \par \par Patient will be discharged from office. She will follow with Dr. Easley (Next Appt in October 2019). Please do not hesitate to contact me at [phone] if you have any further questions regarding this patient care.

## 2019-09-30 NOTE — PROCEDURE
[No] : not [NSR] : normal sinus rhythm [Sensing Amplitude ___mv] : sensing amplitude was [unfilled] mv [de-identified] : 79BPM [de-identified] : LINQ [de-identified] : [employer]onic [de-identified] : [id number] [de-identified] : 12/5/18 [de-identified] : Good [de-identified] : No events.

## 2019-09-30 NOTE — PHYSICAL EXAM
[General Appearance - Well Developed] : well developed [Well Groomed] : well groomed [Normal Appearance] : normal appearance [General Appearance - Well Nourished] : well nourished [No Deformities] : no deformities [General Appearance - In No Acute Distress] : no acute distress [Heart Rate And Rhythm] : heart rate and rhythm were normal [Murmurs] : no murmurs present [Heart Sounds] : normal S1 and S2 [Auscultation Breath Sounds / Voice Sounds] : lungs were clear to auscultation bilaterally [Respiration, Rhythm And Depth] : normal respiratory rhythm and effort [Exaggerated Use Of Accessory Muscles For Inspiration] : no accessory muscle use [Clean] : clean [FreeTextEntry1] : Left Parasternal [Dry] : dry [Well-Healed] : well-healed [Abdomen Tenderness] : non-tender [Abdomen Soft] : soft [Abdomen Mass (___ Cm)] : no abdominal mass palpated [Nail Clubbing] : no clubbing of the fingernails [Cyanosis, Localized] : no localized cyanosis [Petechial Hemorrhages (___cm)] : no petechial hemorrhages [] : no ischemic changes

## 2019-10-14 ENCOUNTER — OUTPATIENT (OUTPATIENT)
Dept: OUTPATIENT SERVICES | Facility: HOSPITAL | Age: 54
LOS: 1 days | Discharge: HOME | End: 2019-10-14

## 2019-10-14 DIAGNOSIS — Z98.890 OTHER SPECIFIED POSTPROCEDURAL STATES: Chronic | ICD-10-CM

## 2019-10-14 DIAGNOSIS — N39.0 URINARY TRACT INFECTION, SITE NOT SPECIFIED: ICD-10-CM

## 2019-10-16 ENCOUNTER — OUTPATIENT (OUTPATIENT)
Dept: OUTPATIENT SERVICES | Facility: HOSPITAL | Age: 54
LOS: 1 days | Discharge: HOME | End: 2019-10-16
Payer: MEDICARE

## 2019-10-16 DIAGNOSIS — Z98.890 OTHER SPECIFIED POSTPROCEDURAL STATES: Chronic | ICD-10-CM

## 2019-10-16 DIAGNOSIS — N28.9 DISORDER OF KIDNEY AND URETER, UNSPECIFIED: ICD-10-CM

## 2019-10-16 PROCEDURE — 76775 US EXAM ABDO BACK WALL LIM: CPT | Mod: 26

## 2019-11-04 ENCOUNTER — APPOINTMENT (OUTPATIENT)
Dept: HEMATOLOGY ONCOLOGY | Facility: CLINIC | Age: 54
End: 2019-11-04

## 2019-11-04 ENCOUNTER — CHART COPY (OUTPATIENT)
Age: 54
End: 2019-11-04

## 2019-11-04 ENCOUNTER — LABORATORY RESULT (OUTPATIENT)
Age: 54
End: 2019-11-04

## 2019-11-05 LAB
FERRITIN SERPL-MCNC: 111 NG/ML
HCT VFR BLD CALC: 43.5 %
HGB BLD-MCNC: 13.9 G/DL
IRON SATN MFR SERPL: 17 %
IRON SERPL-MCNC: 54 UG/DL
MCHC RBC-ENTMCNC: 25.8 PG
MCHC RBC-ENTMCNC: 32 G/DL
MCV RBC AUTO: 80.9 FL
PLATELET # BLD AUTO: 310 K/UL
PMV BLD: 9.4 FL
RBC # BLD: 5.38 M/UL
RBC # FLD: 16.8 %
TIBC SERPL-MCNC: 319 UG/DL
UIBC SERPL-MCNC: 265 UG/DL
WBC # FLD AUTO: 5.51 K/UL

## 2019-11-08 ENCOUNTER — APPOINTMENT (OUTPATIENT)
Dept: HEMATOLOGY ONCOLOGY | Facility: CLINIC | Age: 54
End: 2019-11-08
Payer: MEDICARE

## 2019-11-08 ENCOUNTER — OUTPATIENT (OUTPATIENT)
Dept: OUTPATIENT SERVICES | Facility: HOSPITAL | Age: 54
LOS: 1 days | Discharge: HOME | End: 2019-11-08

## 2019-11-08 VITALS
SYSTOLIC BLOOD PRESSURE: 100 MMHG | HEART RATE: 79 BPM | BODY MASS INDEX: 34.38 KG/M2 | WEIGHT: 194 LBS | HEIGHT: 63 IN | RESPIRATION RATE: 16 BRPM | DIASTOLIC BLOOD PRESSURE: 56 MMHG

## 2019-11-08 DIAGNOSIS — D50.9 IRON DEFICIENCY ANEMIA, UNSPECIFIED: ICD-10-CM

## 2019-11-08 DIAGNOSIS — Z98.890 OTHER SPECIFIED POSTPROCEDURAL STATES: Chronic | ICD-10-CM

## 2019-11-08 PROCEDURE — 99213 OFFICE O/P EST LOW 20 MIN: CPT

## 2019-11-08 NOTE — ASSESSMENT
[FreeTextEntry1] : 55 yo woman with PMH of ulcerative colitis is seen by me for iron deficiency anemia. She is being seen by a private GI; colonoscopy from April 2018 showed colon adenoma; obtained records for EGD/ small bowel series\par \par -followup with GI Dr. Lopez ([facility]) for results of biopsy; she would like to establish with a new GI so was given referral\par - s/p iv iron replacement (completed in June 2019) with resolution of low iron; no iron at this time\par \par RTC in 3-4 months with CBC, iron studies, ferritin 1-2 days prior\par referred to GI (might need small bowel series to r/o celiac sprue in view of the PMH of autoimmune disease, UC, and CAM

## 2019-11-08 NOTE — REVIEW OF SYSTEMS
[Negative] : Allergic/Immunologic [Fever] : no fever [Fatigue] : no fatigue [Recent Change In Weight] : ~T no recent weight change [Shortness Of Breath] : no shortness of breath [Abdominal Pain] : no abdominal pain [Constipation] : no constipation [Easy Bleeding] : no tendency for easy bleeding [Easy Bruising] : no tendency for easy bruising

## 2019-11-08 NOTE — PHYSICAL EXAM
[Restricted in physically strenuous activity but ambulatory and able to carry out work of a light or sedentary nature] : Status 1- Restricted in physically strenuous activity but ambulatory and able to carry out work of a light or sedentary nature, e.g., light house work, office work [Normal] : grossly intact [de-identified] : uses seated walker for ambulation

## 2019-11-08 NOTE — CONSULT LETTER
[Consult Letter:] : I had the pleasure of evaluating your patient, [unfilled]. [Dear  ___] : Dear  [unfilled], [Referral Closing:] : Thank you very much for seeing this patient.  If you have any questions, please do not hesitate to contact me. [Please see my note below.] : Please see my note below. [Sincerely,] : Sincerely, [FreeTextEntry3] : John Boateng DO\par Attending Physician,\par Hematology/ Medical Oncology\par [phone] office\par \par

## 2019-11-08 NOTE — HISTORY OF PRESENT ILLNESS
[de-identified] : Ms. SEJAL BARNARD is a 54 year old female here today for evaluation and treatment of Anemia.\par \par The patient was referred by Dr. Quintero.  She presents with her health aide from the Apartment Housing complex she resides on [facility].  She reports that she was seen by her PCP, who referred her due to iron deficiency anemia.  She currently takes iron supplement, 325mg daily and is tolerating without complaint.  She states she feels tired on occasion.  \par \par LAB WORKUP:\par (5/7/19) WBC 6.23, Hgb 13.7, MCV 76.7, RDW 16.1, , Na 147, ALT 42, Ferritin 36, TIBC 349, ironsat 15%, B12 normal\par \par HCM:\par Mammogram (07/2018) patient reports it was benign\par Colonoscopy ([facility] - 4/6/2018) removed 2 polyps, as per patient.  IMPRESSION:  Preparation of the colon was poor.  One 6mm polyp in the cecum, removed peicemeal using a cold biopsy forceps.  Resected and retrieved.  Random biopsies taken from transverse colo, descending, sigmoid and rectum.  Repeat in 1 year [de-identified] : 11/8/19\par Patient is here for a follow-up visit for anemia with aid.  She is feeling well with no complaints.  Most recent CBC is stable with ferritin 111 and ironsat 17%.  Patient denies fever, chills, nausea, vomiting, bleeding or pica.  She does not take oral iron as she had IV iron in the past. She reports her energy has improved slightly as well.

## 2019-11-18 ENCOUNTER — OUTPATIENT (OUTPATIENT)
Dept: OUTPATIENT SERVICES | Facility: HOSPITAL | Age: 54
LOS: 1 days | Discharge: HOME | End: 2019-11-18

## 2019-11-18 DIAGNOSIS — D64.9 ANEMIA, UNSPECIFIED: ICD-10-CM

## 2019-11-18 DIAGNOSIS — I10 ESSENTIAL (PRIMARY) HYPERTENSION: ICD-10-CM

## 2019-11-18 DIAGNOSIS — Z98.890 OTHER SPECIFIED POSTPROCEDURAL STATES: Chronic | ICD-10-CM

## 2019-11-18 DIAGNOSIS — E78.9 DISORDER OF LIPOPROTEIN METABOLISM, UNSPECIFIED: ICD-10-CM

## 2019-11-18 DIAGNOSIS — E11.65 TYPE 2 DIABETES MELLITUS WITH HYPERGLYCEMIA: ICD-10-CM

## 2019-11-18 DIAGNOSIS — E55.9 VITAMIN D DEFICIENCY, UNSPECIFIED: ICD-10-CM

## 2019-12-18 ENCOUNTER — INPATIENT (INPATIENT)
Facility: HOSPITAL | Age: 54
LOS: 5 days | Discharge: HOME HEALTH | End: 2019-12-24
Attending: HOSPITALIST | Admitting: HOSPITALIST
Payer: MEDICARE

## 2019-12-18 VITALS
HEART RATE: 73 BPM | RESPIRATION RATE: 18 BRPM | SYSTOLIC BLOOD PRESSURE: 121 MMHG | TEMPERATURE: 98 F | DIASTOLIC BLOOD PRESSURE: 56 MMHG | OXYGEN SATURATION: 96 %

## 2019-12-18 DIAGNOSIS — Z88.8 ALLERGY STATUS TO OTHER DRUGS, MEDICAMENTS AND BIOLOGICAL SUBSTANCES STATUS: ICD-10-CM

## 2019-12-18 DIAGNOSIS — Z98.890 OTHER SPECIFIED POSTPROCEDURAL STATES: Chronic | ICD-10-CM

## 2019-12-18 DIAGNOSIS — Z88.1 ALLERGY STATUS TO OTHER ANTIBIOTIC AGENTS STATUS: ICD-10-CM

## 2019-12-18 LAB
ALBUMIN SERPL ELPH-MCNC: 4.6 G/DL — SIGNIFICANT CHANGE UP (ref 3.5–5.2)
ALP SERPL-CCNC: 69 U/L — SIGNIFICANT CHANGE UP (ref 30–115)
ALT FLD-CCNC: 14 U/L — SIGNIFICANT CHANGE UP (ref 0–41)
ANION GAP SERPL CALC-SCNC: 16 MMOL/L — HIGH (ref 7–14)
APTT BLD: 30.7 SEC — SIGNIFICANT CHANGE UP (ref 27–39.2)
AST SERPL-CCNC: 11 U/L — SIGNIFICANT CHANGE UP (ref 0–41)
BASOPHILS # BLD AUTO: 0.03 K/UL — SIGNIFICANT CHANGE UP (ref 0–0.2)
BASOPHILS NFR BLD AUTO: 0.5 % — SIGNIFICANT CHANGE UP (ref 0–1)
BILIRUB SERPL-MCNC: 0.3 MG/DL — SIGNIFICANT CHANGE UP (ref 0.2–1.2)
BUN SERPL-MCNC: 16 MG/DL — SIGNIFICANT CHANGE UP (ref 10–20)
CALCIUM SERPL-MCNC: 10.2 MG/DL — HIGH (ref 8.5–10.1)
CHLORIDE SERPL-SCNC: 99 MMOL/L — SIGNIFICANT CHANGE UP (ref 98–110)
CO2 SERPL-SCNC: 28 MMOL/L — SIGNIFICANT CHANGE UP (ref 17–32)
CREAT SERPL-MCNC: 0.6 MG/DL — LOW (ref 0.7–1.5)
EOSINOPHIL # BLD AUTO: 0.08 K/UL — SIGNIFICANT CHANGE UP (ref 0–0.7)
EOSINOPHIL NFR BLD AUTO: 1.3 % — SIGNIFICANT CHANGE UP (ref 0–8)
GLUCOSE BLDC GLUCOMTR-MCNC: 133 MG/DL — HIGH (ref 70–99)
GLUCOSE SERPL-MCNC: 162 MG/DL — HIGH (ref 70–99)
HCG SERPL QL: NEGATIVE — SIGNIFICANT CHANGE UP
HCT VFR BLD CALC: 45.1 % — SIGNIFICANT CHANGE UP (ref 37–47)
HGB BLD-MCNC: 13.9 G/DL — SIGNIFICANT CHANGE UP (ref 12–16)
IMM GRANULOCYTES NFR BLD AUTO: 0.2 % — SIGNIFICANT CHANGE UP (ref 0.1–0.3)
INR BLD: 0.97 RATIO — SIGNIFICANT CHANGE UP (ref 0.65–1.3)
LYMPHOCYTES # BLD AUTO: 2.66 K/UL — SIGNIFICANT CHANGE UP (ref 1.2–3.4)
LYMPHOCYTES # BLD AUTO: 44.3 % — SIGNIFICANT CHANGE UP (ref 20.5–51.1)
MAGNESIUM SERPL-MCNC: 1.6 MG/DL — LOW (ref 1.8–2.4)
MCHC RBC-ENTMCNC: 25.7 PG — LOW (ref 27–31)
MCHC RBC-ENTMCNC: 30.8 G/DL — LOW (ref 32–37)
MCV RBC AUTO: 83.4 FL — SIGNIFICANT CHANGE UP (ref 81–99)
MONOCYTES # BLD AUTO: 0.44 K/UL — SIGNIFICANT CHANGE UP (ref 0.1–0.6)
MONOCYTES NFR BLD AUTO: 7.3 % — SIGNIFICANT CHANGE UP (ref 1.7–9.3)
NEUTROPHILS # BLD AUTO: 2.79 K/UL — SIGNIFICANT CHANGE UP (ref 1.4–6.5)
NEUTROPHILS NFR BLD AUTO: 46.4 % — SIGNIFICANT CHANGE UP (ref 42.2–75.2)
NRBC # BLD: 0 /100 WBCS — SIGNIFICANT CHANGE UP (ref 0–0)
NT-PROBNP SERPL-SCNC: 81 PG/ML — SIGNIFICANT CHANGE UP (ref 0–300)
PLATELET # BLD AUTO: 366 K/UL — SIGNIFICANT CHANGE UP (ref 130–400)
POTASSIUM SERPL-MCNC: 4.4 MMOL/L — SIGNIFICANT CHANGE UP (ref 3.5–5)
POTASSIUM SERPL-SCNC: 4.4 MMOL/L — SIGNIFICANT CHANGE UP (ref 3.5–5)
PROT SERPL-MCNC: 6.9 G/DL — SIGNIFICANT CHANGE UP (ref 6–8)
PROTHROM AB SERPL-ACNC: 11.2 SEC — SIGNIFICANT CHANGE UP (ref 9.95–12.87)
RBC # BLD: 5.41 M/UL — HIGH (ref 4.2–5.4)
RBC # FLD: 16.8 % — HIGH (ref 11.5–14.5)
SODIUM SERPL-SCNC: 143 MMOL/L — SIGNIFICANT CHANGE UP (ref 135–146)
TROPONIN T SERPL-MCNC: <0.01 NG/ML — SIGNIFICANT CHANGE UP
WBC # BLD: 6.01 K/UL — SIGNIFICANT CHANGE UP (ref 4.8–10.8)
WBC # FLD AUTO: 6.01 K/UL — SIGNIFICANT CHANGE UP (ref 4.8–10.8)

## 2019-12-18 PROCEDURE — 93010 ELECTROCARDIOGRAM REPORT: CPT

## 2019-12-18 PROCEDURE — 71046 X-RAY EXAM CHEST 2 VIEWS: CPT | Mod: 26

## 2019-12-18 PROCEDURE — 70450 CT HEAD/BRAIN W/O DYE: CPT | Mod: 26

## 2019-12-18 PROCEDURE — 99285 EMERGENCY DEPT VISIT HI MDM: CPT

## 2019-12-18 PROCEDURE — 99223 1ST HOSP IP/OBS HIGH 75: CPT | Mod: AI

## 2019-12-18 RX ORDER — HALOPERIDOL DECANOATE 100 MG/ML
5 INJECTION INTRAMUSCULAR DAILY
Refills: 0 | Status: DISCONTINUED | OUTPATIENT
Start: 2019-12-18 | End: 2019-12-24

## 2019-12-18 RX ORDER — CHLORHEXIDINE GLUCONATE 213 G/1000ML
1 SOLUTION TOPICAL
Refills: 0 | Status: DISCONTINUED | OUTPATIENT
Start: 2019-12-18 | End: 2019-12-24

## 2019-12-18 RX ORDER — ALBUTEROL 90 UG/1
2 AEROSOL, METERED ORAL
Qty: 0 | Refills: 0 | DISCHARGE

## 2019-12-18 RX ORDER — FOLIC ACID 0.8 MG
1 TABLET ORAL DAILY
Refills: 0 | Status: DISCONTINUED | OUTPATIENT
Start: 2019-12-18 | End: 2019-12-24

## 2019-12-18 RX ORDER — DEXTROSE 50 % IN WATER 50 %
15 SYRINGE (ML) INTRAVENOUS ONCE
Refills: 0 | Status: DISCONTINUED | OUTPATIENT
Start: 2019-12-18 | End: 2019-12-24

## 2019-12-18 RX ORDER — MECLIZINE HCL 12.5 MG
1 TABLET ORAL
Qty: 0 | Refills: 0 | DISCHARGE

## 2019-12-18 RX ORDER — POLYETHYLENE GLYCOL 3350 17 G/17G
17 POWDER, FOR SOLUTION ORAL DAILY
Refills: 0 | Status: DISCONTINUED | OUTPATIENT
Start: 2019-12-18 | End: 2019-12-24

## 2019-12-18 RX ORDER — DEXTROSE 50 % IN WATER 50 %
25 SYRINGE (ML) INTRAVENOUS ONCE
Refills: 0 | Status: DISCONTINUED | OUTPATIENT
Start: 2019-12-18 | End: 2019-12-24

## 2019-12-18 RX ORDER — LEVOTHYROXINE SODIUM 125 MCG
1 TABLET ORAL
Qty: 0 | Refills: 0 | DISCHARGE

## 2019-12-18 RX ORDER — ATORVASTATIN CALCIUM 80 MG/1
40 TABLET, FILM COATED ORAL AT BEDTIME
Refills: 0 | Status: DISCONTINUED | OUTPATIENT
Start: 2019-12-18 | End: 2019-12-24

## 2019-12-18 RX ORDER — INSULIN GLARGINE 100 [IU]/ML
50 INJECTION, SOLUTION SUBCUTANEOUS AT BEDTIME
Refills: 0 | Status: DISCONTINUED | OUTPATIENT
Start: 2019-12-18 | End: 2019-12-24

## 2019-12-18 RX ORDER — MELOXICAM 15 MG/1
1 TABLET ORAL
Qty: 0 | Refills: 0 | DISCHARGE

## 2019-12-18 RX ORDER — LEVOTHYROXINE SODIUM 125 MCG
88 TABLET ORAL DAILY
Refills: 0 | Status: DISCONTINUED | OUTPATIENT
Start: 2019-12-18 | End: 2019-12-24

## 2019-12-18 RX ORDER — MESALAMINE 400 MG
500 TABLET, DELAYED RELEASE (ENTERIC COATED) ORAL DAILY
Refills: 0 | Status: DISCONTINUED | OUTPATIENT
Start: 2019-12-18 | End: 2019-12-24

## 2019-12-18 RX ORDER — ERGOCALCIFEROL 1.25 MG/1
50000 CAPSULE ORAL
Refills: 0 | Status: DISCONTINUED | OUTPATIENT
Start: 2019-12-18 | End: 2019-12-24

## 2019-12-18 RX ORDER — IPRATROPIUM/ALBUTEROL SULFATE 18-103MCG
1 AEROSOL WITH ADAPTER (GRAM) INHALATION
Refills: 0 | Status: DISCONTINUED | OUTPATIENT
Start: 2019-12-18 | End: 2019-12-20

## 2019-12-18 RX ORDER — MAGNESIUM SULFATE 500 MG/ML
2 VIAL (ML) INJECTION ONCE
Refills: 0 | Status: COMPLETED | OUTPATIENT
Start: 2019-12-18 | End: 2019-12-18

## 2019-12-18 RX ORDER — PREGABALIN 225 MG/1
1 CAPSULE ORAL
Qty: 0 | Refills: 0 | DISCHARGE

## 2019-12-18 RX ORDER — INSULIN LISPRO 100/ML
VIAL (ML) SUBCUTANEOUS
Refills: 0 | Status: DISCONTINUED | OUTPATIENT
Start: 2019-12-18 | End: 2019-12-24

## 2019-12-18 RX ORDER — MECLIZINE HCL 12.5 MG
50 TABLET ORAL ONCE
Refills: 0 | Status: COMPLETED | OUTPATIENT
Start: 2019-12-18 | End: 2019-12-18

## 2019-12-18 RX ORDER — LUBIPROSTONE 24 UG/1
0 CAPSULE, GELATIN COATED ORAL
Qty: 0 | Refills: 0 | DISCHARGE

## 2019-12-18 RX ORDER — GLUCAGON INJECTION, SOLUTION 0.5 MG/.1ML
1 INJECTION, SOLUTION SUBCUTANEOUS ONCE
Refills: 0 | Status: DISCONTINUED | OUTPATIENT
Start: 2019-12-18 | End: 2019-12-24

## 2019-12-18 RX ORDER — MAGNESIUM OXIDE 400 MG ORAL TABLET 241.3 MG
400 TABLET ORAL ONCE
Refills: 0 | Status: COMPLETED | OUTPATIENT
Start: 2019-12-18 | End: 2019-12-18

## 2019-12-18 RX ORDER — BUDESONIDE AND FORMOTEROL FUMARATE DIHYDRATE 160; 4.5 UG/1; UG/1
2 AEROSOL RESPIRATORY (INHALATION)
Refills: 0 | Status: DISCONTINUED | OUTPATIENT
Start: 2019-12-18 | End: 2019-12-24

## 2019-12-18 RX ORDER — ATORVASTATIN CALCIUM 80 MG/1
1 TABLET, FILM COATED ORAL
Qty: 0 | Refills: 0 | DISCHARGE

## 2019-12-18 RX ORDER — PREGABALIN 225 MG/1
1000 CAPSULE ORAL DAILY
Refills: 0 | Status: DISCONTINUED | OUTPATIENT
Start: 2019-12-18 | End: 2019-12-24

## 2019-12-18 RX ORDER — FLUTICASONE FUROATE AND VILANTEROL TRIFENATATE 100; 25 UG/1; UG/1
0 POWDER RESPIRATORY (INHALATION)
Qty: 0 | Refills: 0 | DISCHARGE

## 2019-12-18 RX ORDER — ERGOCALCIFEROL 1.25 MG/1
1 CAPSULE ORAL
Qty: 0 | Refills: 0 | DISCHARGE

## 2019-12-18 RX ORDER — SODIUM CHLORIDE 9 MG/ML
1000 INJECTION, SOLUTION INTRAVENOUS ONCE
Refills: 0 | Status: COMPLETED | OUTPATIENT
Start: 2019-12-18 | End: 2019-12-18

## 2019-12-18 RX ORDER — MIDODRINE HYDROCHLORIDE 2.5 MG/1
10 TABLET ORAL THREE TIMES A DAY
Refills: 0 | Status: DISCONTINUED | OUTPATIENT
Start: 2019-12-18 | End: 2019-12-24

## 2019-12-18 RX ORDER — UMECLIDINIUM 62.5 UG/1
0 AEROSOL, POWDER ORAL
Qty: 0 | Refills: 0 | DISCHARGE

## 2019-12-18 RX ORDER — MECLIZINE HCL 12.5 MG
25 TABLET ORAL THREE TIMES A DAY
Refills: 0 | Status: DISCONTINUED | OUTPATIENT
Start: 2019-12-18 | End: 2019-12-21

## 2019-12-18 RX ORDER — ENOXAPARIN SODIUM 100 MG/ML
40 INJECTION SUBCUTANEOUS DAILY
Refills: 0 | Status: DISCONTINUED | OUTPATIENT
Start: 2019-12-18 | End: 2019-12-24

## 2019-12-18 RX ORDER — SODIUM CHLORIDE 9 MG/ML
1000 INJECTION, SOLUTION INTRAVENOUS
Refills: 0 | Status: DISCONTINUED | OUTPATIENT
Start: 2019-12-18 | End: 2019-12-24

## 2019-12-18 RX ORDER — PANTOPRAZOLE SODIUM 20 MG/1
1 TABLET, DELAYED RELEASE ORAL
Qty: 0 | Refills: 0 | DISCHARGE

## 2019-12-18 RX ORDER — FERROUS SULFATE 325(65) MG
325 TABLET ORAL DAILY
Refills: 0 | Status: DISCONTINUED | OUTPATIENT
Start: 2019-12-18 | End: 2019-12-24

## 2019-12-18 RX ORDER — DEXTROSE 50 % IN WATER 50 %
12.5 SYRINGE (ML) INTRAVENOUS ONCE
Refills: 0 | Status: DISCONTINUED | OUTPATIENT
Start: 2019-12-18 | End: 2019-12-24

## 2019-12-18 RX ORDER — MESALAMINE 400 MG
1 TABLET, DELAYED RELEASE (ENTERIC COATED) ORAL
Qty: 0 | Refills: 0 | DISCHARGE

## 2019-12-18 RX ORDER — MONTELUKAST 4 MG/1
10 TABLET, CHEWABLE ORAL AT BEDTIME
Refills: 0 | Status: DISCONTINUED | OUTPATIENT
Start: 2019-12-18 | End: 2019-12-24

## 2019-12-18 RX ORDER — FERROUS SULFATE 325(65) MG
0 TABLET ORAL
Qty: 0 | Refills: 0 | DISCHARGE

## 2019-12-18 RX ORDER — FOLIC ACID 0.8 MG
1 TABLET ORAL
Qty: 0 | Refills: 0 | DISCHARGE

## 2019-12-18 RX ORDER — MONTELUKAST 4 MG/1
1 TABLET, CHEWABLE ORAL
Qty: 0 | Refills: 0 | DISCHARGE

## 2019-12-18 RX ORDER — INSULIN LISPRO 100/ML
10 VIAL (ML) SUBCUTANEOUS
Refills: 0 | Status: DISCONTINUED | OUTPATIENT
Start: 2019-12-18 | End: 2019-12-24

## 2019-12-18 RX ADMIN — INSULIN GLARGINE 50 UNIT(S): 100 INJECTION, SOLUTION SUBCUTANEOUS at 23:42

## 2019-12-18 RX ADMIN — MAGNESIUM OXIDE 400 MG ORAL TABLET 400 MILLIGRAM(S): 241.3 TABLET ORAL at 22:46

## 2019-12-18 RX ADMIN — Medication 50 MILLIGRAM(S): at 20:48

## 2019-12-18 NOTE — H&P ADULT - NSHPLABSRESULTS_GEN_ALL_CORE
13.9   6.01  )-----------( 366      ( 18 Dec 2019 20:20 )             45.1       12-18    143  |  99  |  16  ----------------------------<  162<H>  4.4   |  28  |  0.6<L>    Ca    10.2<H>      18 Dec 2019 20:20  Mg     1.6     12-18    TPro  6.9  /  Alb  4.6  /  TBili  0.3  /  DBili  x   /  AST  11  /  ALT  14  /  AlkPhos  69  12-18                  PT/INR - ( 18 Dec 2019 20:20 )   PT: 11.20 sec;   INR: 0.97 ratio         PTT - ( 18 Dec 2019 20:20 )  PTT:30.7 sec      CARDIAC MARKERS ( 18 Dec 2019 20:20 )  x     / <0.01 ng/mL / x     / x     / x        CAPILLARY BLOOD GLUCOSE  POCT Blood Glucose.: 194 mg/dL (18 Dec 2019 17:59)    -------    < from: CT Head No Cont (12.18.19 @ 21:00) >    Impression:       No evidence of intracranial hemorrhage, territorial infarct, or mass effect.    No change since the prior exam    < end of copied text >

## 2019-12-18 NOTE — ED PROVIDER NOTE - PHYSICAL EXAMINATION
CONST: Well appearing in NAD  EYES: Sclera and conjunctiva clear.  CARD: Normal S1 S2; Normal rate and rhythm  RESP: Equal BS B/L, No wheezes, rhonchi or rales. No distress  GI: Soft, non-tender, non-distended.  MS: Normal ROM in all extremities. No edema of lower extremities, no calf pain, radial pulses 2+ bilaterally  SKIN: + abrasions to bilateral knees, no active bleeding or FB.   NEURO: A&Ox3, CN II-XII intact, no focal deficits, no facial asymmetry, no pronator drift, normal finger to nose, no nystagmus, gross sensation intact, UE/LE strength 5/5, unsteady gait requiring assistance.

## 2019-12-18 NOTE — ED PROVIDER NOTE - NS ED ROS FT
Constitutional: See HPI.  Eyes: No visual changes, eye pain or discharge. No Photophobia  ENMT: No hearing changes, pain, discharge or infections. No neck pain or stiffness. No limited ROM  Cardiac: No SOB or edema. No chest pain with exertion.  Respiratory: No cough or respiratory distress.   GI: No nausea, vomiting, diarrhea or abdominal pain.  : No dysuria, frequency or burning. No Discharge  MS: No myalgia, muscle weakness, joint pain or back pain.  Neuro: + dizziness. No headache or weakness. No LOC.  Skin: + abrasions to bilateral knees.  Except as documented in the HPI, all other systems are negative.

## 2019-12-18 NOTE — H&P ADULT - ATTENDING COMMENTS
55 YO F with a PMH of DMII, COPD/Asthma, ANISHA on CPAP, schizophrenia, depression/anxiety, Crohn disease, s/p loop recorder, migraine, hypothyroidism, and chronic dizziness who presents to the hospital with a c/o dizziness (room-spinning) that caused her to fall onto her knees. Exacerbated with standing and then resolves. Pt states this is the same dizziness for which she has had extensive work-ups in the past. Including MRA, MRI, CTH, loop recorder, and reviewing for possible poly-pharmacy. She follows with neuro as out-pt for same problem. The pt was thought to have orthostatic dizziness and is currently on Midodrine. In the ED, pt had negative CTH. Given Meclizine and IVFs. Pt did not feel comfortable going home. Physical exam shows pt in NAD. RRR. No M/G/R. CTA B/L. Muscle stregth/sensation intact. No swelling of LEs. Labs and radiology as above. Chronic dizziness likely related to her known Hx of orthostatic dizziness. Check orthostatics. IVFs. Restart home midodrine. Can interrogate loop recorder. Follow up with Neuro as out-pt. Discharge in the AM. Hypomagnesemia. Replace. GI and DVT PPX. Rest as per above note.

## 2019-12-18 NOTE — ED PROVIDER NOTE - CARE PLAN
Principal Discharge DX:	Dizziness  Secondary Diagnosis:	Hypomagnesemia  Secondary Diagnosis:	Unsteady gait

## 2019-12-18 NOTE — H&P ADULT - NSICDXPASTMEDICALHX_GEN_ALL_CORE_FT
PAST MEDICAL HISTORY:  Anxiety     Bladder disorder     Constipation     COPD (chronic obstructive pulmonary disease)     Crohn disease     Depression     Diabetes mellitus, type 2     Dizziness     GERD (gastroesophageal reflux disease)     Hypercholesteremia     Hypothyroid     Iron deficiency     Migraine     ANISHA (obstructive sleep apnea)     Polyneuropathy     Schizophrenia     Sciatica     Vitamin D deficiency

## 2019-12-18 NOTE — ED PROVIDER NOTE - CLINICAL SUMMARY MEDICAL DECISION MAKING FREE TEXT BOX
pt seen for dizziness and near syncopal episode with fall to knees, pt with persistent dizziness and unable to ambulate in ED, labs unremarkable, CT head and CXR without any acute pathology. Troponin negative. Pt admitted for further monitoring and workup.

## 2019-12-18 NOTE — ED PROVIDER NOTE - OBJECTIVE STATEMENT
54 y.o female w/ hx of chrons, schizophrenia, COPD, migraine, Dm, hypothyroid presents to the ED for evaluation of dizziness. States while walking developed acute onset room spinning sensation and fell to knees with no head injury or LOC. Was able to slowly get up  Since onset sxs resolved. In ed states she feels dizzy when she ambulates.  Denies headache, changes in vision, neck pain, nausea, vomiting, abd pain, back pain, fever, chills, urinary sxs. Had similar episode in 2018, was admitted and origin of dizziness not determined.  Currently has loop recorder.  UTD on tdap.

## 2019-12-18 NOTE — ED ADULT TRIAGE NOTE - CHIEF COMPLAINT QUOTE
Pt states she got dizzy and fell on her knees. Pt states she has b/l knee pain and right shoulder pain. Denies LOC, head trauma, blood thinners.

## 2019-12-18 NOTE — H&P ADULT - ASSESSMENT
54 year old female with PMH of DM II, COPD/Asthma (never smoked), ANISHA on CPAP, schizophrenia, depression/anxiety, Crohn disease, s/p loop recorder, migraine, hypothyroidism chronic dizziness presents to the ED complaining of dizziness and fall    # Dizziness and fall  - Prior workup by cardiology and neurology were unrevealing - possibly orthostatics vs polypharmacy vs cardiologic etiology  - CTH negative for any acute pathology  - Check orthostatics BP  - Continue Midodrine  - EPS for loop recorder interrogation  - Continue Meclizine  - Neurology recommended previously trial of Phenergan if okay with psych but pt on Haldol 5 mg daily and may induce significant QTc prolongation    # DM II  - Check fingerstick  - Insulin regimen and adjust accordingly    # COPD/asthma - stable  - Continue with inhalers    # ANISHA on CPAP - continue CPAP at 12    # Schizophrenia / Depression/anxiety - continue home meds    # Crohn disease - Continue Mesalamine    # Hypothyroid - continue Synthroid     DVT prophylaxis: Lovenox    Dispo: from home. Has HHA 10 hours/7days 54 year old female with PMH of DM II, COPD/Asthma (never smoked), ANISHA on CPAP, schizophrenia, depression/anxiety, Crohn disease, s/p loop recorder, migraine, hypothyroidism chronic dizziness presents to the ED complaining of dizziness and fall    # Dizziness and fall  - Prior workup by cardiology and neurology were unrevealing - possibly orthostatics vs polypharmacy vs cardiologic etiology  - CTH negative for any acute pathology  - Check orthostatics BP  - Continue Midodrine  - EPS for loop recorder interrogation  - Continue Meclizine  - Neurology recommended previously trial of Phenergan if okay with psych but pt on Haldol 5 mg daily and may induce significant QTc prolongation    # Hypomagnesemia - Replete and repeat    # DM II  - Check fingerstick  - Insulin regimen and adjust accordingly    # COPD/asthma - stable  - Continue with inhalers    # ANISHA on CPAP - continue CPAP at 12    # Schizophrenia / Depression/anxiety - continue home meds    # Crohn disease - Continue Mesalamine    # Hypothyroid - continue Synthroid     # Suspected folate / B12 / Vitamin D deficiency - continue supplement     DVT prophylaxis: Lovenox    Dispo: from home. Has HHA 10 hours/7days

## 2019-12-18 NOTE — ED PROVIDER NOTE - PROGRESS NOTE DETAILS
ATTENDING NOTE:   55 y/o F with PMH of COPD, DM, hypothyroidism, and schizophrenia p/w dizziness and near syncopal episode where pt became dizzy and fell onto her knees. Denies head trauma, HA, focal weakness or parasthesias. Has a loop recorder implanted as she has had similar episodes in the past. Cannot ambulate well due to persistent dizziness. Pt lives alone. No fever, CP, Sob, palpitations, abdominal pain, n/v.    VITAL SIGNS: noted. CONSTITUTIONAL: Well-developed; well-nourished; in no acute distress. HEAD: Normocephalic; atraumatic. EYES: PERRL, EOM intact; conjunctiva and sclera clear. ENT: No nasal discharge; airway clear. MMM. NECK: Supple; non tender. No anterior cervical lymphadenopathy noted. CARD: S1, S2 normal; no murmurs, gallops, or rubs. Regular rate and rhythm. RESP: CTAB/L, no wheezes, rales or rhonchi. ABD: Normal bowel sounds; soft; non-distended; non-tender; no hepatosplenomegaly. No CVA tenderness. EXT: Normal ROM. No calf tenderness or edema. Distal pulses intact. NEURO: Unable to ambulate secondary to dizziness. Alert, oriented. Grossly unremarkable. No focal deficits. SKIN: Skin exam is warm and dry, no acute rash. MS: No midline spinal tenderness. pt lives by self.  still complaining of dizziness and not able to ambulate.  will admit for further work up. ATTENDING NOTE:   55 y/o F with PMH of COPD, DM, hypothyroidism, and schizophrenia p/w dizziness and near syncopal episode where pt became dizzy and fell onto her knees. Denies head trauma, HA, focal weakness or paresthesias. Has a loop recorder implanted as she has had similar episodes in the past. Cannot ambulate well due to persistent dizziness. Pt lives alone. No fever, CP, SOB, palpitations, abdominal pain, N/V/D.    VITAL SIGNS: noted. CONSTITUTIONAL: Well-developed; well-nourished; in no acute distress. HEAD: Normocephalic; atraumatic. EYES: PERRL, EOM intact; conjunctiva and sclera clear. ENT: No nasal discharge; airway clear. MMM. NECK: Supple; non tender. No anterior cervical lymphadenopathy noted. CARD: S1, S2 normal; no murmurs, gallops, or rubs. Regular rate and rhythm. RESP: CTAB/L, no wheezes, rales or rhonchi. ABD: Normal bowel sounds; soft; non-distended; non-tender; no hepatosplenomegaly. No CVA tenderness. EXT: Normal ROM. No calf tenderness or edema. Distal pulses intact. NEURO: Unable to ambulate secondary to dizziness. Alert, oriented. Grossly unremarkable. No focal deficits. SKIN: Skin exam is warm and dry. MS: No midline spinal tenderness.

## 2019-12-18 NOTE — H&P ADULT - HISTORY OF PRESENT ILLNESS
54 year old female with PMH of DM II, COPD/Asthma (never smoked), ANISHA on CPAP, schizophrenia, depression/anxiety, Crohn disease, s/p loop recorder, migraine, hypothyroidism chronic dizziness presents to the ED complaining of dizziness and fall. She reports that she went to a holiday party today, when she came home at around 4:30 PM she was walking to the bathroom when she was feeling very dizzy and fell to her knees. She had previous admissions for dizziness and syncope workup. EP Dr Kilpatrick placed implantable loop recorder 12/5/2018. Working etiology was polypharmacy vs psychogenic vs vascular but MRA head was unremarkable.     In ED: vitals stable. Negative troponin. ECG with no new changes (chronic T-wave inversions in V1-3). Mg 1.6  CTH negative for any acute pathology  Meclizine and 1 liter IV fluids were given.

## 2019-12-18 NOTE — ED ADULT NURSE NOTE - OBJECTIVE STATEMENT
Patient present to ED from home for complains of a fall, as per patient, she got dizzy and landed on her knees and right arm, denies LOC, head injury and blood thinner. Patient is able to move all extremity with  no problem at this time.

## 2019-12-18 NOTE — H&P ADULT - NSHPPHYSICALEXAM_GEN_ALL_CORE
Vital Signs Last 24 Hrs  T(C): 36.9 (18 Dec 2019 23:05), Max: 36.9 (18 Dec 2019 23:05)  T(F): 98.4 (18 Dec 2019 23:05), Max: 98.4 (18 Dec 2019 23:05)  HR: 78 (18 Dec 2019 23:05) (72 - 78)  BP: 116/56 (18 Dec 2019 23:05) (113/59 - 121/56)  RR: 18 (18 Dec 2019 23:05) (18 - 18)  SpO2: 99% (18 Dec 2019 23:05) (96% - 99%)    ------    PHYSICAL EXAM:  GENERAL: NAD, speaks in full sentences, no signs of respiratory distress  HEAD:  Atraumatic, Normocephalic  EYES: EOMI, PERRLA, anicteric sclera  NECK: Supple, No JVD  CHEST/LUNG: Clear to auscultation bilaterally; No wheeze; No crackles; No accessory muscles used  HEART: Regular rate and rhythm; No murmurs;   ABDOMEN: Soft, Nontender, Nondistended; Bowel sounds present; No guarding  EXTREMITIES:  2+ Peripheral Pulses, No cyanosis or edema  PSYCH: AAOx3  NEUROLOGY: non-focal  SKIN: No rashes or lesions

## 2019-12-18 NOTE — ED PROVIDER NOTE - ATTENDING CONTRIBUTION TO CARE
I have personally performed a history and physical exam on this patient and personally directed the management of the patient. Attending note in progress notes by adela.

## 2019-12-19 LAB
ANION GAP SERPL CALC-SCNC: 16 MMOL/L — HIGH (ref 7–14)
BUN SERPL-MCNC: 18 MG/DL — SIGNIFICANT CHANGE UP (ref 10–20)
CALCIUM SERPL-MCNC: 9.2 MG/DL — SIGNIFICANT CHANGE UP (ref 8.5–10.1)
CHLORIDE SERPL-SCNC: 104 MMOL/L — SIGNIFICANT CHANGE UP (ref 98–110)
CO2 SERPL-SCNC: 24 MMOL/L — SIGNIFICANT CHANGE UP (ref 17–32)
CREAT SERPL-MCNC: 0.5 MG/DL — LOW (ref 0.7–1.5)
ESTIMATED AVERAGE GLUCOSE: 148 MG/DL — HIGH (ref 68–114)
GLUCOSE BLDC GLUCOMTR-MCNC: 113 MG/DL — HIGH (ref 70–99)
GLUCOSE BLDC GLUCOMTR-MCNC: 133 MG/DL — HIGH (ref 70–99)
GLUCOSE BLDC GLUCOMTR-MCNC: 134 MG/DL — HIGH (ref 70–99)
GLUCOSE BLDC GLUCOMTR-MCNC: 92 MG/DL — SIGNIFICANT CHANGE UP (ref 70–99)
GLUCOSE SERPL-MCNC: 108 MG/DL — HIGH (ref 70–99)
HBA1C BLD-MCNC: 6.8 % — HIGH (ref 4–5.6)
MAGNESIUM SERPL-MCNC: 2.3 MG/DL — SIGNIFICANT CHANGE UP (ref 1.8–2.4)
POTASSIUM SERPL-MCNC: 4 MMOL/L — SIGNIFICANT CHANGE UP (ref 3.5–5)
POTASSIUM SERPL-SCNC: 4 MMOL/L — SIGNIFICANT CHANGE UP (ref 3.5–5)
SODIUM SERPL-SCNC: 144 MMOL/L — SIGNIFICANT CHANGE UP (ref 135–146)

## 2019-12-19 PROCEDURE — 93285 PRGRMG DEV EVAL SCRMS IP: CPT | Mod: 26

## 2019-12-19 PROCEDURE — 99222 1ST HOSP IP/OBS MODERATE 55: CPT | Mod: 25

## 2019-12-19 PROCEDURE — 99233 SBSQ HOSP IP/OBS HIGH 50: CPT

## 2019-12-19 RX ADMIN — HALOPERIDOL DECANOATE 5 MILLIGRAM(S): 100 INJECTION INTRAMUSCULAR at 11:46

## 2019-12-19 RX ADMIN — MONTELUKAST 10 MILLIGRAM(S): 4 TABLET, CHEWABLE ORAL at 22:23

## 2019-12-19 RX ADMIN — Medication 10 UNIT(S): at 17:24

## 2019-12-19 RX ADMIN — ENOXAPARIN SODIUM 40 MILLIGRAM(S): 100 INJECTION SUBCUTANEOUS at 11:45

## 2019-12-19 RX ADMIN — PREGABALIN 1000 MICROGRAM(S): 225 CAPSULE ORAL at 11:46

## 2019-12-19 RX ADMIN — Medication 88 MICROGRAM(S): at 06:10

## 2019-12-19 RX ADMIN — Medication 10 UNIT(S): at 12:00

## 2019-12-19 RX ADMIN — Medication 10 UNIT(S): at 08:52

## 2019-12-19 RX ADMIN — Medication 1 PUFF(S): at 15:10

## 2019-12-19 RX ADMIN — Medication 25 GRAM(S): at 00:20

## 2019-12-19 RX ADMIN — MIDODRINE HYDROCHLORIDE 10 MILLIGRAM(S): 2.5 TABLET ORAL at 11:45

## 2019-12-19 RX ADMIN — BUDESONIDE AND FORMOTEROL FUMARATE DIHYDRATE 2 PUFF(S): 160; 4.5 AEROSOL RESPIRATORY (INHALATION) at 22:23

## 2019-12-19 RX ADMIN — MIDODRINE HYDROCHLORIDE 10 MILLIGRAM(S): 2.5 TABLET ORAL at 06:11

## 2019-12-19 RX ADMIN — Medication 500 MILLIGRAM(S): at 17:24

## 2019-12-19 RX ADMIN — ATORVASTATIN CALCIUM 40 MILLIGRAM(S): 80 TABLET, FILM COATED ORAL at 22:24

## 2019-12-19 RX ADMIN — MIDODRINE HYDROCHLORIDE 10 MILLIGRAM(S): 2.5 TABLET ORAL at 17:22

## 2019-12-19 RX ADMIN — ERGOCALCIFEROL 50000 UNIT(S): 1.25 CAPSULE ORAL at 11:46

## 2019-12-19 RX ADMIN — BUDESONIDE AND FORMOTEROL FUMARATE DIHYDRATE 2 PUFF(S): 160; 4.5 AEROSOL RESPIRATORY (INHALATION) at 08:54

## 2019-12-19 RX ADMIN — Medication 1 MILLIGRAM(S): at 11:45

## 2019-12-19 NOTE — CONSULT NOTE ADULT - SUBJECTIVE AND OBJECTIVE BOX
HPI:  54 year old female with PMH of DM II, COPD/Asthma (never smoked), ANISHA on CPAP, schizophrenia, depression/anxiety, Crohn disease, s/p loop recorder, migraine, hypothyroidism chronic dizziness presents to the ED complaining of dizziness and fall. She reports that she went to a holiday party today, when she came home at around 4:30 PM she was walking to the bathroom when she was feeling very dizzy and fell to her knees. She had previous admissions for dizziness and syncope workup. EP Dr Kilpatrick placed implantable loop recorder 12/5/2018. Working etiology was polypharmacy vs psychogenic vs vascular but MRA head was unremarkable.     In ED: vitals stable. Negative troponin. ECG with no new changes (chronic T-wave inversions in V1-3). Mg 1.6  CTH negative for any acute pathology  Meclizine and 1 liter IV fluids were given. (18 Dec 2019 23:16)      PAST MEDICAL & SURGICAL HISTORY:  Dizziness  Migraine  Crohn disease  COPD (chronic obstructive pulmonary disease)  Depression  Anxiety  Schizophrenia  ANISHA (obstructive sleep apnea)  Polyneuropathy  Bladder disorder  Sciatica  Constipation  Iron deficiency  Hypercholesteremia  Hypothyroid  Diabetes mellitus, type 2  Vitamin D deficiency  GERD (gastroesophageal reflux disease)  History of cholecystectomy      Hospital Course:  She has a HHA 10 hrs a day. she walks with a walker. She falls twice a month often secondary to lightheadedness. this times there was some vertigo also. she may have lightheadedness and vertigo. she is on haldol. denies recent addition of med.  TODAY'S SUBJECTIVE & REVIEW OF SYMPTOMS:     Constitutional WNL   Cardio WNL   Resp WNL   GI WNL  Heme WNL  Endo WNL  Skin WNL  MSK WNL  Neuro WNL  Cognitive WNL  Psych WNL      MEDICATIONS  (STANDING):  albuterol/ipratropium (CFC free) Inhaler. 1 Puff(s) Inhalation four times a day  atorvastatin 40 milliGRAM(s) Oral at bedtime  budesonide 160 MICROgram(s)/formoterol 4.5 MICROgram(s) Inhaler 2 Puff(s) Inhalation two times a day  chlorhexidine 4% Liquid 1 Application(s) Topical <User Schedule>  cyanocobalamin 1000 MICROGram(s) Oral daily  dextrose 5%. 1000 milliLiter(s) (50 mL/Hr) IV Continuous <Continuous>  dextrose 50% Injectable 12.5 Gram(s) IV Push once  dextrose 50% Injectable 25 Gram(s) IV Push once  dextrose 50% Injectable 25 Gram(s) IV Push once  enoxaparin Injectable 40 milliGRAM(s) SubCutaneous daily  ergocalciferol 25075 Unit(s) Oral <User Schedule>  ferrous    sulfate 325 milliGRAM(s) Oral daily  folic acid 1 milliGRAM(s) Oral daily  haloperidol     Tablet 5 milliGRAM(s) Oral daily  insulin glargine Injectable (LANTUS) 50 Unit(s) SubCutaneous at bedtime  insulin lispro (HumaLOG) corrective regimen sliding scale   SubCutaneous three times a day before meals  insulin lispro Injectable (HumaLOG) 10 Unit(s) SubCutaneous three times a day before meals  levothyroxine 88 MICROGram(s) Oral daily  mesalamine ER Capsule 500 milliGRAM(s) Oral daily  midodrine. 10 milliGRAM(s) Oral three times a day  montelukast 10 milliGRAM(s) Oral at bedtime    MEDICATIONS  (PRN):  dextrose 40% Gel 15 Gram(s) Oral once PRN Blood Glucose LESS THAN 70 milliGRAM(s)/deciliter  glucagon  Injectable 1 milliGRAM(s) IntraMuscular once PRN Glucose LESS THAN 70 milligrams/deciliter  meclizine 25 milliGRAM(s) Oral three times a day PRN Dizziness  polyethylene glycol 3350 17 Gram(s) Oral daily PRN Constipation      FAMILY HISTORY:      Allergies    ciprofloxacin (Unknown)  codeine (Unknown)  Fish Products (Unknown)  lactose (Unknown)  latex (Unknown)  penicillins (Unknown)  tetracycline (Unknown)  Zoloft (Other)    Intolerances        SOCIAL HISTORY:    [  ] Etoh  [  ] Smoking  [  ] Substance abuse     Home Environment:  [ x ] Home Alone  [  ] Lives with Family  [ x ] Home Health Aid    Dwelling:  [  ] Apartment  [  ] Private House  [  ] Adult Home  [  ] Skilled Nursing Facility      [  ] Short Term  [  ] Long Term  [  ] Stairs       Elevator [  ]    FUNCTIONAL STATUS PTA: (Check all that apply)  Ambulation: [ x  ]Independent    [  ] Dependent     [  ] Non-Ambulatory  Assistive Device: [  ] SA Cane  [  ]  Q Cane  [x  ] Walker  [  ]  Wheelchair  ADL : [  ] Independent  [  ]  Dependent       Vital Signs Last 24 Hrs  T(C): 36.7 (19 Dec 2019 08:14), Max: 36.9 (18 Dec 2019 23:05)  T(F): 98.1 (19 Dec 2019 08:14), Max: 98.4 (18 Dec 2019 23:05)  HR: 62 (19 Dec 2019 08:14) (62 - 78)  BP: 111/60 (19 Dec 2019 08:14) (98/53 - 121/56)  BP(mean): --  RR: 18 (19 Dec 2019 08:14) (18 - 18)  SpO2: 97% (19 Dec 2019 08:14) (96% - 99%)      PHYSICAL EXAM: Alert & Oriented X3  GENERAL: NAD, well-groomed, well-developed  HEAD:  Atraumatic, Normocephalic  EYES: EOMI, PERRLA, conjunctiva and sclera clear  NECK: Supple, No JVD, Normal thyroid  CHEST/LUNG: Clear to percussion bilaterally; No rales, rhonchi, wheezing, or rubs  HEART: Regular rate and rhythm; No murmurs, rubs, or gallops  ABDOMEN: Soft, Nontender, Nondistended; Bowel sounds present  EXTREMITIES:  2+ Peripheral Pulses, No clubbing, cyanosis, or edema    NERVOUS SYSTEM:  Cranial Nerves 2-12 intact [  ] Abnormal  [  ]  ROM: WFL all extremities [  ]  Abnormal [  ]  Motor Strength: WFL all extremities  [  ]  Abnormal [x  ]   5-/5 LE's  Sensation: intact to light touch [  ] Abnormal [  ]  Reflexes: Symmetric [  ]  Abnormal [  ]    FUNCTIONAL STATUS:  Bed Mobility: Independent [  ]  Supervision [  ]  Needs Assistance [  ]  N/A [  ]  Transfers: Independent [  ]  Supervision [  ]  Needs Assistance [  ]  N/A [  ]   Ambulation: Independent [  ]  Supervision [  ]  Needs Assistance [  ]  N/A [  ]  ADL: Independent [  ] Requires Assistance [  ] N/A [  ]      LABS:                        13.9   6.01  )-----------( 366      ( 18 Dec 2019 20:20 )             45.1     12-19    144  |  104  |  18  ----------------------------<  108<H>  4.0   |  24  |  0.5<L>    Ca    9.2      19 Dec 2019 05:25  Mg     2.3     12-19    TPro  6.9  /  Alb  4.6  /  TBili  0.3  /  DBili  x   /  AST  11  /  ALT  14  /  AlkPhos  69  12-18    PT/INR - ( 18 Dec 2019 20:20 )   PT: 11.20 sec;   INR: 0.97 ratio         PTT - ( 18 Dec 2019 20:20 )  PTT:30.7 sec      RADIOLOGY & ADDITIONAL STUDIES:    Assesment:

## 2019-12-19 NOTE — PROGRESS NOTE ADULT - SUBJECTIVE AND OBJECTIVE BOX
SEJAL BARNARD 54y Female  MRN#: [id number]   CODE STATUS:____Full____      SUBJECTIVE  Patient is a 54y old Female who presents with a chief complaint of Dizziness (18 Dec 2019 23:16)  Currently admitted to medicine with the primary diagnosis of Dizziness    Today is hospital day 1d, and this morning she is still complaining of significant dizziness, worse now - feels she should go to rehab. But sitting in bed comfortably and eating well. Otherwise denies CP, SOB, abd pain, leg swelling. Does endorse some constipation.       OBJECTIVE  PAST MEDICAL & SURGICAL HISTORY  Dizziness  Migraine  Crohn disease  COPD (chronic obstructive pulmonary disease)  Depression  Anxiety  Schizophrenia  ANISHA (obstructive sleep apnea)  Polyneuropathy  Bladder disorder  Sciatica  Constipation  Iron deficiency  Hypercholesteremia  Hypothyroid  Diabetes mellitus, type 2  Vitamin D deficiency  GERD (gastroesophageal reflux disease)  History of cholecystectomy    ALLERGIES:  ciprofloxacin (Unknown)  codeine (Unknown)  Fish Products (Unknown)  lactose (Unknown)  latex (Unknown)  penicillins (Unknown)  tetracycline (Unknown)  Zoloft (Other)    MEDICATIONS:  STANDING MEDICATIONS  albuterol/ipratropium (CFC free) Inhaler. 1 Puff(s) Inhalation four times a day  atorvastatin 40 milliGRAM(s) Oral at bedtime  budesonide 160 MICROgram(s)/formoterol 4.5 MICROgram(s) Inhaler 2 Puff(s) Inhalation two times a day  chlorhexidine 4% Liquid 1 Application(s) Topical <User Schedule>  cyanocobalamin 1000 MICROGram(s) Oral daily  dextrose 5%. 1000 milliLiter(s) IV Continuous <Continuous>  dextrose 50% Injectable 12.5 Gram(s) IV Push once  dextrose 50% Injectable 25 Gram(s) IV Push once  dextrose 50% Injectable 25 Gram(s) IV Push once  enoxaparin Injectable 40 milliGRAM(s) SubCutaneous daily  ergocalciferol 88552 Unit(s) Oral <User Schedule>  ferrous    sulfate 325 milliGRAM(s) Oral daily  folic acid 1 milliGRAM(s) Oral daily  haloperidol     Tablet 5 milliGRAM(s) Oral daily  insulin glargine Injectable (LANTUS) 50 Unit(s) SubCutaneous at bedtime  insulin lispro (HumaLOG) corrective regimen sliding scale   SubCutaneous three times a day before meals  insulin lispro Injectable (HumaLOG) 10 Unit(s) SubCutaneous three times a day before meals  levothyroxine 88 MICROGram(s) Oral daily  mesalamine ER Capsule 500 milliGRAM(s) Oral daily  midodrine. 10 milliGRAM(s) Oral three times a day  montelukast 10 milliGRAM(s) Oral at bedtime    PRN MEDICATIONS  dextrose 40% Gel 15 Gram(s) Oral once PRN  glucagon  Injectable 1 milliGRAM(s) IntraMuscular once PRN  meclizine 25 milliGRAM(s) Oral three times a day PRN  polyethylene glycol 3350 17 Gram(s) Oral daily PRN      VITAL SIGNS: Last 24 Hours  T(C): 36.7 (19 Dec 2019 08:14), Max: 36.9 (18 Dec 2019 23:05)  T(F): 98.1 (19 Dec 2019 08:14), Max: 98.4 (18 Dec 2019 23:05)  HR: 62 (19 Dec 2019 08:14) (62 - 78)  BP: 111/60 (19 Dec 2019 08:14) (98/53 - 121/56)  BP(mean): --  RR: 18 (19 Dec 2019 08:14) (18 - 18)  SpO2: 97% (19 Dec 2019 08:14) (96% - 99%)    LABS:                        13.9   6.01  )-----------( 366      ( 18 Dec 2019 20:20 )             45.1     12-19    144  |  104  |  18  ----------------------------<  108<H>  4.0   |  24  |  0.5<L>    Ca    9.2      19 Dec 2019 05:25  Mg     2.3     12-19    TPro  6.9  /  Alb  4.6  /  TBili  0.3  /  DBili  x   /  AST  11  /  ALT  14  /  AlkPhos  69  12-18    PT/INR - ( 18 Dec 2019 20:20 )   PT: 11.20 sec;   INR: 0.97 ratio         PTT - ( 18 Dec 2019 20:20 )  PTT:30.7 sec      Troponin T, Serum: <0.01 ng/mL (12-18-19 @ 20:20)      CARDIAC MARKERS ( 18 Dec 2019 20:20 )  x     / <0.01 ng/mL / x     / x     / x          RADIOLOGY:  < from: CT Head No Cont (12.18.19 @ 21:00) >    Impression:       No evidence of intracranial hemorrhage, territorial infarct, or mass effect.    No change since the prior exam    < end of copied text >      PHYSICAL EXAM:    GENERAL: NAD, well-developed lady comfortable in bed w/o distress, AAOx3  HEENT:  Atraumatic, Normocephalic. EOMI, PERRLA, conjunctiva and sclera clear, No JVD; slight facial dysmorphia prominent forehead/small chin   PULMONARY: Clear to auscultation bilaterally; No wheeze/crackles  CARDIOVASCULAR: Regular rate and rhythm; No murmurs  GASTROINTESTINAL: Soft, Nontender, Nondistended; Bowel sounds present  MUSCULOSKELETAL:  2+ Peripheral Pulses, No periph edema  NEUROLOGY: CNII-CNXII intact however pt poorly cooperative w/ exam (doesn't seem to be able to do as asked?), no drift 5/5 strength all limbs  SKIN: bilateral knee abrasions noted      ADMISSION SUMMARY  Patient is a 54y old Female who presents with a chief complaint of Dizziness (18 Dec 2019 23:16)  Currently admitted to medicine with the primary diagnosis of Dizziness  54 year old female with PMH of DM II, COPD/Asthma (never smoked), ANISHA on CPAP, schizophrenia, depression/anxiety, Crohn disease, s/p loop recorder, migraine, hypothyroidism chronic dizziness presents to the ED complaining of dizziness and fall. She reports that she went to a holiday party today, when she came home at around 4:30 PM she was walking to the bathroom when she was feeling very dizzy and fell to her knees. She had previous admissions for dizziness and syncope workup. EP Dr Kilpatrick placed implantable loop recorder 12/5/2018. Working etiology was polypharmacy vs psychogenic vs vascular but MRA head was unremarkable.     In ED: vitals stable. Negative troponin. ECG with no new changes (chronic T-wave inversions in V1-3). Mg 1.6. CTH negative for any acute pathology. meclizine and 1 liter IV fluids were given. prior HCT/MRA reviewed - negative workup. Saw neuro in 2018, never saw on prior admit. Does endorse some ringing in ears but not associated with dizziness.       ASSESSMENT & PLAN  54 year old female with PMH of DM II, COPD/Asthma (never smoked), ANISHA on CPAP, schizophrenia, depression/anxiety, Crohn disease, s/p loop recorder, migraine, hypothyroidism chronic dizziness presents to the ED complaining of dizziness and fall    # Dizziness and fall  - Prior workup by cardiology and neurology were unrevealing - possibly orthostatics vs polypharmacy vs cardiologic etiology  - CTH negative for any acute pathology  - Check orthostatics BP  - c/w Meclizine, midodrine  - f/u EPS for loop recorder interrogation  - ENT eval   - No need for neuro at this pt because complete w/u negative   - Neurology recommended previously trial of Phenergan if okay with psych but pt on Haldol 5 mg daily and may induce significant QTc prolongation  - PT/rehab    # DM II  - Check fingerstick  - Insulin regimen and adjust accordingly    # COPD/asthma, stable  - Continue with montelukast    # ANISHA on CPAP - continue CPAP at 12  # Schizophrenia / Depression/anxiety - continue home meds  # Crohn disease - Continue Mesalamine  # Hypothyroid - continue Synthroid   # Suspected folate / B12 / Vitamin D deficiency - continue supplement   # Hypomagnesemia - resolved    #MISC  - DVT prophylaxis: Lovenox  - GI ppx: not indicated  - Dispo: from home. Has HHA 10 hours/7days       ( ) Discussion with patient and/or family regarding goals of care  ( ) Discussed Case and Plan with Medical Attending, Name:

## 2019-12-19 NOTE — CONSULT NOTE ADULT - SUBJECTIVE AND OBJECTIVE BOX
HPI:  54 year old female with PMH of DM II, COPD/Asthma (never smoked), ANISHA on CPAP, schizophrenia, depression/anxiety, Crohn disease, s/p loop recorder, migraine, hypothyroidism chronic dizziness presents to the ED complaining of dizziness and fall. She reports that she went to a holiday party today, when she came home at around 4:30 PM she was walking to the bathroom when she was feeling very dizzy and fell to her knees. She had previous admissions for dizziness and syncope workup. EP Dr Kilpatrick placed implantable loop recorder 12/5/2018. Working etiology was polypharmacy vs psychogenic vs vascular but MRA head was unremarkable.     In ED: vitals stable. Negative troponin. ECG with no new changes (chronic T-wave inversions in V1-3). Mg 1.6  CTH negative for any acute pathology  Meclizine and 1 liter IV fluids were given. (18 Dec 2019 23:16)      PAST MEDICAL & SURGICAL HISTORY  Dizziness  Migraine  Crohn disease  COPD (chronic obstructive pulmonary disease)  Depression  Anxiety  Schizophrenia  ANISHA (obstructive sleep apnea)  Polyneuropathy  Bladder disorder  Sciatica  Constipation  Iron deficiency  Hypercholesteremia  Hypothyroid  Diabetes mellitus, type 2  Vitamin D deficiency  GERD (gastroesophageal reflux disease)  History of cholecystectomy      FAMILY HISTORY:  FAMILY HISTORY:      SOCIAL HISTORY:  []smoker  []Alcohol  []Drug    ALLERGIES:  ciprofloxacin (Unknown)  codeine (Unknown)  Fish Products (Unknown)  lactose (Unknown)  latex (Unknown)  penicillins (Unknown)  tetracycline (Unknown)  Zoloft (Other)      MEDICATIONS:  MEDICATIONS  (STANDING):  albuterol/ipratropium (CFC free) Inhaler. 1 Puff(s) Inhalation four times a day  atorvastatin 40 milliGRAM(s) Oral at bedtime  budesonide 160 MICROgram(s)/formoterol 4.5 MICROgram(s) Inhaler 2 Puff(s) Inhalation two times a day  chlorhexidine 4% Liquid 1 Application(s) Topical <User Schedule>  cyanocobalamin 1000 MICROGram(s) Oral daily  dextrose 5%. 1000 milliLiter(s) (50 mL/Hr) IV Continuous <Continuous>  dextrose 50% Injectable 12.5 Gram(s) IV Push once  dextrose 50% Injectable 25 Gram(s) IV Push once  dextrose 50% Injectable 25 Gram(s) IV Push once  enoxaparin Injectable 40 milliGRAM(s) SubCutaneous daily  ergocalciferol 93394 Unit(s) Oral <User Schedule>  ferrous    sulfate 325 milliGRAM(s) Oral daily  folic acid 1 milliGRAM(s) Oral daily  haloperidol     Tablet 5 milliGRAM(s) Oral daily  insulin glargine Injectable (LANTUS) 50 Unit(s) SubCutaneous at bedtime  insulin lispro (HumaLOG) corrective regimen sliding scale   SubCutaneous three times a day before meals  insulin lispro Injectable (HumaLOG) 10 Unit(s) SubCutaneous three times a day before meals  levothyroxine 88 MICROGram(s) Oral daily  mesalamine ER Capsule 500 milliGRAM(s) Oral daily  midodrine. 10 milliGRAM(s) Oral three times a day  montelukast 10 milliGRAM(s) Oral at bedtime    MEDICATIONS  (PRN):  dextrose 40% Gel 15 Gram(s) Oral once PRN Blood Glucose LESS THAN 70 milliGRAM(s)/deciliter  glucagon  Injectable 1 milliGRAM(s) IntraMuscular once PRN Glucose LESS THAN 70 milligrams/deciliter  meclizine 25 milliGRAM(s) Oral three times a day PRN Dizziness  polyethylene glycol 3350 17 Gram(s) Oral daily PRN Constipation      HOME MEDICATIONS:  Home Medications:  Actos 45 mg oral tablet: 1 tab(s) orally once a day (18 Dec 2019 22:53)  albuterol 90 mcg/inh inhalation aerosol: 2 puff(s) inhaled 4 times a day, As Needed (18 Dec 2019 23:00)  Apriso 0.375 g oral capsule, extended release: 1 cap(s) orally 2 times a day (18 Dec 2019 22:58)  atorvastatin 40 mg oral tablet: 1 tab(s) orally once a day (15 Dec 2018 06:41)  Combivent Respimat 20 mcg-100 mcg/inh inhalation aerosol: 1 puff(s) inhaled 4 times a day (18 Dec 2019 22:59)  ferrous sulfate 325 mg (65 mg elemental iron) oral tablet: 1 tab(s) orally once a day (18 Dec 2019 22:54)  folic acid 1 mg oral tablet: 1 tab(s) orally once a day (18 Dec 2019 22:54)  Haldol 5 mg oral tablet: 1 tab(s) orally once a day (18 Dec 2019 22:55)  loperamide 2 mg oral capsule: orally once a day (30 Nov 2018 17:34)  meclizine 25 mg oral tablet: 1 tab(s) orally 3 times a day, As Needed (06 Dec 2018 14:18)  metFORMIN 1000 mg oral tablet: 1 tab(s) orally 2 times a day (18 Dec 2019 22:55)  midodrine 10 mg oral tablet: 1 tab(s) orally 3 times a day (18 Dec 2019 22:56)  NovoLOG FlexPen 100 units/mL injectable solution: 10 unit(s) injectable 3 times a day (with meals) (18 Dec 2019 23:02)  Singulair 10 mg oral tablet: 1 tab(s) orally once a day (at bedtime) (18 Dec 2019 22:58)  SUMAtriptan 100 mg oral tablet: 1 tab(s) orally every 2 hours, As Needed (02 Dec 2018 20:38)  Synthroid 88 mcg (0.088 mg) oral tablet: 1 tab(s) orally once a day (18 Dec 2019 22:54)  traMADol 50 mg oral tablet: orally every 6 hours, As Needed (02 Dec 2018 20:38)  Trelegy Ellipta inhalation powder: 1 puff(s) inhaled once a day (18 Dec 2019 22:59)  Vitamin B12 100 mcg oral tablet: 1 tab(s) orally once a day (18 Dec 2019 22:55)  Vitamin D2 50,000 intl units (1.25 mg) oral capsule: 1 cap(s) orally once a week (Thursday) (18 Dec 2019 22:56)      VITALS:   T(F): 98.1 (12-19 @ 08:14), Max: 98.4 (12-18 @ 23:05)  HR: 62 (12-19 @ 08:14) (62 - 78)  BP: 111/60 (12-19 @ 08:14) (98/53 - 121/56)  BP(mean): --  RR: 18 (12-19 @ 08:14) (18 - 18)  SpO2: 97% (12-19 @ 08:14) (96% - 99%)    I&O's Summary      REVIEW OF SYSTEMS:  CONSTITUTIONAL: No weakness, fevers or chills  EYES/ENT: No visual changes;  No vertigo or throat pain   NECK: No pain or stiffness  RESPIRATORY: No cough, wheezing, hemoptysis; No shortness of breath  CARDIOVASCULAR: No chest pain or palpitations  GASTROINTESTINAL: No abdominal or epigastric pain. No nausea, vomiting, or hematemesis; No diarrhea or constipation. No melena or hematochezia.  GENITOURINARY: No dysuria, frequency or hematuria  NEUROLOGICAL: No numbness or weakness  SKIN: No itching, no rashes    PHYSICAL EXAM:  NEURO: patient is awake , alert and oriented  GEN: Not in acute distress  NECK: no thyroid enlargement, no JVD  LUNGS: Clear to auscultation bilaterally   CARDIOVASCULAR: S1/S2 present, RRR , no murmus or rubs, + PP bilaterally  ABD: Soft, non-tender, non-distended, +BS  EXT: No AURA  SKIN: Intact    LABS:                        13.9   6.01  )-----------( 366      ( 18 Dec 2019 20:20 )             45.1     12-19    144  |  104  |  18  ----------------------------<  108<H>  4.0   |  24  |  0.5<L>    Ca    9.2      19 Dec 2019 05:25  Mg     2.3     12-19    TPro  6.9  /  Alb  4.6  /  TBili  0.3  /  DBili  x   /  AST  11  /  ALT  14  /  AlkPhos  69  12-18    PT/INR - ( 18 Dec 2019 20:20 )   PT: 11.20 sec;   INR: 0.97 ratio         PTT - ( 18 Dec 2019 20:20 )  PTT:30.7 sec  Troponin T, Serum: <0.01 ng/mL (12-18-19 @ 20:20)    CARDIAC MARKERS ( 18 Dec 2019 20:20 )  x     / <0.01 ng/mL / x     / x     / x            Troponin trend:    Serum Pro-Brain Natriuretic Peptide: 81 pg/mL (12-18-19 @ 20:20)      Hemoglobin A1C Hemoglobin A1C, Whole Blood: 6.8 % (12-19-19 @ 05:25)    Thyroid      RADIOLOGY:  -CXR:  -TTE:  -CCTA:  -STRESS TEST:  -CATHETERIZATION:    ECG:    TELEMETRY EVENTS:

## 2019-12-19 NOTE — CONSULT NOTE ADULT - ASSESSMENT
Dizziness- while walking   previous tilt at [facility] positive for significant orthostatic hypotension   Loop recorder interrogated, no arrhythmias during the fall event     symptoms likely secondary to orthostatics  Repeat head-up tilt table test for proper management of dizziness causing her falls impression   Dizziness- while walking  poor historian   previous tilt at [facility] positive for significant orthostatic hypotension   Loop recorder interrogated, no arrhythmias during the fall event     plan     symptoms likely secondary to orthostatic hypotension s  Repeat head-up tilt table test for proper management of dizziness causing her falls

## 2019-12-19 NOTE — CONSULT NOTE ADULT - ASSESSMENT
IMPRESSION: Rehab of   debility, orthostasis, vertigo, on haldol    PRECAUTIONS: [  ] Cardiac  [  ] Respiratory  [  ] Seizures [  ] Contact Isolation  [  ] Droplet Isolation  [  ] Other    Weight Bearing Status:     RECOMMENDATION:   Consider adding bilateral thigh high teds on in aam and off at bedtime; BP is on low side.    Out of Bed to Chair     DVT/Decubiti Prophylaxis    REHAB PLAN:     [  x ] Bedside P/T 3-5 times a week   [   ]   Bedside O/T  2-3 times a week             [   ] No Rehab Therapy Indicated                   [   ]  Speech Therapy   Conditioning/ROM                                    ADL  Bed Mobility                                               Conditioning/ROM  Transfers                                                     Bed Mobility  Sitting /Standing Balance                         Transfers                                        Gait Training                                               Sitting/Standing Balance  Stair Training [   ]Applicable                    Home equipment Eval                                                                        Splinting  [   ] Only      GOALS:   ADL   [x  ]   Independent                    Transfers  [ x  ] Independent                          Ambulation  [ x  ] Independent     [  x  ] With device                            [   ]  CG                                                         [   ]  CG                                                                  [   ] CG                            [    ] Min A                                                   [   ] Min A                                                              [   ] Min  A          DISCHARGE PLAN:   [   ]  Good candidate for Intensive Rehabilitation/Hospital based-[facility]A [facility]                                             Will tolerate 3hrs Intensive Rehab Daily                                       [  x  ]  Short Term Rehab in Skilled Nursing Facility                                       [    ]  Home with Outpatient or VN services                                         [    ]  Possible Candidate for Intensive Hospital based Rehab

## 2019-12-20 LAB
AMMONIA BLD-MCNC: 21 UMOL/L — SIGNIFICANT CHANGE UP (ref 11–55)
ANION GAP SERPL CALC-SCNC: 15 MMOL/L — HIGH (ref 7–14)
BUN SERPL-MCNC: 23 MG/DL — HIGH (ref 10–20)
CALCIUM SERPL-MCNC: 8.7 MG/DL — SIGNIFICANT CHANGE UP (ref 8.5–10.1)
CHLORIDE SERPL-SCNC: 102 MMOL/L — SIGNIFICANT CHANGE UP (ref 98–110)
CO2 SERPL-SCNC: 25 MMOL/L — SIGNIFICANT CHANGE UP (ref 17–32)
CREAT SERPL-MCNC: 0.5 MG/DL — LOW (ref 0.7–1.5)
GLUCOSE BLDC GLUCOMTR-MCNC: 119 MG/DL — HIGH (ref 70–99)
GLUCOSE BLDC GLUCOMTR-MCNC: 134 MG/DL — HIGH (ref 70–99)
GLUCOSE BLDC GLUCOMTR-MCNC: 183 MG/DL — HIGH (ref 70–99)
GLUCOSE BLDC GLUCOMTR-MCNC: 200 MG/DL — HIGH (ref 70–99)
GLUCOSE SERPL-MCNC: 151 MG/DL — HIGH (ref 70–99)
HCT VFR BLD CALC: 41.5 % — SIGNIFICANT CHANGE UP (ref 37–47)
HGB BLD-MCNC: 12.8 G/DL — SIGNIFICANT CHANGE UP (ref 12–16)
MAGNESIUM SERPL-MCNC: 2.1 MG/DL — SIGNIFICANT CHANGE UP (ref 1.8–2.4)
MCHC RBC-ENTMCNC: 25.5 PG — LOW (ref 27–31)
MCHC RBC-ENTMCNC: 30.8 G/DL — LOW (ref 32–37)
MCV RBC AUTO: 82.7 FL — SIGNIFICANT CHANGE UP (ref 81–99)
NRBC # BLD: 0 /100 WBCS — SIGNIFICANT CHANGE UP (ref 0–0)
PLATELET # BLD AUTO: 295 K/UL — SIGNIFICANT CHANGE UP (ref 130–400)
POTASSIUM SERPL-MCNC: 4.2 MMOL/L — SIGNIFICANT CHANGE UP (ref 3.5–5)
POTASSIUM SERPL-SCNC: 4.2 MMOL/L — SIGNIFICANT CHANGE UP (ref 3.5–5)
RBC # BLD: 5.02 M/UL — SIGNIFICANT CHANGE UP (ref 4.2–5.4)
RBC # FLD: 16.6 % — HIGH (ref 11.5–14.5)
SODIUM SERPL-SCNC: 142 MMOL/L — SIGNIFICANT CHANGE UP (ref 135–146)
WBC # BLD: 5.4 K/UL — SIGNIFICANT CHANGE UP (ref 4.8–10.8)
WBC # FLD AUTO: 5.4 K/UL — SIGNIFICANT CHANGE UP (ref 4.8–10.8)

## 2019-12-20 PROCEDURE — 70547 MR ANGIOGRAPHY NECK W/O DYE: CPT | Mod: 26

## 2019-12-20 PROCEDURE — 99233 SBSQ HOSP IP/OBS HIGH 50: CPT

## 2019-12-20 PROCEDURE — 70551 MRI BRAIN STEM W/O DYE: CPT | Mod: 26

## 2019-12-20 PROCEDURE — 70544 MR ANGIOGRAPHY HEAD W/O DYE: CPT | Mod: 26,59

## 2019-12-20 PROCEDURE — 93660 TILT TABLE EVALUATION: CPT | Mod: 26

## 2019-12-20 RX ORDER — ALBUTEROL 90 UG/1
1 AEROSOL, METERED ORAL EVERY 4 HOURS
Refills: 0 | Status: DISCONTINUED | OUTPATIENT
Start: 2019-12-20 | End: 2019-12-24

## 2019-12-20 RX ORDER — TIOTROPIUM BROMIDE 18 UG/1
1 CAPSULE ORAL; RESPIRATORY (INHALATION) DAILY
Refills: 0 | Status: DISCONTINUED | OUTPATIENT
Start: 2019-12-20 | End: 2019-12-24

## 2019-12-20 RX ORDER — IPRATROPIUM/ALBUTEROL SULFATE 18-103MCG
3 AEROSOL WITH ADAPTER (GRAM) INHALATION EVERY 6 HOURS
Refills: 0 | Status: DISCONTINUED | OUTPATIENT
Start: 2019-12-20 | End: 2019-12-24

## 2019-12-20 RX ADMIN — ATORVASTATIN CALCIUM 40 MILLIGRAM(S): 80 TABLET, FILM COATED ORAL at 22:04

## 2019-12-20 RX ADMIN — HALOPERIDOL DECANOATE 5 MILLIGRAM(S): 100 INJECTION INTRAMUSCULAR at 11:51

## 2019-12-20 RX ADMIN — Medication 0: at 08:12

## 2019-12-20 RX ADMIN — Medication 10 UNIT(S): at 08:12

## 2019-12-20 RX ADMIN — Medication 1 PUFF(S): at 07:57

## 2019-12-20 RX ADMIN — MIDODRINE HYDROCHLORIDE 10 MILLIGRAM(S): 2.5 TABLET ORAL at 17:29

## 2019-12-20 RX ADMIN — Medication 10 UNIT(S): at 17:29

## 2019-12-20 RX ADMIN — MIDODRINE HYDROCHLORIDE 10 MILLIGRAM(S): 2.5 TABLET ORAL at 05:51

## 2019-12-20 RX ADMIN — BUDESONIDE AND FORMOTEROL FUMARATE DIHYDRATE 2 PUFF(S): 160; 4.5 AEROSOL RESPIRATORY (INHALATION) at 22:04

## 2019-12-20 RX ADMIN — Medication 1 MILLIGRAM(S): at 11:51

## 2019-12-20 RX ADMIN — Medication 25 MILLIGRAM(S): at 17:29

## 2019-12-20 RX ADMIN — POLYETHYLENE GLYCOL 3350 17 GRAM(S): 17 POWDER, FOR SOLUTION ORAL at 11:51

## 2019-12-20 RX ADMIN — Medication 25 MILLIGRAM(S): at 08:24

## 2019-12-20 RX ADMIN — INSULIN GLARGINE 50 UNIT(S): 100 INJECTION, SOLUTION SUBCUTANEOUS at 22:04

## 2019-12-20 RX ADMIN — Medication 10 UNIT(S): at 14:37

## 2019-12-20 RX ADMIN — PREGABALIN 1000 MICROGRAM(S): 225 CAPSULE ORAL at 11:51

## 2019-12-20 RX ADMIN — MONTELUKAST 10 MILLIGRAM(S): 4 TABLET, CHEWABLE ORAL at 22:04

## 2019-12-20 RX ADMIN — Medication 0: at 14:37

## 2019-12-20 RX ADMIN — Medication 88 MICROGRAM(S): at 05:51

## 2019-12-20 RX ADMIN — Medication 1: at 17:29

## 2019-12-20 RX ADMIN — Medication 500 MILLIGRAM(S): at 11:52

## 2019-12-20 RX ADMIN — MIDODRINE HYDROCHLORIDE 10 MILLIGRAM(S): 2.5 TABLET ORAL at 11:52

## 2019-12-20 RX ADMIN — ENOXAPARIN SODIUM 40 MILLIGRAM(S): 100 INJECTION SUBCUTANEOUS at 11:51

## 2019-12-20 RX ADMIN — Medication 3 MILLILITER(S): at 20:16

## 2019-12-20 NOTE — PROGRESS NOTE ADULT - ASSESSMENT
54 year old female with PMH of DM II, COPD/Asthma (never smoked), ANISHA on CPAP, schizophrenia, depression/anxiety, Crohn disease, s/p loop recorder, migraine, hypothyroidism chronic dizziness presents to the ED complaining of dizziness and fall    Patient still complaining of Dizzziness this morning. No significant events overnight  # Dizziness and fall  - Prior workup by cardiology and neurology were unrevealing - possibly orthostatics vs polypharmacy vs cardiologic etiology  - CTH negative for any acute pathology  - Check orthostatics BP  - c/w Meclizine, midodrine  - f/u EPS for loop recorder interrogation  - ENT eval   - No need for neuro at this pt because complete w/u negative   - Neurology recommended previously trial of Phenergan if okay with psych but pt on Haldol 5 mg daily and may induce significant QTc prolongation  - PT/rehab    # DM II  - Check fingerstick  - Insulin regimen and adjust accordingly    # COPD/asthma, stable  - Continue with montelukast    # ANISHA on CPAP - continue CPAP at 12  # Schizophrenia / Depression/anxiety - continue home meds  # Crohn disease - Continue Mesalamine  # Hypothyroid - continue Synthroid   # Suspected folate / B12 / Vitamin D deficiency - continue supplement   # Hypomagnesemia - resolved    #MISC  - DVT prophylaxis: Lovenox  - GI ppx: not indicated  - Dispo: from home. Has [facility] 10 hours/7days 54 year old female with PMH of DM II, COPD/Asthma (never smoked), ANISHA on CPAP, schizophrenia, depression/anxiety, Crohn disease, s/p loop recorder, migraine, hypothyroidism chronic dizziness presents to the ED complaining of dizziness and fall    Patient still complaining of Dizzziness this morning. No significant events overnight    # Dizziness and fall  - Prior workup by cardiology and neurology were unrevealing   - EPS following- no findings on loop recorder, tilt table study today, f/u results  - CTH negative for any acute pathology  - orthostatics negative  - c/w midodrine, Meclizine ( will change to Promethazine 25mg po bid if ineffective)  - ENT eval pending  - Neurology recommended MRI head, MRA head and neck  (loop recorder is compatible , confirmed with EP)  - PT/rehab    # DM II  - Check fingerstick  - Insulin regimen and adjust accordingly    # COPD/asthma, stable  - Continue with montelukast    # ANISHA on CPAP - continue CPAP at 12  # Schizophrenia / Depression/anxiety - continue home meds  # Crohn disease - Continue Mesalamine  # Hypothyroid - continue Synthroid   # Suspected folate / B12 / Vitamin D deficiency - continue supplement , f/u serum levels  # Hypomagnesemia - resolved    #MISC  - DVT prophylaxis: Lovenox  - GI ppx: not indicated  - Dispo: from home. Has [facility] 10 hours/7days

## 2019-12-20 NOTE — CONSULT NOTE ADULT - ASSESSMENT
55 yo [facility] with PMH of DM II, COPD/Asthma (never smoked), ANISHA on CPAP, schizophrenia, depression/anxiety, Crohn disease, s/p loop recorder, migraine, hypothyroidism chronic dizziness P/W dizziness and fall, states that her dizziness has been constant since onset and gets aggravated with head turning or mvmt and is different than her usual dizziness. Dizziness a/w bitemporal headache , but denies n/v or ear symptoms.  She had previous admissions for dizziness and syncope workup. EP Dr Kilpatrick placed implantable loop recorder 12/5/2018. Her symptoms are currently persistent.                               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

## 2019-12-20 NOTE — PROGRESS NOTE ADULT - SUBJECTIVE AND OBJECTIVE BOX
LENGTH OF HOSPITAL STAY: 2d    CHIEF COMPLAINT:   Patient is a 54y old  Female who presents with a chief complaint of Dizziness (20 Dec 2019 09:26)      HISTORY OF PRESENTING ILLNESS:    HPI:  54 year old female with PMH of DM II, COPD/Asthma (never smoked), ANISHA on CPAP, schizophrenia, depression/anxiety, Crohn disease, s/p loop recorder, migraine, hypothyroidism chronic dizziness presents to the ED complaining of dizziness and fall. She reports that she went to a holiday party today, when she came home at around 4:30 PM she was walking to the bathroom when she was feeling very dizzy and fell to her knees. She had previous admissions for dizziness and syncope workup. EP Dr Kilpatrick placed implantable loop recorder 12/5/2018. Working etiology was polypharmacy vs psychogenic vs vascular but MRA head was unremarkable.     In ED: vitals stable. Negative troponin. ECG with no new changes (chronic T-wave inversions in V1-3). Mg 1.6  CTH negative for any acute pathology  Meclizine and 1 liter IV fluids were given. (18 Dec 2019 23:16)    PAST MEDICAL & SURGICAL HISTORY  PAST MEDICAL & SURGICAL HISTORY:  Dizziness  Migraine  Crohn disease  COPD (chronic obstructive pulmonary disease)  Depression  Anxiety  Schizophrenia  ANISHA (obstructive sleep apnea)  Polyneuropathy  Bladder disorder  Sciatica  Constipation  Iron deficiency  Hypercholesteremia  Hypothyroid  Diabetes mellitus, type 2  Vitamin D deficiency  GERD (gastroesophageal reflux disease)  History of cholecystectomy    SOCIAL HISTORY:    ALLERGIES:  ciprofloxacin (Unknown)  codeine (Unknown)  Fish Products (Unknown)  lactose (Unknown)  latex (Unknown)  penicillins (Unknown)  tetracycline (Unknown)  Zoloft (Other)    MEDICATIONS:  STANDING MEDICATIONS  ALBUTerol    90 MICROgram(s) HFA Inhaler 1 Puff(s) Inhalation every 4 hours  albuterol/ipratropium for Nebulization 3 milliLiter(s) Nebulizer every 6 hours  atorvastatin 40 milliGRAM(s) Oral at bedtime  budesonide 160 MICROgram(s)/formoterol 4.5 MICROgram(s) Inhaler 2 Puff(s) Inhalation two times a day  chlorhexidine 4% Liquid 1 Application(s) Topical <User Schedule>  cyanocobalamin 1000 MICROGram(s) Oral daily  dextrose 5%. 1000 milliLiter(s) IV Continuous <Continuous>  dextrose 50% Injectable 12.5 Gram(s) IV Push once  dextrose 50% Injectable 25 Gram(s) IV Push once  dextrose 50% Injectable 25 Gram(s) IV Push once  enoxaparin Injectable 40 milliGRAM(s) SubCutaneous daily  ergocalciferol 62438 Unit(s) Oral <User Schedule>  ferrous    sulfate 325 milliGRAM(s) Oral daily  folic acid 1 milliGRAM(s) Oral daily  haloperidol     Tablet 5 milliGRAM(s) Oral daily  insulin glargine Injectable (LANTUS) 50 Unit(s) SubCutaneous at bedtime  insulin lispro (HumaLOG) corrective regimen sliding scale   SubCutaneous three times a day before meals  insulin lispro Injectable (HumaLOG) 10 Unit(s) SubCutaneous three times a day before meals  levothyroxine 88 MICROGram(s) Oral daily  mesalamine ER Capsule 500 milliGRAM(s) Oral daily  midodrine. 10 milliGRAM(s) Oral three times a day  montelukast 10 milliGRAM(s) Oral at bedtime  tiotropium 18 MICROgram(s) Capsule 1 Capsule(s) Inhalation daily    PRN MEDICATIONS  dextrose 40% Gel 15 Gram(s) Oral once PRN  glucagon  Injectable 1 milliGRAM(s) IntraMuscular once PRN  meclizine 25 milliGRAM(s) Oral three times a day PRN  polyethylene glycol 3350 17 Gram(s) Oral daily PRN    VITALS:   T(F): 97  HR: 78  BP: 115/61  RR: 18  SpO2: 97%    LABS:                        12.8   5.40  )-----------( 295      ( 20 Dec 2019 08:00 )             41.5     12-20    142  |  102  |  23<H>  ----------------------------<  151<H>  4.2   |  25  |  0.5<L>    Ca    8.7      20 Dec 2019 08:00  Mg     2.1     12-20    TPro  6.9  /  Alb  4.6  /  TBili  0.3  /  DBili  x   /  AST  11  /  ALT  14  /  AlkPhos  69  12-18    PT/INR - ( 18 Dec 2019 20:20 )   PT: 11.20 sec;   INR: 0.97 ratio         PTT - ( 18 Dec 2019 20:20 )  PTT:30.7 sec          CARDIAC MARKERS ( 18 Dec 2019 20:20 )  x     / <0.01 ng/mL / x     / x     / x          RADIOLOGY:  < from: CT Head No Cont (12.18.19 @ 21:00) >    Impression:       No evidence of intracranial hemorrhage, territorial infarct, or mass effect.    No change since the prior exam            < end of copied text >    PHYSICAL EXAM:  GEN: No acute distress  HEENT: AT, NC, PERRLA  LUNGS: Clear to auscultation bilaterally   HEART: S1/S2 present. RRR.   ABD: Soft, non-tender, non-distended. Bowel sounds present  EXT: edema, clubbing, cyanosis absent  NEURO: AAOX3

## 2019-12-20 NOTE — CONSULT NOTE ADULT - ASSESSMENT
A 53yo woman with multiple comorbidities adm for dizziness started suddenly 2 days ago, still feels dizzy even laying down in the bed. Worse when stands up. Meclizine in not helping. No other neuro sxs. Has mild rt arm weakness on neuro exam - as per pt is new. Had similar episode 1 yr ago - MRA head was neg. Denies N/V, visual or speech exe associated with dizziness, dizziness feels as the room is spinning. Has loop recorder to eval cariogenic causes.     Impression:  - Vertigo/dizziness peripheral vs central, r/o brain stem ischemia, r/o VBI, r/o psychogenic causes vs polypharmacy  - DM II, COPD/Asthma, ANISHA on CPAP, schizophrenia, depression/anxiety, Crohn disease, s/p loop recorder, migraine, hypothyroidism, chronic dizziness    Plan:  - MRI brain w/o contrast  - MRA head and neck w/o contrast  - can try to change Meclizine to Promethazine 25mg po bid  - Vit B12, folate, NH3, TSH  - VNG as OP after d/c  - please call us for any questions

## 2019-12-20 NOTE — CONSULT NOTE ADULT - SUBJECTIVE AND OBJECTIVE BOX
Neurology consult    SEJAL [id number]    HPI:  54 year old female with PMH of DM II, COPD/Asthma (never smoked), ANISHA on CPAP, schizophrenia, depression/anxiety, Crohn disease, s/p loop recorder, migraine, hypothyroidism chronic dizziness presents to the ED complaining of dizziness and fall. She reports that she went to a holiday party today, when she came home at around 4:30 PM she was walking to the bathroom when she was feeling very dizzy and fell to her knees. She had previous admissions for dizziness and syncope workup. EP Dr Kilpatrick placed implantable loop recorder 12/5/2018. Working etiology was polypharmacy vs psychogenic vs vascular but MRA head was unremarkable.     In ED: vitals stable. Negative troponin. ECG with no new changes (chronic T-wave inversions in V1-3). Mg 1.6  CTH negative for any acute pathology  Meclizine and 1 liter IV fluids were given. (18 Dec 2019 23:16)      MEDICATIONS    ALBUTerol    90 MICROgram(s) HFA Inhaler 1 Puff(s) Inhalation every 4 hours  albuterol/ipratropium for Nebulization 3 milliLiter(s) Nebulizer every 6 hours  atorvastatin 40 milliGRAM(s) Oral at bedtime  budesonide 160 MICROgram(s)/formoterol 4.5 MICROgram(s) Inhaler 2 Puff(s) Inhalation two times a day  chlorhexidine 4% Liquid 1 Application(s) Topical <User Schedule>  cyanocobalamin 1000 MICROGram(s) Oral daily  dextrose 40% Gel 15 Gram(s) Oral once PRN  dextrose 5%. 1000 milliLiter(s) IV Continuous <Continuous>  dextrose 50% Injectable 12.5 Gram(s) IV Push once  dextrose 50% Injectable 25 Gram(s) IV Push once  dextrose 50% Injectable 25 Gram(s) IV Push once  enoxaparin Injectable 40 milliGRAM(s) SubCutaneous daily  ergocalciferol 74875 Unit(s) Oral <User Schedule>  ferrous    sulfate 325 milliGRAM(s) Oral daily  folic acid 1 milliGRAM(s) Oral daily  glucagon  Injectable 1 milliGRAM(s) IntraMuscular once PRN  haloperidol     Tablet 5 milliGRAM(s) Oral daily  insulin glargine Injectable (LANTUS) 50 Unit(s) SubCutaneous at bedtime  insulin lispro (HumaLOG) corrective regimen sliding scale   SubCutaneous three times a day before meals  insulin lispro Injectable (HumaLOG) 10 Unit(s) SubCutaneous three times a day before meals  levothyroxine 88 MICROGram(s) Oral daily  meclizine 25 milliGRAM(s) Oral three times a day PRN  mesalamine ER Capsule 500 milliGRAM(s) Oral daily  midodrine. 10 milliGRAM(s) Oral three times a day  montelukast 10 milliGRAM(s) Oral at bedtime  polyethylene glycol 3350 17 Gram(s) Oral daily PRN  tiotropium 18 MICROgram(s) Capsule 1 Capsule(s) Inhalation daily      PAST MEDICAL & SURGICAL HISTORY:  Dizziness  Migraine  Crohn disease  COPD (chronic obstructive pulmonary disease)  Depression  Anxiety  Schizophrenia  ANISHA (obstructive sleep apnea)  Polyneuropathy  Bladder disorder  Sciatica  Constipation  Iron deficiency  Hypercholesteremia  Hypothyroid  Diabetes mellitus, type 2  Vitamin D deficiency  GERD (gastroesophageal reflux disease)  History of cholecystectomy       Family history: No history of dementia, strokes, or seizures   FAMILY HISTORY:    SOCIAL HISTORY --     Allergies    ciprofloxacin (Unknown)  codeine (Unknown)  Fish Products (Unknown)  lactose (Unknown)  latex (Unknown)  penicillins (Unknown)  tetracycline (Unknown)  Zoloft (Other)    Intolerances        Height (cm): 162.6 (12-20 @ 01:40)  Weight (kg): 86 (12-20 @ 01:40)  BMI (kg/m2): 32.5 (12-20 @ 01:40)    Vital Signs Last 24 Hrs  T(C): 36.3 (20 Dec 2019 05:25), Max: 37.1 (19 Dec 2019 16:05)  T(F): 97.4 (20 Dec 2019 05:25), Max: 98.8 (19 Dec 2019 16:05)  HR: 73 (20 Dec 2019 05:25) (67 - 78)  BP: 143/87 (20 Dec 2019 05:25) (99/51 - 143/87)  BP(mean): --  RR: 18 (20 Dec 2019 05:25) (18 - 18)  SpO2: 97% (20 Dec 2019 01:42) (95% - 97%)    DIZZINESS HYPOMAGNESEMIA UNSTEADY GAIT  ^FALL  No pertinent family history in first degree relatives  Handoff  MEWS Score  Dizziness  Migraine  Crohn disease  COPD (chronic obstructive pulmonary disease)  Depression  Anxiety  Schizophrenia  ANISHA (obstructive sleep apnea)  Polyneuropathy  Bladder disorder  Sciatica  Constipation  Iron deficiency  Hypercholesteremia  Hypothyroid  Diabetes mellitus, type 2  Vitamin D deficiency  Dementia  GERD (gastroesophageal reflux disease)  Diabetes  No pertinent past medical history  Dizziness  History of cholecystectomy  FALL  90+  Unsteady gait  Hypomagnesemia      REVIEW OF SYSTEMS:    Constitutional: No fever, chills, fatigue, weakness  Eyes: no eye pain, visual disturbances, or discharge  ENT:  No difficulty hearing. Has b/l tinnitus and dizziness; No sinus or throat pain  Neck: No pain or stiffness  Respiratory: No cough, dyspnea, wheezing. Has ANISHA   Cardiovascular: No chest pain, palpitations, has loop recorder  Gastrointestinal: as per PMHx  Genitourinary: as per PMHx  Neurological: No headaches, lightheadedness, vertigo, numbness or tremors  Psychiatric: as per PMHx  Musculoskeletal: No joint pain or swelling; No muscle, back or extremity pain  Skin: No itching, burning, rashes or lesions   Lymph Nodes: No enlarged glands  Endocrine: h/o diabetes and thyroid dysfunction  Allergy and Immunologic: No hives or eczema    PHYSICAL EXAMINATION:  General:  Well-developed, well nourished, in no acute distress.  Eyes: Conjunctiva and sclera clear.  Cardiovascular: Regular rate and rhythm; S1 and S2 Normal; No murmurs, gallops or rubs.  Neurologic:  - Mental Status:  Alert, awake, oriented to person, place, and time; Speech is fluent with intact naming, repetition, and comprehension.  - Cranial Nerves II-XII:  PERRLA, EOMI, no nystagmus, face symmetrical hearing intact to finger rub bl, tongue midline  - Motor:  Strength is 5/5 in LUE and both legs, 4+/5 in RUE.  There is no pronator drift.  Normal muscle bulk and tone throughout.  - Reflexes:  2+ symmetric throughout.  Plantar responses flexor.  - Sensory:  Intact to light touch  sense throughout.  - Coordination:  Finger-nose-finger and heel-knee-shin intact without dysmetria.  Rapid alternating hand movements intact.  - Gait:  refused b/o dizziness.    LABS:  CBC Full  -  ( 20 Dec 2019 08:00 )  WBC Count : 5.40 K/uL  RBC Count : 5.02 M/uL  Hemoglobin : 12.8 g/dL  Hematocrit : 41.5 %  Platelet Count - Automated : 295 K/uL  Mean Cell Volume : 82.7 fL  Mean Cell Hemoglobin : 25.5 pg  Mean Cell Hemoglobin Concentration : 30.8 g/dL  Auto Neutrophil # : x  Auto Lymphocyte # : x  Auto Monocyte # : x  Auto Eosinophil # : x  Auto Basophil # : x  Auto Neutrophil % : x  Auto Lymphocyte % : x  Auto Monocyte % : x  Auto Eosinophil % : x  Auto Basophil % : x      12-20    142  |  102  |  23<H>  ----------------------------<  151<H>  4.2   |  25  |  0.5<L>    Ca    8.7      20 Dec 2019 08:00  Mg     2.1     12-20    TPro  6.9  /  Alb  4.6  /  TBili  0.3  /  DBili  x   /  AST  11  /  ALT  14  /  AlkPhos  69  12-18    Hemoglobin A1C:     LIVER FUNCTIONS - ( 18 Dec 2019 20:20 )  Alb: 4.6 g/dL / Pro: 6.9 g/dL / ALK PHOS: 69 U/L / ALT: 14 U/L / AST: 11 U/L / GGT: x           PT/INR - ( 18 Dec 2019 20:20 )   PT: 11.20 sec;   INR: 0.97 ratio      PTT - ( 18 Dec 2019 20:20 )  PTT:30.7 sec    Urinalysis (12.15.18 @ 08:03)    pH Urine: 6.0    Glucose Qualitative, Urine: >=1000    Blood, Urine: Negative    Color: Yellow    Urine Appearance: Clear    Bilirubin: Negative    Ketone - Urine: Negative    Specific Gravity: >=1.030    Protein, Urine: Negative    Urobilinogen: 0.2    Nitrite: Negative    Leukocyte Esterase Concentration: Negative    RADIOLOGY    < from: CT Head No Cont (12.18.19 @ 21:00) >  No evidence of intracranial hemorrhage, territorial infarct, or mass effect.  < end of copied text >

## 2019-12-20 NOTE — CONSULT NOTE ADULT - SUBJECTIVE AND OBJECTIVE BOX
CC: Dizziness      HPI:   54 year old left handed female with PMH of DM II, COPD/Asthma (never smoked), ANISHA on CPAP, schizophrenia, depression/anxiety, Crohn disease, s/p loop recorder, migraine, hypothyroidism chronic dizziness presents to the ED complaining of dizziness and fall. She reports that she went to a holiday party yesterday and , when she came home at around 4:30 PM she was walking to the bathroom when she acutely experienced room spinning dizziness and fell to her knees.  She further states that her dizziness has been constant since onset and gets aggravated with head turning or mvmt. Dizziness a/w bitemporal headache , but denies n/v.  She had previous admissions for dizziness and syncope workup. EP Dr Kilpatrick placed implantable loop recorder 12/5/2018. She states that     In ED: vitals stable. Negative troponin. ECG with no new changes (chronic T-wave inversions in V1-3). Mg 1.6  CTH negative for any acute pathology  Meclizine and 1 liter IV fluids were given.       Home Medications:  Actos 45 mg oral tablet: 1 tab(s) orally once a day   albuterol 90 mcg/inh inhalation aerosol: 2 puff(s) inhaled 4 times a day, As Needed   Apriso 0.375 g oral capsule, extended release: 1 cap(s) orally 2 times a day   atorvastatin 40 mg oral tablet: 1 tab(s) orally once a day   Combivent Respimat 20 mcg-100 mcg/inh inhalation aerosol: 1 puff(s) inhaled 4 times a day   ferrous sulfate 325 mg (65 mg elemental iron) oral tablet: 1 tab(s) orally once a day   folic acid 1 mg oral tablet: 1 tab(s) orally once a day  Haldol 5 mg oral tablet: 1 tab(s) orally once a day   loperamide 2 mg oral capsule: orally once a day   meclizine 25 mg oral tablet: 1 tab(s) orally 3 times a day, As Needed   metformin 1000 mg oral tablet: 1 tab(s) orally 2 times a day   midodrine 10 mg oral tablet: 1 tab(s) orally 3 times a day  NovoLog FlexPen 100 units/mL injectable solution: 10 unit(s) injectable 3 times a day (with meals)   Singulair 10 mg oral tablet: 1 tab(s) orally once a day (at bedtime)  Sumatriptan 100 mg oral tablet: 1 tab(s) orally every 2 hours, As Needed   Synthroid 88 mcg (0.088 mg) oral tablet: 1 tab(s) orally once a day   tramadol 50 mg oral tablet: orally every 6 hours, As Needed   Trelegy Ellipta inhalation powder: 1 puff(s) inhaled once a day  Vitamin B12 100 mcg oral tablet: 1 tab(s) orally once a day   Vitamin D2 50,000 intl units (1.25 mg) oral capsule: 1 cap(s) orally once a week (Thursday)       Social history   Denies smoking alcohol or drug use.      Neuro Exam:  Orientation: oriented to person, place and time , speech is fluent  Cranial Nerves: Intact except for horizontal nystagmus  Motor: 5/5 throughout/ no drift  Sensory exam:  intact and symmetric  Coordination:. No dysmetria or limb ataxia  Plantar responses normal on the right, normal on the left.    Gait : deferred    NIHSS: 0    Allergies    ciprofloxacin (Unknown)  codeine (Unknown)  Fish Products (Unknown)  lactose (Unknown)  latex (Unknown)  penicillins (Unknown)  tetracycline (Unknown)  Zoloft (Other)    Intolerances      MEDICATIONS  (STANDING):  albuterol/ipratropium (CFC free) Inhaler. 1 Puff(s) Inhalation four times a day  atorvastatin 40 milliGRAM(s) Oral at bedtime  budesonide 160 MICROgram(s)/formoterol 4.5 MICROgram(s) Inhaler 2 Puff(s) Inhalation two times a day  chlorhexidine 4% Liquid 1 Application(s) Topical <User Schedule>  cyanocobalamin 1000 MICROGram(s) Oral daily  dextrose 5%. 1000 milliLiter(s) (50 mL/Hr) IV Continuous <Continuous>  dextrose 50% Injectable 12.5 Gram(s) IV Push once  dextrose 50% Injectable 25 Gram(s) IV Push once  dextrose 50% Injectable 25 Gram(s) IV Push once  enoxaparin Injectable 40 milliGRAM(s) SubCutaneous daily  ergocalciferol 83353 Unit(s) Oral <User Schedule>  ferrous    sulfate 325 milliGRAM(s) Oral daily  folic acid 1 milliGRAM(s) Oral daily  haloperidol     Tablet 5 milliGRAM(s) Oral daily  insulin glargine Injectable (LANTUS) 50 Unit(s) SubCutaneous at bedtime  insulin lispro (HumaLOG) corrective regimen sliding scale   SubCutaneous three times a day before meals  insulin lispro Injectable (HumaLOG) 10 Unit(s) SubCutaneous three times a day before meals  levothyroxine 88 MICROGram(s) Oral daily  mesalamine ER Capsule 500 milliGRAM(s) Oral daily  midodrine. 10 milliGRAM(s) Oral three times a day  montelukast 10 milliGRAM(s) Oral at bedtime    MEDICATIONS  (PRN):  dextrose 40% Gel 15 Gram(s) Oral once PRN Blood Glucose LESS THAN 70 milliGRAM(s)/deciliter  glucagon  Injectable 1 milliGRAM(s) IntraMuscular once PRN Glucose LESS THAN 70 milligrams/deciliter  meclizine 25 milliGRAM(s) Oral three times a day PRN Dizziness  polyethylene glycol 3350 17 Gram(s) Oral daily PRN Constipation      LABS:                        13.9   6.01  )-----------( 366      ( 18 Dec 2019 20:20 )             45.1     12-19    144  |  104  |  18  ----------------------------<  108<H>  4.0   |  24  |  0.5<L>    Ca    9.2      19 Dec 2019 05:25  Mg     2.3     12-19    TPro  6.9  /  Alb  4.6  /  TBili  0.3  /  DBili  x   /  AST  11  /  ALT  14  /  AlkPhos  69  12-18    PT/INR - ( 18 Dec 2019 20:20 )   PT: 11.20 sec;   INR: 0.97 ratio         PTT - ( 18 Dec 2019 20:20 )  PTT:30.7 sec  Hemoglobin A1C, Whole Blood: 6.8 % (12-19 @ 05:25)        Neuro Imaging:  < from: CT Head No Cont (12.18.19 @ 21:00) >  Findings:     The ventricles, basal cisterns and sulcal pattern are within normal limits for the patient's stated age.      The gray-white matter differentiation is preserved.    There is no acute mass effect, midline shift or intracranial hemorrhage.      The imaged paranasal sinuses and bilateral mastoid complexes are unremarkable.    No evidence of a depressed skull fracture.    Beam hardening artifact is noted overlying the brain stem and posterior fossa which is inherent to CT in this location.      Impression:       No evidence of intracranial hemorrhage, territorial infarct, or mass effect.    No change since the prior exam            < from: MR Angio Head No Cont (12.01.18 @ 22:07) >  Findings: There is normal signal flow enhancement noted in the bilateral   internal carotid arteries, middle cerebral arteries and distal MCA   branches.Normal signal flow enhancement is noted of the bilateral   anterior cerebral arteries, anterior communicating artery and bilateral   A2 segments.    There is normal signal flow enhancement of the bilateral vertebral   arteries with a dominant left vertebral artery and small/hypoplastic   right vertebral artery.  Normal filling is noted of the basilar artery,   bilateral SCA and PCA vessels.    There is normal signal flow enhancement of the bilateral posterior   communicating arteries.  There is no evidence of aneurysms, vascular   malformations or stenosis identified at this time.      IMPRESSION:     Unremarkable MRA of the brain without contrast.      < end of copied text >    EEG:     Echo:   Carotid Doppler: N/A  Telemetry:

## 2019-12-21 LAB
FOLATE SERPL-MCNC: >20 NG/ML — SIGNIFICANT CHANGE UP
GLUCOSE BLDC GLUCOMTR-MCNC: 126 MG/DL — HIGH (ref 70–99)
GLUCOSE BLDC GLUCOMTR-MCNC: 134 MG/DL — HIGH (ref 70–99)
GLUCOSE BLDC GLUCOMTR-MCNC: 142 MG/DL — HIGH (ref 70–99)
GLUCOSE BLDC GLUCOMTR-MCNC: 178 MG/DL — HIGH (ref 70–99)
TSH SERPL-MCNC: 4.22 UIU/ML — HIGH (ref 0.27–4.2)
VIT B12 SERPL-MCNC: 519 PG/ML — SIGNIFICANT CHANGE UP (ref 232–1245)

## 2019-12-21 PROCEDURE — 99233 SBSQ HOSP IP/OBS HIGH 50: CPT

## 2019-12-21 RX ORDER — ACETAMINOPHEN 500 MG
650 TABLET ORAL EVERY 6 HOURS
Refills: 0 | Status: DISCONTINUED | OUTPATIENT
Start: 2019-12-21 | End: 2019-12-23

## 2019-12-21 RX ORDER — ACETAMINOPHEN 500 MG
650 TABLET ORAL ONCE
Refills: 0 | Status: COMPLETED | OUTPATIENT
Start: 2019-12-21 | End: 2019-12-21

## 2019-12-21 RX ADMIN — MONTELUKAST 10 MILLIGRAM(S): 4 TABLET, CHEWABLE ORAL at 23:02

## 2019-12-21 RX ADMIN — Medication 10 UNIT(S): at 17:28

## 2019-12-21 RX ADMIN — Medication 10 UNIT(S): at 12:02

## 2019-12-21 RX ADMIN — MIDODRINE HYDROCHLORIDE 10 MILLIGRAM(S): 2.5 TABLET ORAL at 11:15

## 2019-12-21 RX ADMIN — PREGABALIN 1000 MICROGRAM(S): 225 CAPSULE ORAL at 11:15

## 2019-12-21 RX ADMIN — ATORVASTATIN CALCIUM 40 MILLIGRAM(S): 80 TABLET, FILM COATED ORAL at 23:02

## 2019-12-21 RX ADMIN — Medication 650 MILLIGRAM(S): at 18:05

## 2019-12-21 RX ADMIN — Medication 650 MILLIGRAM(S): at 11:59

## 2019-12-21 RX ADMIN — POLYETHYLENE GLYCOL 3350 17 GRAM(S): 17 POWDER, FOR SOLUTION ORAL at 12:01

## 2019-12-21 RX ADMIN — HALOPERIDOL DECANOATE 5 MILLIGRAM(S): 100 INJECTION INTRAMUSCULAR at 11:15

## 2019-12-21 RX ADMIN — BUDESONIDE AND FORMOTEROL FUMARATE DIHYDRATE 2 PUFF(S): 160; 4.5 AEROSOL RESPIRATORY (INHALATION) at 20:01

## 2019-12-21 RX ADMIN — BUDESONIDE AND FORMOTEROL FUMARATE DIHYDRATE 2 PUFF(S): 160; 4.5 AEROSOL RESPIRATORY (INHALATION) at 08:35

## 2019-12-21 RX ADMIN — Medication 500 MILLIGRAM(S): at 11:15

## 2019-12-21 RX ADMIN — Medication 1: at 12:02

## 2019-12-21 RX ADMIN — Medication 3 MILLILITER(S): at 07:49

## 2019-12-21 RX ADMIN — Medication 650 MILLIGRAM(S): at 12:45

## 2019-12-21 RX ADMIN — MIDODRINE HYDROCHLORIDE 10 MILLIGRAM(S): 2.5 TABLET ORAL at 06:22

## 2019-12-21 RX ADMIN — CHLORHEXIDINE GLUCONATE 1 APPLICATION(S): 213 SOLUTION TOPICAL at 06:22

## 2019-12-21 RX ADMIN — ENOXAPARIN SODIUM 40 MILLIGRAM(S): 100 INJECTION SUBCUTANEOUS at 11:15

## 2019-12-21 RX ADMIN — Medication 650 MILLIGRAM(S): at 17:33

## 2019-12-21 RX ADMIN — Medication 3 MILLILITER(S): at 14:51

## 2019-12-21 RX ADMIN — Medication 25 MILLIGRAM(S): at 11:14

## 2019-12-21 RX ADMIN — Medication 88 MICROGRAM(S): at 06:22

## 2019-12-21 RX ADMIN — INSULIN GLARGINE 50 UNIT(S): 100 INJECTION, SOLUTION SUBCUTANEOUS at 23:02

## 2019-12-21 RX ADMIN — Medication 25 MILLIGRAM(S): at 23:02

## 2019-12-21 RX ADMIN — Medication 10 UNIT(S): at 08:35

## 2019-12-21 RX ADMIN — Medication 1 MILLIGRAM(S): at 11:15

## 2019-12-21 RX ADMIN — MIDODRINE HYDROCHLORIDE 10 MILLIGRAM(S): 2.5 TABLET ORAL at 17:28

## 2019-12-21 NOTE — CONSULT NOTE ADULT - ATTENDING COMMENTS
I personally reviewed patient's MRI images and discussed results with her.    recommend audiogram and VNG as o/p after clearance by cardio/neuro.

## 2019-12-21 NOTE — PROGRESS NOTE ADULT - SUBJECTIVE AND OBJECTIVE BOX
LENGTH OF HOSPITAL STAY: 3d    CHIEF COMPLAINT:   Patient is a 54y old  Female who presents with a chief complaint of Dizziness (20 Dec 2019 16:16)      HISTORY OF PRESENTING ILLNESS:    HPI:  54 year old female with PMH of DM II, COPD/Asthma (never smoked), ANISHA on CPAP, schizophrenia, depression/anxiety, Crohn disease, s/p loop recorder, migraine, hypothyroidism chronic dizziness presents to the ED complaining of dizziness and fall. She reports that she went to a holiday party today, when she came home at around 4:30 PM she was walking to the bathroom when she was feeling very dizzy and fell to her knees. She had previous admissions for dizziness and syncope workup. EP Dr Kilpatrick placed implantable loop recorder 12/5/2018. Working etiology was polypharmacy vs psychogenic vs vascular but MRA head was unremarkable.     In ED: vitals stable. Negative troponin. ECG with no new changes (chronic T-wave inversions in V1-3). Mg 1.6  CTH negative for any acute pathology  Meclizine and 1 liter IV fluids were given. (18 Dec 2019 23:16)    PAST MEDICAL & SURGICAL HISTORY  PAST MEDICAL & SURGICAL HISTORY:  Dizziness  Migraine  Crohn disease  COPD (chronic obstructive pulmonary disease)  Depression  Anxiety  Schizophrenia  ANISHA (obstructive sleep apnea)  Polyneuropathy  Bladder disorder  Sciatica  Constipation  Iron deficiency  Hypercholesteremia  Hypothyroid  Diabetes mellitus, type 2  Vitamin D deficiency  GERD (gastroesophageal reflux disease)  History of cholecystectomy    SOCIAL HISTORY:    ALLERGIES:  ciprofloxacin (Unknown)  codeine (Unknown)  Fish Products (Unknown)  lactose (Unknown)  latex (Unknown)  penicillins (Unknown)  tetracycline (Unknown)  Zoloft (Other)    MEDICATIONS:  STANDING MEDICATIONS  acetaminophen   Tablet .. 650 milliGRAM(s) Oral once  ALBUTerol    90 MICROgram(s) HFA Inhaler 1 Puff(s) Inhalation every 4 hours  albuterol/ipratropium for Nebulization 3 milliLiter(s) Nebulizer every 6 hours  atorvastatin 40 milliGRAM(s) Oral at bedtime  budesonide 160 MICROgram(s)/formoterol 4.5 MICROgram(s) Inhaler 2 Puff(s) Inhalation two times a day  chlorhexidine 4% Liquid 1 Application(s) Topical <User Schedule>  cyanocobalamin 1000 MICROGram(s) Oral daily  dextrose 5%. 1000 milliLiter(s) IV Continuous <Continuous>  dextrose 50% Injectable 12.5 Gram(s) IV Push once  dextrose 50% Injectable 25 Gram(s) IV Push once  dextrose 50% Injectable 25 Gram(s) IV Push once  enoxaparin Injectable 40 milliGRAM(s) SubCutaneous daily  ergocalciferol 84935 Unit(s) Oral <User Schedule>  ferrous    sulfate 325 milliGRAM(s) Oral daily  folic acid 1 milliGRAM(s) Oral daily  haloperidol     Tablet 5 milliGRAM(s) Oral daily  insulin glargine Injectable (LANTUS) 50 Unit(s) SubCutaneous at bedtime  insulin lispro (HumaLOG) corrective regimen sliding scale   SubCutaneous three times a day before meals  insulin lispro Injectable (HumaLOG) 10 Unit(s) SubCutaneous three times a day before meals  levothyroxine 88 MICROGram(s) Oral daily  mesalamine ER Capsule 500 milliGRAM(s) Oral daily  midodrine. 10 milliGRAM(s) Oral three times a day  montelukast 10 milliGRAM(s) Oral at bedtime  promethazine 25 milliGRAM(s) Oral two times a day  tiotropium 18 MICROgram(s) Capsule 1 Capsule(s) Inhalation daily    PRN MEDICATIONS  acetaminophen   Tablet .. 650 milliGRAM(s) Oral every 6 hours PRN  dextrose 40% Gel 15 Gram(s) Oral once PRN  glucagon  Injectable 1 milliGRAM(s) IntraMuscular once PRN  polyethylene glycol 3350 17 Gram(s) Oral daily PRN    VITALS:   T(F): 97.5  HR: 70  BP: 99/50  RR: 18  SpO2: 93%    LABS:                        12.8   5.40  )-----------( 295      ( 20 Dec 2019 08:00 )             41.5     12-20    142  |  102  |  23<H>  ----------------------------<  151<H>  4.2   |  25  |  0.5<L>    Ca    8.7      20 Dec 2019 08:00  Mg     2.1     12-20                    RADIOLOGY:  < from: MR Angio Neck No Cont (12.20.19 @ 19:51) >    IMPRESSION:     No evidence of major vascular stenosis or occlusion.        < end of copied text >    PHYSICAL EXAM:  GEN: No acute distress  HEENT: AT, NC, PERRLA  LUNGS: Clear to auscultation bilaterally   HEART: S1/S2 present. RRR.   ABD: Soft, non-tender, non-distended. Bowel sounds present  EXT: edema, clubbing, cyanosis absent  NEURO: AAOX3 LENGTH OF HOSPITAL STAY: 3d    CHIEF COMPLAINT:   Patient is a 54y old  Female who presents with a chief complaint of Dizziness (20 Dec 2019 16:16)      HISTORY OF PRESENTING ILLNESS:    HPI:  54 year old female with PMH of DM II, COPD/Asthma (never smoked), ANISHA on CPAP, schizophrenia, depression/anxiety, Crohn disease, s/p loop recorder, migraine, hypothyroidism chronic dizziness presents to the ED complaining of dizziness and fall. She reports that she went to a holiday party today, when she came home at around 4:30 PM she was walking to the bathroom when she was feeling very dizzy and fell to her knees. She had previous admissions for dizziness and syncope workup. EP Dr Kilpatrick placed implantable loop recorder 12/5/2018. Working etiology was polypharmacy vs psychogenic vs vascular but MRA head was unremarkable.     In ED: vitals stable. Negative troponin. ECG with no new changes (chronic T-wave inversions in V1-3). Mg 1.6  CTH negative for any acute pathology  Meclizine and 1 liter IV fluids were given. (18 Dec 2019 23:16)    PAST MEDICAL & SURGICAL HISTORY  PAST MEDICAL & SURGICAL HISTORY:  Dizziness  Migraine  Crohn disease  COPD (chronic obstructive pulmonary disease)  Depression  Anxiety  Schizophrenia  ANISHA (obstructive sleep apnea)  Polyneuropathy  Bladder disorder  Sciatica  Constipation  Iron deficiency  Hypercholesteremia  Hypothyroid  Diabetes mellitus, type 2  Vitamin D deficiency  GERD (gastroesophageal reflux disease)  History of cholecystectomy    SOCIAL HISTORY:    ALLERGIES:  ciprofloxacin (Unknown)  codeine (Unknown)  Fish Products (Unknown)  lactose (Unknown)  latex (Unknown)  penicillins (Unknown)  tetracycline (Unknown)  Zoloft (Other)    MEDICATIONS:  STANDING MEDICATIONS  acetaminophen   Tablet .. 650 milliGRAM(s) Oral once  ALBUTerol    90 MICROgram(s) HFA Inhaler 1 Puff(s) Inhalation every 4 hours  albuterol/ipratropium for Nebulization 3 milliLiter(s) Nebulizer every 6 hours  atorvastatin 40 milliGRAM(s) Oral at bedtime  budesonide 160 MICROgram(s)/formoterol 4.5 MICROgram(s) Inhaler 2 Puff(s) Inhalation two times a day  chlorhexidine 4% Liquid 1 Application(s) Topical <User Schedule>  cyanocobalamin 1000 MICROGram(s) Oral daily  dextrose 5%. 1000 milliLiter(s) IV Continuous <Continuous>  dextrose 50% Injectable 12.5 Gram(s) IV Push once  dextrose 50% Injectable 25 Gram(s) IV Push once  dextrose 50% Injectable 25 Gram(s) IV Push once  enoxaparin Injectable 40 milliGRAM(s) SubCutaneous daily  ergocalciferol 07250 Unit(s) Oral <User Schedule>  ferrous    sulfate 325 milliGRAM(s) Oral daily  folic acid 1 milliGRAM(s) Oral daily  haloperidol     Tablet 5 milliGRAM(s) Oral daily  insulin glargine Injectable (LANTUS) 50 Unit(s) SubCutaneous at bedtime  insulin lispro (HumaLOG) corrective regimen sliding scale   SubCutaneous three times a day before meals  insulin lispro Injectable (HumaLOG) 10 Unit(s) SubCutaneous three times a day before meals  levothyroxine 88 MICROGram(s) Oral daily  mesalamine ER Capsule 500 milliGRAM(s) Oral daily  midodrine. 10 milliGRAM(s) Oral three times a day  montelukast 10 milliGRAM(s) Oral at bedtime  promethazine 25 milliGRAM(s) Oral two times a day  tiotropium 18 MICROgram(s) Capsule 1 Capsule(s) Inhalation daily    PRN MEDICATIONS  acetaminophen   Tablet .. 650 milliGRAM(s) Oral every 6 hours PRN  dextrose 40% Gel 15 Gram(s) Oral once PRN  glucagon  Injectable 1 milliGRAM(s) IntraMuscular once PRN  polyethylene glycol 3350 17 Gram(s) Oral daily PRN    VITALS:   T(F): 97.5  HR: 70  BP: 99/50  RR: 18  SpO2: 93%    LABS:                        12.8   5.40  )-----------( 295      ( 20 Dec 2019 08:00 )             41.5     12-20    142  |  102  |  23<H>  ----------------------------<  151<H>  4.2   |  25  |  0.5<L>    Ca    8.7      20 Dec 2019 08:00  Mg     2.1     12-20        RADIOLOGY:  < from: MR Angio Neck No Cont (12.20.19 @ 19:51) >    IMPRESSION:     No evidence of major vascular stenosis or occlusion.        < end of copied text >    PHYSICAL EXAM:  GEN: No acute distress  HEENT: AT, NC, PERRLA  LUNGS: Clear to auscultation bilaterally   HEART: S1/S2 present. RRR.   ABD: Soft, non-tender, non-distended. Bowel sounds present  EXT: edema, clubbing, cyanosis absent  NEURO: AAOX3

## 2019-12-21 NOTE — CONSULT NOTE ADULT - ASSESSMENT
Pt is a 54 y.o female with dizziness, room spinning, persistent.    ·	audiogram, VNG and vesitbular rehab as OP  ·	cont supportive care  ·	no acute ENT intervention - d/w patient  ·	w/d with attng

## 2019-12-21 NOTE — PROGRESS NOTE ADULT - ASSESSMENT
54 year old female with PMH of DM II, COPD/Asthma (never smoked), ANISHA on CPAP, schizophrenia, depression/anxiety, Crohn disease, s/p loop recorder, migraine, hypothyroidism chronic dizziness presents to the ED complaining of dizziness and fall    Patient still complaining of Dizzziness this morning. No significant events overnight    # Dizziness and fall  - Prior workup by cardiology and neurology were unrevealing   - EPS following- no findings on loop recorder, tilt table study today, partially positive  - CTH negative for any acute pathology  - MRI, MRA head/neck nefative for any vascular occlusion  - orthostatics negative  - c/w midodrine, Meclizine changed to Promethazine 25mg po bid  - ENT eval pending  - vit b12/folate/NH3 NL  - PT/rehab- patient will need placement in [facility]    # DM II  - Check fingerstick  - Insulin regimen and adjust accordingly    # COPD/asthma, stable  - Continue with montelukast    # ANISHA on CPAP - continue CPAP at 12  # Schizophrenia / Depression/anxiety - continue home meds  # Crohn disease - Continue Mesalamine  # Hypothyroid - continue Synthroid   # Suspected folate / B12 / Vitamin D deficiency - continue supplement , f/u serum levels  # Hypomagnesemia - resolved    #MISC  - DVT prophylaxis: Lovenox  - GI ppx: not indicated  - Dispo: from home. Has HHA 10 hours/7days 54 year old female with PMH of DM II, COPD/Asthma (never smoked), ANISHA on CPAP, schizophrenia, depression/anxiety, Crohn disease, s/p loop recorder, migraine, hypothyroidism chronic dizziness presents to the ED complaining of dizziness and fall    Patient still complaining of Dizzziness this morning. No significant events overnight    #Dizziness and fall  - Prior workup by cardiology and neurology were unrevealing   - EPS following- no findings on loop recorder, tilt table study yesterday pending offical  - CTH negative for any acute pathology  - MRI, MRA head/neck negative for any vascular occlusion  - orthostatics negative  - c/w midodrine, Meclizine changed to Promethazine 25mg po bid  - ENT eval pending  - vit b12/folate/NH3 NL  - PT/rehab- patient will need placement in [facility]  -TEDs stalkings     # DM II  - Check fingerstick  - Insulin regimen and adjust accordingly    # COPD/asthma, stable  - Continue with montelukast    # ANISHA on CPAP - continue CPAP at 12  # Schizophrenia / Depression/anxiety - continue home meds  # Crohn disease - Continue Mesalamine  # Hypothyroid - continue Synthroid   # Suspected folate / B12 / Vitamin D deficiency - continue supplement , f/u serum levels  # Hypomagnesemia - resolved    #MISC  - DVT prophylaxis: Lovenox  - GI ppx: not indicated  - Dispo: from home. Has HHA 10 hours/7days

## 2019-12-21 NOTE — CONSULT NOTE ADULT - SUBJECTIVE AND OBJECTIVE BOX
PT is a 54 y.o female admitted with dizziness, called to r/o vestibular cause. PT seen and examined at bedside, states that she has had room spinning vertigo for the past three days without any relief. PT had no precipitating factors, feels it is worse when she lays down or turns her head to the right side. PT has history of vertigo in the past, last year went for balance testing and rehab and noted improvement  - feels like the same symptoms. Pt denies any ear symptoms - no pain, tinnitus, discharge or fullness. PT denies any N/V. Pt denies any visual disturbances. PT denies any recent URI.    PAST MEDICAL & SURGICAL HISTORY:  Dizziness  Migraine  Crohn disease  COPD (chronic obstructive pulmonary disease)  Depression  Anxiety  Schizophrenia  ANISHA (obstructive sleep apnea)  Polyneuropathy  Bladder disorder  Sciatica  Constipation  Iron deficiency  Hypercholesteremia  Hypothyroid  Diabetes mellitus, type 2  Vitamin D deficiency  GERD (gastroesophageal reflux disease)  History of cholecystectomy  MEDICATIONS  (STANDING):  ALBUTerol    90 MICROgram(s) HFA Inhaler 1 Puff(s) Inhalation every 4 hours  albuterol/ipratropium for Nebulization 3 milliLiter(s) Nebulizer every 6 hours  atorvastatin 40 milliGRAM(s) Oral at bedtime  budesonide 160 MICROgram(s)/formoterol 4.5 MICROgram(s) Inhaler 2 Puff(s) Inhalation two times a day  chlorhexidine 4% Liquid 1 Application(s) Topical <User Schedule>  cyanocobalamin 1000 MICROGram(s) Oral daily  dextrose 5%. 1000 milliLiter(s) (50 mL/Hr) IV Continuous <Continuous>  dextrose 50% Injectable 12.5 Gram(s) IV Push once  dextrose 50% Injectable 25 Gram(s) IV Push once  dextrose 50% Injectable 25 Gram(s) IV Push once  enoxaparin Injectable 40 milliGRAM(s) SubCutaneous daily  ergocalciferol 05604 Unit(s) Oral <User Schedule>  ferrous    sulfate 325 milliGRAM(s) Oral daily  folic acid 1 milliGRAM(s) Oral daily  haloperidol     Tablet 5 milliGRAM(s) Oral daily  insulin glargine Injectable (LANTUS) 50 Unit(s) SubCutaneous at bedtime  insulin lispro (HumaLOG) corrective regimen sliding scale   SubCutaneous three times a day before meals  insulin lispro Injectable (HumaLOG) 10 Unit(s) SubCutaneous three times a day before meals  levothyroxine 88 MICROGram(s) Oral daily  mesalamine ER Capsule 500 milliGRAM(s) Oral daily  midodrine. 10 milliGRAM(s) Oral three times a day  montelukast 10 milliGRAM(s) Oral at bedtime  promethazine 25 milliGRAM(s) Oral two times a day  tiotropium 18 MICROgram(s) Capsule 1 Capsule(s) Inhalation daily  Allergies    ciprofloxacin (Unknown)  codeine (Unknown)  Fish Products (Unknown)  lactose (Unknown)  latex (Unknown)  penicillins (Unknown)  tetracycline (Unknown)  Zoloft (Other)    Intolerances    Vital Signs Last 24 Hrs  T(C): 36.7 (21 Dec 2019 13:11), Max: 36.7 (21 Dec 2019 13:11)  T(F): 98 (21 Dec 2019 13:11), Max: 98 (21 Dec 2019 13:11)  HR: 65 (21 Dec 2019 13:11) (65 - 70)  BP: 105/55 (21 Dec 2019 13:11) (99/50 - 105/55)  BP(mean): --  RR: 18 (21 Dec 2019 13:11) (18 - 18)  SpO2: 93% (21 Dec 2019 08:52) (93% - 93%)    GEN: NAD, awake and alert.   HEENT: PERRLA, EOMI b/l, no nystagmus noted on exam. pt actively dizzy while exam in progress. oral mucosa pink, no erythema/edema to post pharynx, uvula midline. neck supple. no pre/post auricular or tragal TTP B/L. EAC WNL B/L. TM intact B/L.                          12.8   5.40  )-----------( 295      ( 20 Dec 2019 08:00 )             41.5   12-20    142  |  102  |  23<H>  ----------------------------<  151<H>  4.2   |  25  |  0.5<L>    Ca    8.7      20 Dec 2019 08:00  Mg     2.1     12-20    < from: CT Head No Cont (12.18.19 @ 21:00) >    EXAM:  CT BRAIN        PROCEDURE DATE:  12/18/2019    INTERPRETATION:  Clinical History/Reason For Exam: Dizziness.    Technique: Multiple contiguous axial CT images were obtained from the base of the skull to the vertex without administration of intravenous contrast. Axial, coronal and sagittal images were reviewed.    Comparison: 5/24/2019    Findings:     The ventricles, basal cisterns and sulcal pattern are within normal limits for the patient's stated age.      The gray-white matter differentiation is preserved.    There is no acute mass effect, midline shift or intracranial hemorrhage.      The imaged paranasal sinuses and bilateral mastoid complexes are unremarkable.    No evidence of a depressed skull fracture.    Beam hardening artifact is noted overlying the brain stem and posterior fossa which is inherent to CT in this location.    Impression:       No evidence of intracranial hemorrhage, territorial infarct, or mass effect.    No change since the prior exam    FRANKLIN HARRELL M.D., ATTENDING RADIOLOGIST  This document has been electronically signed. Dec 18 2019  9:05PM        < from: MR Head No Cont (12.20.19 @ 19:51) >  EXAM:  MR BRAIN        PROCEDURE DATE:  12/20/2019      INTERPRETATION:  Clinical History / Reason for exam: Sudden onset dizziness.    TECHNIQUE: MRI brain without contrast. Multiplanar multisequential MRI of the brain without intravenous contrast administration on the 1.5 Safia magnet.     COMPARISON: Correlation with CT head 12/18/2019.    FINDINGS:    The ventricles and cortical sulci are normal in size and configuration.      There is no evidence of acute intracranial hemorrhage,extra-axial fluid collection or midline shift.       There is no diffusion abnormality to suggest acute/subacute infarct. There is normal flow void present within the major vascular structures.      The orbits, sinuses, and mastoid complexes are unremarkable.     IMPRESSION:     Unremarkable MRI of the brain.    ZOEY MILLER M.D., ATTENDING RADIOLOGIST  This document has been electronically signed. Dec 21 2019 10:14AM      < from: MR Angio Head No Cont (12.20.19 @ 19:51) >  EXAM:  MR ANGIO NECK        EXAM:  MR ANGIO BRAIN          PROCEDURE DATE:  12/20/2019      INTERPRETATION:  Clinical History / Reason for exam: Sudden onset dizziness    Technique:  MRA of the brain and neck without contrast. Magnetic resonance angiography of the brain and neck was performed utilizing 2-D and 3-D time-of-flight technique.    Correlation is made with accompanying brain MRI, MRA brain 12/1/2018.    Findings:       MRA NECK: The common, internal and external carotid arteries are patent bilaterally. The vertebral arteries are patent. The left vertebral artery is dominant.    MRA BRAIN: The distal segments of the internal carotid arteries are patent bilaterally.  There is normal bifurcation into the A1 and M1 segments.The distal vertebral arteries and the basilar artery are patent.  There is normal bifurcation into the P1 segments.     IMPRESSION:     No evidence of major vascular stenosis or occlusion.    ZOEY MILLER M.D., ATTENDING RADIOLOGIST  This document has been electronically signed. Dec 21 2019 10:14AM

## 2019-12-22 LAB
GLUCOSE BLDC GLUCOMTR-MCNC: 123 MG/DL — HIGH (ref 70–99)
GLUCOSE BLDC GLUCOMTR-MCNC: 130 MG/DL — HIGH (ref 70–99)
GLUCOSE BLDC GLUCOMTR-MCNC: 180 MG/DL — HIGH (ref 70–99)
GLUCOSE BLDC GLUCOMTR-MCNC: 90 MG/DL — SIGNIFICANT CHANGE UP (ref 70–99)

## 2019-12-22 PROCEDURE — 99233 SBSQ HOSP IP/OBS HIGH 50: CPT

## 2019-12-22 RX ADMIN — Medication 10 UNIT(S): at 08:24

## 2019-12-22 RX ADMIN — Medication 25 MILLIGRAM(S): at 12:15

## 2019-12-22 RX ADMIN — INSULIN GLARGINE 50 UNIT(S): 100 INJECTION, SOLUTION SUBCUTANEOUS at 21:38

## 2019-12-22 RX ADMIN — ATORVASTATIN CALCIUM 40 MILLIGRAM(S): 80 TABLET, FILM COATED ORAL at 21:38

## 2019-12-22 RX ADMIN — Medication 1 MILLIGRAM(S): at 12:14

## 2019-12-22 RX ADMIN — BUDESONIDE AND FORMOTEROL FUMARATE DIHYDRATE 2 PUFF(S): 160; 4.5 AEROSOL RESPIRATORY (INHALATION) at 21:38

## 2019-12-22 RX ADMIN — MIDODRINE HYDROCHLORIDE 10 MILLIGRAM(S): 2.5 TABLET ORAL at 16:01

## 2019-12-22 RX ADMIN — Medication 1: at 12:16

## 2019-12-22 RX ADMIN — Medication 650 MILLIGRAM(S): at 11:30

## 2019-12-22 RX ADMIN — Medication 10 UNIT(S): at 12:15

## 2019-12-22 RX ADMIN — HALOPERIDOL DECANOATE 5 MILLIGRAM(S): 100 INJECTION INTRAMUSCULAR at 12:15

## 2019-12-22 RX ADMIN — Medication 650 MILLIGRAM(S): at 16:00

## 2019-12-22 RX ADMIN — MIDODRINE HYDROCHLORIDE 10 MILLIGRAM(S): 2.5 TABLET ORAL at 12:15

## 2019-12-22 RX ADMIN — MONTELUKAST 10 MILLIGRAM(S): 4 TABLET, CHEWABLE ORAL at 21:38

## 2019-12-22 RX ADMIN — Medication 500 MILLIGRAM(S): at 16:01

## 2019-12-22 RX ADMIN — Medication 88 MICROGRAM(S): at 06:14

## 2019-12-22 RX ADMIN — Medication 650 MILLIGRAM(S): at 10:51

## 2019-12-22 RX ADMIN — Medication 650 MILLIGRAM(S): at 16:30

## 2019-12-22 RX ADMIN — Medication 25 MILLIGRAM(S): at 23:35

## 2019-12-22 RX ADMIN — PREGABALIN 1000 MICROGRAM(S): 225 CAPSULE ORAL at 12:15

## 2019-12-22 RX ADMIN — BUDESONIDE AND FORMOTEROL FUMARATE DIHYDRATE 2 PUFF(S): 160; 4.5 AEROSOL RESPIRATORY (INHALATION) at 08:24

## 2019-12-22 RX ADMIN — ENOXAPARIN SODIUM 40 MILLIGRAM(S): 100 INJECTION SUBCUTANEOUS at 12:15

## 2019-12-22 RX ADMIN — MIDODRINE HYDROCHLORIDE 10 MILLIGRAM(S): 2.5 TABLET ORAL at 06:14

## 2019-12-22 NOTE — PROGRESS NOTE ADULT - ASSESSMENT
54 year old female with PMH of DM II, COPD/Asthma (never smoked), ANISHA on CPAP, schizophrenia, depression/anxiety, Crohn disease, s/p loop recorder, migraine, hypothyroidism chronic dizziness presents to the ED complaining of dizziness and fall    #Dizziness and fall  - Prior workup by cardiology and neurology were unrevealing   - EPS following- no findings on loop recorder, tilt table study yesterday pending offical, prelim positive   - CTH negative for any acute pathology  - MRI, MRA head/neck negative for any vascular occlusion  - orthostatics negative  - c/w midodrine, Meclizine changed to Promethazine 25mg po bid  - ENT eval reccs appreciated- outpt vestibular rehab  - vit b12/folate/NH3 NL  - PT/rehab- patient will need placement in [facility]  -TEDs stalkings     # DM II  - Check fingerstick  - Insulin regimen and adjust accordingly    # COPD/asthma, stable  - Continue with montelukast    # ANISHA on CPAP - continue CPAP at 12  # Schizophrenia / Depression/anxiety - continue home meds  # Crohn disease - Continue Mesalamine  # Hypothyroid - continue Synthroid   # Suspected folate / B12 / Vitamin D deficiency - continue supplement , f/u serum levels  # Hypomagnesemia - resolved    #Progress Note Handoff  Pending (specify):  Consults___ENT, EP, neuro, PT______, Tests________, Test Results_______, Other_________  Family discussion: andie pt and agreed to plan  Disposition: Home___/SNF__x_placement /Other________/Unknown at this time________

## 2019-12-22 NOTE — PROGRESS NOTE ADULT - SUBJECTIVE AND OBJECTIVE BOX
Pt seen and examined at bedside. No CP or SOB. Still complaining of dizziness    PAST MEDICAL & SURGICAL HISTORY:  Dizziness  Migraine  Crohn disease  COPD (chronic obstructive pulmonary disease)  Depression  Anxiety  Schizophrenia  ANISHA (obstructive sleep apnea)  Polyneuropathy  Bladder disorder  Sciatica  Constipation  Iron deficiency  Hypercholesteremia  Hypothyroid  Diabetes mellitus, type 2  Vitamin D deficiency  GERD (gastroesophageal reflux disease)  History of cholecystectomy      VITAL SIGNS (Last 24 hrs):  T(C): 36.9 (12-22-19 @ 12:35), Max: 36.9 (12-21-19 @ 20:00)  HR: 86 (12-22-19 @ 12:35) (66 - 86)  BP: 115/68 (12-22-19 @ 12:35) (98/52 - 115/68)  RR: 18 (12-22-19 @ 12:35) (16 - 18)  SpO2: 93% (12-22-19 @ 08:32) (93% - 93%)  Wt(kg): --  Daily     Daily     I&O's Summary      PHYSICAL EXAM:  GENERAL: NAD, well-developed  HEAD:  Atraumatic, Normocephalic  EYES: EOMI, PERRLA, conjunctiva and sclera clear  NECK: Supple, No JVD  CHEST/LUNG: Clear to auscultation bilaterally; No wheeze  HEART: Regular rate and rhythm; No murmurs, rubs, or gallops  ABDOMEN: Soft, Nontender, Nondistended; Bowel sounds present  EXTREMITIES:  2+ Peripheral Pulses, No clubbing, cyanosis, or edema  PSYCH: AAOx3  NEUROLOGY: non-focal  SKIN: No rashes or lesions    Labs Reviewed  Spoke to patient in regards to abnormal labs.    CBC Full  -  ( 20 Dec 2019 08:00 )  WBC Count : 5.40 K/uL  Hemoglobin : 12.8 g/dL  Hematocrit : 41.5 %  Platelet Count - Automated : 295 K/uL  Mean Cell Volume : 82.7 fL  Mean Cell Hemoglobin : 25.5 pg  Mean Cell Hemoglobin Concentration : 30.8 g/dL  Auto Neutrophil # : x  Auto Lymphocyte # : x  Auto Monocyte # : x  Auto Eosinophil # : x  Auto Basophil # : x  Auto Neutrophil % : x  Auto Lymphocyte % : x  Auto Monocyte % : x  Auto Eosinophil % : x  Auto Basophil % : x    BMP:    12-20 @ 08:00    Blood Urea Nitrogen - 23  Calcium - 8.7  Carbond Dioxide - 25  Chloride - 102  Creatinine - 0.5  Glucose - 151  Potassium - 4.2  Sodium - 142      Hemoglobin A1c - Hemoglobin A1C, Whole Blood: 6.8 % (12-19 @ 05:25)    PT/INR - ( 18 Dec 2019 20:20 )   PT: 11.20 sec;   INR: 0.97 ratio         PTT - ( 18 Dec 2019 20:20 )  PTT:30.7 sec  Urine Culture:        Imaging reviewed: < from: MR Angio Neck No Cont (12.20.19 @ 19:51) >    IMPRESSION:     No evidence of major vascular stenosis or occlusion.    < end of copied text >    < from: MR Head No Cont (12.20.19 @ 19:51) >  IMPRESSION:     Unremarkable MRI of the brain.    < end of copied text >        MEDICATIONS  (STANDING):  ALBUTerol    90 MICROgram(s) HFA Inhaler 1 Puff(s) Inhalation every 4 hours  albuterol/ipratropium for Nebulization 3 milliLiter(s) Nebulizer every 6 hours  atorvastatin 40 milliGRAM(s) Oral at bedtime  budesonide 160 MICROgram(s)/formoterol 4.5 MICROgram(s) Inhaler 2 Puff(s) Inhalation two times a day  chlorhexidine 4% Liquid 1 Application(s) Topical <User Schedule>  cyanocobalamin 1000 MICROGram(s) Oral daily  dextrose 5%. 1000 milliLiter(s) (50 mL/Hr) IV Continuous <Continuous>  dextrose 50% Injectable 12.5 Gram(s) IV Push once  dextrose 50% Injectable 25 Gram(s) IV Push once  dextrose 50% Injectable 25 Gram(s) IV Push once  enoxaparin Injectable 40 milliGRAM(s) SubCutaneous daily  ergocalciferol 27573 Unit(s) Oral <User Schedule>  ferrous    sulfate 325 milliGRAM(s) Oral daily  folic acid 1 milliGRAM(s) Oral daily  haloperidol     Tablet 5 milliGRAM(s) Oral daily  insulin glargine Injectable (LANTUS) 50 Unit(s) SubCutaneous at bedtime  insulin lispro (HumaLOG) corrective regimen sliding scale   SubCutaneous three times a day before meals  insulin lispro Injectable (HumaLOG) 10 Unit(s) SubCutaneous three times a day before meals  levothyroxine 88 MICROGram(s) Oral daily  mesalamine ER Capsule 500 milliGRAM(s) Oral daily  midodrine. 10 milliGRAM(s) Oral three times a day  montelukast 10 milliGRAM(s) Oral at bedtime  promethazine 25 milliGRAM(s) Oral two times a day  tiotropium 18 MICROgram(s) Capsule 1 Capsule(s) Inhalation daily    MEDICATIONS  (PRN):  acetaminophen   Tablet .. 650 milliGRAM(s) Oral every 6 hours PRN Temp greater or equal to 38C (100.4F), Moderate Pain (4 - 6)  dextrose 40% Gel 15 Gram(s) Oral once PRN Blood Glucose LESS THAN 70 milliGRAM(s)/deciliter  glucagon  Injectable 1 milliGRAM(s) IntraMuscular once PRN Glucose LESS THAN 70 milligrams/deciliter  polyethylene glycol 3350 17 Gram(s) Oral daily PRN Constipation

## 2019-12-23 ENCOUNTER — TRANSCRIPTION ENCOUNTER (OUTPATIENT)
Age: 54
End: 2019-12-23

## 2019-12-23 LAB
GLUCOSE BLDC GLUCOMTR-MCNC: 184 MG/DL — HIGH (ref 70–99)
GLUCOSE BLDC GLUCOMTR-MCNC: 202 MG/DL — HIGH (ref 70–99)
GLUCOSE BLDC GLUCOMTR-MCNC: 89 MG/DL — SIGNIFICANT CHANGE UP (ref 70–99)
GLUCOSE BLDC GLUCOMTR-MCNC: 94 MG/DL — SIGNIFICANT CHANGE UP (ref 70–99)
T3 SERPL-MCNC: 75 NG/DL — LOW (ref 80–200)
T4 AB SER-ACNC: 7.6 UG/DL — SIGNIFICANT CHANGE UP (ref 4.6–12)

## 2019-12-23 PROCEDURE — 99223 1ST HOSP IP/OBS HIGH 75: CPT

## 2019-12-23 PROCEDURE — 99231 SBSQ HOSP IP/OBS SF/LOW 25: CPT

## 2019-12-23 RX ORDER — ACETAMINOPHEN 500 MG
650 TABLET ORAL EVERY 6 HOURS
Refills: 0 | Status: DISCONTINUED | OUTPATIENT
Start: 2019-12-23 | End: 2019-12-24

## 2019-12-23 RX ADMIN — MIDODRINE HYDROCHLORIDE 10 MILLIGRAM(S): 2.5 TABLET ORAL at 18:04

## 2019-12-23 RX ADMIN — PREGABALIN 1000 MICROGRAM(S): 225 CAPSULE ORAL at 12:05

## 2019-12-23 RX ADMIN — ATORVASTATIN CALCIUM 40 MILLIGRAM(S): 80 TABLET, FILM COATED ORAL at 21:34

## 2019-12-23 RX ADMIN — MIDODRINE HYDROCHLORIDE 10 MILLIGRAM(S): 2.5 TABLET ORAL at 06:07

## 2019-12-23 RX ADMIN — Medication 25 MILLIGRAM(S): at 12:05

## 2019-12-23 RX ADMIN — Medication 88 MICROGRAM(S): at 06:07

## 2019-12-23 RX ADMIN — Medication 650 MILLIGRAM(S): at 20:16

## 2019-12-23 RX ADMIN — INSULIN GLARGINE 50 UNIT(S): 100 INJECTION, SOLUTION SUBCUTANEOUS at 21:34

## 2019-12-23 RX ADMIN — Medication 10 UNIT(S): at 12:06

## 2019-12-23 RX ADMIN — MONTELUKAST 10 MILLIGRAM(S): 4 TABLET, CHEWABLE ORAL at 21:34

## 2019-12-23 RX ADMIN — BUDESONIDE AND FORMOTEROL FUMARATE DIHYDRATE 2 PUFF(S): 160; 4.5 AEROSOL RESPIRATORY (INHALATION) at 12:04

## 2019-12-23 RX ADMIN — MIDODRINE HYDROCHLORIDE 10 MILLIGRAM(S): 2.5 TABLET ORAL at 12:04

## 2019-12-23 RX ADMIN — Medication 25 MILLIGRAM(S): at 23:36

## 2019-12-23 RX ADMIN — Medication 650 MILLIGRAM(S): at 10:04

## 2019-12-23 RX ADMIN — BUDESONIDE AND FORMOTEROL FUMARATE DIHYDRATE 2 PUFF(S): 160; 4.5 AEROSOL RESPIRATORY (INHALATION) at 21:34

## 2019-12-23 RX ADMIN — Medication 650 MILLIGRAM(S): at 09:34

## 2019-12-23 RX ADMIN — Medication 500 MILLIGRAM(S): at 12:28

## 2019-12-23 RX ADMIN — Medication 1 MILLIGRAM(S): at 12:04

## 2019-12-23 RX ADMIN — HALOPERIDOL DECANOATE 5 MILLIGRAM(S): 100 INJECTION INTRAMUSCULAR at 12:04

## 2019-12-23 RX ADMIN — ENOXAPARIN SODIUM 40 MILLIGRAM(S): 100 INJECTION SUBCUTANEOUS at 12:05

## 2019-12-23 RX ADMIN — Medication 2: at 12:05

## 2019-12-23 NOTE — DISCHARGE NOTE PROVIDER - NSDCFUSCHEDAPPT_GEN_ALL_CORE_FT
SEJAL BARNARD ; 02/07/2020 ; \[facility] [place] [facility]\"" [facility] [address] SEJAL [place] ; 02/14/2020 ; \[facility] [place] [facility]\"" [facility] [address] SEJAL BARNARD ; 02/07/2020 ; [facility] [address] SEJAL Eden ; 02/14/2020 ; [facility] [address] SEJAL BARNARD ; 02/07/2020 ; [facility] [address] SEJAL [place] ; 02/14/2020 ; [facility] [address] SEJAL BARNARD ; 02/07/2020 ; [facility] [address] SEJAL [place] ; 02/14/2020 ; [facility] [address]

## 2019-12-23 NOTE — PROGRESS NOTE ADULT - SUBJECTIVE AND OBJECTIVE BOX
LENGTH OF HOSPITAL STAY: 5d    CHIEF COMPLAINT:   Patient is a 54y old  Female who presents with a chief complaint of Dizziness (22 Dec 2019 17:04)      HISTORY OF PRESENTING ILLNESS:    HPI:  54 year old female with PMH of DM II, COPD/Asthma (never smoked), ANISHA on CPAP, schizophrenia, depression/anxiety, Crohn disease, s/p loop recorder, migraine, hypothyroidism chronic dizziness presents to the ED complaining of dizziness and fall. She reports that she went to a holiday party today, when she came home at around 4:30 PM she was walking to the bathroom when she was feeling very dizzy and fell to her knees. She had previous admissions for dizziness and syncope workup. EP Dr Kilpatrick placed implantable loop recorder 12/5/2018. Working etiology was polypharmacy vs psychogenic vs vascular but MRA head was unremarkable.     In ED: vitals stable. Negative troponin. ECG with no new changes (chronic T-wave inversions in V1-3). Mg 1.6  CTH negative for any acute pathology  Meclizine and 1 liter IV fluids were given. (18 Dec 2019 23:16)    PAST MEDICAL & SURGICAL HISTORY  PAST MEDICAL & SURGICAL HISTORY:  Dizziness  Migraine  Crohn disease  COPD (chronic obstructive pulmonary disease)  Depression  Anxiety  Schizophrenia  ANISHA (obstructive sleep apnea)  Polyneuropathy  Bladder disorder  Sciatica  Constipation  Iron deficiency  Hypercholesteremia  Hypothyroid  Diabetes mellitus, type 2  Vitamin D deficiency  GERD (gastroesophageal reflux disease)  History of cholecystectomy    SOCIAL HISTORY:    ALLERGIES:  ciprofloxacin (Unknown)  codeine (Unknown)  Fish Products (Unknown)  lactose (Unknown)  latex (Unknown)  penicillins (Unknown)  tetracycline (Unknown)  Zoloft (Other)    MEDICATIONS:  STANDING MEDICATIONS  ALBUTerol    90 MICROgram(s) HFA Inhaler 1 Puff(s) Inhalation every 4 hours  albuterol/ipratropium for Nebulization 3 milliLiter(s) Nebulizer every 6 hours  atorvastatin 40 milliGRAM(s) Oral at bedtime  budesonide 160 MICROgram(s)/formoterol 4.5 MICROgram(s) Inhaler 2 Puff(s) Inhalation two times a day  chlorhexidine 4% Liquid 1 Application(s) Topical <User Schedule>  cyanocobalamin 1000 MICROGram(s) Oral daily  dextrose 5%. 1000 milliLiter(s) IV Continuous <Continuous>  dextrose 50% Injectable 12.5 Gram(s) IV Push once  dextrose 50% Injectable 25 Gram(s) IV Push once  dextrose 50% Injectable 25 Gram(s) IV Push once  enoxaparin Injectable 40 milliGRAM(s) SubCutaneous daily  ergocalciferol 06328 Unit(s) Oral <User Schedule>  ferrous    sulfate 325 milliGRAM(s) Oral daily  folic acid 1 milliGRAM(s) Oral daily  haloperidol     Tablet 5 milliGRAM(s) Oral daily  insulin glargine Injectable (LANTUS) 50 Unit(s) SubCutaneous at bedtime  insulin lispro (HumaLOG) corrective regimen sliding scale   SubCutaneous three times a day before meals  insulin lispro Injectable (HumaLOG) 10 Unit(s) SubCutaneous three times a day before meals  levothyroxine 88 MICROGram(s) Oral daily  mesalamine ER Capsule 500 milliGRAM(s) Oral daily  midodrine. 10 milliGRAM(s) Oral three times a day  montelukast 10 milliGRAM(s) Oral at bedtime  promethazine 25 milliGRAM(s) Oral two times a day  tiotropium 18 MICROgram(s) Capsule 1 Capsule(s) Inhalation daily    PRN MEDICATIONS  acetaminophen   Tablet .. 650 milliGRAM(s) Oral every 6 hours PRN  acetaminophen   Tablet .. 650 milliGRAM(s) Oral every 6 hours PRN  dextrose 40% Gel 15 Gram(s) Oral once PRN  glucagon  Injectable 1 milliGRAM(s) IntraMuscular once PRN  polyethylene glycol 3350 17 Gram(s) Oral daily PRN    VITALS:   T(F): 99  HR: 71  BP: 110/65  RR: 18  SpO2: 95%    LABS:                        RADIOLOGY:  < from: MR Angio Neck No Cont (12.20.19 @ 19:51) >  IMPRESSION:     No evidence of major vascular stenosis or occlusion.    < end of copied text >    PHYSICAL EXAM:  GEN: No acute distress  HEENT: AT, NC, PERRLA  LUNGS: Clear to auscultation bilaterally   HEART: S1/S2 present. RRR.   ABD: Soft, non-tender, non-distended. Bowel sounds present  EXT: edema, clubbing, cyanosis absent  NEURO: AAOX3

## 2019-12-23 NOTE — DISCHARGE NOTE PROVIDER - PROVIDER TOKENS
PROVIDER:[TOKEN:[[id number]],FOLLOWUP:[1 week]] PROVIDER:[TOKEN:[[id number]],FOLLOWUP:[1 week]],PROVIDER:[TOKEN:[[id number]],FOLLOWUP:[1 week]]

## 2019-12-23 NOTE — PROGRESS NOTE ADULT - ASSESSMENT
54 year old female with PMH of DM II, COPD/Asthma (never smoked), ANISHA on CPAP, schizophrenia, depression/anxiety, Crohn disease, s/p loop recorder, migraine, hypothyroidism chronic dizziness presents to the ED complaining of dizziness and fall    Patient still complaining of Dizzziness this morning. No significant events overnight    #Dizziness and fall  - Prior workup by cardiology and neurology were unrevealing   - EPS following- no findings on loop recorder, tilt table study yesterday pending offical  - CTH negative for any acute pathology  - MRI, MRA head/neck negative for any vascular occlusion  - orthostatics negative  - c/w midodrine, Meclizine changed to Promethazine 25mg po bid ( patient still complaining of dizziness)  - ENT- recommended outpatient follow up, audiogram, VNG and vesitbular rehab  - vit b12/folate/NH3 NL  - PT/rehab- patient will need placement in [facility]  -TEDs stalkings     # DM II  - Check fingerstick  - Insulin regimen and adjust accordingly    # COPD/asthma, stable  - Continue with montelukast    # ANISHA on CPAP - continue CPAP at 12  # Schizophrenia / Depression/anxiety - continue home meds  # Crohn disease - Continue Mesalamine  # Hypothyroid - continue Synthroid   # Suspected folate / B12 / Vitamin D deficiency - continue supplement , f/u serum levels  # Hypomagnesemia - resolved    #MISC  - DVT prophylaxis: Lovenox  - GI ppx: not indicated  - Dispo: from home. Has HHA 10 hours/7days

## 2019-12-23 NOTE — PROGRESS NOTE ADULT - I WAS PHYSICALLY PRESENT FOR THE KEY PORTIONS OF THE EVALUATION AND MANAGEMENT (E/M) SERVICE PROVIDED.  I AGREE WITH THE ABOVE HISTORY, PHYSICAL, AND PLAN WHICH I HAVE REVIEWED AND EDITED WHERE APPROPRIATE
Pt has yet to return for fasting labs - will have staff call pt to remind her to pursue.  Also, reviewed her previous records:    Due for TWINRIX #3 NOW  Due for mammo/ screening u/s NOW - ordered previously     Staff to contact pt regarding  1. Fasting labs  2.  Twinrix #3  3.  Mammo/ screening u/s    _____________________________________________________    Review of records:    IBS/ Pain LLQ - evaluated by Dr Velazquez 12/2018   - u/s abd:  Fatty liver   - u/s pelv:  ? Fibroids   - colonoscopy - tics   - CT - uncomplicated diverticular disease  Rx:  Bentyl    Microhematuria - eval by Dr JOSE Nieves 1/2019   - CT neg   - cysto neg   - cytol neg   - re-eval in 3-5 yrs if microhematuria persists.     Testing:   CXR 2/19/19 - neg   CT abd pel 10/25/18 - tics   u/s  Pel 10/9/18 - ? Fibroids   u/s abd 10/9/18 - fatty liver   colonoscopy 10/17/18 - tics   labs (CMP, CBC, D) 10/1/18 - low D - no hep C in records   DEXA 3/29/18 fem neck -1.5   mammo 3/29/18 - dense   pap/ HPV 3/22/18 neg   EKG 5/4/12 neg  
Statement Selected

## 2019-12-23 NOTE — PROGRESS NOTE ADULT - ATTENDING COMMENTS
Pt seen and examined at bedside independently. Pt still complaining dizziness. She describes dizziness as she is up and walking.   Vital Signs (24 Hrs):  T(C): 37.1 (12-19-19 @ 16:05), Max: 37.1 (12-19-19 @ 16:05)  HR: 67 (12-19-19 @ 16:05) (62 - 78)  BP: 109/55 (12-19-19 @ 16:05) (98/53 - 121/56)  RR: 18 (12-19-19 @ 16:05) (18 - 18)  SpO2: 96% (12-19-19 @ 16:05) (96% - 99%)  Wt(kg): --  Daily     Daily     I&O's Summary    PHYSICAL EXAM:  GENERAL: NAD, well-developed  HEAD:  Atraumatic, Normocephalic  EYES: EOMI, PERRLA, conjunctiva and sclera clear  NECK: Supple, No JVD  CHEST/LUNG: Clear to auscultation bilaterally; No wheeze  HEART: Regular rate and rhythm; No murmurs, rubs, or gallops  ABDOMEN: Soft, Nontender, Nondistended; Bowel sounds present  EXTREMITIES:  2+ Peripheral Pulses, No clubbing, cyanosis, or edema, BL abrasions on knees   PSYCH: AAOx3  NEUROLOGY: non-focal  SKIN: No rashes or lesions    #dizziness- likely ortho static pt has a had chronic dizziness, previous table test positive on previous ep notes, EP follow up, ENT consult possible vertigo, previous neuro w/u neg   #weakness- PT consult  #rest as above.     I agree with the above plan   #Progress Note Handoff  Pending (specify):  Consults__EP, ENT,PT_______, Tests________, Test Results_______, Other_________  Family discussion: dw pt and agreed to plan  Disposition: Home___/SNF_x__/Other________/Unknown at this time________
PT evaluation again today for D/C plan.   SNF ?  D/W patient
Pt seen and examined at bedside independently. Still complaining of dizziness, no cp or sob.  Vital Signs (24 Hrs):  T(C): 36.1 (12-20-19 @ 14:36), Max: 36.8 (12-20-19 @ 00:14)  HR: 68 (12-20-19 @ 17:27) (68 - 78)  BP: 110/58 (12-20-19 @ 17:27) (99/51 - 143/87)  RR: 18 (12-20-19 @ 05:25) (18 - 18)  SpO2: 97% (12-20-19 @ 01:42) (95% - 97%)  Wt(kg): --  Daily Height in cm: 162.56 (20 Dec 2019 01:40)    Daily     I&O's Summary  exam stable    #dizziness likely 2/2 orthostats- follow up table tilt, teds stalkings, continue midodrine and meclazine. mri as per neuro Pt interviewed regarding MRI. Pt has loop recorder. ERIKA EP and said it is MRI compatible. Pt denies any other metal in body. Xray reviewed shows loop recorder. Pt agreed to MRI.     I agree with the above plan   #Progress Note Handoff  Pending (specify):  Consults___EP, neuro, PT______, Tests_____MRI___, Test Results_______, Other_________  Family discussion: erika pt and agreed to mri and plan as above  Disposition: Home___/SNF_x__/Other________/Unknown at this time________
#Progress Note Handoff  Pending (specify):  Consults___ENT, EP, neuro, PT______, Tests________, Test Results_______, Other_________  Family discussion: nadie pt and agreed to plan  Disposition: Home___/SNF__x_/Other________/Unknown at this time________

## 2019-12-23 NOTE — DISCHARGE NOTE PROVIDER - CARE PROVIDER_API CALL
Farhan Quintero)  [address] [address]  [address] [facility], [address]  [place] [address]  Phone: [phone]  Fax: [phone]  Follow Up Time: 1 week Farhan Quintero)  [id number]  [address] [facility], [address]  [place] [address]  Phone: [phone]  Fax: [phone]  Follow Up Time: 1 week    Shasta Alford)  [facility]  [address] [place] [address], [address] [place] [address]  Phone: [phone]  Fax: [phone]  Follow Up Time: 1 week

## 2019-12-23 NOTE — DISCHARGE NOTE PROVIDER - NSDCCPCAREPLAN_GEN_ALL_CORE_FT
PRINCIPAL DISCHARGE DIAGNOSIS  Diagnosis: Dizziness  Assessment and Plan of Treatment: You were admitted for the dizziness.  Neurology recommended doing MRI and MRA of head that did not reveal any pathology of blood vessels. Kendy tilt table study was partially positive. To complete the work up you need to follow up with ENT to get VNG, Vestibular rehab and audiogram as outpatient.      SECONDARY DISCHARGE DIAGNOSES  Diagnosis: Hypothyroidism  Assessment and Plan of Treatment: You have low thyroid levels. You might need to adjustment in your thyroid medications for that.    Diagnosis: Diabetes mellitus  Assessment and Plan of Treatment: You have diabetes. You need to continue with same insulin regimen, and oral anti-hypoglycemics. Monitor your blood sugar everyday. If fasting blood sugar is more than 140mg/dl follow up with your primary doctor to adjust medications.

## 2019-12-23 NOTE — PHYSICAL THERAPY INITIAL EVALUATION ADULT - GENERAL OBSERVATIONS, REHAB EVAL
13:10-13:23 Pt encountered supine in bed in NAD, pt recently went to bathroom with RN , now back in bed. Pt reports mild dizziness at this time, reports it increases with ambulation. 13:10-13:23 Pt encountered supine in bed in NAD + TEDS , pt recently went to bathroom with RN , now back in bed. Pt reports mild dizziness at this time, reports it increases with ambulation.

## 2019-12-23 NOTE — DISCHARGE NOTE PROVIDER - HOSPITAL COURSE
54 year old female with PMH of DM II, COPD/Asthma (never smoked), ANISHA on CPAP, schizophrenia, depression/anxiety, Crohn disease, s/p loop recorder, migraine, hypothyroidism chronic dizziness presents to the ED complaining of dizziness and fall. She reports that she went to a holiday party today, when she came home at around 4:30 PM she was walking to the bathroom when she was feeling very dizzy and fell to her knees. She had previous admissions for dizziness and syncope workup. EP Dr Kilpatrick placed implantable loop recorder 12/5/2018. Working etiology was polypharmacy vs psychogenic vs vascular but MRA head was unremarkable.         #Dizziness and fall    - Prior workup by cardiology and neurology were unrevealing     - EPS following- no findings on loop recorder, tilt table study was partially positive    - CTH negative for any acute pathology    - MRI, MRA head/neck negative for any vascular occlusion    - orthostatics negative    - c/w midodrine, Meclizine changed to Promethazine 25mg po bid     - ENT- recommended outpatient follow up, audiogram, VNG and vesitbular rehab    - vit b12/folate/NH3 NL    - PT/rehab- patient will need placement in STR    -TEDs stalkings         # DM II    - Check fingerstick    - Insulin regimen and adjust accordingly        # COPD/asthma, stable    - Continue with montelukast        # ANISHA on CPAP - continue CPAP at 12    # Schizophrenia / Depression/anxiety - continue home meds    # Crohn disease - Continue Mesalamine    # Hypothyroid - continue Synthroid     # Suspected folate / B12 / Vitamin D deficiency - continue supplement , f/u serum levels    # Hypomagnesemia - resolved

## 2019-12-23 NOTE — DISCHARGE NOTE PROVIDER - NSDCMRMEDTOKEN_GEN_ALL_CORE_FT
Actos 45 mg oral tablet: 1 tab(s) orally once a day  albuterol 90 mcg/inh inhalation aerosol: 2 puff(s) inhaled 4 times a day, As Needed  Apriso 0.375 g oral capsule, extended release: 1 cap(s) orally 2 times a day  atorvastatin 40 mg oral tablet: 1 tab(s) orally once a day  Combivent Respimat 20 mcg-100 mcg/inh inhalation aerosol: 1 puff(s) inhaled 4 times a day  ferrous sulfate 325 mg (65 mg elemental iron) oral tablet: 1 tab(s) orally once a day  folic acid 1 mg oral tablet: 1 tab(s) orally once a day  Haldol 5 mg oral tablet: 1 tab(s) orally once a day  loperamide 2 mg oral capsule: orally once a day  meclizine 25 mg oral tablet: 1 tab(s) orally 3 times a day, As Needed  metFORMIN 1000 mg oral tablet: 1 tab(s) orally 2 times a day  midodrine 10 mg oral tablet: 1 tab(s) orally 3 times a day  NovoLOG FlexPen 100 units/mL injectable solution: 10 unit(s) injectable 3 times a day (with meals)  Singulair 10 mg oral tablet: 1 tab(s) orally once a day (at bedtime)  SUMAtriptan 100 mg oral tablet: 1 tab(s) orally every 2 hours, As Needed  Synthroid 88 mcg (0.088 mg) oral tablet: 1 tab(s) orally once a day  traMADol 50 mg oral tablet: orally every 6 hours, As Needed  Trelegy Ellipta inhalation powder: 1 puff(s) inhaled once a day  Tresiba FlexTouch 100 units/mL subcutaneous solution: 50 unit(s) subcutaneous once a day (at bedtime)   Victoza 18 mg/3 mL subcutaneous solution: 0.3 milliliter(s) subcutaneously once a day   Vitamin B12 100 mcg oral tablet: 1 tab(s) orally once a day  Vitamin D2 50,000 intl units (1.25 mg) oral capsule: 1 cap(s) orally once a week (Thursday)

## 2019-12-23 NOTE — DISCHARGE NOTE NURSING/CASE MANAGEMENT/SOCIAL WORK - PATIENT PORTAL LINK FT
You can access the FollowMyHealth Patient Portal offered by [facility] by registering at the following website: [email or web address]. By joining [employer]’s FollowMyHealth portal, you will also be able to view your health information using other applications (apps) compatible with our system.

## 2019-12-23 NOTE — DISCHARGE NOTE PROVIDER - CARE PROVIDERS DIRECT ADDRESSES
[email or web address]

## 2019-12-24 VITALS
RESPIRATION RATE: 18 BRPM | DIASTOLIC BLOOD PRESSURE: 56 MMHG | TEMPERATURE: 97 F | HEART RATE: 90 BPM | SYSTOLIC BLOOD PRESSURE: 118 MMHG

## 2019-12-24 LAB
GLUCOSE BLDC GLUCOMTR-MCNC: 141 MG/DL — HIGH (ref 70–99)
GLUCOSE BLDC GLUCOMTR-MCNC: 96 MG/DL — SIGNIFICANT CHANGE UP (ref 70–99)

## 2019-12-24 PROCEDURE — 99238 HOSP IP/OBS DSCHRG MGMT 30/<: CPT

## 2019-12-24 RX ADMIN — CHLORHEXIDINE GLUCONATE 1 APPLICATION(S): 213 SOLUTION TOPICAL at 05:35

## 2019-12-24 RX ADMIN — Medication 10 UNIT(S): at 12:51

## 2019-12-24 RX ADMIN — Medication 88 MICROGRAM(S): at 05:35

## 2019-12-24 RX ADMIN — HALOPERIDOL DECANOATE 5 MILLIGRAM(S): 100 INJECTION INTRAMUSCULAR at 12:55

## 2019-12-24 RX ADMIN — Medication 1 MILLIGRAM(S): at 12:55

## 2019-12-24 RX ADMIN — MIDODRINE HYDROCHLORIDE 10 MILLIGRAM(S): 2.5 TABLET ORAL at 12:55

## 2019-12-24 RX ADMIN — PREGABALIN 1000 MICROGRAM(S): 225 CAPSULE ORAL at 12:55

## 2019-12-24 RX ADMIN — Medication 25 MILLIGRAM(S): at 12:55

## 2019-12-24 RX ADMIN — MIDODRINE HYDROCHLORIDE 10 MILLIGRAM(S): 2.5 TABLET ORAL at 05:35

## 2019-12-24 RX ADMIN — BUDESONIDE AND FORMOTEROL FUMARATE DIHYDRATE 2 PUFF(S): 160; 4.5 AEROSOL RESPIRATORY (INHALATION) at 12:51

## 2019-12-24 RX ADMIN — ENOXAPARIN SODIUM 40 MILLIGRAM(S): 100 INJECTION SUBCUTANEOUS at 12:55

## 2019-12-24 RX ADMIN — Medication 0: at 12:50

## 2019-12-24 RX ADMIN — Medication 650 MILLIGRAM(S): at 06:15

## 2019-12-24 RX ADMIN — Medication 500 MILLIGRAM(S): at 12:55

## 2019-12-26 PROBLEM — R42 DIZZINESS AND GIDDINESS: Chronic | Status: ACTIVE | Noted: 2019-12-18

## 2019-12-30 DIAGNOSIS — K21.9 GASTRO-ESOPHAGEAL REFLUX DISEASE WITHOUT ESOPHAGITIS: ICD-10-CM

## 2019-12-30 DIAGNOSIS — Z88.0 ALLERGY STATUS TO PENICILLIN: ICD-10-CM

## 2019-12-30 DIAGNOSIS — I95.1 ORTHOSTATIC HYPOTENSION: ICD-10-CM

## 2019-12-30 DIAGNOSIS — G47.33 OBSTRUCTIVE SLEEP APNEA (ADULT) (PEDIATRIC): ICD-10-CM

## 2019-12-30 DIAGNOSIS — G43.909 MIGRAINE, UNSPECIFIED, NOT INTRACTABLE, WITHOUT STATUS MIGRAINOSUS: ICD-10-CM

## 2019-12-30 DIAGNOSIS — Z91.040 LATEX ALLERGY STATUS: ICD-10-CM

## 2019-12-30 DIAGNOSIS — Z91.013 ALLERGY TO SEAFOOD: ICD-10-CM

## 2019-12-30 DIAGNOSIS — Z88.1 ALLERGY STATUS TO OTHER ANTIBIOTIC AGENTS STATUS: ICD-10-CM

## 2019-12-30 DIAGNOSIS — R53.81 OTHER MALAISE: ICD-10-CM

## 2019-12-30 DIAGNOSIS — K50.90 CROHN'S DISEASE, UNSPECIFIED, WITHOUT COMPLICATIONS: ICD-10-CM

## 2019-12-30 DIAGNOSIS — E61.1 IRON DEFICIENCY: ICD-10-CM

## 2019-12-30 DIAGNOSIS — R42 DIZZINESS AND GIDDINESS: ICD-10-CM

## 2019-12-30 DIAGNOSIS — E55.9 VITAMIN D DEFICIENCY, UNSPECIFIED: ICD-10-CM

## 2019-12-30 DIAGNOSIS — Z79.4 LONG TERM (CURRENT) USE OF INSULIN: ICD-10-CM

## 2019-12-30 DIAGNOSIS — E83.42 HYPOMAGNESEMIA: ICD-10-CM

## 2019-12-30 DIAGNOSIS — R26.81 UNSTEADINESS ON FEET: ICD-10-CM

## 2019-12-30 DIAGNOSIS — E78.5 HYPERLIPIDEMIA, UNSPECIFIED: ICD-10-CM

## 2019-12-30 DIAGNOSIS — F41.9 ANXIETY DISORDER, UNSPECIFIED: ICD-10-CM

## 2019-12-30 DIAGNOSIS — F20.9 SCHIZOPHRENIA, UNSPECIFIED: ICD-10-CM

## 2019-12-30 DIAGNOSIS — Z99.89 DEPENDENCE ON OTHER ENABLING MACHINES AND DEVICES: ICD-10-CM

## 2019-12-30 DIAGNOSIS — Z79.82 LONG TERM (CURRENT) USE OF ASPIRIN: ICD-10-CM

## 2019-12-30 DIAGNOSIS — E53.8 DEFICIENCY OF OTHER SPECIFIED B GROUP VITAMINS: ICD-10-CM

## 2019-12-30 DIAGNOSIS — Z88.8 ALLERGY STATUS TO OTHER DRUGS, MEDICAMENTS AND BIOLOGICAL SUBSTANCES STATUS: ICD-10-CM

## 2019-12-30 DIAGNOSIS — F32.9 MAJOR DEPRESSIVE DISORDER, SINGLE EPISODE, UNSPECIFIED: ICD-10-CM

## 2019-12-30 DIAGNOSIS — M54.30 SCIATICA, UNSPECIFIED SIDE: ICD-10-CM

## 2019-12-30 DIAGNOSIS — E03.9 HYPOTHYROIDISM, UNSPECIFIED: ICD-10-CM

## 2019-12-30 DIAGNOSIS — J44.9 CHRONIC OBSTRUCTIVE PULMONARY DISEASE, UNSPECIFIED: ICD-10-CM

## 2019-12-30 DIAGNOSIS — K59.00 CONSTIPATION, UNSPECIFIED: ICD-10-CM

## 2019-12-30 DIAGNOSIS — E11.42 TYPE 2 DIABETES MELLITUS WITH DIABETIC POLYNEUROPATHY: ICD-10-CM

## 2020-01-10 ENCOUNTER — APPOINTMENT (OUTPATIENT)
Dept: OTOLARYNGOLOGY | Facility: CLINIC | Age: 55
End: 2020-01-10
Payer: COMMERCIAL

## 2020-01-10 PROCEDURE — 99214 OFFICE O/P EST MOD 30 MIN: CPT | Mod: 25

## 2020-01-10 PROCEDURE — 92557 COMPREHENSIVE HEARING TEST: CPT

## 2020-01-10 PROCEDURE — 92550 TYMPANOMETRY & REFLEX THRESH: CPT

## 2020-01-10 NOTE — ASSESSMENT
[FreeTextEntry1] : Audiogram reviewed with patient. \par \par MRI images reviewed today. \par \par Recommended hearing aids and f/u in 6M or as needed.

## 2020-01-11 NOTE — DISCHARGE NOTE ADULT - CARE PROVIDER_API CALL
Lory townsend  Phone: [phone]  Fax: (   )    -    skyler,   Phone: [phone]  Fax: (   )    -    PMD,   Phone: (   )    -  Fax: (   )    - family/patient I have personally performed a face to face diagnostic evaluation on this patient. I have reviewed the ACP note and agree with the history, exam and plan of care, except as noted.

## 2020-02-07 ENCOUNTER — LABORATORY RESULT (OUTPATIENT)
Age: 55
End: 2020-02-07

## 2020-02-07 ENCOUNTER — APPOINTMENT (OUTPATIENT)
Dept: HEMATOLOGY ONCOLOGY | Facility: CLINIC | Age: 55
End: 2020-02-07

## 2020-02-07 LAB
HCT VFR BLD CALC: 44.9 %
HGB BLD-MCNC: 14 G/DL
IRON SATN MFR SERPL: 20 %
IRON SERPL-MCNC: 75 UG/DL
MCHC RBC-ENTMCNC: 25.3 PG
MCHC RBC-ENTMCNC: 31.2 G/DL
MCV RBC AUTO: 81.2 FL
PLATELET # BLD AUTO: 344 K/UL
PMV BLD: 9.5 FL
RBC # BLD: 5.53 M/UL
RBC # FLD: 16 %
TIBC SERPL-MCNC: 381 UG/DL
UIBC SERPL-MCNC: 306 UG/DL
WBC # FLD AUTO: 5.81 K/UL

## 2020-02-10 LAB — FERRITIN SERPL-MCNC: 99 NG/ML

## 2020-02-14 ENCOUNTER — APPOINTMENT (OUTPATIENT)
Dept: HEMATOLOGY ONCOLOGY | Facility: CLINIC | Age: 55
End: 2020-02-14
Payer: MEDICARE

## 2020-02-14 VITALS
HEART RATE: 75 BPM | SYSTOLIC BLOOD PRESSURE: 100 MMHG | TEMPERATURE: 98.3 F | WEIGHT: 194 LBS | BODY MASS INDEX: 34.38 KG/M2 | HEIGHT: 63 IN | RESPIRATION RATE: 16 BRPM | DIASTOLIC BLOOD PRESSURE: 48 MMHG

## 2020-02-14 PROCEDURE — 99213 OFFICE O/P EST LOW 20 MIN: CPT

## 2020-02-14 NOTE — PHYSICAL EXAM
[Restricted in physically strenuous activity but ambulatory and able to carry out work of a light or sedentary nature] : Status 1- Restricted in physically strenuous activity but ambulatory and able to carry out work of a light or sedentary nature, e.g., light house work, office work [Normal] : affect appropriate [de-identified] : uses seated walker for ambulation

## 2020-02-14 NOTE — HISTORY OF PRESENT ILLNESS
[de-identified] : Ms. SEJAL BARNARD is a 54 year old female here today for evaluation and treatment of Anemia.\par \par The patient was referred by Dr. Quintero.  She presents with her health aide from the Apartment Housing complex she resides on [facility].  She reports that she was seen by her PCP, who referred her due to iron deficiency anemia.  She currently takes iron supplement, 325mg daily and is tolerating without complaint.  She states she feels tired on occasion.  \par \par LAB WORKUP:\par (5/7/19) WBC 6.23, Hgb 13.7, MCV 76.7, RDW 16.1, , Na 147, ALT 42, Ferritin 36, TIBC 349, ironsat 15%, B12 normal\par \par HCM:\par Mammogram (07/2018) patient reports it was benign\par Colonoscopy ([facility] - 4/6/2018) removed 2 polyps, as per patient.  IMPRESSION:  Preparation of the colon was poor.  One 6mm polyp in the cecum, removed peicemeal using a cold biopsy forceps.  Resected and retrieved.  Random biopsies taken from transverse colo, descending, sigmoid and rectum.  Repeat in 1 year [de-identified] : 11/8/19\par Patient is here for a follow-up visit for anemia with aid.  She is feeling well with no complaints.  Most recent CBC is stable with ferritin 111 and ironsat 17%.  Patient denies fever, chills, nausea, vomiting, bleeding or pica.  She does not take oral iron as she had IV iron in the past. She reports her energy has improved slightly as well.  \par \par 2/14/2020 \par Patient is here for a follow-up visit for anemia with aid.  She is feeling well with no complaints. Most recent CBC and iron studies from 2.7.2020 remains stable off oral iron.   Patient denies fever, chills, nausea, vomiting, bleeding or pica.  She followed with Dr. Pak who performed endoscopy which showed GERD with esophagitis, acute gastritis with and without bleeding and duodenitis.

## 2020-02-14 NOTE — REVIEW OF SYSTEMS
[Negative] : Allergic/Immunologic [Fever] : no fever [Chills] : no chills [Fatigue] : no fatigue [Recent Change In Weight] : ~T no recent weight change [Chest Pain] : no chest pain [Palpitations] : no palpitations [Shortness Of Breath] : no shortness of breath [Abdominal Pain] : no abdominal pain [Constipation] : no constipation [Skin Rash] : no skin rash [Easy Bleeding] : no tendency for easy bleeding [Easy Bruising] : no tendency for easy bruising

## 2020-02-14 NOTE — CONSULT LETTER
[Dear  ___] : Dear  [unfilled], [Consult Letter:] : I had the pleasure of evaluating your patient, [unfilled]. [Sincerely,] : Sincerely, [Please see my note below.] : Please see my note below. [Referral Closing:] : Thank you very much for seeing this patient.  If you have any questions, please do not hesitate to contact me. [FreeTextEntry3] : John Boateng DO\par Attending Physician,\par Hematology/ Medical Oncology\par [phone] office\par \par

## 2020-02-14 NOTE — ASSESSMENT
[FreeTextEntry1] : 53 yo woman with PMH of ulcerative colitis is seen by me for iron deficiency anemia. She is being seen by a private GI; She followed with Dr. Pak who performed endoscopy which showed GERD with esophagitis, acute gastritis with and without bleeding and duodenitis.  \par \par \par - s/p iv iron replacement (completed in June 2019) with resolution of low iron; no iron at this time\par \par RTC in 3 months with CBC, iron studies, ferritin 1-2 days prior

## 2020-03-01 ENCOUNTER — EMERGENCY (EMERGENCY)
Facility: HOSPITAL | Age: 55
LOS: 0 days | Discharge: HOME | End: 2020-03-01
Attending: EMERGENCY MEDICINE | Admitting: EMERGENCY MEDICINE
Payer: MEDICARE

## 2020-03-01 VITALS — SYSTOLIC BLOOD PRESSURE: 121 MMHG | HEART RATE: 80 BPM | DIASTOLIC BLOOD PRESSURE: 76 MMHG | TEMPERATURE: 99 F

## 2020-03-01 VITALS
OXYGEN SATURATION: 97 % | HEIGHT: 64 IN | TEMPERATURE: 98 F | HEART RATE: 84 BPM | WEIGHT: 182.1 LBS | SYSTOLIC BLOOD PRESSURE: 115 MMHG | RESPIRATION RATE: 20 BRPM | DIASTOLIC BLOOD PRESSURE: 72 MMHG

## 2020-03-01 DIAGNOSIS — J44.9 CHRONIC OBSTRUCTIVE PULMONARY DISEASE, UNSPECIFIED: ICD-10-CM

## 2020-03-01 DIAGNOSIS — R10.9 UNSPECIFIED ABDOMINAL PAIN: ICD-10-CM

## 2020-03-01 DIAGNOSIS — Z90.49 ACQUIRED ABSENCE OF OTHER SPECIFIED PARTS OF DIGESTIVE TRACT: ICD-10-CM

## 2020-03-01 DIAGNOSIS — Z88.5 ALLERGY STATUS TO NARCOTIC AGENT: ICD-10-CM

## 2020-03-01 DIAGNOSIS — Z91.013 ALLERGY TO SEAFOOD: ICD-10-CM

## 2020-03-01 DIAGNOSIS — Z98.890 OTHER SPECIFIED POSTPROCEDURAL STATES: Chronic | ICD-10-CM

## 2020-03-01 DIAGNOSIS — Z91.040 LATEX ALLERGY STATUS: ICD-10-CM

## 2020-03-01 DIAGNOSIS — E11.9 TYPE 2 DIABETES MELLITUS WITHOUT COMPLICATIONS: ICD-10-CM

## 2020-03-01 DIAGNOSIS — Z88.0 ALLERGY STATUS TO PENICILLIN: ICD-10-CM

## 2020-03-01 LAB
ALBUMIN SERPL ELPH-MCNC: 4.8 G/DL — SIGNIFICANT CHANGE UP (ref 3.5–5.2)
ALP SERPL-CCNC: 54 U/L — SIGNIFICANT CHANGE UP (ref 30–115)
ALT FLD-CCNC: 16 U/L — SIGNIFICANT CHANGE UP (ref 0–41)
ANION GAP SERPL CALC-SCNC: 16 MMOL/L — HIGH (ref 7–14)
APPEARANCE UR: CLEAR — SIGNIFICANT CHANGE UP
AST SERPL-CCNC: 15 U/L — SIGNIFICANT CHANGE UP (ref 0–41)
BASOPHILS # BLD AUTO: 0.03 K/UL — SIGNIFICANT CHANGE UP (ref 0–0.2)
BASOPHILS NFR BLD AUTO: 0.5 % — SIGNIFICANT CHANGE UP (ref 0–1)
BILIRUB SERPL-MCNC: 0.3 MG/DL — SIGNIFICANT CHANGE UP (ref 0.2–1.2)
BILIRUB UR-MCNC: NEGATIVE — SIGNIFICANT CHANGE UP
BUN SERPL-MCNC: 27 MG/DL — HIGH (ref 10–20)
CALCIUM SERPL-MCNC: 9.9 MG/DL — SIGNIFICANT CHANGE UP (ref 8.5–10.1)
CHLORIDE SERPL-SCNC: 97 MMOL/L — LOW (ref 98–110)
CO2 SERPL-SCNC: 26 MMOL/L — SIGNIFICANT CHANGE UP (ref 17–32)
COLOR SPEC: YELLOW — SIGNIFICANT CHANGE UP
COMMENT - URINE: SIGNIFICANT CHANGE UP
CREAT SERPL-MCNC: 0.5 MG/DL — LOW (ref 0.7–1.5)
DIFF PNL FLD: ABNORMAL
EOSINOPHIL # BLD AUTO: 0.71 K/UL — HIGH (ref 0–0.7)
EOSINOPHIL NFR BLD AUTO: 11 % — HIGH (ref 0–8)
EPI CELLS # UR: ABNORMAL /HPF
GLUCOSE SERPL-MCNC: 120 MG/DL — HIGH (ref 70–99)
GLUCOSE UR QL: 500 MG/DL
HCT VFR BLD CALC: 44.3 % — SIGNIFICANT CHANGE UP (ref 37–47)
HGB BLD-MCNC: 13.9 G/DL — SIGNIFICANT CHANGE UP (ref 12–16)
IMM GRANULOCYTES NFR BLD AUTO: 0.3 % — SIGNIFICANT CHANGE UP (ref 0.1–0.3)
KETONES UR-MCNC: ABNORMAL
LEUKOCYTE ESTERASE UR-ACNC: ABNORMAL
LIDOCAIN IGE QN: 40 U/L — SIGNIFICANT CHANGE UP (ref 7–60)
LYMPHOCYTES # BLD AUTO: 1.72 K/UL — SIGNIFICANT CHANGE UP (ref 1.2–3.4)
LYMPHOCYTES # BLD AUTO: 26.5 % — SIGNIFICANT CHANGE UP (ref 20.5–51.1)
MAGNESIUM SERPL-MCNC: 1.8 MG/DL — SIGNIFICANT CHANGE UP (ref 1.8–2.4)
MCHC RBC-ENTMCNC: 25.6 PG — LOW (ref 27–31)
MCHC RBC-ENTMCNC: 31.4 G/DL — LOW (ref 32–37)
MCV RBC AUTO: 81.7 FL — SIGNIFICANT CHANGE UP (ref 81–99)
MONOCYTES # BLD AUTO: 0.35 K/UL — SIGNIFICANT CHANGE UP (ref 0.1–0.6)
MONOCYTES NFR BLD AUTO: 5.4 % — SIGNIFICANT CHANGE UP (ref 1.7–9.3)
NEUTROPHILS # BLD AUTO: 3.65 K/UL — SIGNIFICANT CHANGE UP (ref 1.4–6.5)
NEUTROPHILS NFR BLD AUTO: 56.3 % — SIGNIFICANT CHANGE UP (ref 42.2–75.2)
NITRITE UR-MCNC: NEGATIVE — SIGNIFICANT CHANGE UP
NRBC # BLD: 0 /100 WBCS — SIGNIFICANT CHANGE UP (ref 0–0)
PH UR: 5.5 — SIGNIFICANT CHANGE UP (ref 5–8)
PLATELET # BLD AUTO: 294 K/UL — SIGNIFICANT CHANGE UP (ref 130–400)
POTASSIUM SERPL-MCNC: 4.4 MMOL/L — SIGNIFICANT CHANGE UP (ref 3.5–5)
POTASSIUM SERPL-SCNC: 4.4 MMOL/L — SIGNIFICANT CHANGE UP (ref 3.5–5)
PROT SERPL-MCNC: 6.9 G/DL — SIGNIFICANT CHANGE UP (ref 6–8)
PROT UR-MCNC: NEGATIVE MG/DL — SIGNIFICANT CHANGE UP
RBC # BLD: 5.42 M/UL — HIGH (ref 4.2–5.4)
RBC # FLD: 16.8 % — HIGH (ref 11.5–14.5)
RBC CASTS # UR COMP ASSIST: SIGNIFICANT CHANGE UP /HPF
SODIUM SERPL-SCNC: 139 MMOL/L — SIGNIFICANT CHANGE UP (ref 135–146)
SP GR SPEC: 1.02 — SIGNIFICANT CHANGE UP (ref 1.01–1.03)
UROBILINOGEN FLD QL: 0.2 MG/DL — SIGNIFICANT CHANGE UP (ref 0.2–0.2)
WBC # BLD: 6.48 K/UL — SIGNIFICANT CHANGE UP (ref 4.8–10.8)
WBC # FLD AUTO: 6.48 K/UL — SIGNIFICANT CHANGE UP (ref 4.8–10.8)
WBC UR QL: ABNORMAL /HPF

## 2020-03-01 PROCEDURE — 74177 CT ABD & PELVIS W/CONTRAST: CPT | Mod: 26

## 2020-03-01 PROCEDURE — 99285 EMERGENCY DEPT VISIT HI MDM: CPT

## 2020-03-01 RX ORDER — SODIUM CHLORIDE 9 MG/ML
1000 INJECTION INTRAMUSCULAR; INTRAVENOUS; SUBCUTANEOUS ONCE
Refills: 0 | Status: COMPLETED | OUTPATIENT
Start: 2020-03-01 | End: 2020-03-01

## 2020-03-01 RX ORDER — DIPHENHYDRAMINE HCL 50 MG
50 CAPSULE ORAL ONCE
Refills: 0 | Status: COMPLETED | OUTPATIENT
Start: 2020-03-01 | End: 2020-03-01

## 2020-03-01 RX ORDER — IOHEXOL 300 MG/ML
30 INJECTION, SOLUTION INTRAVENOUS ONCE
Refills: 0 | Status: COMPLETED | OUTPATIENT
Start: 2020-03-01 | End: 2020-03-01

## 2020-03-01 RX ORDER — FAMOTIDINE 10 MG/ML
20 INJECTION INTRAVENOUS ONCE
Refills: 0 | Status: COMPLETED | OUTPATIENT
Start: 2020-03-01 | End: 2020-03-01

## 2020-03-01 RX ADMIN — SODIUM CHLORIDE 1000 MILLILITER(S): 9 INJECTION INTRAMUSCULAR; INTRAVENOUS; SUBCUTANEOUS at 14:52

## 2020-03-01 RX ADMIN — IOHEXOL 30 MILLILITER(S): 300 INJECTION, SOLUTION INTRAVENOUS at 15:09

## 2020-03-01 RX ADMIN — Medication 125 MILLIGRAM(S): at 17:34

## 2020-03-01 RX ADMIN — Medication 50 MILLIGRAM(S): at 17:34

## 2020-03-01 RX ADMIN — FAMOTIDINE 100 MILLIGRAM(S): 10 INJECTION INTRAVENOUS at 17:34

## 2020-03-01 NOTE — ED PROVIDER NOTE - OBJECTIVE STATEMENT
55 y/o female with hx of NIDDM, COPD, ANISHA, schizophrenia, crohn, migraine, hypothyroid presents to the ed for abdominal pain which started yesterday. patient denies any vomiting or diarrhea. patient wtihout any fever,chills. patinent with hx of cholecystectomy. patient denies any change in symptoms with food intake or movement. no dysuria or back pain

## 2020-03-01 NOTE — ED PROVIDER NOTE - CARE PROVIDER_API CALL
Trevon Diaz)  Gastroenterology  [address] [place] [address]  Phone: [phone]  Fax: [phone]  Follow Up Time:

## 2020-03-01 NOTE — ED ADULT TRIAGE NOTE - NS ED NURSE BANDS TYPE
Size Of Lesion In Cm (Optional): 0 Home Suture Removal Text: Patient was provided a home suture removal kit and will remove their sutures at home.  If they have any questions or difficulties they will call the office. Name band; Anesthesia Type: 1% lidocaine with epinephrine Suture Removal: 14 days Consent: Written consent was obtained and risks were reviewed including but not limited to scarring, infection, bleeding, scabbing, incomplete removal, nerve damage and allergy to anesthesia. Detail Level: Detailed Notification Instructions: Patient will be notified of biopsy results. However, patient instructed to call the office if not contacted within 2 weeks. Render In Bullet Format When Appropriate: No Punch Size In Mm: 4 Wound Care: Petrolatum Billing Type: Third-Party Bill Anesthesia Volume In Cc (Will Not Render If 0): 0.5 Epidermal Sutures: 4-0 Ethilon Biopsy Type: H and E Hemostasis: None Was A Bandage Applied: Yes Dressing: bandage Lab: [id number] Post-Care Instructions: I reviewed with the patient in detail post-care instructions. Patient is to keep the biopsy site dry overnight, and then apply bacitracin twice daily until healed. Patient may apply hydrogen peroxide soaks to remove any crusting. Lab Facility: [id number]

## 2020-03-01 NOTE — ED PROVIDER NOTE - PMH
Anxiety    Bladder disorder    Constipation    COPD (chronic obstructive pulmonary disease)    Crohn disease    Depression    Diabetes mellitus, type 2    Dizziness    GERD (gastroesophageal reflux disease)    Hypercholesteremia    Hypothyroid    Iron deficiency    Migraine    ANISHA (obstructive sleep apnea)    Polyneuropathy    Schizophrenia    Sciatica    Vitamin D deficiency

## 2020-03-01 NOTE — ED PROVIDER NOTE - ATTENDING CONTRIBUTION TO CARE
Pt with abd pain and n/v, normal BM, no f/c, no urinary symptoms, PE as above, labs and studies reviewed, feels better and po tolerant, will d/c to f/u with PMD/GI. Patient counseled regarding conditions which should prompt return.

## 2020-03-01 NOTE — ED ADULT NURSE NOTE - NSIMPLEMENTINTERV_GEN_ALL_ED
Implemented All Universal Safety Interventions:  [place] to call system. Call bell, personal items and telephone within reach. Instruct patient to call for assistance. Room bathroom lighting operational. Non-slip footwear when patient is off stretcher. Physically safe environment: no spills, clutter or unnecessary equipment. Stretcher in lowest position, wheels locked, appropriate side rails in place.

## 2020-03-01 NOTE — ED PROVIDER NOTE - PATIENT PORTAL LINK FT
You can access the FollowMyHealth Patient Portal offered by [facility] by registering at the following website: [email or web address]. By joining [employer]’s FollowMyHealth portal, you will also be able to view your health information using other applications (apps) compatible with our system.

## 2020-03-01 NOTE — ED PROVIDER NOTE - NS_EDPROVIDERDISPOUSERTYPE_ED_A_ED
Pt call and states that she don't remember the name of that form you told her to get from her HR Department. She request to call back. Attending Attestation (For Attendings USE Only)...

## 2020-03-02 LAB
CULTURE RESULTS: SIGNIFICANT CHANGE UP
SPECIMEN SOURCE: SIGNIFICANT CHANGE UP

## 2020-03-07 ENCOUNTER — OUTPATIENT (OUTPATIENT)
Dept: OUTPATIENT SERVICES | Facility: HOSPITAL | Age: 55
LOS: 1 days | Discharge: HOME | End: 2020-03-07
Payer: MEDICARE

## 2020-03-07 DIAGNOSIS — Z98.890 OTHER SPECIFIED POSTPROCEDURAL STATES: Chronic | ICD-10-CM

## 2020-03-07 DIAGNOSIS — N20.0 CALCULUS OF KIDNEY: ICD-10-CM

## 2020-03-07 DIAGNOSIS — M54.2 CERVICALGIA: ICD-10-CM

## 2020-03-07 DIAGNOSIS — R10.84 GENERALIZED ABDOMINAL PAIN: ICD-10-CM

## 2020-03-07 DIAGNOSIS — M25.511 PAIN IN RIGHT SHOULDER: ICD-10-CM

## 2020-03-07 PROCEDURE — 76857 US EXAM PELVIC LIMITED: CPT | Mod: 26

## 2020-03-07 PROCEDURE — 72050 X-RAY EXAM NECK SPINE 4/5VWS: CPT | Mod: 26

## 2020-03-07 PROCEDURE — 73030 X-RAY EXAM OF SHOULDER: CPT | Mod: 26,RT

## 2020-03-07 PROCEDURE — 76700 US EXAM ABDOM COMPLETE: CPT | Mod: 26

## 2020-03-07 PROCEDURE — 73060 X-RAY EXAM OF HUMERUS: CPT | Mod: 26,RT

## 2020-05-20 ENCOUNTER — APPOINTMENT (OUTPATIENT)
Dept: HEMATOLOGY ONCOLOGY | Facility: CLINIC | Age: 55
End: 2020-05-20

## 2020-05-22 ENCOUNTER — APPOINTMENT (OUTPATIENT)
Dept: HEMATOLOGY ONCOLOGY | Facility: CLINIC | Age: 55
End: 2020-05-22

## 2020-07-08 NOTE — DISCHARGE NOTE ADULT - NURSING SECTION COMPLETE
This document is complete and the patient is ready for discharge.
Patient/Caregiver provided printed discharge information.

## 2020-07-16 NOTE — DISCHARGE NOTE ADULT - NS TRANSFER PATIENT BELONGINGS
Excisional Biopsy Additional Text (Leave Blank If You Do Not Want): The margin was drawn around the clinically apparent lesion. An elliptical shape was then drawn on the skin incorporating the lesion and margins.  Incisions were then made along these lines to the appropriate tissue plane and the lesion was extirpated. Clothing/Cell Phone/PDA (specify) Cell Phone/PDA (specify)/Clothing/cane, sneakers

## 2020-07-17 ENCOUNTER — APPOINTMENT (OUTPATIENT)
Dept: OTOLARYNGOLOGY | Facility: CLINIC | Age: 55
End: 2020-07-17
Payer: MEDICARE

## 2020-07-17 PROCEDURE — 92557 COMPREHENSIVE HEARING TEST: CPT

## 2020-07-17 PROCEDURE — 92550 TYMPANOMETRY & REFLEX THRESH: CPT

## 2020-07-17 PROCEDURE — 99213 OFFICE O/P EST LOW 20 MIN: CPT | Mod: 25

## 2020-07-17 NOTE — HISTORY OF PRESENT ILLNESS
[de-identified] : Patient here today following up on recent ER visit. Patient states she went to the hospital due to her dizziness. Patient was experiencing room spinning started on 12/18. Patient admits dizziness also occurred last year.  She had a negative CT and MR head. Upon discharge no recent episodes. \par Her balance is back to baseline now. no more spinning.  [FreeTextEntry1] : \par 7/17/2020: Patient here today following up on hearing loss. Patient interested in hearing aids. Her last audiogram was 1/2020. Patient uses TV on loud volume. No otalgia. Patient notes hearing has progressively worsened since last being seen.

## 2020-08-03 ENCOUNTER — APPOINTMENT (OUTPATIENT)
Dept: HEMATOLOGY ONCOLOGY | Facility: CLINIC | Age: 55
End: 2020-08-03

## 2020-08-03 ENCOUNTER — OUTPATIENT (OUTPATIENT)
Dept: OUTPATIENT SERVICES | Facility: HOSPITAL | Age: 55
LOS: 1 days | Discharge: HOME | End: 2020-08-03

## 2020-08-03 ENCOUNTER — LABORATORY RESULT (OUTPATIENT)
Age: 55
End: 2020-08-03

## 2020-08-03 DIAGNOSIS — Z98.890 OTHER SPECIFIED POSTPROCEDURAL STATES: Chronic | ICD-10-CM

## 2020-08-03 DIAGNOSIS — D50.9 IRON DEFICIENCY ANEMIA, UNSPECIFIED: ICD-10-CM

## 2020-08-03 LAB
ALBUMIN SERPL ELPH-MCNC: 4.3 G/DL
ALP BLD-CCNC: 62 U/L
ALT SERPL-CCNC: 21 U/L
ANION GAP SERPL CALC-SCNC: 15 MMOL/L
AST SERPL-CCNC: 14 U/L
BILIRUB DIRECT SERPL-MCNC: <0.2 MG/DL
BILIRUB INDIRECT SERPL-MCNC: >0.1 MG/DL
BILIRUB SERPL-MCNC: 0.3 MG/DL
BUN SERPL-MCNC: 10 MG/DL
CALCIUM SERPL-MCNC: 10 MG/DL
CHLORIDE SERPL-SCNC: 101 MMOL/L
CO2 SERPL-SCNC: 26 MMOL/L
CREAT SERPL-MCNC: 0.5 MG/DL
GLUCOSE SERPL-MCNC: 180 MG/DL
HCT VFR BLD CALC: 39.8 %
HGB BLD-MCNC: 12.7 G/DL
IRON SATN MFR SERPL: 14 %
IRON SERPL-MCNC: 44 UG/DL
MCHC RBC-ENTMCNC: 25.7 PG
MCHC RBC-ENTMCNC: 31.9 G/DL
MCV RBC AUTO: 80.4 FL
PLATELET # BLD AUTO: 294 K/UL
PMV BLD: 9.6 FL
POTASSIUM SERPL-SCNC: 4.2 MMOL/L
PROT SERPL-MCNC: 6.1 G/DL
RBC # BLD: 4.95 M/UL
RBC # FLD: 16.1 %
SODIUM SERPL-SCNC: 142 MMOL/L
TIBC SERPL-MCNC: 304 UG/DL
UIBC SERPL-MCNC: 260 UG/DL
WBC # FLD AUTO: 6.66 K/UL

## 2020-08-04 ENCOUNTER — APPOINTMENT (OUTPATIENT)
Dept: HEMATOLOGY ONCOLOGY | Facility: CLINIC | Age: 55
End: 2020-08-04
Payer: MEDICARE

## 2020-08-04 VITALS
DIASTOLIC BLOOD PRESSURE: 65 MMHG | SYSTOLIC BLOOD PRESSURE: 100 MMHG | BODY MASS INDEX: 33.84 KG/M2 | RESPIRATION RATE: 16 BRPM | TEMPERATURE: 97.1 F | HEIGHT: 63 IN | HEART RATE: 80 BPM | WEIGHT: 191 LBS

## 2020-08-04 LAB — FERRITIN SERPL-MCNC: 111 NG/ML

## 2020-08-04 PROCEDURE — 99213 OFFICE O/P EST LOW 20 MIN: CPT

## 2020-08-04 NOTE — REVIEW OF SYSTEMS
[Negative] : Heme/Lymph [Recent Change In Weight] : ~T no recent weight change [Fatigue] : no fatigue [Chills] : no chills [Fever] : no fever [Palpitations] : no palpitations [Chest Pain] : no chest pain [Shortness Of Breath] : no shortness of breath [Constipation] : no constipation [Abdominal Pain] : no abdominal pain [Skin Rash] : no skin rash [Easy Bleeding] : no tendency for easy bleeding [Easy Bruising] : no tendency for easy bruising

## 2020-08-04 NOTE — ASSESSMENT
[FreeTextEntry1] : 54 yo woman with PMH of ulcerative colitis here for follow up on history of iron deficiency anemia. She is being seen by a private GI; She followed with Dr. Pak who performed endoscopy and colonoscopy in January 2020 which showed GERD with esophagitis, acute gastritis with and without bleeding and duodenitis. \par \par She is pending a CT scan of the abdomen/pelvis with contrast to undergo evaluation for Crohns Disease. \par \par - CBC done showed evidence of normal H/H 12.2 with 14% iron saturation\par - s/p iv iron replacement (completed in June 2019) with resolution of low iron; no iron at this time\par \par RTC in 3 months with CBC, iron studies, ferritin 1-2 days prior

## 2020-08-04 NOTE — HISTORY OF PRESENT ILLNESS
[de-identified] : Ms. SEJAL BARNARD is a 54 year old female here today for evaluation and treatment of Anemia.\par \par The patient was referred by Dr. Quintero.  She presents with her health aide from the Apartment Housing complex she resides on [facility].  She reports that she was seen by her PCP, who referred her due to iron deficiency anemia.  She currently takes iron supplement, 325mg daily and is tolerating without complaint.  She states she feels tired on occasion.  \par \par LAB WORKUP:\par (5/7/19) WBC 6.23, Hgb 13.7, MCV 76.7, RDW 16.1, , Na 147, ALT 42, Ferritin 36, TIBC 349, ironsat 15%, B12 normal\par \par HCM:\par Mammogram (07/2018) patient reports it was benign\par Colonoscopy ([facility] - 4/6/2018) removed 2 polyps, as per patient.  IMPRESSION:  Preparation of the colon was poor.  One 6mm polyp in the cecum, removed peicemeal using a cold biopsy forceps.  Resected and retrieved.  Random biopsies taken from transverse colo, descending, sigmoid and rectum.  Repeat in 1 year [de-identified] : 11/8/19\[facility] Patient is here for a follow-up visit for anemia with aid.  She is feeling well with no complaints.  Most recent CBC is stable with ferritin 111 and ironsat 17%.  Patient denies fever, chills, nausea, vomiting, bleeding or pica.  She does not take oral iron as she had IV iron in the past. She reports her energy has improved slightly as well.  \par \par 2/14/2020 \[facility] Patient is here for a follow-up visit for anemia with aid.  She is feeling well with no complaints. Most recent CBC and iron studies from 2.7.2020 remains stable off oral iron.   Patient denies fever, chills, nausea, vomiting, bleeding or pica.  She followed with Dr. Pak who performed endoscopy which showed GERD with esophagitis, acute gastritis with and without bleeding and duodenitis.  \par \par 8/4/20\[facility] Patient is here for follow up today for anemia. She is accompanied by her aide. She feels well. She denies severe fatigue, shortness of breath, chest pain, palpitations, or decreased energy. She does endorse black colored stools. SHe had an EGD and colonoscopy with Dr. Pak in January 2020 and continues to follow with him. Currently, she reports she is due for a CT abdomen and pelvis with contrast to assess for any bleeding.

## 2020-08-04 NOTE — PHYSICAL EXAM
[Restricted in physically strenuous activity but ambulatory and able to carry out work of a light or sedentary nature] : Status 1- Restricted in physically strenuous activity but ambulatory and able to carry out work of a light or sedentary nature, e.g., light house work, office work [Normal] : affect appropriate [de-identified] : uses seated walker for ambulation

## 2020-09-22 ENCOUNTER — OUTPATIENT (OUTPATIENT)
Dept: OUTPATIENT SERVICES | Facility: HOSPITAL | Age: 55
LOS: 1 days | Discharge: HOME | End: 2020-09-22
Payer: MEDICARE

## 2020-09-22 DIAGNOSIS — Z98.890 OTHER SPECIFIED POSTPROCEDURAL STATES: Chronic | ICD-10-CM

## 2020-09-22 DIAGNOSIS — R10.9 UNSPECIFIED ABDOMINAL PAIN: ICD-10-CM

## 2020-09-22 PROCEDURE — 74177 CT ABD & PELVIS W/CONTRAST: CPT | Mod: 26

## 2020-10-26 ENCOUNTER — LABORATORY RESULT (OUTPATIENT)
Age: 55
End: 2020-10-26

## 2020-10-27 ENCOUNTER — APPOINTMENT (OUTPATIENT)
Dept: HEMATOLOGY ONCOLOGY | Facility: CLINIC | Age: 55
End: 2020-10-27

## 2020-10-27 LAB
ALBUMIN SERPL ELPH-MCNC: 4.4 G/DL
ALP BLD-CCNC: 75 U/L
ALT SERPL-CCNC: 19 U/L
ANION GAP SERPL CALC-SCNC: 12 MMOL/L
AST SERPL-CCNC: 13 U/L
BILIRUB DIRECT SERPL-MCNC: <0.2 MG/DL
BILIRUB INDIRECT SERPL-MCNC: >0.1 MG/DL
BILIRUB SERPL-MCNC: 0.3 MG/DL
BUN SERPL-MCNC: 22 MG/DL
CALCIUM SERPL-MCNC: 10.1 MG/DL
CHLORIDE SERPL-SCNC: 102 MMOL/L
CO2 SERPL-SCNC: 27 MMOL/L
CREAT SERPL-MCNC: 0.6 MG/DL
GLUCOSE SERPL-MCNC: 129 MG/DL
IRON SATN MFR SERPL: 20 %
IRON SERPL-MCNC: 60 UG/DL
POTASSIUM SERPL-SCNC: 4.5 MMOL/L
PROT SERPL-MCNC: 6.5 G/DL
SODIUM SERPL-SCNC: 141 MMOL/L
TIBC SERPL-MCNC: 294 UG/DL
UIBC SERPL-MCNC: 234 UG/DL

## 2020-10-28 LAB — FERRITIN SERPL-MCNC: 71 NG/ML

## 2020-11-03 ENCOUNTER — APPOINTMENT (OUTPATIENT)
Dept: HEMATOLOGY ONCOLOGY | Facility: CLINIC | Age: 55
End: 2020-11-03
Payer: MEDICARE

## 2020-11-03 ENCOUNTER — OUTPATIENT (OUTPATIENT)
Dept: OUTPATIENT SERVICES | Facility: HOSPITAL | Age: 55
LOS: 1 days | Discharge: HOME | End: 2020-11-03

## 2020-11-03 VITALS
BODY MASS INDEX: 34.73 KG/M2 | SYSTOLIC BLOOD PRESSURE: 115 MMHG | HEIGHT: 63 IN | WEIGHT: 196 LBS | HEART RATE: 98 BPM | DIASTOLIC BLOOD PRESSURE: 60 MMHG | TEMPERATURE: 97.8 F

## 2020-11-03 DIAGNOSIS — Z98.890 OTHER SPECIFIED POSTPROCEDURAL STATES: Chronic | ICD-10-CM

## 2020-11-03 DIAGNOSIS — D50.9 IRON DEFICIENCY ANEMIA, UNSPECIFIED: ICD-10-CM

## 2020-11-03 PROCEDURE — 99213 OFFICE O/P EST LOW 20 MIN: CPT

## 2020-11-03 NOTE — ASSESSMENT
[FreeTextEntry1] : 54 yo woman with PMH of ulcerative colitis here for follow up on history of iron deficiency anemia. She is being seen by a private GI; She followed with Dr. Pak who performed endoscopy and colonoscopy in January 2020 which showed GERD with esophagitis, acute gastritis with and without bleeding and duodenitis. \par \par She is pending a CT scan of the abdomen/pelvis with contrast to undergo evaluation for Crohns Disease. \par \par - CBC done showed evidence of normal hg and iron stores\par - s/p iv iron replacement (completed in June 2019) with resolution of low iron; no iron at this time\par \par RTC in4 months with CBC, iron studies, ferritin 1-2 days prior

## 2020-11-03 NOTE — HISTORY OF PRESENT ILLNESS
[de-identified] : Ms. SEJAL BARNARD is a 54 year old female here today for evaluation and treatment of Anemia.\par \par The patient was referred by Dr. Quintero.  She presents with her health aide from the [facility] complex she resides on [facility].  She reports that she was seen by her PCP, who referred her due to iron deficiency anemia.  She currently takes iron supplement, 325mg daily and is tolerating without complaint.  She states she feels tired on occasion.  \par \par LAB WORKUP:\par (5/7/19) WBC 6.23, Hgb 13.7, MCV 76.7, RDW 16.1, , Na 147, ALT 42, Ferritin 36, TIBC 349, ironsat 15%, B12 normal\par \par HCM:\par Mammogram (07/2018) patient reports it was benign\par Colonoscopy ([facility] - 4/6/2018) removed 2 polyps, as per patient.  IMPRESSION:  Preparation of the colon was poor.  One 6mm polyp in the cecum, removed peicemeal using a cold biopsy forceps.  Resected and retrieved.  Random biopsies taken from transverse colo, descending, sigmoid and rectum.  Repeat in 1 year [de-identified] : 11/8/19\[facility] Patient is here for a follow-up visit for anemia with aid.  She is feeling well with no complaints.  Most recent CBC is stable with ferritin 111 and ironsat 17%.  Patient denies fever, chills, nausea, vomiting, bleeding or pica.  She does not take oral iron as she had IV iron in the past. She reports her energy has improved slightly as well.  \par \par 2/14/2020 \[facility] Patient is here for a follow-up visit for anemia with aid.  She is feeling well with no complaints. Most recent CBC and iron studies from 2.7.2020 remains stable off oral iron.   Patient denies fever, chills, nausea, vomiting, bleeding or pica.  She followed with Dr. Pak who performed endoscopy which showed GERD with esophagitis, acute gastritis with and without bleeding and duodenitis.  \par \par 8/4/20\[facility] Patient is here for follow up today for anemia. She is accompanied by her aide. She feels well. She denies severe fatigue, shortness of breath, chest pain, palpitations, or decreased energy. She does endorse black colored stools. SHe had an EGD and colonoscopy with Dr. Pak in January 2020 and continues to follow with him. Currently, she reports she is due for a CT abdomen and pelvis with contrast to assess for any bleeding.

## 2020-11-03 NOTE — PHYSICAL EXAM
[Restricted in physically strenuous activity but ambulatory and able to carry out work of a light or sedentary nature] : Status 1- Restricted in physically strenuous activity but ambulatory and able to carry out work of a light or sedentary nature, e.g., light house work, office work [Normal] : affect appropriate [de-identified] : uses seated walker for ambulation

## 2020-11-27 ENCOUNTER — APPOINTMENT (OUTPATIENT)
Dept: OPHTHALMOLOGY | Facility: CLINIC | Age: 55
End: 2020-11-27

## 2020-11-27 ENCOUNTER — OUTPATIENT (OUTPATIENT)
Dept: OUTPATIENT SERVICES | Facility: HOSPITAL | Age: 55
LOS: 1 days | Discharge: HOME | End: 2020-11-27
Payer: MEDICARE

## 2020-11-27 DIAGNOSIS — H52.4 PRESBYOPIA: ICD-10-CM

## 2020-11-27 DIAGNOSIS — Z98.890 OTHER SPECIFIED POSTPROCEDURAL STATES: Chronic | ICD-10-CM

## 2020-11-27 DIAGNOSIS — E11.9 TYPE 2 DIABETES MELLITUS WITHOUT COMPLICATIONS: ICD-10-CM

## 2020-11-27 DIAGNOSIS — H25.13 AGE-RELATED NUCLEAR CATARACT, BILATERAL: ICD-10-CM

## 2020-11-27 DIAGNOSIS — H02.88A MEIBOMIAN GLAND DYSFUNCTION RIGHT EYE, UPPER AND LOWER EYELIDS: ICD-10-CM

## 2020-11-27 DIAGNOSIS — H35.013 CHANGES IN RETINAL VASCULAR APPEARANCE, BILATERAL: ICD-10-CM

## 2020-11-27 PROCEDURE — 92004 COMPRE OPH EXAM NEW PT 1/>: CPT

## 2020-11-27 PROCEDURE — 92250 FUNDUS PHOTOGRAPHY W/I&R: CPT | Mod: 26

## 2020-11-30 ENCOUNTER — INPATIENT (INPATIENT)
Facility: HOSPITAL | Age: 55
LOS: 1 days | Discharge: HOME HEALTH | End: 2020-12-02
Attending: STUDENT IN AN ORGANIZED HEALTH CARE EDUCATION/TRAINING PROGRAM | Admitting: STUDENT IN AN ORGANIZED HEALTH CARE EDUCATION/TRAINING PROGRAM
Payer: MEDICARE

## 2020-11-30 VITALS
RESPIRATION RATE: 18 BRPM | OXYGEN SATURATION: 93 % | TEMPERATURE: 99 F | WEIGHT: 190.04 LBS | SYSTOLIC BLOOD PRESSURE: 108 MMHG | HEIGHT: 64 IN | DIASTOLIC BLOOD PRESSURE: 44 MMHG | HEART RATE: 127 BPM

## 2020-11-30 DIAGNOSIS — Z98.890 OTHER SPECIFIED POSTPROCEDURAL STATES: Chronic | ICD-10-CM

## 2020-11-30 LAB
ALBUMIN SERPL ELPH-MCNC: 4.8 G/DL — SIGNIFICANT CHANGE UP (ref 3.5–5.2)
ALP SERPL-CCNC: 98 U/L — SIGNIFICANT CHANGE UP (ref 30–115)
ALT FLD-CCNC: 33 U/L — SIGNIFICANT CHANGE UP (ref 0–41)
ANION GAP SERPL CALC-SCNC: 14 MMOL/L — SIGNIFICANT CHANGE UP (ref 7–14)
APPEARANCE UR: CLEAR — SIGNIFICANT CHANGE UP
APTT BLD: 33.9 SEC — SIGNIFICANT CHANGE UP (ref 27–39.2)
AST SERPL-CCNC: 20 U/L — SIGNIFICANT CHANGE UP (ref 0–41)
BACTERIA # UR AUTO: ABNORMAL
BASE EXCESS BLDV CALC-SCNC: -0.1 MMOL/L — SIGNIFICANT CHANGE UP (ref -2–2)
BASOPHILS # BLD AUTO: 0.02 K/UL — SIGNIFICANT CHANGE UP (ref 0–0.2)
BASOPHILS NFR BLD AUTO: 0.3 % — SIGNIFICANT CHANGE UP (ref 0–1)
BILIRUB SERPL-MCNC: 0.2 MG/DL — SIGNIFICANT CHANGE UP (ref 0.2–1.2)
BILIRUB UR-MCNC: NEGATIVE — SIGNIFICANT CHANGE UP
BUN SERPL-MCNC: 18 MG/DL — SIGNIFICANT CHANGE UP (ref 10–20)
CA-I SERPL-SCNC: 1.27 MMOL/L — SIGNIFICANT CHANGE UP (ref 1.12–1.3)
CALCIUM SERPL-MCNC: 10.5 MG/DL — HIGH (ref 8.5–10.1)
CHLORIDE SERPL-SCNC: 100 MMOL/L — SIGNIFICANT CHANGE UP (ref 98–110)
CO2 SERPL-SCNC: 25 MMOL/L — SIGNIFICANT CHANGE UP (ref 17–32)
COLOR SPEC: YELLOW — SIGNIFICANT CHANGE UP
CREAT SERPL-MCNC: 0.7 MG/DL — SIGNIFICANT CHANGE UP (ref 0.7–1.5)
DIFF PNL FLD: ABNORMAL
EOSINOPHIL # BLD AUTO: 0 K/UL — SIGNIFICANT CHANGE UP (ref 0–0.7)
EOSINOPHIL NFR BLD AUTO: 0 % — SIGNIFICANT CHANGE UP (ref 0–8)
EPI CELLS # UR: ABNORMAL /HPF
GAS PNL BLDV: 141 MMOL/L — SIGNIFICANT CHANGE UP (ref 136–145)
GAS PNL BLDV: SIGNIFICANT CHANGE UP
GLUCOSE SERPL-MCNC: 346 MG/DL — HIGH (ref 70–99)
GLUCOSE UR QL: >=1000 MG/DL
HCO3 BLDV-SCNC: 26 MMOL/L — SIGNIFICANT CHANGE UP (ref 22–29)
HCT VFR BLD CALC: 43.9 % — SIGNIFICANT CHANGE UP (ref 37–47)
HCT VFR BLDA CALC: 43.9 % — SIGNIFICANT CHANGE UP (ref 34–44)
HGB BLD CALC-MCNC: 14.3 G/DL — SIGNIFICANT CHANGE UP (ref 14–18)
HGB BLD-MCNC: 13.7 G/DL — SIGNIFICANT CHANGE UP (ref 12–16)
HOROWITZ INDEX BLDV+IHG-RTO: 21 — SIGNIFICANT CHANGE UP
IMM GRANULOCYTES NFR BLD AUTO: 0.7 % — HIGH (ref 0.1–0.3)
INR BLD: 0.99 RATIO — SIGNIFICANT CHANGE UP (ref 0.65–1.3)
KETONES UR-MCNC: 15
LACTATE BLDV-MCNC: 3.8 MMOL/L — HIGH (ref 0.5–1.6)
LACTATE SERPL-SCNC: 3.6 MMOL/L — HIGH (ref 0.7–2)
LEUKOCYTE ESTERASE UR-ACNC: NEGATIVE — SIGNIFICANT CHANGE UP
LYMPHOCYTES # BLD AUTO: 0.94 K/UL — LOW (ref 1.2–3.4)
LYMPHOCYTES # BLD AUTO: 13.5 % — LOW (ref 20.5–51.1)
MCHC RBC-ENTMCNC: 24.9 PG — LOW (ref 27–31)
MCHC RBC-ENTMCNC: 31.2 G/DL — LOW (ref 32–37)
MCV RBC AUTO: 79.8 FL — LOW (ref 81–99)
MONOCYTES # BLD AUTO: 0.05 K/UL — LOW (ref 0.1–0.6)
MONOCYTES NFR BLD AUTO: 0.7 % — LOW (ref 1.7–9.3)
NEUTROPHILS # BLD AUTO: 5.91 K/UL — SIGNIFICANT CHANGE UP (ref 1.4–6.5)
NEUTROPHILS NFR BLD AUTO: 84.8 % — HIGH (ref 42.2–75.2)
NITRITE UR-MCNC: NEGATIVE — SIGNIFICANT CHANGE UP
NRBC # BLD: 0 /100 WBCS — SIGNIFICANT CHANGE UP (ref 0–0)
PCO2 BLDV: 44 MMHG — SIGNIFICANT CHANGE UP (ref 41–51)
PH BLDV: 7.37 — SIGNIFICANT CHANGE UP (ref 7.26–7.43)
PH UR: 6.5 — SIGNIFICANT CHANGE UP (ref 5–8)
PLATELET # BLD AUTO: 356 K/UL — SIGNIFICANT CHANGE UP (ref 130–400)
PO2 BLDV: 41 MMHG — HIGH (ref 20–40)
POTASSIUM BLDV-SCNC: 4 MMOL/L — SIGNIFICANT CHANGE UP (ref 3.3–5.6)
POTASSIUM SERPL-MCNC: 4.2 MMOL/L — SIGNIFICANT CHANGE UP (ref 3.5–5)
POTASSIUM SERPL-SCNC: 4.2 MMOL/L — SIGNIFICANT CHANGE UP (ref 3.5–5)
PROT SERPL-MCNC: 7 G/DL — SIGNIFICANT CHANGE UP (ref 6–8)
PROT UR-MCNC: NEGATIVE MG/DL — SIGNIFICANT CHANGE UP
PROTHROM AB SERPL-ACNC: 11.4 SEC — SIGNIFICANT CHANGE UP (ref 9.95–12.87)
RAPID RVP RESULT: SIGNIFICANT CHANGE UP
RBC # BLD: 5.5 M/UL — HIGH (ref 4.2–5.4)
RBC # FLD: 15.9 % — HIGH (ref 11.5–14.5)
RBC CASTS # UR COMP ASSIST: SIGNIFICANT CHANGE UP /HPF
SAO2 % BLDV: 75 % — SIGNIFICANT CHANGE UP
SARS-COV-2 RNA SPEC QL NAA+PROBE: SIGNIFICANT CHANGE UP
SODIUM SERPL-SCNC: 139 MMOL/L — SIGNIFICANT CHANGE UP (ref 135–146)
SP GR SPEC: 1.02 — SIGNIFICANT CHANGE UP (ref 1.01–1.03)
TROPONIN T SERPL-MCNC: <0.01 NG/ML — SIGNIFICANT CHANGE UP
UROBILINOGEN FLD QL: 0.2 MG/DL — SIGNIFICANT CHANGE UP (ref 0.2–0.2)
WBC # BLD: 6.97 K/UL — SIGNIFICANT CHANGE UP (ref 4.8–10.8)
WBC # FLD AUTO: 6.97 K/UL — SIGNIFICANT CHANGE UP (ref 4.8–10.8)
WBC UR QL: SIGNIFICANT CHANGE UP /HPF

## 2020-11-30 PROCEDURE — 99221 1ST HOSP IP/OBS SF/LOW 40: CPT

## 2020-11-30 PROCEDURE — 71046 X-RAY EXAM CHEST 2 VIEWS: CPT | Mod: 26

## 2020-11-30 PROCEDURE — 99285 EMERGENCY DEPT VISIT HI MDM: CPT

## 2020-11-30 RX ORDER — TRAZODONE HCL 50 MG
50 TABLET ORAL AT BEDTIME
Refills: 0 | Status: DISCONTINUED | OUTPATIENT
Start: 2020-11-30 | End: 2020-12-02

## 2020-11-30 RX ORDER — SODIUM CHLORIDE 9 MG/ML
1000 INJECTION INTRAMUSCULAR; INTRAVENOUS; SUBCUTANEOUS ONCE
Refills: 0 | Status: COMPLETED | OUTPATIENT
Start: 2020-11-30 | End: 2020-11-30

## 2020-11-30 RX ORDER — TRAZODONE HCL 50 MG
50 TABLET ORAL AT BEDTIME
Refills: 0 | Status: DISCONTINUED | OUTPATIENT
Start: 2020-11-30 | End: 2020-12-01

## 2020-11-30 RX ORDER — SODIUM CHLORIDE 9 MG/ML
1000 INJECTION INTRAMUSCULAR; INTRAVENOUS; SUBCUTANEOUS
Refills: 0 | Status: DISCONTINUED | OUTPATIENT
Start: 2020-11-30 | End: 2020-12-01

## 2020-11-30 RX ADMIN — SODIUM CHLORIDE 1000 MILLILITER(S): 9 INJECTION INTRAMUSCULAR; INTRAVENOUS; SUBCUTANEOUS at 18:24

## 2020-11-30 NOTE — H&P ADULT - HISTORY OF PRESENT ILLNESS
54yo female with extensive PMH to include endocrine diseases, COPD, Crohn's, sleep apnea and schizophrenia  presents to the ER with hyperglycemia. Associated with dizziness. Got cortisone IV this am. Found to have evidence of acidosis 54yo female with extensive PMH to include endocrine diseases, COPD, Crohn's, sleep apnea and schizophrenia  presents to the ER with hyperglycemia. Associated with dizziness. Got cortisone IV this am. Found to have evidence of acidosis (she is on metformin)

## 2020-11-30 NOTE — ED ADULT TRIAGE NOTE - CHIEF COMPLAINT QUOTE
pt states that at 4pm her sugar was over 500, pt took her insulin and called 911. In ED finger stick was 337

## 2020-11-30 NOTE — H&P ADULT - NSHPLABSRESULTS_GEN_ALL_CORE
13.7   6.97  )-----------( 356      ( 2020 17:15 )             43.9         139  |  100  |  18  ----------------------------<  346<H>  4.2   |  25  |  0.7    Ca    10.5<H>      2020 17:15    TPro  7.0  /  Alb  4.8  /  TBili  0.2  /  DBili  x   /  AST  20  /  ALT  33  /  AlkPhos  98            Urinalysis Basic - ( 2020 20:00 )    Color: Yellow / Appearance: Clear / S.020 / pH: x  Gluc: x / Ketone: 15  / Bili: Negative / Urobili: 0.2 mg/dL   Blood: x / Protein: Negative mg/dL / Nitrite: Negative   Leuk Esterase: Negative / RBC: 1-2 /HPF / WBC 1-2 /HPF   Sq Epi: x / Non Sq Epi: Occasional /HPF / Bacteria: Few      PT/INR - ( 2020 17:15 )   PT: 11.40 sec;   INR: 0.99 ratio         PTT - ( 2020 17:15 )  PTT:33.9 sec  Lactate Trend   @ 20:32 Lactate:3.6     CARDIAC MARKERS ( 2020 17:15 )  x     / <0.01 ng/mL / x     / x     / x          CAPILLARY BLOOD GLUCOSE      POCT Blood Glucose.: 258 mg/dL (2020 19:52)

## 2020-11-30 NOTE — ED PROVIDER NOTE - OBJECTIVE STATEMENT
56 yo F pmh DM, migraine, COPD, Crohn's disease, depression,  anxiety, schizophrenia, ANISHA, hypothyroidism, GERD, 54 yo F pmh DM, migraine, COPD, Crohn's disease, depression,  anxiety, schizophrenia, ANISHA, hypothyroidism, GERD BIBEMS c/o high fingerstick at home 443 associated with dizziness since 4pm. Pt takes metformin and insulin, compliant with medications, took appropriate dose today. Pt reports at 3pm she ate a sandwich and shortly after had the sudden onset of dizziness, relieved by sitting down. Denies any fever, chills, nausea, vomiting, abdominal pain, chest pain, sob, headache.

## 2020-11-30 NOTE — ED PROVIDER NOTE - NS ED ROS FT
Constitutional: (-) fever (-) malaise (-) diaphoresis (-) chills (-) wt. loss (-) bodyaches (-) night sweats  Eyes: (-) visual changes  Cardiac: (-) chest pain  (-) palpitations (-) syncope (-) edema  Respiratory: (-) cough (-) hemoptysis (-) SOB (-) DAVIS  GI: (-) nausea (-) vomiting (-) diarrhea (-) abdominal pain (-) hematochezia (-) changes in appetite   : (-) dysuria (-) increased frequency  (-) hematuria (-) incontinence  MS: (-) back pain (-) myalgia (-) muscle weakness (-)  joint pain  Neuro: (-) headache (+) dizziness (-) numbness/tingling to extremities B/L (-) weakness (-) LOC (-) head injury (-) AMS     Except as documented in the HPI, all other systems are negative.

## 2020-11-30 NOTE — ED ADULT NURSE NOTE - NSFALLRSKASSESSTYPE_ED_ALL_ED
VM left for Dasha letting her know that its okay to check on Monday 11/26, but I am not sure what she was given while admitted at [facility]. I asked that she call back to let me know if she knows what was given and what was advised for dosing upon discharge. Will need dosing through the weekend.   Zo Davis RN     Initial (On Arrival)

## 2020-11-30 NOTE — ED PROVIDER NOTE - ATTENDING CONTRIBUTION TO CARE
54 yo F PMHx noted including DM, COPD, presents via EMS with c/p feeling dizzy, elevated blood sugar,  Pt states that it was 443 at home around 4 pm.  Pt states that she took her insulin after and called 911.  no fevers or chills, no dysuria or frequency.  On exam pt in NAD AAO x 3, Lungs cta b/l no wrr, abd soft nt nd, no edema,

## 2020-11-30 NOTE — ED PROVIDER NOTE - PHYSICAL EXAMINATION
GENERAL: Well-nourished, Well-developed. NAD.  Eyes: PERRLA, EOMI. No asymmetry. No nystagmus. No conjunctival injection. Non-icteric sclera.  CVS: No JVD. No reproducible chest wall tenderness. Normal S1,S2. No murmurs appreciated on auscultation   RESP: No use of accessory muscles. Chest rise symmetrical with good expansion. Lungs clear to auscultation B/L. No wheezing, rales, or rhonchi auscultated.  GI: Normal auscultation of bowel sounds in all 4 quadrants. Soft, Nontender, Nondistended. No guarding or rebound tenderness. No CVAT B/L.  MSK: Extremities w/o deformity or ttp. No visible signs of trauma such as ecchymosis, erythema, or swelling. FROM of upper and lower extremities B/L. No midline spinal TTP. FROM of back with flexion and extension.  Skin: Warm, Dry. No rashes or lesions. Good cap refill < 2 sec B/L.  EXT: Radial and pedal pulses present B/L. No calf tenderness or swelling B/L. No palpable cords. No pedal edema B/L.  Neuro: AA&O x 3. Speaking in full sentences. No slurring of speech. No facial droop. No tremors. Sensation grossly intact. Strength 5/5 B/L. Gait within normal limits. Negative Romberg and Pronator Drift. Normal Finger to nose exam. Visual fields intact.  Psych:  Appropriate mood and affect. Cooperative. Calm.

## 2020-11-30 NOTE — H&P ADULT - PROBLEM SELECTOR PLAN 3
Medications list verbalized to medical PA is different then list obtained from ER. Need to clarify in daytime

## 2020-12-01 DIAGNOSIS — E87.2 ACIDOSIS: ICD-10-CM

## 2020-12-01 DIAGNOSIS — Z79.899 OTHER LONG TERM (CURRENT) DRUG THERAPY: ICD-10-CM

## 2020-12-01 DIAGNOSIS — R73.9 HYPERGLYCEMIA, UNSPECIFIED: ICD-10-CM

## 2020-12-01 LAB
A1C WITH ESTIMATED AVERAGE GLUCOSE RESULT: 7.5 % — HIGH (ref 4–5.6)
ALBUMIN SERPL ELPH-MCNC: 4.4 G/DL — SIGNIFICANT CHANGE UP (ref 3.5–5.2)
ALP SERPL-CCNC: 83 U/L — SIGNIFICANT CHANGE UP (ref 30–115)
ALT FLD-CCNC: 27 U/L — SIGNIFICANT CHANGE UP (ref 0–41)
ANION GAP SERPL CALC-SCNC: 15 MMOL/L — HIGH (ref 7–14)
AST SERPL-CCNC: 15 U/L — SIGNIFICANT CHANGE UP (ref 0–41)
BILIRUB SERPL-MCNC: 0.2 MG/DL — SIGNIFICANT CHANGE UP (ref 0.2–1.2)
BUN SERPL-MCNC: 16 MG/DL — SIGNIFICANT CHANGE UP (ref 10–20)
CALCIUM SERPL-MCNC: 10.2 MG/DL — HIGH (ref 8.5–10.1)
CHLORIDE SERPL-SCNC: 99 MMOL/L — SIGNIFICANT CHANGE UP (ref 98–110)
CO2 SERPL-SCNC: 23 MMOL/L — SIGNIFICANT CHANGE UP (ref 17–32)
CREAT SERPL-MCNC: 0.6 MG/DL — LOW (ref 0.7–1.5)
ESTIMATED AVERAGE GLUCOSE: 169 MG/DL — HIGH (ref 68–114)
GLUCOSE BLDC GLUCOMTR-MCNC: 159 MG/DL — HIGH (ref 70–99)
GLUCOSE BLDC GLUCOMTR-MCNC: 164 MG/DL — HIGH (ref 70–99)
GLUCOSE BLDC GLUCOMTR-MCNC: 209 MG/DL — HIGH (ref 70–99)
GLUCOSE BLDC GLUCOMTR-MCNC: 214 MG/DL — HIGH (ref 70–99)
GLUCOSE BLDC GLUCOMTR-MCNC: 228 MG/DL — HIGH (ref 70–99)
GLUCOSE BLDC GLUCOMTR-MCNC: 282 MG/DL — HIGH (ref 70–99)
GLUCOSE SERPL-MCNC: 274 MG/DL — HIGH (ref 70–99)
HCT VFR BLD CALC: 39.8 % — SIGNIFICANT CHANGE UP (ref 37–47)
HGB BLD-MCNC: 12.6 G/DL — SIGNIFICANT CHANGE UP (ref 12–16)
LACTATE SERPL-SCNC: 1.4 MMOL/L — SIGNIFICANT CHANGE UP (ref 0.7–2)
LACTATE SERPL-SCNC: 2.1 MMOL/L — HIGH (ref 0.7–2)
LACTATE SERPL-SCNC: 5 MMOL/L — CRITICAL HIGH (ref 0.7–2)
MCHC RBC-ENTMCNC: 24.9 PG — LOW (ref 27–31)
MCHC RBC-ENTMCNC: 31.7 G/DL — LOW (ref 32–37)
MCV RBC AUTO: 78.5 FL — LOW (ref 81–99)
NRBC # BLD: 0 /100 WBCS — SIGNIFICANT CHANGE UP (ref 0–0)
PLATELET # BLD AUTO: 354 K/UL — SIGNIFICANT CHANGE UP (ref 130–400)
POTASSIUM SERPL-MCNC: 4.1 MMOL/L — SIGNIFICANT CHANGE UP (ref 3.5–5)
POTASSIUM SERPL-SCNC: 4.1 MMOL/L — SIGNIFICANT CHANGE UP (ref 3.5–5)
PROT SERPL-MCNC: 6.1 G/DL — SIGNIFICANT CHANGE UP (ref 6–8)
RBC # BLD: 5.07 M/UL — SIGNIFICANT CHANGE UP (ref 4.2–5.4)
RBC # FLD: 15.8 % — HIGH (ref 11.5–14.5)
SODIUM SERPL-SCNC: 137 MMOL/L — SIGNIFICANT CHANGE UP (ref 135–146)
WBC # BLD: 12.47 K/UL — HIGH (ref 4.8–10.8)
WBC # FLD AUTO: 12.47 K/UL — HIGH (ref 4.8–10.8)

## 2020-12-01 PROCEDURE — 73560 X-RAY EXAM OF KNEE 1 OR 2: CPT | Mod: 26,50

## 2020-12-01 PROCEDURE — 72125 CT NECK SPINE W/O DYE: CPT | Mod: 26

## 2020-12-01 PROCEDURE — 70450 CT HEAD/BRAIN W/O DYE: CPT | Mod: 26

## 2020-12-01 PROCEDURE — 99233 SBSQ HOSP IP/OBS HIGH 50: CPT

## 2020-12-01 PROCEDURE — 99497 ADVNCD CARE PLAN 30 MIN: CPT | Mod: 25

## 2020-12-01 RX ORDER — INSULIN GLARGINE 100 [IU]/ML
25 INJECTION, SOLUTION SUBCUTANEOUS ONCE
Refills: 0 | Status: COMPLETED | OUTPATIENT
Start: 2020-12-01 | End: 2020-12-01

## 2020-12-01 RX ORDER — GABAPENTIN 400 MG/1
100 CAPSULE ORAL DAILY
Refills: 0 | Status: DISCONTINUED | OUTPATIENT
Start: 2020-12-01 | End: 2020-12-02

## 2020-12-01 RX ORDER — IPRATROPIUM/ALBUTEROL SULFATE 18-103MCG
3 AEROSOL WITH ADAPTER (GRAM) INHALATION EVERY 6 HOURS
Refills: 0 | Status: DISCONTINUED | OUTPATIENT
Start: 2020-12-01 | End: 2020-12-02

## 2020-12-01 RX ORDER — HEPARIN SODIUM 5000 [USP'U]/ML
5000 INJECTION INTRAVENOUS; SUBCUTANEOUS EVERY 12 HOURS
Refills: 0 | Status: DISCONTINUED | OUTPATIENT
Start: 2020-12-02 | End: 2020-12-02

## 2020-12-01 RX ORDER — SODIUM CHLORIDE 9 MG/ML
1000 INJECTION, SOLUTION INTRAVENOUS
Refills: 0 | Status: DISCONTINUED | OUTPATIENT
Start: 2020-12-01 | End: 2020-12-02

## 2020-12-01 RX ORDER — DEXTROSE 50 % IN WATER 50 %
25 SYRINGE (ML) INTRAVENOUS ONCE
Refills: 0 | Status: DISCONTINUED | OUTPATIENT
Start: 2020-12-01 | End: 2020-12-02

## 2020-12-01 RX ORDER — DEXTROSE 50 % IN WATER 50 %
15 SYRINGE (ML) INTRAVENOUS ONCE
Refills: 0 | Status: DISCONTINUED | OUTPATIENT
Start: 2020-12-01 | End: 2020-12-02

## 2020-12-01 RX ORDER — HALOPERIDOL DECANOATE 100 MG/ML
10 INJECTION INTRAMUSCULAR
Refills: 0 | Status: DISCONTINUED | OUTPATIENT
Start: 2020-12-01 | End: 2020-12-01

## 2020-12-01 RX ORDER — GLUCAGON INJECTION, SOLUTION 0.5 MG/.1ML
1 INJECTION, SOLUTION SUBCUTANEOUS ONCE
Refills: 0 | Status: DISCONTINUED | OUTPATIENT
Start: 2020-12-01 | End: 2020-12-02

## 2020-12-01 RX ORDER — INSULIN LISPRO 100/ML
10 VIAL (ML) SUBCUTANEOUS
Refills: 0 | Status: DISCONTINUED | OUTPATIENT
Start: 2020-12-01 | End: 2020-12-02

## 2020-12-01 RX ORDER — PANTOPRAZOLE SODIUM 20 MG/1
40 TABLET, DELAYED RELEASE ORAL
Refills: 0 | Status: DISCONTINUED | OUTPATIENT
Start: 2020-12-01 | End: 2020-12-02

## 2020-12-01 RX ORDER — LEVOTHYROXINE SODIUM 125 MCG
88 TABLET ORAL
Refills: 0 | Status: DISCONTINUED | OUTPATIENT
Start: 2020-12-01 | End: 2020-12-02

## 2020-12-01 RX ORDER — SERTRALINE 25 MG/1
100 TABLET, FILM COATED ORAL DAILY
Refills: 0 | Status: DISCONTINUED | OUTPATIENT
Start: 2020-12-01 | End: 2020-12-02

## 2020-12-01 RX ORDER — ATORVASTATIN CALCIUM 80 MG/1
40 TABLET, FILM COATED ORAL AT BEDTIME
Refills: 0 | Status: DISCONTINUED | OUTPATIENT
Start: 2020-12-01 | End: 2020-12-02

## 2020-12-01 RX ORDER — HALOPERIDOL DECANOATE 100 MG/ML
10 INJECTION INTRAMUSCULAR DAILY
Refills: 0 | Status: DISCONTINUED | OUTPATIENT
Start: 2020-12-01 | End: 2020-12-02

## 2020-12-01 RX ORDER — INSULIN GLARGINE 100 [IU]/ML
50 INJECTION, SOLUTION SUBCUTANEOUS ONCE
Refills: 0 | Status: DISCONTINUED | OUTPATIENT
Start: 2020-12-01 | End: 2020-12-01

## 2020-12-01 RX ORDER — SODIUM CHLORIDE 9 MG/ML
500 INJECTION INTRAMUSCULAR; INTRAVENOUS; SUBCUTANEOUS ONCE
Refills: 0 | Status: COMPLETED | OUTPATIENT
Start: 2020-12-01 | End: 2020-12-01

## 2020-12-01 RX ORDER — DEXTROSE 50 % IN WATER 50 %
12.5 SYRINGE (ML) INTRAVENOUS ONCE
Refills: 0 | Status: DISCONTINUED | OUTPATIENT
Start: 2020-12-01 | End: 2020-12-02

## 2020-12-01 RX ORDER — SODIUM CHLORIDE 9 MG/ML
1000 INJECTION INTRAMUSCULAR; INTRAVENOUS; SUBCUTANEOUS
Refills: 0 | Status: DISCONTINUED | OUTPATIENT
Start: 2020-12-01 | End: 2020-12-02

## 2020-12-01 RX ORDER — INSULIN GLARGINE 100 [IU]/ML
50 INJECTION, SOLUTION SUBCUTANEOUS AT BEDTIME
Refills: 0 | Status: DISCONTINUED | OUTPATIENT
Start: 2020-12-01 | End: 2020-12-02

## 2020-12-01 RX ORDER — MONTELUKAST 4 MG/1
10 TABLET, CHEWABLE ORAL AT BEDTIME
Refills: 0 | Status: DISCONTINUED | OUTPATIENT
Start: 2020-12-01 | End: 2020-12-02

## 2020-12-01 RX ADMIN — INSULIN GLARGINE 25 UNIT(S): 100 INJECTION, SOLUTION SUBCUTANEOUS at 01:11

## 2020-12-01 RX ADMIN — SODIUM CHLORIDE 150 MILLILITER(S): 9 INJECTION INTRAMUSCULAR; INTRAVENOUS; SUBCUTANEOUS at 10:31

## 2020-12-01 RX ADMIN — PANTOPRAZOLE SODIUM 40 MILLIGRAM(S): 20 TABLET, DELAYED RELEASE ORAL at 06:07

## 2020-12-01 RX ADMIN — Medication 10 UNIT(S): at 11:49

## 2020-12-01 RX ADMIN — ATORVASTATIN CALCIUM 40 MILLIGRAM(S): 80 TABLET, FILM COATED ORAL at 21:23

## 2020-12-01 RX ADMIN — SODIUM CHLORIDE 1000 MILLILITER(S): 9 INJECTION INTRAMUSCULAR; INTRAVENOUS; SUBCUTANEOUS at 10:28

## 2020-12-01 RX ADMIN — SODIUM CHLORIDE 125 MILLILITER(S): 9 INJECTION INTRAMUSCULAR; INTRAVENOUS; SUBCUTANEOUS at 06:16

## 2020-12-01 RX ADMIN — Medication 50 MILLIGRAM(S): at 00:10

## 2020-12-01 RX ADMIN — Medication 88 MICROGRAM(S): at 06:07

## 2020-12-01 RX ADMIN — Medication 10 UNIT(S): at 16:56

## 2020-12-01 RX ADMIN — MONTELUKAST 10 MILLIGRAM(S): 4 TABLET, CHEWABLE ORAL at 21:23

## 2020-12-01 RX ADMIN — SERTRALINE 100 MILLIGRAM(S): 25 TABLET, FILM COATED ORAL at 11:09

## 2020-12-01 RX ADMIN — GABAPENTIN 100 MILLIGRAM(S): 400 CAPSULE ORAL at 11:09

## 2020-12-01 RX ADMIN — HALOPERIDOL DECANOATE 10 MILLIGRAM(S): 100 INJECTION INTRAMUSCULAR at 11:09

## 2020-12-01 RX ADMIN — INSULIN GLARGINE 50 UNIT(S): 100 INJECTION, SOLUTION SUBCUTANEOUS at 21:23

## 2020-12-01 RX ADMIN — Medication 10 UNIT(S): at 07:53

## 2020-12-01 NOTE — PHYSICAL THERAPY INITIAL EVALUATION ADULT - PERTINENT HX OF CURRENT PROBLEM, REHAB EVAL
56yo female with extensive PMH to include endocrine diseases, COPD, Crohn's, sleep apnea and schizophrenia  presents to the ER with hyperglycemia. Associated with dizziness. Got cortisone IV this am. Found to have evidence of acidosis

## 2020-12-01 NOTE — PROGRESS NOTE ADULT - ASSESSMENT
#Lactic acidosis suspected to be due to metformin use  -hold metformin  -continue IVF (given bolus this morning)  -repeat lactic acid at 3pm and 10pm as well as in the morning    #Fall while in the hospital  -CT head negative  -follow up knee xrays  -pain control  -fall precautions  -bed alarm in place  -discussed with patient in detail  -pt eval pending  -check orthostatics     # acute on chronic? dizziness  -patient presented with similar symptoms in 2019  -workup was unrevealing  -continue meclizine  -neuro eval pending     # DM II  - monitor fs qac and qhs  - holding metformin  - continue Insulin regimen and adjust accordingly  - endocrine consult pending - pt sees Dr. Valdivia     # COPD/asthma, stable  - Continue with montelukast and prn nebs    # ANISHA on CPAP - continue CPAP at 12    # Schizophrenia / Depression/anxiety - continue home meds    # Crohn disease - outpatient follow up    # Hypothyroid - continue Synthroid     # Dyslipidemia - on statin       FULL CODE    Progress Note Handoff  Pending Consults: neuro, pt  Pending Tests: orthostatics  Pending Results: xrays  Family Discussion: discussed continuing IVF, fall precautions and consults with patient - in agreement with plan of care   Disposition: Home_____/SNF______/Other_____/Unknown at this time__x___    Please call me with any questions at extension [phone]

## 2020-12-01 NOTE — GOALS OF CARE CONVERSATION - ADVANCED CARE PLANNING - CONVERSATION DETAILS
54yo female with extensive PMH to include endocrine diseases, COPD, Crohn's, sleep apnea and schizophrenia  presents to the ER with hyperglycemia. Associated with dizziness.  Patient found to have lactic acidosis.  Discussed diagnosis and prognosis with patient.  She wishes to remain a full code.

## 2020-12-01 NOTE — CONSULT NOTE ADULT - ATTENDING COMMENTS
patient was given a glucocorticoid injection by orthopedic surgeon, next time this should be done in conjunction with endocrinology advice. patient is stable from the endocrine point of view for discharge. she should continue on all her endocrine meds, in particular continue with metformin, pioglitazone etc. she has all her prescriptions, so does not need refills, stop all fruit juices, she will be followed up by dr. mejia.

## 2020-12-01 NOTE — CONSULT NOTE ADULT - ASSESSMENT
obesity, type 2 diabetes, no evidence of acidemia. serum bicarbonate is totally normal, venous blood gas is an inaccurate way of measuring acid base status. Glucocorticoid induced worsening of type 2 diabetes.

## 2020-12-01 NOTE — CONSULT NOTE ADULT - SUBJECTIVE AND OBJECTIVE BOX
Reason for Endocrinology Consult: Diabetes    HPI: 55y Female    Diabetes Type: 2    PAST MEDICAL & SURGICAL HISTORY:  Dizziness    Migraine    Crohn disease    COPD (chronic obstructive pulmonary disease)    Depression    Anxiety    Schizophrenia    ANISHA (obstructive sleep apnea)    Polyneuropathy    Bladder disorder    Sciatica    Constipation    Iron deficiency    Hypercholesteremia    Hypothyroid    Diabetes mellitus, type 2    Vitamin D deficiency    GERD (gastroesophageal reflux disease)    History of cholecystectomy      FAMILY HISTORY:      SH:  Smoking  Etoh:  Recreational Drugs:    Home Medications:  Actos 45 mg oral tablet: 1 tab(s) orally once a day (:13)  albuterol 90 mcg/inh inhalation aerosol: 2 puff(s) inhaled 4 times a day, As Needed (:)  Apriso 0.375 g oral capsule, extended release: 1 cap(s) orally 2 times a day ()  atorvastatin 40 mg oral tablet: 1 tab(s) orally once a day ()  Combivent Respimat 20 mcg-100 mcg/inh inhalation aerosol: 1 puff(s) inhaled 4 times a day ()  ferrous sulfate 325 mg (65 mg elemental iron) oral tablet: 1 tab(s) orally once a day ()  folic acid 1 mg oral tablet: 1 tab(s) orally once a day (:13)  Haldol 5 mg oral tablet: 1 tab(s) orally once a day (13)  loperamide 2 mg oral capsule: orally once a day (:)  meclizine 25 mg oral tablet: 1 tab(s) orally 3 times a day, As Needed ()  metFORMIN 1000 mg oral tablet: 1 tab(s) orally 2 times a day ()  midodrine 10 mg oral tablet: 1 tab(s) orally 3 times a day (:13)  NovoLOG FlexPen 100 units/mL injectable solution: 10 unit(s) injectable 3 times a day (with meals) (13)  Singulair 10 mg oral tablet: 1 tab(s) orally once a day (at bedtime) (:)  SUMAtriptan 100 mg oral tablet: 1 tab(s) orally every 2 hours, As Needed (2020 17:13)  Synthroid 88 mcg (0.088 mg) oral tablet: 1 tab(s) orally once a day (2020 17:13)  traMADol 50 mg oral tablet: orally every 6 hours, As Needed (2020 17:13)  Trelegy Ellipta inhalation powder: 1 puff(s) inhaled once a day (2020 17:13)  Vitamin B12 100 mcg oral tablet: 1 tab(s) orally once a day (2020 17:13)  Vitamin D2 50,000 intl units (1.25 mg) oral capsule: 1 cap(s) orally once a week (Thursday) (2020 17:13)      Current (Non-Endocrine) Meds:  albuterol/ipratropium for Nebulization 3 milliLiter(s) Nebulizer every 6 hours PRN  dextrose 5%. 1000 milliLiter(s) IV Continuous <Continuous>  dextrose 5%. 1000 milliLiter(s) IV Continuous <Continuous>  gabapentin 100 milliGRAM(s) Oral daily  haloperidol     Tablet 10 milliGRAM(s) Oral daily  meclizine 25 milliGRAM(s) Oral every 8 hours  montelukast 10 milliGRAM(s) Oral at bedtime  pantoprazole    Tablet 40 milliGRAM(s) Oral before breakfast  sertraline 100 milliGRAM(s) Oral daily  sodium chloride 0.9%. 1000 milliLiter(s) IV Continuous <Continuous>  traZODone 50 milliGRAM(s) Oral at bedtime PRN      Current Endocrine Meds:   atorvastatin 40 milliGRAM(s) Oral at bedtime  dextrose 40% Gel 15 Gram(s) Oral once  dextrose 50% Injectable 25 Gram(s) IV Push once  dextrose 50% Injectable 12.5 Gram(s) IV Push once  dextrose 50% Injectable 25 Gram(s) IV Push once  glucagon  Injectable 1 milliGRAM(s) IntraMuscular once  insulin glargine Injectable (LANTUS) 50 Unit(s) SubCutaneous at bedtime  insulin lispro Injectable (ADMELOG) 10 Unit(s) SubCutaneous three times a day before meals  levothyroxine 88 MICROGram(s) Oral <User Schedule>      Allergies:  ciprofloxacin (Unknown)  codeine (Unknown)  Fish Products (Unknown)  lactose (Unknown)  latex (Unknown)  penicillins (Unknown)  tetracycline (Unknown)  Zoloft (Other)      ROS:  Denies the following except as indicated.    General: weight loss/weight gain, decreased appetite, fatigue, fever  Eyes: blurry vision, double vision  ENT: neck swelling, dysphagia, voice changes   CV: palpitations, SOB, chest pain, cough  GI: nausea, vomiting, diarrhea, constipation, abdominal pain  : nocturia,  polyuria, dysuria  Endo: decreased libido, heat/cold intolerance, jitteriness  MSK: arthralgias, myalgias  Skin: rash, dryness, diaphoresis  Neuro: pedal numbness,pedal paresthesias, pedal pain    Height (cm): 162.6 ( @ 23:45)  Weight (kg): 86.2 ( @ 23:45)  BMI (kg/m2): 32.6 ( @ 23:45)    Vital Signs Last 24 Hrs  T(C): 36.8 (01 Dec 2020 13:49), Max: 36.8 (01 Dec 2020 13:49)  T(F): 98.2 (01 Dec 2020 13:49), Max: 98.2 (01 Dec 2020 13:49)  HR: 100 (01 Dec 2020 13:49) (87 - 100)  BP: 110/58 (01 Dec 2020 13:49) (97/51 - 130/74)  BP(mean): --  RR: 18 (01 Dec 2020 13:49) (18 - 18)  SpO2: 96% (2020 21:51) (96% - 96%)  Constitutional: WN/WD in NAD.   Neck: no thyromegaly or palpable thyroid nodules   Respiratory: lungs CTAB.  Cardiovascular: regular rate and rhythm, normal S1 and S2, no audible murmurs  GI: soft, NT/ND, no masses/HSM appreciated.  Ext: no edema, no ulcers, pedal pulses palpable bilaterally  Neurology: no tremor, monofilament sensation intact in feet  Psychiatric: A&O x 3, normal affect/mood.        LABS:                        12.6   12.47 )-----------( 354      ( 01 Dec 2020 06:16 )             39.8         137  |  99  |  16  ----------------------------<  274<H>  4.1   |  23  |  0.6<L>    Ca    10.2<H>      01 Dec 2020 06:16    TPro  6.1  /  Alb  4.4  /  TBili  0.2  /  DBili  x   /  AST  15  /  ALT  27  /  AlkPhos  83  12-01    PT/INR - ( 2020 17:15 )   PT: 11.40 sec;   INR: 0.99 ratio         PTT - ( 2020 17:15 )  PTT:33.9 sec  Urinalysis Basic - ( 2020 20:00 )    Color: Yellow / Appearance: Clear / S.020 / pH: x  Gluc: x / Ketone: 15  / Bili: Negative / Urobili: 0.2 mg/dL   Blood: x / Protein: Negative mg/dL / Nitrite: Negative   Leuk Esterase: Negative / RBC: 1-2 /HPF / WBC 1-2 /HPF   Sq Epi: x / Non Sq Epi: Occasional /HPF / Bacteria: Few                                     RADIOLOGY & ADDITIONAL STUDIES:    A/P:55yFemale

## 2020-12-01 NOTE — PROGRESS NOTE ADULT - SUBJECTIVE AND OBJECTIVE BOX
SEJAL BARNARD  55y  Female      Patient is a 55y old  Female who presents with hyperglycemia (2020 22:07)      INTERVAL HPI/OVERNIGHT EVENTS:  Patient seen and examined earlier this morning.  Patient says that she went to the bathroom, felt dizzy and fell hitting her head.   Denies any complaints at this time except for a mild headache post fall.      REVIEW OF SYSTEMS:  CONSTITUTIONAL: No fever, weight loss, or fatigue  EYES: No eye pain, visual disturbances, or discharge  ENMT:  No difficulty hearing, tinnitus, vertigo; No sinus or throat pain  NECK: No pain or stiffness  RESPIRATORY: No cough, wheezing, chills or hemoptysis; No shortness of breath  CARDIOVASCULAR: No chest pain, palpitations, dizziness, or leg swelling  GASTROINTESTINAL: No abdominal or epigastric pain. No nausea, vomiting, or hematemesis; No diarrhea or constipation. No melena or hematochezia.  GENITOURINARY: No dysuria, frequency, hematuria, or incontinence  NEUROLOGICAL: No headaches, memory loss, loss of strength, numbness, or tremors  SKIN: No itching, burning, rashes, or lesions   LYMPH NODES: No enlarged glands  ENDOCRINE: No heat or cold intolerance; No hair loss  MUSCULOSKELETAL: No joint pain or swelling; No muscle, back, or extremity pain  PSYCHIATRIC: No depression, anxiety, mood swings, or difficulty sleeping  HEME/LYMPH: No easy bruising, or bleeding gums  ALLERY AND IMMUNOLOGIC: No hives or eczema    T(C): 35.9 (20 @ 05:11), Max: 37.7 (20 @ 18:24)  HR: 99 (20 @ 05:11) (87 - 127)  BP: 97/51 (20 @ 05:11) (97/51 - 158/74)  RR: 18 (20 @ 05:11) (18 - 18)  SpO2: 96% (20 @ 21:51) (93% - 96%)    PHYSICAL EXAM:  GENERAL: NAD, well-groomed, well-developed  HEAD:  Atraumatic, Normocephalic  EYES: EOMI, PERRLA, conjunctiva and sclera clear  ENMT: No tonsillar erythema, exudates, or enlargement; Moist mucous membranes, Good dentition, No lesions  NECK: Supple, No JVD, Normal thyroid  NERVOUS SYSTEM:  Alert & Oriented X3, Good concentration; Motor Strength 5/5 B/L upper and lower extremities; DTRs 2+ intact and symmetric  CHEST/LUNG: Clear to percussion bilaterally; No rales, rhonchi, wheezing, or rubs  HEART: Regular rate and rhythm; No murmurs, rubs, or gallops  ABDOMEN: Soft, Nontender, Nondistended; Bowel sounds present  EXTREMITIES:  2+ Peripheral Pulses, No clubbing, cyanosis, or edema  LYMPH: No lymphadenopathy noted  SKIN: No rashes or lesions    Consultant(s) Notes Reviewed:  [x ] YES  [ ] NO  Care Discussed with Consultants/Other Providers [ x] YES  [ ] NO    LAB:                        12.6   12.47 )-----------( 354      ( 01 Dec 2020 06:16 )             39.8     12    137  |  99  |  16  ----------------------------<  274<H>  4.1   |  23  |  0.6<L>    Ca    10.2<H>      01 Dec 2020 06:16    TPro  6.1  /  Alb  4.4  /  TBili  0.2  /  DBili  x   /  AST  15  /  ALT  27  /  AlkPhos  83  12-    LIVER FUNCTIONS - ( 01 Dec 2020 06:16 )  Alb: 4.4 g/dL / Pro: 6.1 g/dL / ALK PHOS: 83 U/L / ALT: 27 U/L / AST: 15 U/L / GGT: x           CARDIAC MARKERS ( 2020 17:15 )  x     / <0.01 ng/mL / x     / x     / x          LACTIC ACID = 5        Drug Dosing Weight  Height (cm): 162.6 (2020 23:45)  Weight (kg): 86.2 (2020 23:45)  BMI (kg/m2): 32.6 (2020 23:45)  BSA (m2): 1.91 (2020 23:45)        CAPILLARY BLOOD GLUCOSE  POCT Blood Glucose.: 209 mg/dL (01 Dec 2020 11:41)  POCT Blood Glucose.: 214 mg/dL (01 Dec 2020 08:23)  POCT Blood Glucose.: 228 mg/dL (01 Dec 2020 07:20)  POCT Blood Glucose.: 282 mg/dL (01 Dec 2020 01:05)  POCT Blood Glucose.: 258 mg/dL (2020 19:52)  POCT Blood Glucose.: 337 mg/dL (2020 16:49)        I&O's Summary    2020 07:01  -  01 Dec 2020 07:00  --------------------------------------------------------  IN: 300 mL / OUT: 0 mL / NET: 300 mL      Urinalysis Basic - ( 2020 20:00 )    Color: Yellow / Appearance: Clear / S.020 / pH: x  Gluc: x / Ketone: 15  / Bili: Negative / Urobili: 0.2 mg/dL   Blood: x / Protein: Negative mg/dL / Nitrite: Negative   Leuk Esterase: Negative / RBC: 1-2 /HPF / WBC 1-2 /HPF   Sq Epi: x / Non Sq Epi: Occasional /HPF / Bacteria: Few        RADIOLOGY & ADDITIONAL TESTS:  Imaging Personally Reviewed:  [x] YES  [ ] NO    HEALTH ISSUES - PROBLEM Dx:  Medication management  Medication management    Lactic acidosis  Lactic acidosis    Hyperglycemia  Hyperglycemia        MEDS:  albuterol/ipratropium for Nebulization 3 milliLiter(s) Nebulizer every 6 hours PRN  atorvastatin 40 milliGRAM(s) Oral at bedtime  dextrose 40% Gel 15 Gram(s) Oral once  dextrose 5%. 1000 milliLiter(s) IV Continuous <Continuous>  dextrose 5%. 1000 milliLiter(s) IV Continuous <Continuous>  dextrose 50% Injectable 25 Gram(s) IV Push once  dextrose 50% Injectable 12.5 Gram(s) IV Push once  dextrose 50% Injectable 25 Gram(s) IV Push once  gabapentin 100 milliGRAM(s) Oral daily  glucagon  Injectable 1 milliGRAM(s) IntraMuscular once  haloperidol     Tablet 10 milliGRAM(s) Oral daily  insulin glargine Injectable (LANTUS) 50 Unit(s) SubCutaneous at bedtime  insulin lispro Injectable (ADMELOG) 10 Unit(s) SubCutaneous three times a day before meals  levothyroxine 88 MICROGram(s) Oral <User Schedule>  meclizine 25 milliGRAM(s) Oral every 8 hours  montelukast 10 milliGRAM(s) Oral at bedtime  pantoprazole    Tablet 40 milliGRAM(s) Oral before breakfast  sertraline 100 milliGRAM(s) Oral daily  sodium chloride 0.9%. 1000 milliLiter(s) IV Continuous <Continuous>  traZODone 50 milliGRAM(s) Oral at bedtime PRN

## 2020-12-02 ENCOUNTER — TRANSCRIPTION ENCOUNTER (OUTPATIENT)
Age: 55
End: 2020-12-02

## 2020-12-02 VITALS
RESPIRATION RATE: 16 BRPM | SYSTOLIC BLOOD PRESSURE: 91 MMHG | HEART RATE: 98 BPM | DIASTOLIC BLOOD PRESSURE: 45 MMHG | TEMPERATURE: 98 F

## 2020-12-02 LAB
ALBUMIN SERPL ELPH-MCNC: 4 G/DL — SIGNIFICANT CHANGE UP (ref 3.5–5.2)
ALP SERPL-CCNC: 72 U/L — SIGNIFICANT CHANGE UP (ref 30–115)
ALT FLD-CCNC: 28 U/L — SIGNIFICANT CHANGE UP (ref 0–41)
ANION GAP SERPL CALC-SCNC: 14 MMOL/L — SIGNIFICANT CHANGE UP (ref 7–14)
AST SERPL-CCNC: 18 U/L — SIGNIFICANT CHANGE UP (ref 0–41)
BILIRUB SERPL-MCNC: 0.2 MG/DL — SIGNIFICANT CHANGE UP (ref 0.2–1.2)
BUN SERPL-MCNC: 20 MG/DL — SIGNIFICANT CHANGE UP (ref 10–20)
CALCIUM SERPL-MCNC: 8.8 MG/DL — SIGNIFICANT CHANGE UP (ref 8.5–10.1)
CHLORIDE SERPL-SCNC: 105 MMOL/L — SIGNIFICANT CHANGE UP (ref 98–110)
CO2 SERPL-SCNC: 24 MMOL/L — SIGNIFICANT CHANGE UP (ref 17–32)
CREAT SERPL-MCNC: 0.5 MG/DL — LOW (ref 0.7–1.5)
GLUCOSE BLDC GLUCOMTR-MCNC: 126 MG/DL — HIGH (ref 70–99)
GLUCOSE BLDC GLUCOMTR-MCNC: 231 MG/DL — HIGH (ref 70–99)
GLUCOSE BLDC GLUCOMTR-MCNC: 274 MG/DL — HIGH (ref 70–99)
GLUCOSE BLDC GLUCOMTR-MCNC: 274 MG/DL — HIGH (ref 70–99)
GLUCOSE BLDC GLUCOMTR-MCNC: 350 MG/DL — HIGH (ref 70–99)
GLUCOSE SERPL-MCNC: 127 MG/DL — HIGH (ref 70–99)
HCT VFR BLD CALC: 38.4 % — SIGNIFICANT CHANGE UP (ref 37–47)
HGB BLD-MCNC: 12.2 G/DL — SIGNIFICANT CHANGE UP (ref 12–16)
LACTATE SERPL-SCNC: 1.4 MMOL/L — SIGNIFICANT CHANGE UP (ref 0.7–2)
MAGNESIUM SERPL-MCNC: 1.6 MG/DL — LOW (ref 1.8–2.4)
MCHC RBC-ENTMCNC: 25.2 PG — LOW (ref 27–31)
MCHC RBC-ENTMCNC: 31.8 G/DL — LOW (ref 32–37)
MCV RBC AUTO: 79.3 FL — LOW (ref 81–99)
NRBC # BLD: 0 /100 WBCS — SIGNIFICANT CHANGE UP (ref 0–0)
PLATELET # BLD AUTO: 308 K/UL — SIGNIFICANT CHANGE UP (ref 130–400)
POTASSIUM SERPL-MCNC: 3.9 MMOL/L — SIGNIFICANT CHANGE UP (ref 3.5–5)
POTASSIUM SERPL-SCNC: 3.9 MMOL/L — SIGNIFICANT CHANGE UP (ref 3.5–5)
PROCALCITONIN SERPL-MCNC: 0.03 NG/ML — SIGNIFICANT CHANGE UP (ref 0.02–0.1)
PROT SERPL-MCNC: 5.3 G/DL — LOW (ref 6–8)
RBC # BLD: 4.84 M/UL — SIGNIFICANT CHANGE UP (ref 4.2–5.4)
RBC # FLD: 16 % — HIGH (ref 11.5–14.5)
SARS-COV-2 IGG SERPL QL IA: NEGATIVE — SIGNIFICANT CHANGE UP
SARS-COV-2 IGM SERPL IA-ACNC: <0.1 INDEX — SIGNIFICANT CHANGE UP
SODIUM SERPL-SCNC: 143 MMOL/L — SIGNIFICANT CHANGE UP (ref 135–146)
WBC # BLD: 7.58 K/UL — SIGNIFICANT CHANGE UP (ref 4.8–10.8)
WBC # FLD AUTO: 7.58 K/UL — SIGNIFICANT CHANGE UP (ref 4.8–10.8)

## 2020-12-02 PROCEDURE — 99222 1ST HOSP IP/OBS MODERATE 55: CPT

## 2020-12-02 RX ORDER — INSULIN LISPRO 100/ML
VIAL (ML) SUBCUTANEOUS
Refills: 0 | Status: DISCONTINUED | OUTPATIENT
Start: 2020-12-02 | End: 2020-12-02

## 2020-12-02 RX ORDER — SENNA PLUS 8.6 MG/1
1 TABLET ORAL AT BEDTIME
Refills: 0 | Status: DISCONTINUED | OUTPATIENT
Start: 2020-12-02 | End: 2020-12-02

## 2020-12-02 RX ORDER — INSULIN LISPRO 100/ML
6 VIAL (ML) SUBCUTANEOUS ONCE
Refills: 0 | Status: COMPLETED | OUTPATIENT
Start: 2020-12-02 | End: 2020-12-02

## 2020-12-02 RX ORDER — POLYETHYLENE GLYCOL 3350 17 G/17G
17 POWDER, FOR SOLUTION ORAL ONCE
Refills: 0 | Status: COMPLETED | OUTPATIENT
Start: 2020-12-02 | End: 2020-12-02

## 2020-12-02 RX ORDER — MAGNESIUM OXIDE 400 MG ORAL TABLET 241.3 MG
400 TABLET ORAL ONCE
Refills: 0 | Status: COMPLETED | OUTPATIENT
Start: 2020-12-02 | End: 2020-12-02

## 2020-12-02 RX ADMIN — PANTOPRAZOLE SODIUM 40 MILLIGRAM(S): 20 TABLET, DELAYED RELEASE ORAL at 07:35

## 2020-12-02 RX ADMIN — Medication 6 UNIT(S): at 14:12

## 2020-12-02 RX ADMIN — Medication 10 UNIT(S): at 11:46

## 2020-12-02 RX ADMIN — MAGNESIUM OXIDE 400 MG ORAL TABLET 400 MILLIGRAM(S): 241.3 TABLET ORAL at 14:00

## 2020-12-02 RX ADMIN — Medication 50 MILLIGRAM(S): at 00:10

## 2020-12-02 RX ADMIN — Medication 88 MICROGRAM(S): at 05:55

## 2020-12-02 RX ADMIN — HALOPERIDOL DECANOATE 10 MILLIGRAM(S): 100 INJECTION INTRAMUSCULAR at 10:36

## 2020-12-02 RX ADMIN — Medication 10 UNIT(S): at 08:11

## 2020-12-02 RX ADMIN — GABAPENTIN 100 MILLIGRAM(S): 400 CAPSULE ORAL at 10:35

## 2020-12-02 RX ADMIN — SERTRALINE 100 MILLIGRAM(S): 25 TABLET, FILM COATED ORAL at 10:35

## 2020-12-02 RX ADMIN — HEPARIN SODIUM 5000 UNIT(S): 5000 INJECTION INTRAVENOUS; SUBCUTANEOUS at 05:55

## 2020-12-02 NOTE — DISCHARGE NOTE PROVIDER - NSDCFUADDINST_GEN_ALL_CORE_FT
-follow up with your pcp   -follow up with your endocrinologist, consult with your endocrinologist before getting steroid injection by orthopedics  -follow up with your PCP  -follow up with your neurologist -follow up with your endocrinologist, consult with your endocrinologist before getting steroid injection by orthopedics  -follow up with your PCP  -follow up with your neurologist   - home vestibular therapy

## 2020-12-02 NOTE — CONSULT NOTE ADULT - SUBJECTIVE AND OBJECTIVE BOX
Neurology Consultation note    Name  SEJAL BARNARD    HPI:  56yo female with extensive PMH to include endocrine diseases, COPD, Crohn's, sleep apnea and schizophrenia  presents to the ER with hyperglycemia. Associated with dizziness. Got cortisone IV this am. Found to have evidence of acidosis (she is on metformin) (30 Nov 2020 22:07)      NEURO:  54 YO female with hx as above adm with vertigo and hyperglycemia  states vertigo is positional symptomatic with head turned to right only. asymptomatic to left  no other focal complaints  no hearing loss, tinnitus or antecedent illness      Vital Signs Last 24 Hrs  T(C): 35.6 (02 Dec 2020 05:12), Max: 36.9 (01 Dec 2020 22:09)  T(F): 96 (02 Dec 2020 05:12), Max: 98.4 (01 Dec 2020 22:09)  HR: 82 (02 Dec 2020 05:12) (82 - 100)  BP: 114/61 (02 Dec 2020 05:12) (109/58 - 114/61)  BP(mean): --  RR: 18 (02 Dec 2020 05:12) (18 - 18)  SpO2: --    Neurological Exam:   Mental status: Awake, alert and oriented x3.  Recent and remote memory intact.  Naming, repetition and comprehension intact.  Attention/concentration intact.  No dysarthria, no aphasia.  Fund of knowledge appropriate.    Cranial nerves: Pupils equally round and reactive to light, visual fields full, no nystagmus, extraocular muscles intact, V1 through V3 intact bilaterally and symmetric, face symmetric, hearing intact to finger rub, palate elevation symmetric, tongue was midline.  Motor:  MRC grading 5/5 b/l UE/LE.   strength 5/5.  Normal tone and bulk.  No abnormal movements.    Sensation: Intact to light touch, proprioception, and pinprick.   Coordination: No dysmetria on finger-to-nose and heel-to-shin.  No dysdiadokinesia.  Reflexes: 2+ in bilateral UE/LE, downgoing toes bilaterally. (-) Hayden.      Medications  albuterol/ipratropium for Nebulization 3 milliLiter(s) Nebulizer every 6 hours PRN  atorvastatin 40 milliGRAM(s) Oral at bedtime  dextrose 40% Gel 15 Gram(s) Oral once  dextrose 5%. 1000 milliLiter(s) IV Continuous <Continuous>  dextrose 5%. 1000 milliLiter(s) IV Continuous <Continuous>  dextrose 50% Injectable 25 Gram(s) IV Push once  dextrose 50% Injectable 12.5 Gram(s) IV Push once  dextrose 50% Injectable 25 Gram(s) IV Push once  gabapentin 100 milliGRAM(s) Oral daily  glucagon  Injectable 1 milliGRAM(s) IntraMuscular once  haloperidol     Tablet 10 milliGRAM(s) Oral daily  heparin   Injectable 5000 Unit(s) SubCutaneous every 12 hours  insulin glargine Injectable (LANTUS) 50 Unit(s) SubCutaneous at bedtime  insulin lispro Injectable (ADMELOG) 10 Unit(s) SubCutaneous three times a day before meals  levothyroxine 88 MICROGram(s) Oral <User Schedule>  meclizine 25 milliGRAM(s) Oral every 8 hours  montelukast 10 milliGRAM(s) Oral at bedtime  pantoprazole    Tablet 40 milliGRAM(s) Oral before breakfast  polyethylene glycol 3350 17 Gram(s) Oral once  senna 1 Tablet(s) Oral at bedtime  sertraline 100 milliGRAM(s) Oral daily  traZODone 50 milliGRAM(s) Oral at bedtime PRN      Lab  12-02    143  |  105  |  20  ----------------------------<  127<H>  3.9   |  24  |  0.5<L>    Ca    8.8      02 Dec 2020 06:23  Mg     1.6     12-02    TPro  5.3<L>  /  Alb  4.0  /  TBili  0.2  /  DBili  x   /  AST  18  /  ALT  28  /  AlkPhos  72  12-02                          12.2   7.58  )-----------( 308      ( 02 Dec 2020 06:23 )             38.4     LIVER FUNCTIONS - ( 02 Dec 2020 06:23 )  Alb: 4.0 g/dL / Pro: 5.3 g/dL / ALK PHOS: 72 U/L / ALT: 28 U/L / AST: 18 U/L / GGT: x             1.6        Radiology    ct no acute change  Assessment:  54 yo female with vertigo, improved today  very positional, with symptoms only with right lateral head movement  likely peripheral in etiology  Plan:  meclizine prn  pt/vestibular tx  no further neuro w/u needed at this time

## 2020-12-02 NOTE — OCCUPATIONAL THERAPY INITIAL EVALUATION ADULT - SPECIFY REASON(S)
Patient declined OT IE. States that she is no longer dizzy and current functional status is as prior to admission.

## 2020-12-02 NOTE — DISCHARGE NOTE NURSING/CASE MANAGEMENT/SOCIAL WORK - PATIENT PORTAL LINK FT
You can access the FollowMyHealth Patient Portal offered by [facility] by registering at the following website: [email or web address]. By joining [employer]’s FollowMyHealth portal, you will also be able to view your health information using other applications (apps) compatible with our system.

## 2020-12-02 NOTE — DISCHARGE NOTE PROVIDER - NSDCCPCAREPLAN_GEN_ALL_CORE_FT
PRINCIPAL DISCHARGE DIAGNOSIS  Diagnosis: Hyperglycemia  Assessment and Plan of Treatment: -treated with IV fluids and insulin  -continue home meds including Metfromin   -follow up with your Endocrinoloigst   -consult with your endocrinoligist before getting any steroid injection by orthopedics      SECONDARY DISCHARGE DIAGNOSES  Diagnosis: Lactic acidosis  Assessment and Plan of Treatment: -treated with IV fluids , now resolved     PRINCIPAL DISCHARGE DIAGNOSIS  Diagnosis: Hyperglycemia  Assessment and Plan of Treatment: -treated with IV fluids and insulin  -continue home meds including Metformin and other diabetes medications  -follow up with your Endocrinoloigst   -consult with your endocrinoligist before getting any steroid injection by orthopedics      SECONDARY DISCHARGE DIAGNOSES  Diagnosis: Lactic acidosis  Assessment and Plan of Treatment: -treated with IV fluids , now resolved

## 2020-12-02 NOTE — DISCHARGE NOTE PROVIDER - HOSPITAL COURSE
54yo female with extensive PMH to include endocrine diseases, COPD, Crohn's, sleep apnea and schizophrenia  presents to the ER with hyperglycemia. Associated with dizziness. Got cortisone IV this am. Found to have evidence of acidosis (she is on metformin).  Pt on admission had Lactic acidosis suspected to be due to metformin use, metformin was held,   Pt given IV fluids, acidosis resolved.   PT seen by Endocrinology , as per Endo patient was given a glucocorticoid injection by orthopedic surgeon, outpt and that caused hyperglycemia.  was recommended to continue on all her endocrine meds, in particular continue with metformin, pioglitazone etc. she has all her prescriptions, so does not need refills, stop all fruit juices, she will be followed up by dr. mejia.   Pt fo follow up with her endocrinologist Dr. Kevin   PT had Fall while in the hospital  -CT head , cervical spine negative , bilateral knee x-ray negative   -orthostatic - Pending results   Pt has hx of  acute on chronic? dizziness, on meclizine   -neuro eval pending   Pt to follow up with her PCP in 1x week      54yo female with extensive PMH to include endocrine diseases, COPD, Crohn's, sleep apnea and schizophrenia  presents to the ER with hyperglycemia. Associated with dizziness. Got cortisone IV this am. Found to have evidence of acidosis (she is on metformin).  Pt on admission had Lactic acidosis suspected to be due to metformin use, metformin was held,   Pt given IV fluids, acidosis resolved.   PT seen by Endocrinology , as per Endo patient was given a glucocorticoid injection by orthopedic surgeon, outpt and that caused hyperglycemia.  was recommended to continue on all her endocrine meds, in particular continue with metformin, pioglitazone etc. she has all her prescriptions, so does not need refills, stop all fruit juices, she will be followed up by dr. mejia.   Pt fo follow up with her endocrinologist Dr. Kevin   PT had Fall while in the hospital  -CT head , cervical spine negative , bilateral knee x-ray negative   -orthostatics negative    Pt has hx of  acute on chronic? dizziness, on meclizine   -neuro eval patient, will arrange for home vestibular therapy   Pt to follow up with her PCP in 1x week     GENERAL: NAD, well-groomed, well-developed  HEAD:  Atraumatic, Normocephalic  EYES: EOMI, PERRLA, conjunctiva and sclera clear  ENMT: No tonsillar erythema, exudates, or enlargement; Moist mucous membranes, Good dentition, No lesions  NECK: Supple, No JVD, Normal thyroid  NERVOUS SYSTEM:  Alert & Oriented X3, Good concentration; Motor Strength 5/5 B/L upper and lower extremities; DTRs 2+ intact and symmetric  CHEST/LUNG: Clear to percussion bilaterally; No rales, rhonchi, wheezing, or rubs  HEART: Regular rate and rhythm; No murmurs, rubs, or gallops  ABDOMEN: Soft, Nontender, Nondistended; Bowel sounds present  EXTREMITIES:  2+ Peripheral Pulses, No clubbing, cyanosis, or edema  LYMPH: No lymphadenopathy noted  SKIN: No rashes or lesions        Plan:   Discharge home with Endocrinology follow up   Home Occupational Therapy for vestibular rehab 56yo female with extensive PMH to include endocrine diseases, COPD, Crohn's, sleep apnea and schizophrenia  presents to the ER with hyperglycemia. Associated with dizziness. Got cortisone IV this am. Found to have evidence of acidosis (she is on metformin).  Pt on admission had Lactic acidosis suspected to be due to metformin use, metformin was held,   Pt given IV fluids, acidosis resolved.   PT seen by Endocrinology , as per Endo patient was given a glucocorticoid injection by orthopedic surgeon, outpt and that caused hyperglycemia.  was recommended to continue on all her endocrine meds, in particular continue with metformin, pioglitazone etc. she has all her prescriptions, so does not need refills, stop all fruit juices, she will be followed up by dr. mejia.   Pt fo follow up with her endocrinologist Dr. Kevin   PT had Fall while in the hospital  -CT head , cervical spine negative , bilateral knee x-ray negative   -orthostatics negative    Pt has hx of  acute on chronic? dizziness, on meclizine   -neuro eval patient, will arrange for home vestibular therapy   Pt to follow up with her PCP in 1x week     GENERAL: NAD, well-groomed, well-developed  HEAD:  Atraumatic, Normocephalic  EYES: EOMI, PERRLA, conjunctiva and sclera clear  ENMT: No tonsillar erythema, exudates, or enlargement; Moist mucous membranes, Good dentition, No lesions  NECK: Supple, No JVD, Normal thyroid  NERVOUS SYSTEM:  Alert & Oriented X3, Good concentration; Motor Strength 5/5 B/L upper and lower extremities; DTRs 2+ intact and symmetric  CHEST/LUNG: Clear to percussion bilaterally; No rales, rhonchi, wheezing, or rubs  HEART: Regular rate and rhythm; No murmurs, rubs, or gallops  ABDOMEN: Soft, Nontender, Nondistended; Bowel sounds present  EXTREMITIES:  2+ Peripheral Pulses, No clubbing, cyanosis, or edema  LYMPH: No lymphadenopathy noted  SKIN: No rashes or lesions    Plan:   Discharge home with Endocrinology follow up   Home Occupational Therapy for vestibular rehab 54yo female with extensive PMH to include endocrine diseases, COPD, Crohn's, sleep apnea and schizophrenia  presents to the ER with hyperglycemia. Associated with dizziness. Got cortisone IV this am. Found to have evidence of acidosis (she is on metformin).  Pt on admission had Lactic acidosis suspected to be due to metformin use, metformin was held,   Pt given IV fluids, acidosis resolved.   PT seen by Endocrinology , as per Endo patient was given a glucocorticoid injection by orthopedic surgeon, outpt and that caused hyperglycemia.  was recommended to continue on all her endocrine meds, in particular continue with metformin, pioglitazone etc. she has all her prescriptions, so does not need refills, stop all fruit juices, she will be followed up by dr. mejia.   Pt fo follow up with her endocrinologist Dr. Kevin   PT had Fall while in the hospital  -CT head , cervical spine negative , bilateral knee x-ray negative   -orthostatics negative    Pt has hx of  acute on chronic? dizziness, on meclizine   -neuro eval patient, will arrange for home vestibular therapy   Pt to follow up with her PCP in 1x week     GENERAL: NAD, well-groomed, well-developed  HEAD:  Atraumatic, Normocephalic  EYES: EOMI, PERRLA, conjunctiva and sclera clear  ENMT: No tonsillar erythema, exudates, or enlargement; Moist mucous membranes, Good dentition, No lesions  NECK: Supple, No JVD, Normal thyroid  NERVOUS SYSTEM:  Alert & Oriented X3, Good concentration; Motor Strength 5/5 B/L upper and lower extremities; DTRs 2+ intact and symmetric  CHEST/LUNG: Clear to percussion bilaterally; No rales, rhonchi, wheezing, or rubs  HEART: Regular rate and rhythm; No murmurs, rubs, or gallops  ABDOMEN: Soft, Nontender, Nondistended; Bowel sounds present  EXTREMITIES:  2+ Peripheral Pulses, No clubbing, cyanosis, or edema  LYMPH: No lymphadenopathy noted  SKIN: No rashes or lesions    Plan:   Discharge home with Endocrinology follow up   Home Occupational Therapy for vestibular rehab

## 2020-12-02 NOTE — DISCHARGE NOTE PROVIDER - CARE PROVIDER_API CALL
Farhan Quintero  [address] [facility]  [address], [address]  [place] [address]  Phone: [phone]  Fax: [phone]  Follow Up Time: 1 week    Oswald Valdivia  ENDOCRINOLOGY/METAB/DIABETES  [address] [place] [address]  Phone: [phone]  Fax: [phone]  Follow Up Time: 1 week

## 2020-12-02 NOTE — DISCHARGE NOTE PROVIDER - PROVIDER TOKENS
PROVIDER:[TOKEN:[[id number]],FOLLOWUP:[1 week]],PROVIDER:[TOKEN:[[id number]],FOLLOWUP:[1 week]]

## 2020-12-04 DIAGNOSIS — Z88.0 ALLERGY STATUS TO PENICILLIN: ICD-10-CM

## 2020-12-04 DIAGNOSIS — M54.30 SCIATICA, UNSPECIFIED SIDE: ICD-10-CM

## 2020-12-04 DIAGNOSIS — F32.9 MAJOR DEPRESSIVE DISORDER, SINGLE EPISODE, UNSPECIFIED: ICD-10-CM

## 2020-12-04 DIAGNOSIS — E66.9 OBESITY, UNSPECIFIED: ICD-10-CM

## 2020-12-04 DIAGNOSIS — E11.42 TYPE 2 DIABETES MELLITUS WITH DIABETIC POLYNEUROPATHY: ICD-10-CM

## 2020-12-04 DIAGNOSIS — Z88.8 ALLERGY STATUS TO OTHER DRUGS, MEDICAMENTS AND BIOLOGICAL SUBSTANCES: ICD-10-CM

## 2020-12-04 DIAGNOSIS — G47.33 OBSTRUCTIVE SLEEP APNEA (ADULT) (PEDIATRIC): ICD-10-CM

## 2020-12-04 DIAGNOSIS — Z91.018 ALLERGY TO OTHER FOODS: ICD-10-CM

## 2020-12-04 DIAGNOSIS — F41.9 ANXIETY DISORDER, UNSPECIFIED: ICD-10-CM

## 2020-12-04 DIAGNOSIS — E61.1 IRON DEFICIENCY: ICD-10-CM

## 2020-12-04 DIAGNOSIS — J44.9 CHRONIC OBSTRUCTIVE PULMONARY DISEASE, UNSPECIFIED: ICD-10-CM

## 2020-12-04 DIAGNOSIS — Z91.013 ALLERGY TO SEAFOOD: ICD-10-CM

## 2020-12-04 DIAGNOSIS — F20.9 SCHIZOPHRENIA, UNSPECIFIED: ICD-10-CM

## 2020-12-04 DIAGNOSIS — R73.9 HYPERGLYCEMIA, UNSPECIFIED: ICD-10-CM

## 2020-12-04 DIAGNOSIS — E03.9 HYPOTHYROIDISM, UNSPECIFIED: ICD-10-CM

## 2020-12-04 DIAGNOSIS — Z79.4 LONG TERM (CURRENT) USE OF INSULIN: ICD-10-CM

## 2020-12-04 DIAGNOSIS — G43.909 MIGRAINE, UNSPECIFIED, NOT INTRACTABLE, WITHOUT STATUS MIGRAINOSUS: ICD-10-CM

## 2020-12-04 DIAGNOSIS — Z79.899 OTHER LONG TERM (CURRENT) DRUG THERAPY: ICD-10-CM

## 2020-12-04 DIAGNOSIS — T38.0X5A ADVERSE EFFECT OF GLUCOCORTICOIDS AND SYNTHETIC ANALOGUES, INITIAL ENCOUNTER: ICD-10-CM

## 2020-12-04 DIAGNOSIS — Z91.040 LATEX ALLERGY STATUS: ICD-10-CM

## 2020-12-04 DIAGNOSIS — E55.9 VITAMIN D DEFICIENCY, UNSPECIFIED: ICD-10-CM

## 2020-12-04 DIAGNOSIS — R42 DIZZINESS AND GIDDINESS: ICD-10-CM

## 2020-12-04 DIAGNOSIS — E11.65 TYPE 2 DIABETES MELLITUS WITH HYPERGLYCEMIA: ICD-10-CM

## 2020-12-04 DIAGNOSIS — K50.90 CROHN'S DISEASE, UNSPECIFIED, WITHOUT COMPLICATIONS: ICD-10-CM

## 2020-12-04 DIAGNOSIS — E11.10 TYPE 2 DIABETES MELLITUS WITH KETOACIDOSIS WITHOUT COMA: ICD-10-CM

## 2020-12-04 DIAGNOSIS — K21.9 GASTRO-ESOPHAGEAL REFLUX DISEASE WITHOUT ESOPHAGITIS: ICD-10-CM

## 2021-01-01 ENCOUNTER — EMERGENCY (EMERGENCY)
Facility: HOSPITAL | Age: 56
LOS: 0 days | Discharge: HOME | End: 2021-01-02
Attending: EMERGENCY MEDICINE | Admitting: EMERGENCY MEDICINE
Payer: MEDICARE

## 2021-01-01 VITALS
HEART RATE: 74 BPM | DIASTOLIC BLOOD PRESSURE: 54 MMHG | RESPIRATION RATE: 19 BRPM | OXYGEN SATURATION: 98 % | SYSTOLIC BLOOD PRESSURE: 122 MMHG | TEMPERATURE: 98 F

## 2021-01-01 VITALS
WEIGHT: 199.08 LBS | DIASTOLIC BLOOD PRESSURE: 56 MMHG | OXYGEN SATURATION: 98 % | RESPIRATION RATE: 19 BRPM | TEMPERATURE: 97 F | HEART RATE: 96 BPM | HEIGHT: 64 IN | SYSTOLIC BLOOD PRESSURE: 126 MMHG

## 2021-01-01 DIAGNOSIS — R42 DIZZINESS AND GIDDINESS: ICD-10-CM

## 2021-01-01 DIAGNOSIS — F41.8 OTHER SPECIFIED ANXIETY DISORDERS: ICD-10-CM

## 2021-01-01 DIAGNOSIS — F25.9 SCHIZOAFFECTIVE DISORDER, UNSPECIFIED: ICD-10-CM

## 2021-01-01 DIAGNOSIS — E03.9 HYPOTHYROIDISM, UNSPECIFIED: ICD-10-CM

## 2021-01-01 DIAGNOSIS — Z98.890 OTHER SPECIFIED POSTPROCEDURAL STATES: Chronic | ICD-10-CM

## 2021-01-01 DIAGNOSIS — E78.00 PURE HYPERCHOLESTEROLEMIA, UNSPECIFIED: ICD-10-CM

## 2021-01-01 DIAGNOSIS — J44.9 CHRONIC OBSTRUCTIVE PULMONARY DISEASE, UNSPECIFIED: ICD-10-CM

## 2021-01-01 DIAGNOSIS — Z91.040 LATEX ALLERGY STATUS: ICD-10-CM

## 2021-01-01 DIAGNOSIS — Z91.011 ALLERGY TO MILK PRODUCTS: ICD-10-CM

## 2021-01-01 DIAGNOSIS — Z88.1 ALLERGY STATUS TO OTHER ANTIBIOTIC AGENTS STATUS: ICD-10-CM

## 2021-01-01 DIAGNOSIS — Z88.0 ALLERGY STATUS TO PENICILLIN: ICD-10-CM

## 2021-01-01 DIAGNOSIS — K21.9 GASTRO-ESOPHAGEAL REFLUX DISEASE WITHOUT ESOPHAGITIS: ICD-10-CM

## 2021-01-01 DIAGNOSIS — E11.9 TYPE 2 DIABETES MELLITUS WITHOUT COMPLICATIONS: ICD-10-CM

## 2021-01-01 LAB
ALBUMIN SERPL ELPH-MCNC: 4.6 G/DL — SIGNIFICANT CHANGE UP (ref 3.5–5.2)
ALP SERPL-CCNC: 87 U/L — SIGNIFICANT CHANGE UP (ref 30–115)
ALT FLD-CCNC: 17 U/L — SIGNIFICANT CHANGE UP (ref 0–41)
ANION GAP SERPL CALC-SCNC: 9 MMOL/L — SIGNIFICANT CHANGE UP (ref 7–14)
APTT BLD: 35.8 SEC — SIGNIFICANT CHANGE UP (ref 27–39.2)
AST SERPL-CCNC: 15 U/L — SIGNIFICANT CHANGE UP (ref 0–41)
BASOPHILS # BLD AUTO: 0.02 K/UL — SIGNIFICANT CHANGE UP (ref 0–0.2)
BASOPHILS NFR BLD AUTO: 0.4 % — SIGNIFICANT CHANGE UP (ref 0–1)
BILIRUB SERPL-MCNC: 0.2 MG/DL — SIGNIFICANT CHANGE UP (ref 0.2–1.2)
BUN SERPL-MCNC: 17 MG/DL — SIGNIFICANT CHANGE UP (ref 10–20)
CALCIUM SERPL-MCNC: 9.5 MG/DL — SIGNIFICANT CHANGE UP (ref 8.5–10.1)
CHLORIDE SERPL-SCNC: 104 MMOL/L — SIGNIFICANT CHANGE UP (ref 98–110)
CO2 SERPL-SCNC: 31 MMOL/L — SIGNIFICANT CHANGE UP (ref 17–32)
CREAT SERPL-MCNC: 0.8 MG/DL — SIGNIFICANT CHANGE UP (ref 0.7–1.5)
EOSINOPHIL # BLD AUTO: 0.09 K/UL — SIGNIFICANT CHANGE UP (ref 0–0.7)
EOSINOPHIL NFR BLD AUTO: 1.6 % — SIGNIFICANT CHANGE UP (ref 0–8)
GLUCOSE SERPL-MCNC: 113 MG/DL — HIGH (ref 70–99)
HCT VFR BLD CALC: 43.4 % — SIGNIFICANT CHANGE UP (ref 37–47)
HGB BLD-MCNC: 13.4 G/DL — SIGNIFICANT CHANGE UP (ref 12–16)
IMM GRANULOCYTES NFR BLD AUTO: 0.4 % — HIGH (ref 0.1–0.3)
INR BLD: 1.01 RATIO — SIGNIFICANT CHANGE UP (ref 0.65–1.3)
LYMPHOCYTES # BLD AUTO: 1.74 K/UL — SIGNIFICANT CHANGE UP (ref 1.2–3.4)
LYMPHOCYTES # BLD AUTO: 31.2 % — SIGNIFICANT CHANGE UP (ref 20.5–51.1)
MCHC RBC-ENTMCNC: 24.8 PG — LOW (ref 27–31)
MCHC RBC-ENTMCNC: 30.9 G/DL — LOW (ref 32–37)
MCV RBC AUTO: 80.2 FL — LOW (ref 81–99)
MONOCYTES # BLD AUTO: 0.35 K/UL — SIGNIFICANT CHANGE UP (ref 0.1–0.6)
MONOCYTES NFR BLD AUTO: 6.3 % — SIGNIFICANT CHANGE UP (ref 1.7–9.3)
NEUTROPHILS # BLD AUTO: 3.36 K/UL — SIGNIFICANT CHANGE UP (ref 1.4–6.5)
NEUTROPHILS NFR BLD AUTO: 60.1 % — SIGNIFICANT CHANGE UP (ref 42.2–75.2)
NRBC # BLD: 0 /100 WBCS — SIGNIFICANT CHANGE UP (ref 0–0)
PLATELET # BLD AUTO: 341 K/UL — SIGNIFICANT CHANGE UP (ref 130–400)
POTASSIUM SERPL-MCNC: 3.9 MMOL/L — SIGNIFICANT CHANGE UP (ref 3.5–5)
POTASSIUM SERPL-SCNC: 3.9 MMOL/L — SIGNIFICANT CHANGE UP (ref 3.5–5)
PROT SERPL-MCNC: 6.8 G/DL — SIGNIFICANT CHANGE UP (ref 6–8)
PROTHROM AB SERPL-ACNC: 11.6 SEC — SIGNIFICANT CHANGE UP (ref 9.95–12.87)
RBC # BLD: 5.41 M/UL — HIGH (ref 4.2–5.4)
RBC # FLD: 16.5 % — HIGH (ref 11.5–14.5)
SODIUM SERPL-SCNC: 144 MMOL/L — SIGNIFICANT CHANGE UP (ref 135–146)
TROPONIN T SERPL-MCNC: <0.01 NG/ML — SIGNIFICANT CHANGE UP
WBC # BLD: 5.58 K/UL — SIGNIFICANT CHANGE UP (ref 4.8–10.8)
WBC # FLD AUTO: 5.58 K/UL — SIGNIFICANT CHANGE UP (ref 4.8–10.8)

## 2021-01-01 PROCEDURE — 99285 EMERGENCY DEPT VISIT HI MDM: CPT

## 2021-01-01 PROCEDURE — 70450 CT HEAD/BRAIN W/O DYE: CPT | Mod: 26,59

## 2021-01-01 PROCEDURE — 70498 CT ANGIOGRAPHY NECK: CPT | Mod: 26

## 2021-01-01 PROCEDURE — 70496 CT ANGIOGRAPHY HEAD: CPT | Mod: 26

## 2021-01-01 RX ORDER — METFORMIN HYDROCHLORIDE 850 MG/1
1 TABLET ORAL
Qty: 0 | Refills: 0 | DISCHARGE

## 2021-01-01 RX ORDER — HYDROCORTISONE 20 MG
100 TABLET ORAL ONCE
Refills: 0 | Status: COMPLETED | OUTPATIENT
Start: 2021-01-01 | End: 2021-01-01

## 2021-01-01 RX ORDER — DIPHENHYDRAMINE HCL 50 MG
50 CAPSULE ORAL ONCE
Refills: 0 | Status: COMPLETED | OUTPATIENT
Start: 2021-01-01 | End: 2021-01-01

## 2021-01-01 RX ORDER — MESALAMINE 400 MG
1 TABLET, DELAYED RELEASE (ENTERIC COATED) ORAL
Qty: 0 | Refills: 0 | DISCHARGE

## 2021-01-01 RX ORDER — HALOPERIDOL DECANOATE 100 MG/ML
1 INJECTION INTRAMUSCULAR
Qty: 0 | Refills: 0 | DISCHARGE

## 2021-01-01 RX ORDER — PIOGLITAZONE HYDROCHLORIDE 15 MG/1
1 TABLET ORAL
Qty: 0 | Refills: 0 | DISCHARGE

## 2021-01-01 RX ORDER — TRAMADOL HYDROCHLORIDE 50 MG/1
0 TABLET ORAL
Qty: 0 | Refills: 0 | DISCHARGE

## 2021-01-01 RX ORDER — SUMATRIPTAN SUCCINATE 4 MG/.5ML
1 INJECTION, SOLUTION SUBCUTANEOUS
Qty: 0 | Refills: 0 | DISCHARGE

## 2021-01-01 RX ORDER — PREGABALIN 225 MG/1
1 CAPSULE ORAL
Qty: 0 | Refills: 0 | DISCHARGE

## 2021-01-01 RX ORDER — LOPERAMIDE HCL 2 MG
0 TABLET ORAL
Qty: 0 | Refills: 0 | DISCHARGE

## 2021-01-01 RX ORDER — INSULIN ASPART 100 [IU]/ML
10 INJECTION, SOLUTION SUBCUTANEOUS
Qty: 0 | Refills: 0 | DISCHARGE

## 2021-01-01 RX ORDER — FLUTICASONE FUROATE, UMECLIDINIUM BROMIDE AND VILANTEROL TRIFENATATE 200; 62.5; 25 UG/1; UG/1; UG/1
1 POWDER RESPIRATORY (INHALATION)
Qty: 0 | Refills: 0 | DISCHARGE

## 2021-01-01 RX ORDER — ATORVASTATIN CALCIUM 80 MG/1
1 TABLET, FILM COATED ORAL
Qty: 0 | Refills: 0 | DISCHARGE

## 2021-01-01 RX ORDER — IPRATROPIUM/ALBUTEROL SULFATE 18-103MCG
1 AEROSOL WITH ADAPTER (GRAM) INHALATION
Qty: 0 | Refills: 0 | DISCHARGE

## 2021-01-01 RX ORDER — MONTELUKAST 4 MG/1
1 TABLET, CHEWABLE ORAL
Qty: 0 | Refills: 0 | DISCHARGE

## 2021-01-01 RX ORDER — FAMOTIDINE 10 MG/ML
20 INJECTION INTRAVENOUS DAILY
Refills: 0 | Status: DISCONTINUED | OUTPATIENT
Start: 2021-01-01 | End: 2021-01-02

## 2021-01-01 RX ORDER — FERROUS SULFATE 325(65) MG
1 TABLET ORAL
Qty: 0 | Refills: 0 | DISCHARGE

## 2021-01-01 RX ORDER — MECLIZINE HCL 12.5 MG
25 TABLET ORAL ONCE
Refills: 0 | Status: COMPLETED | OUTPATIENT
Start: 2021-01-01 | End: 2021-01-01

## 2021-01-01 RX ORDER — MECLIZINE HCL 12.5 MG
1 TABLET ORAL
Qty: 0 | Refills: 0 | DISCHARGE

## 2021-01-01 RX ORDER — ALBUTEROL 90 UG/1
2 AEROSOL, METERED ORAL
Qty: 0 | Refills: 0 | DISCHARGE

## 2021-01-01 RX ORDER — FOLIC ACID 0.8 MG
1 TABLET ORAL
Qty: 0 | Refills: 0 | DISCHARGE

## 2021-01-01 RX ORDER — LEVOTHYROXINE SODIUM 125 MCG
1 TABLET ORAL
Qty: 0 | Refills: 0 | DISCHARGE

## 2021-01-01 RX ORDER — ERGOCALCIFEROL 1.25 MG/1
1 CAPSULE ORAL
Qty: 0 | Refills: 0 | DISCHARGE

## 2021-01-01 RX ORDER — MIDODRINE HYDROCHLORIDE 2.5 MG/1
1 TABLET ORAL
Qty: 0 | Refills: 0 | DISCHARGE

## 2021-01-01 RX ADMIN — Medication 100 MILLIGRAM(S): at 14:41

## 2021-01-01 RX ADMIN — FAMOTIDINE 20 MILLIGRAM(S): 10 INJECTION INTRAVENOUS at 15:00

## 2021-01-01 RX ADMIN — Medication 50 MILLIGRAM(S): at 14:41

## 2021-01-01 RX ADMIN — FAMOTIDINE 100 MILLIGRAM(S): 10 INJECTION INTRAVENOUS at 14:41

## 2021-01-01 RX ADMIN — Medication 25 MILLIGRAM(S): at 14:03

## 2021-01-01 NOTE — ED ADULT TRIAGE NOTE - CHIEF COMPLAINT QUOTE
BIBA for dizziness and off balance started at 08:30. Pt states she feels the room spinning. PA at bedside

## 2021-01-01 NOTE — ED PROVIDER NOTE - CARE PROVIDER_API CALL
Cory Pereira)  EEGEpilepsy; Neurology  [address] [place] [address], [address]  [place] [address]  Phone: [phone]  Fax: [phone]  Follow Up Time:

## 2021-01-01 NOTE — ED PROVIDER NOTE - PHYSICAL EXAMINATION
Vital Signs: I have reviewed the initial vital signs.  Constitutional: well-nourished, no acute distress, normocephalic  Eyes: PERRLA, EOMI, no nystagmus, pallor conjunctiva  ENT: MMM, TM b/l clear   Cardiovascular: regular rate, regular rhythm, no murmur appreciated  Respiratory: unlabored respiratory effort, clear to auscultation bilaterally  Gastrointestinal: soft, non-tender, non-distended  abdomen, no pulsatile mass  Musculoskeletal: supple neck, no lower extremity edema, no bony tenderness  Integumentary: warm, dry, no rash    Psychiatric: appropriate mood, appropriate affect

## 2021-01-01 NOTE — ED PROVIDER NOTE - CRANIAL NERVE AND PUPILLARY EXAM
cranial nerves 2-12 intact/corneal reflex intact/central and peripheral vision intact/peripheral vision intact/extra-ocular movements intact/tongue is midline

## 2021-01-01 NOTE — ED PROVIDER NOTE - OBJECTIVE STATEMENT
54 yo F pmh DM, migraine, COPD, Crohn's disease, depression,  anxiety, schizophrenia, ANISHA, hypothyroidism, GERD presents to the Ed for dizziness since this AM. patient admitted for similar recently and was evaluated by neurology. patient took meclizine this am without any relief of symptoms. patient was dc home with vestibular rehab for dizziness. patient denies any headache , neck or back pain. patient able to ambulate. no tinnitus, hearing loss. patient denies any recent illness . no cough or congestion. patient denies any sore thorat .

## 2021-01-01 NOTE — ED PROVIDER NOTE - CLINICAL SUMMARY MEDICAL DECISION MAKING FREE TEXT BOX
patient improved with treatment we obtained cta which is negative for acute abnormalities at this time I will discharge to her facility at this time

## 2021-01-01 NOTE — ED PROVIDER NOTE - PATIENT PORTAL LINK FT
You can access the FollowMyHealth Patient Portal offered by [facility] by registering at the following website: [email or web address]. By joining [employer]’s FollowMyHealth portal, you will also be able to view your health information using other applications (apps) compatible with our system.

## 2021-01-01 NOTE — ED PROVIDER NOTE - NS ED ROS FT
Review of Systems    Constitutional: (-) fever/ chills  Eyes (-) visual changes  ENT: (-) epistaxis (-) sore throat (-) ear pain  Cardiovascular: (-) chest pain, (-) syncope (-) palpitations  Respiratory: (-) cough, (-) shortness of breath  Gastrointestinal: (-) vomiting, (-) diarrhea (-) abdominal pain  : (-) dysuria , hematuria   neck: (-) neck pain or stiffness  Musculoskeletal:  (-) back pain, (-) joint pain   Integumentary: (-) rash, (-) swelling  Neurological: (-) headache, (-) altered mental status (+)dizziness

## 2021-01-01 NOTE — ED PROVIDER NOTE - PROGRESS NOTE DETAILS
reviewed patient results of diagnostics. being patient receiving vestibular rehab and rx meclizine, instructed pt to be discharged with follow up

## 2021-01-01 NOTE — ED PROVIDER NOTE - ATTENDING CONTRIBUTION TO CARE
I was present for and supervised the key and critical aspects of the procedures performed during the care of the patient.  .Patient presents for evaluation of dizziness onset is more than several weeks prior she denies any fevers or chills she denies any headache, she denies any focal weakness or deficits   on physical exam she is well appearing nc/at perrla eomi oropharynx clear cta b/l, rrr s1s2 noted abd-soft nt nd bs+ ext from with no focal deficits she is able to move all extremities in addition she is able to ambulate at this time   a/p- based on her history and hpi we obtained cta head and neck administered iv fluids and iv meclizine I will continue to monitor at this time

## 2021-01-08 ENCOUNTER — OUTPATIENT (OUTPATIENT)
Dept: OUTPATIENT SERVICES | Facility: HOSPITAL | Age: 56
LOS: 1 days | Discharge: HOME | End: 2021-01-08
Payer: MEDICARE

## 2021-01-08 ENCOUNTER — APPOINTMENT (OUTPATIENT)
Dept: OPHTHALMOLOGY | Facility: CLINIC | Age: 56
End: 2021-01-08

## 2021-01-08 DIAGNOSIS — Z98.890 OTHER SPECIFIED POSTPROCEDURAL STATES: Chronic | ICD-10-CM

## 2021-01-08 PROCEDURE — 92012 INTRM OPH EXAM EST PATIENT: CPT

## 2021-01-14 DIAGNOSIS — H52.4 PRESBYOPIA: ICD-10-CM

## 2021-01-14 DIAGNOSIS — H01.001 UNSPECIFIED BLEPHARITIS RIGHT UPPER EYELID: ICD-10-CM

## 2021-01-14 DIAGNOSIS — H02.88A MEIBOMIAN GLAND DYSFUNCTION RIGHT EYE, UPPER AND LOWER EYELIDS: ICD-10-CM

## 2021-03-15 ENCOUNTER — APPOINTMENT (OUTPATIENT)
Dept: HEMATOLOGY ONCOLOGY | Facility: CLINIC | Age: 56
End: 2021-03-15

## 2021-03-15 ENCOUNTER — LABORATORY RESULT (OUTPATIENT)
Age: 56
End: 2021-03-15

## 2021-03-15 LAB
HCT VFR BLD CALC: 44.5 %
HGB BLD-MCNC: 13.8 G/DL
IRON SATN MFR SERPL: 20 %
IRON SERPL-MCNC: 53 UG/DL
MCHC RBC-ENTMCNC: 24.7 PG
MCHC RBC-ENTMCNC: 31 G/DL
MCV RBC AUTO: 79.6 FL
PLATELET # BLD AUTO: 313 K/UL
PMV BLD: 9.5 FL
RBC # BLD: 5.59 M/UL
RBC # FLD: 16.1 %
TIBC SERPL-MCNC: 270 UG/DL
UIBC SERPL-MCNC: 217 UG/DL
WBC # FLD AUTO: 6.14 K/UL

## 2021-03-16 LAB — FERRITIN SERPL-MCNC: 82 NG/ML

## 2021-04-02 ENCOUNTER — APPOINTMENT (OUTPATIENT)
Dept: HEMATOLOGY ONCOLOGY | Facility: CLINIC | Age: 56
End: 2021-04-02
Payer: MEDICARE

## 2021-04-02 ENCOUNTER — OUTPATIENT (OUTPATIENT)
Dept: OUTPATIENT SERVICES | Facility: HOSPITAL | Age: 56
LOS: 1 days | Discharge: HOME | End: 2021-04-02

## 2021-04-02 VITALS
RESPIRATION RATE: 16 BRPM | BODY MASS INDEX: 33.31 KG/M2 | WEIGHT: 188 LBS | HEART RATE: 85 BPM | DIASTOLIC BLOOD PRESSURE: 55 MMHG | SYSTOLIC BLOOD PRESSURE: 101 MMHG | TEMPERATURE: 97.8 F | HEIGHT: 63 IN

## 2021-04-02 DIAGNOSIS — Z98.890 OTHER SPECIFIED POSTPROCEDURAL STATES: Chronic | ICD-10-CM

## 2021-04-02 PROCEDURE — 99212 OFFICE O/P EST SF 10 MIN: CPT

## 2021-04-02 NOTE — HISTORY OF PRESENT ILLNESS
[de-identified] : Ms. SEJAL BARNARD is a 54 year old female here today for evaluation and treatment of Anemia.\par \par The patient was referred by Dr. Quintero.  She presents with her health aide from the [facility] complex she resides on [address].  She reports that she was seen by her PCP, who referred her due to iron deficiency anemia.  She currently takes iron supplement, 325mg daily and is tolerating without complaint.  She states she feels tired on occasion.  \par \par LAB WORKUP:\par (5/7/19) WBC 6.23, Hgb 13.7, MCV 76.7, RDW 16.1, , Na 147, ALT 42, Ferritin 36, TIBC 349, ironsat 15%, B12 normal\par \par HCM:\par Mammogram (07/2018) patient reports it was benign\par Colonoscopy ([facility] - 4/6/2018) removed 2 polyps, as per patient.  IMPRESSION:  Preparation of the colon was poor.  One 6mm polyp in the cecum, removed peicemeal using a cold biopsy forceps.  Resected and retrieved.  Random biopsies taken from transverse colo, descending, sigmoid and rectum.  Repeat in 1 year [de-identified] : 11/8/19\[facility] Patient is here for a follow-up visit for anemia with aid.  She is feeling well with no complaints.  Most recent CBC is stable with ferritin 111 and ironsat 17%.  Patient denies fever, chills, nausea, vomiting, bleeding or pica.  She does not take oral iron as she had IV iron in the past. She reports her energy has improved slightly as well.  \par \par 2/14/2020 \[facility] Patient is here for a follow-up visit for anemia with aid.  She is feeling well with no complaints. Most recent CBC and iron studies from 2.7.2020 remains stable off oral iron.   Patient denies fever, chills, nausea, vomiting, bleeding or pica.  She followed with Dr. Pak who performed endoscopy which showed GERD with esophagitis, acute gastritis with and without bleeding and duodenitis.  \par \par 8/4/20\[facility] Patient is here for follow up today for anemia. She is accompanied by her aide. She feels well. She denies severe fatigue, shortness of breath, chest pain, palpitations, or decreased energy. She does endorse black colored stools. SHe had an EGD and colonoscopy with Dr. Pak in January 2020 and continues to follow with him. Currently, she reports she is due for a CT abdomen and pelvis with contrast to assess for any bleeding. \par \par 4/2/21\[facility] Patient is here for follow up today for anemia, accompanied by her aide.  She denies severe fatigue, shortness of breath, chest pain, palpitations, or decreased energy.

## 2021-04-02 NOTE — PHYSICAL EXAM
[Restricted in physically strenuous activity but ambulatory and able to carry out work of a light or sedentary nature] : Status 1- Restricted in physically strenuous activity but ambulatory and able to carry out work of a light or sedentary nature, e.g., light house work, office work [Normal] : affect appropriate [de-identified] : uses seated walker for ambulation

## 2021-04-02 NOTE — PATIENT PROFILE ADULT - DO YOU FEEL LIKE HURTING YOURSELF OR OTHERS?
Pt presents to ED - had disc replacement surgery last Thursday- here today c/o SOB and CP with cough.    no

## 2021-04-02 NOTE — ASSESSMENT
[FreeTextEntry1] : 56 yo woman with PMH of ulcerative colitis here for follow up on history of iron deficiency anemia. She is being seen by a private GI; She followed with Dr. aPk who performed endoscopy and colonoscopy in January 2020 which showed GERD with esophagitis, acute gastritis with and without bleeding and duodenitis. \par \par She is pending a CT scan of the abdomen/pelvis with contrast to undergo evaluation for Crohns Disease. \par \par - CBC done showed evidence of normal hg and iron stores\par - s/p iv iron replacement (completed in June 2019) with resolution of low iron; no iron at this time\par \par RTC in4 months with CBC, iron studies, ferritin 1-2 days prior

## 2021-04-07 NOTE — DISCHARGE NOTE ADULT - CASE MANAGER'S NAME
"""Discussed blepharitis diagnosis with patient. Educated patient on proper lid hygiene and the use of warm compresses.  """ [facility] [phone]

## 2021-04-15 DIAGNOSIS — D50.9 IRON DEFICIENCY ANEMIA, UNSPECIFIED: ICD-10-CM

## 2021-06-25 ENCOUNTER — APPOINTMENT (OUTPATIENT)
Dept: OTOLARYNGOLOGY | Facility: CLINIC | Age: 56
End: 2021-06-25
Payer: MEDICARE

## 2021-06-25 PROCEDURE — 99072 ADDL SUPL MATRL&STAF TM PHE: CPT

## 2021-06-25 PROCEDURE — 99213 OFFICE O/P EST LOW 20 MIN: CPT | Mod: 25

## 2021-06-25 PROCEDURE — 92550 TYMPANOMETRY & REFLEX THRESH: CPT

## 2021-06-25 PROCEDURE — 99212 OFFICE O/P EST SF 10 MIN: CPT | Mod: 25

## 2021-06-25 PROCEDURE — 92557 COMPREHENSIVE HEARING TEST: CPT

## 2021-06-25 NOTE — ASSESSMENT
[FreeTextEntry1] : I reviewed, interpreted, and discussed the Audiogram done today. Stable snhl. \par \par RTC in 6M.\par

## 2021-06-25 NOTE — HISTORY OF PRESENT ILLNESS
[de-identified] : Patient here today following up on recent ER visit. Patient states she went to the hospital due to her dizziness. Patient was experiencing room spinning started on 12/18. Patient admits dizziness also occurred last year.  She had a negative CT and MR head. Upon discharge no recent episodes. \par Her balance is back to baseline now. no more spinning. \par \par \par 7/17/2020: Patient here today following up on hearing loss. Patient interested in hearing aids. Her last audiogram was 1/2020. Patient uses TV on loud volume. No otalgia. Patient notes hearing has progressively worsened since last being seen.  [FreeTextEntry1] : \par 6/25/21: Patient presents today following up on hearing loss. Patient currently using hearing aids. No otalgia. Doing well.

## 2021-06-25 NOTE — PHYSICAL EXAM
[Midline] : trachea located in midline position [Edentulous] : edentulous [Normal] : no rashes [de-identified] : large septal perforation no bleeding noted.

## 2021-07-22 ENCOUNTER — APPOINTMENT (OUTPATIENT)
Dept: HEMATOLOGY ONCOLOGY | Facility: CLINIC | Age: 56
End: 2021-07-22

## 2021-07-23 ENCOUNTER — APPOINTMENT (OUTPATIENT)
Dept: HEMATOLOGY ONCOLOGY | Facility: CLINIC | Age: 56
End: 2021-07-23

## 2021-10-08 ENCOUNTER — RX RENEWAL (OUTPATIENT)
Age: 56
End: 2021-10-08

## 2021-10-20 ENCOUNTER — APPOINTMENT (OUTPATIENT)
Dept: HEMATOLOGY ONCOLOGY | Facility: CLINIC | Age: 56
End: 2021-10-20

## 2021-10-22 ENCOUNTER — OUTPATIENT (OUTPATIENT)
Dept: OUTPATIENT SERVICES | Facility: HOSPITAL | Age: 56
LOS: 1 days | Discharge: HOME | End: 2021-10-22

## 2021-10-22 ENCOUNTER — APPOINTMENT (OUTPATIENT)
Dept: HEMATOLOGY ONCOLOGY | Facility: CLINIC | Age: 56
End: 2021-10-22

## 2021-10-22 ENCOUNTER — APPOINTMENT (OUTPATIENT)
Dept: HEMATOLOGY ONCOLOGY | Facility: CLINIC | Age: 56
End: 2021-10-22
Payer: MEDICARE

## 2021-10-22 VITALS
HEIGHT: 63 IN | BODY MASS INDEX: 35.08 KG/M2 | HEART RATE: 68 BPM | OXYGEN SATURATION: 98 % | RESPIRATION RATE: 16 BRPM | SYSTOLIC BLOOD PRESSURE: 107 MMHG | WEIGHT: 198 LBS | DIASTOLIC BLOOD PRESSURE: 67 MMHG

## 2021-10-22 DIAGNOSIS — Z98.890 OTHER SPECIFIED POSTPROCEDURAL STATES: Chronic | ICD-10-CM

## 2021-10-22 PROCEDURE — 99213 OFFICE O/P EST LOW 20 MIN: CPT

## 2021-10-22 NOTE — HISTORY OF PRESENT ILLNESS
[de-identified] : Ms. SEJAL BARNARD is a 54 year old female here today for evaluation and treatment of Anemia.\par \par The patient was referred by Dr. Quintero.  She presents with her health aide from the [facility] she resides on [facility].  She reports that she was seen by her PCP, who referred her due to iron deficiency anemia.  She currently takes iron supplement, 325mg daily and is tolerating without complaint.  She states she feels tired on occasion.  \par \par LAB WORKUP:\par (5/7/19) WBC 6.23, Hgb 13.7, MCV 76.7, RDW 16.1, , Na 147, ALT 42, Ferritin 36, TIBC 349, ironsat 15%, B12 normal\par \par HCM:\par Mammogram (07/2018) patient reports it was benign\par Colonoscopy ([facility] - 4/6/2018) removed 2 polyps, as per patient.  IMPRESSION:  Preparation of the colon was poor.  One 6mm polyp in the cecum, removed peicemeal using a cold biopsy forceps.  Resected and retrieved.  Random biopsies taken from transverse colo, descending, sigmoid and rectum.  Repeat in 1 year [de-identified] : 11/8/19\[facility] Patient is here for a follow-up visit for anemia with aid.  She is feeling well with no complaints.  Most recent CBC is stable with ferritin 111 and ironsat 17%.  Patient denies fever, chills, nausea, vomiting, bleeding or pica.  She does not take oral iron as she had IV iron in the past. She reports her energy has improved slightly as well.  \par \par 2/14/2020 \[facility] Patient is here for a follow-up visit for anemia with aid.  She is feeling well with no complaints. Most recent CBC and iron studies from 2.7.2020 remains stable off oral iron.   Patient denies fever, chills, nausea, vomiting, bleeding or pica.  She followed with Dr. Pak who performed endoscopy which showed GERD with esophagitis, acute gastritis with and without bleeding and duodenitis.  \par \par 8/4/20\[facility] Patient is here for follow up today for anemia. She is accompanied by her aide. She feels well. She denies severe fatigue, shortness of breath, chest pain, palpitations, or decreased energy. She does endorse black colored stools. SHe had an EGD and colonoscopy with Dr. Pak in January 2020 and continues to follow with him. Currently, she reports she is due for a CT abdomen and pelvis with contrast to assess for any bleeding. \par \par 4/2/21\[facility] Patient is here for follow up today for anemia, accompanied by her aide.  She denies severe fatigue, shortness of breath, chest pain, palpitations, or decreased energy.

## 2021-10-22 NOTE — PHYSICAL EXAM
[Restricted in physically strenuous activity but ambulatory and able to carry out work of a light or sedentary nature] : Status 1- Restricted in physically strenuous activity but ambulatory and able to carry out work of a light or sedentary nature, e.g., light house work, office work [Normal] : affect appropriate [de-identified] : uses seated walker for ambulation

## 2021-10-22 NOTE — ASSESSMENT
[FreeTextEntry1] : 55 yo woman with PMH of ulcerative colitis here for follow up on history of iron deficiency anemia. She is being seen by a private GI; She followed with Dr. Pak who performed endoscopy and colonoscopy in January 2020 which showed GERD with esophagitis, acute gastritis with and without bleeding and duodenitis. \par \par She is pending a CT scan of the abdomen/pelvis with contrast to undergo evaluation for Crohns Disease. \par \par LABS 09/2021 no evidence of anemia\par - s/p iv iron replacement (completed in June 2019) with resolution of low iron; no iron at this time\par \par RTC in4 months with CBC, iron studies, ferritin 1-2 days prior

## 2021-10-25 DIAGNOSIS — D50.9 IRON DEFICIENCY ANEMIA, UNSPECIFIED: ICD-10-CM

## 2021-11-29 ENCOUNTER — APPOINTMENT (OUTPATIENT)
Dept: OTOLARYNGOLOGY | Facility: CLINIC | Age: 56
End: 2021-11-29
Payer: MEDICARE

## 2021-11-29 PROCEDURE — 99214 OFFICE O/P EST MOD 30 MIN: CPT | Mod: 25

## 2021-11-29 PROCEDURE — 92557 COMPREHENSIVE HEARING TEST: CPT

## 2021-11-29 PROCEDURE — 92550 TYMPANOMETRY & REFLEX THRESH: CPT

## 2021-11-29 NOTE — ASSESSMENT
[FreeTextEntry1] : I reviewed, interpreted, and discussed the Audiogram done today. Bilateral SNHL. stable w hen compared to previous test.\par \par Persistent dizziness exacerbated with head movements. Taking meclizine daily.\par \par \par Recommended to stop meclizine gradually (bid for 5 days then OD for 5d then stop) and go for a VNG to see if needs vestibular rehab.\par

## 2021-11-29 NOTE — HISTORY OF PRESENT ILLNESS
[de-identified] : Patient here today following up on recent ER visit. Patient states she went to the hospital due to her dizziness. Patient was experiencing room spinning started on 12/18. Patient admits dizziness also occurred last year.  She had a negative CT and MR head. Upon discharge no recent episodes. \par Her balance is back to baseline now. no more spinning. \par \par \par 7/17/2020: Patient here today following up on hearing loss. Patient interested in hearing aids. Her last audiogram was 1/2020. Patient uses TV on loud volume. No otalgia. Patient notes hearing has progressively worsened since last being seen. \par \par 6/25/21: Patient presents today following up on hearing loss. Patient currently using hearing aids. No otalgia. Doing well.  [FreeTextEntry1] : \par 11/29/21: Patient returns today following up on hearing loss. Has been having dizziness,  gets off balance.  has been going on for two months.  Dizziness last all day.  No pressure or pain  in ears.

## 2022-01-03 ENCOUNTER — OUTPATIENT (OUTPATIENT)
Dept: OUTPATIENT SERVICES | Facility: HOSPITAL | Age: 57
LOS: 1 days | Discharge: HOME | End: 2022-01-03

## 2022-01-03 ENCOUNTER — APPOINTMENT (OUTPATIENT)
Dept: SPEECH THERAPY | Facility: CLINIC | Age: 57
End: 2022-01-03

## 2022-01-03 DIAGNOSIS — R42 DIZZINESS AND GIDDINESS: ICD-10-CM

## 2022-01-03 DIAGNOSIS — Z98.890 OTHER SPECIFIED POSTPROCEDURAL STATES: Chronic | ICD-10-CM

## 2022-02-04 ENCOUNTER — APPOINTMENT (OUTPATIENT)
Dept: OTOLARYNGOLOGY | Facility: CLINIC | Age: 57
End: 2022-02-04
Payer: MEDICARE

## 2022-02-04 PROCEDURE — 99215 OFFICE O/P EST HI 40 MIN: CPT

## 2022-02-04 NOTE — HISTORY OF PRESENT ILLNESS
[de-identified] : Patient here today following up on recent ER visit. Patient states she went to the hospital due to her dizziness. Patient was experiencing room spinning started on 12/18. Patient admits dizziness also occurred last year.  She had a negative CT and MR head. Upon discharge no recent episodes. \par Her balance is back to baseline now. no more spinning. \par \par \par 7/17/2020: Patient here today following up on hearing loss. Patient interested in hearing aids. Her last audiogram was 1/2020. Patient uses TV on loud volume. No otalgia. Patient notes hearing has progressively worsened since last being seen. \par \par 6/25/21: Patient presents today following up on hearing loss. Patient currently using hearing aids. No otalgia. Doing well. \par 11/29/21: Patient returns today following up on hearing loss. Has been having dizziness,  gets off balance.  has been going on for two months.  Dizziness last all day.  No pressure or pain  in ears.   [FreeTextEntry1] : \par 2/4/22: Patient following up on dizziness. Still has unbalance sensation at times, but overall better. Requesting refill on allergy meds. Patient went for VNG.

## 2022-02-04 NOTE — PHYSICAL EXAM
[de-identified] : large septal perforation no bleeding noted.  [Midline] : trachea located in midline position [Edentulous] : edentulous [Normal] : no rashes

## 2022-02-04 NOTE — ASSESSMENT
[FreeTextEntry1] : I personally reviewed, interpreted and discussed patient's VNG results. Possible central pathology.\par

## 2022-02-11 ENCOUNTER — EMERGENCY (EMERGENCY)
Facility: HOSPITAL | Age: 57
LOS: 0 days | Discharge: HOME | End: 2022-02-12
Attending: EMERGENCY MEDICINE | Admitting: EMERGENCY MEDICINE
Payer: MEDICARE

## 2022-02-11 VITALS
HEART RATE: 88 BPM | DIASTOLIC BLOOD PRESSURE: 66 MMHG | TEMPERATURE: 98 F | RESPIRATION RATE: 20 BRPM | OXYGEN SATURATION: 97 % | SYSTOLIC BLOOD PRESSURE: 140 MMHG | HEIGHT: 64 IN

## 2022-02-11 DIAGNOSIS — M54.50 LOW BACK PAIN, UNSPECIFIED: ICD-10-CM

## 2022-02-11 DIAGNOSIS — E03.9 HYPOTHYROIDISM, UNSPECIFIED: ICD-10-CM

## 2022-02-11 DIAGNOSIS — K21.9 GASTRO-ESOPHAGEAL REFLUX DISEASE WITHOUT ESOPHAGITIS: ICD-10-CM

## 2022-02-11 DIAGNOSIS — E78.00 PURE HYPERCHOLESTEROLEMIA, UNSPECIFIED: ICD-10-CM

## 2022-02-11 DIAGNOSIS — F20.9 SCHIZOPHRENIA, UNSPECIFIED: ICD-10-CM

## 2022-02-11 DIAGNOSIS — F32.A DEPRESSION, UNSPECIFIED: ICD-10-CM

## 2022-02-11 DIAGNOSIS — Z88.0 ALLERGY STATUS TO PENICILLIN: ICD-10-CM

## 2022-02-11 DIAGNOSIS — G47.33 OBSTRUCTIVE SLEEP APNEA (ADULT) (PEDIATRIC): ICD-10-CM

## 2022-02-11 DIAGNOSIS — E11.65 TYPE 2 DIABETES MELLITUS WITH HYPERGLYCEMIA: ICD-10-CM

## 2022-02-11 DIAGNOSIS — F41.9 ANXIETY DISORDER, UNSPECIFIED: ICD-10-CM

## 2022-02-11 DIAGNOSIS — G43.909 MIGRAINE, UNSPECIFIED, NOT INTRACTABLE, WITHOUT STATUS MIGRAINOSUS: ICD-10-CM

## 2022-02-11 DIAGNOSIS — Z88.5 ALLERGY STATUS TO NARCOTIC AGENT: ICD-10-CM

## 2022-02-11 DIAGNOSIS — Z98.890 OTHER SPECIFIED POSTPROCEDURAL STATES: Chronic | ICD-10-CM

## 2022-02-11 DIAGNOSIS — Z79.84 LONG TERM (CURRENT) USE OF ORAL HYPOGLYCEMIC DRUGS: ICD-10-CM

## 2022-02-11 DIAGNOSIS — Z88.8 ALLERGY STATUS TO OTHER DRUGS, MEDICAMENTS AND BIOLOGICAL SUBSTANCES STATUS: ICD-10-CM

## 2022-02-11 DIAGNOSIS — Z91.040 LATEX ALLERGY STATUS: ICD-10-CM

## 2022-02-11 PROCEDURE — 99284 EMERGENCY DEPT VISIT MOD MDM: CPT

## 2022-02-11 RX ORDER — KETOROLAC TROMETHAMINE 30 MG/ML
30 SYRINGE (ML) INJECTION ONCE
Refills: 0 | Status: DISCONTINUED | OUTPATIENT
Start: 2022-02-11 | End: 2022-02-11

## 2022-02-11 RX ORDER — DEXAMETHASONE 0.5 MG/5ML
10 ELIXIR ORAL ONCE
Refills: 0 | Status: COMPLETED | OUTPATIENT
Start: 2022-02-11 | End: 2022-02-11

## 2022-02-11 RX ADMIN — Medication 30 MILLIGRAM(S): at 20:17

## 2022-02-11 RX ADMIN — Medication 10 MILLIGRAM(S): at 20:17

## 2022-02-11 NOTE — ED PROVIDER NOTE - DOMESTIC TRAVEL HIGH RISK QUESTION
04/21/21 1203   Reason for Consult   Reason for Consult Initial assessment   Patient Intervention(s)   Type of Intervention Performed Normalizing and coping;Preparation;Procedural support   Normalizing and Coping Intervention(s) Activities to promote developmental play   Diagnosis/Procedure Education Continue to address misconceptions (Diagnosis/treatment/hospitalization);Introduction of relaxation/distraction techniques;Medical play/developmentally appropriate demonstration to address misconceptions of healthcare experience;Pre-procedure teaching for patient/family - individually   Medical Play Tools Utilized Familiarization of medical equipmemt  (Photos on ipad used- patient wanted details and specifics)   Procedural Support Intervention(s) Deep breathing;Distraction;Rehearsal of coping;Verbal reassurance;Sensory information  (CCLS present for distraction and comfort throughout transition to OR and anesthesia induction)   Support Provided to Family   Support Provided to Family Parent/caregiver(s)  (Father in pre-op)   Parent/Caregiver(s) Intervention Active listening   Anxiety Level   Anxiety Level No distress noted or observed   Evaluation   Patient Behaviors Pre-Interventions Appropriate for age;Interactive;Cooperative;Verbal;Makes eye contact   Patient Behaviors During Interventions Appropriate for age;Cooperative;Distractible;Interactive;Verbal   Patient Behaviors Post-Intervention(s) Sedated   Evaluation/Plan of Care Patient/family receptive   Marimar Rios, Certified Child Life Specialist    
No

## 2022-02-11 NOTE — ED PROVIDER NOTE - ATTENDING CONTRIBUTION TO CARE
57 yo f with pmh of depression, copd, dm2, hld, schizophrenia, sciatica, presents with L mid back pain.  pt says has been having pain x 3 weeks, radiating down leg, already taking anti-inflam and muscle relaxant with little relief.  no trauma.  exam: +ttp L paraspinal muscle in thoracic region, no midline ttp, no stepoff imp: pt with radicular pain, symptomatic tx and f/u with rehab/pain mgt 55 yo f with pmh of depression, copd, dm2, hld, schizophrenia, sciatica, presents with L mid back pain.  pt says has been having pain x 3 weeks, radiating down leg, already taking anti-inflam and muscle relaxant with little relief.  no trauma.  exam: +ttp L paraspinal muscle in thoracic region, no midline ttp, no stepoff imp: pt with radicular pain, symptomatic tx and f/u with rehab/pain mgt.

## 2022-02-11 NOTE — ED PROVIDER NOTE - PATIENT PORTAL LINK FT
You can access the FollowMyHealth Patient Portal offered by [facility] by registering at the following website: [email or web address]. By joining [facility]’s FollowMyHealth portal, you will also be able to view your health information using other applications (apps) compatible with our system.

## 2022-02-11 NOTE — ED PROVIDER NOTE - CARE PROVIDER_API CALL
Paulo Calderón (MD)  Anesthesiology; Pain Medicine  [address] [place] [address]  Phone: [phone]  Fax: [phone]  Follow Up Time:

## 2022-02-11 NOTE — ED PROVIDER NOTE - PHYSICAL EXAMINATION
CONST: NAD  EYES: Sclera and conjunctiva clear.   ENT: No nasal discharge. Oropharynx normal appearing  NECK: Non-tender, no meningeal signs. normal ROM. supple   CARD: S1 S2; No jvd  RESP: Equal BS B/L, No wheezes, rhonchi or rales. No distress  GI: Soft, non-tender, non-distended. no cva tenderness. normal BS  MS: Reproducible L lumbar paraspinal tenderness. Normal ROM in all extremities. pulses 2 +. no calf tenderness or swelling  SKIN: Warm, dry, no acute rashes. Good turgor  NEURO: A&Ox3, No focal deficits. Strength 5/5 with no sensory deficits. Steady gait.

## 2022-02-11 NOTE — ED PROVIDER NOTE - NSFOLLOWUPCLINICS_GEN_ALL_ED_FT
[facility] ([facility])  Rehabilitation  [facility] [address], [address] [place] [address]  Phone: [phone]  Fax:

## 2022-02-11 NOTE — ED PROVIDER NOTE - OBJECTIVE STATEMENT
56Y F past medical history DM, COPD, Crohn's, depression, anxiety, schizophrenia, ANISHA, hypothyroid presents for evaluation of back pain.  Patient is mild aching left lower back pain starting in her left buttocks radiating down her left leg x1 week aggravated with ambulation, relieved at rest.  Denies numbness, weakness, incontinence.

## 2022-02-11 NOTE — ED PROVIDER NOTE - NS ED ROS FT
Constitutional: (-) fever  Eyes/ENT: (-) blurry vision, (-) epistaxis  Cardiovascular: (-) chest pain, (-) syncope  Respiratory: (-) cough, (-) shortness of breath  Gastrointestinal: (-) vomiting, (-) diarrhea  Musculoskeletal: (+) back pain, (-) neck pain, (-) joint pain  Integumentary: (-) rash, (-) edema  Neurological: (-) headache, (-) altered mental status  Psychiatric: (-) hallucinations  Allergic/Immunologic: (-) pruritus

## 2022-02-12 VITALS
DIASTOLIC BLOOD PRESSURE: 66 MMHG | HEART RATE: 78 BPM | SYSTOLIC BLOOD PRESSURE: 121 MMHG | WEIGHT: 197.98 LBS | OXYGEN SATURATION: 99 % | RESPIRATION RATE: 18 BRPM | TEMPERATURE: 98 F

## 2022-02-14 ENCOUNTER — APPOINTMENT (OUTPATIENT)
Dept: HEMATOLOGY ONCOLOGY | Facility: CLINIC | Age: 57
End: 2022-02-14

## 2022-02-16 ENCOUNTER — APPOINTMENT (OUTPATIENT)
Dept: HEMATOLOGY ONCOLOGY | Facility: CLINIC | Age: 57
End: 2022-02-16

## 2022-03-18 ENCOUNTER — LABORATORY RESULT (OUTPATIENT)
Age: 57
End: 2022-03-18

## 2022-03-18 ENCOUNTER — APPOINTMENT (OUTPATIENT)
Dept: HEMATOLOGY ONCOLOGY | Facility: CLINIC | Age: 57
End: 2022-03-18

## 2022-03-18 LAB
ALBUMIN SERPL ELPH-MCNC: 4.7 G/DL
ALP BLD-CCNC: 85 U/L
ALT SERPL-CCNC: 23 U/L
ANION GAP SERPL CALC-SCNC: 15 MMOL/L
AST SERPL-CCNC: 16 U/L
BILIRUB DIRECT SERPL-MCNC: <0.2 MG/DL
BILIRUB INDIRECT SERPL-MCNC: >0 MG/DL
BILIRUB SERPL-MCNC: 0.2 MG/DL
BUN SERPL-MCNC: 18 MG/DL
CALCIUM SERPL-MCNC: 10 MG/DL
CHLORIDE SERPL-SCNC: 99 MMOL/L
CO2 SERPL-SCNC: 25 MMOL/L
CREAT SERPL-MCNC: 0.6 MG/DL
EGFR: 106 ML/MIN/1.73M2
GLUCOSE SERPL-MCNC: 163 MG/DL
HCT VFR BLD CALC: 45.5 %
HGB BLD-MCNC: 14.1 G/DL
IRON SATN MFR SERPL: 25 %
IRON SERPL-MCNC: 85 UG/DL
MCHC RBC-ENTMCNC: 24.7 PG
MCHC RBC-ENTMCNC: 31 G/DL
MCV RBC AUTO: 79.5 FL
PLATELET # BLD AUTO: 258 K/UL
PMV BLD: 9.4 FL
POTASSIUM SERPL-SCNC: 4.8 MMOL/L
PROT SERPL-MCNC: 6.8 G/DL
RBC # BLD: 5.72 M/UL
RBC # FLD: 17.7 %
SODIUM SERPL-SCNC: 139 MMOL/L
TIBC SERPL-MCNC: 336 UG/DL
UIBC SERPL-MCNC: 251 UG/DL
WBC # FLD AUTO: 4.73 K/UL

## 2022-03-18 NOTE — DISCHARGE NOTE ADULT - DO YOU HAVE DIFFICULTY CLIMBING STAIRS
Patient Name: Brenna Moya  Date:3/21/2022  : 1960  [x]  Patient  Verified  Payor: Eyad Moreno / Plan: [address] PPO / Product Type: PPO /    Total Treatment Time (min): 60  1:1 Treatment Time ( [address] only):   Referring provider: Jose Antonio Ching,   1. Primary osteoarthritis of left hip  2. Acute postoperative pain of left hip      SUBJECTIVE  Patient states he has been using a single-point cane throughout much of his weekend. States he does have some fatigue and soreness but feels comfortable with the transition from his rolling walker. He is to follow-up with his physician tomorrow. OBJECTIVE  Modality:   []  E-Stim: type _ x _ min     []att   []unatt   []w/US   []w/ice   []w/heat  []  Ultrasound: []cont   []pulse    _ W/cm2 x _  min   []1MHz   []3MHz  []  Ice pack _  Post     declines  [] Hot pack _  Pre       []  Other:    Man: 15 min  Manual and IA myofascial techniques to the anterior/lateral/proximal hip and thigh in varying angles of hip flexion to neutral position. NMR: 15 min  PNF contract relax techniques for hip flexion/extension while patient in supine. Ex: 30 min  Therapeutic exercise/proprioceptive training/neuromuscular reeducation/therapeutic activity completed here in clinic today per the exercise log. Manual assisted passive flexibility exercises for the hamstrings/adductors/iliopsoas while patient in supine. PT Exercise Log         EXERCISE 3/21/2022   SAQ 4#   Sliders y   [employer] y   cups y   Hip ER with Tband green   TKE green                                                                Ex: 30 min  Man: 15 min  NMR: 15 min    ASSESSMENT  [x]  See Plan of Care  [x]  Patient will continue to benefit from skilled therapy to address remaining functional deficits:     Appropriate fatigue but progressing well with current plan of care.   Antalgia is becoming less prominent with decreasing dependence on assistive device. PLAN  Continue with current plan of care and progress as appropriate towards functional goals.   [x]  Upgrade activities as tolerated     [x]  Continue plan of care  []  Discharge due to:_  [] Other:_       Marianne Guardado, PT, DPT  3/21/2022    11:34 AM No

## 2022-03-21 LAB — FERRITIN SERPL-MCNC: 72 NG/ML

## 2022-03-22 ENCOUNTER — APPOINTMENT (OUTPATIENT)
Dept: HEMATOLOGY ONCOLOGY | Facility: CLINIC | Age: 57
End: 2022-03-22
Payer: MEDICARE

## 2022-03-22 ENCOUNTER — OUTPATIENT (OUTPATIENT)
Dept: OUTPATIENT SERVICES | Facility: HOSPITAL | Age: 57
LOS: 1 days | Discharge: HOME | End: 2022-03-22

## 2022-03-22 VITALS
RESPIRATION RATE: 16 BRPM | SYSTOLIC BLOOD PRESSURE: 130 MMHG | DIASTOLIC BLOOD PRESSURE: 72 MMHG | TEMPERATURE: 97.5 F | HEART RATE: 86 BPM | BODY MASS INDEX: 34.73 KG/M2 | WEIGHT: 196 LBS | HEIGHT: 63 IN

## 2022-03-22 DIAGNOSIS — Z98.890 OTHER SPECIFIED POSTPROCEDURAL STATES: Chronic | ICD-10-CM

## 2022-03-22 PROCEDURE — 99213 OFFICE O/P EST LOW 20 MIN: CPT

## 2022-03-22 NOTE — HISTORY OF PRESENT ILLNESS
[de-identified] : Ms. SEJAL BARNARD is a 54 year old female here today for evaluation and treatment of Anemia.\par \par The patient was referred by Dr. Quintero.  She presents with her health aide from the [facility] [place] complex she resides on [facility].  She reports that she was seen by her PCP, who referred her due to iron deficiency anemia.  She currently takes iron supplement, 325mg daily and is tolerating without complaint.  She states she feels tired on occasion.  \par \par LAB WORKUP:\par (5/7/19) WBC 6.23, Hgb 13.7, MCV 76.7, RDW 16.1, , Na 147, ALT 42, Ferritin 36, TIBC 349, ironsat 15%, B12 normal\par \par HCM:\par Mammogram (07/2018) patient reports it was benign\par Colonoscopy ([facility] - 4/6/2018) removed 2 polyps, as per patient.  IMPRESSION:  Preparation of the colon was poor.  One 6mm polyp in the cecum, removed peicemeal using a cold biopsy forceps.  Resected and retrieved.  Random biopsies taken from transverse colo, descending, sigmoid and rectum.  Repeat in 1 year [de-identified] : 11/8/19\[facility] Patient is here for a follow-up visit for anemia with aid.  She is feeling well with no complaints.  Most recent CBC is stable with ferritin 111 and ironsat 17%.  Patient denies fever, chills, nausea, vomiting, bleeding or pica.  She does not take oral iron as she had IV iron in the past. She reports her energy has improved slightly as well.  \par \par 2/14/2020 \[facility] Patient is here for a follow-up visit for anemia with aid.  She is feeling well with no complaints. Most recent CBC and iron studies from 2.7.2020 remains stable off oral iron.   Patient denies fever, chills, nausea, vomiting, bleeding or pica.  She followed with Dr. Pak who performed endoscopy which showed GERD with esophagitis, acute gastritis with and without bleeding and duodenitis.  \par \par 8/4/20\[facility] Patient is here for follow up today for anemia. She is accompanied by her aide. She feels well. She denies severe fatigue, shortness of breath, chest pain, palpitations, or decreased energy. She does endorse black colored stools. SHe had an EGD and colonoscopy with Dr. Pak in January 2020 and continues to follow with him. Currently, she reports she is due for a CT abdomen and pelvis with contrast to assess for any bleeding. \par \par 4/2/21\[facility] Patient is here for follow up today for anemia, accompanied by her aide.  She denies severe fatigue, shortness of breath, chest pain, palpitations, or decreased energy.  \par \par 3/22/22\[facility] Patient is here for follow up today for anemia, accompanied by aide.  Reviewed most recent labwork which shows mild leukopenia (normal differential) but no evidence of anemia and iron stores are adequate.  She denies shortness of breath, chest pain, palpitations, dark stools or overt bleeding. Last EGD and colonoscopy was performed by Dr. Pak in January 2020.

## 2022-03-22 NOTE — ASSESSMENT
[FreeTextEntry1] : 55 yo woman with PMH of ulcerative colitis here for follow up on history of iron deficiency anemia. She is being seen by a private GI; She followed with Dr. Pak who performed endoscopy and colonoscopy in January 2020 which showed GERD with esophagitis, acute gastritis with and without bleeding and duodenitis. \par \par - no current evidence of anemia ; iron stores are adequate\par - s/p iv iron replacement (completed in June 2019) with resolution of low iron; no iron at this time\par - GI f/u in view of ulcerative colitis\par \par RTC in 4 months with CBC, iron studies, ferritin 1-2 days prior

## 2022-03-22 NOTE — REVIEW OF SYSTEMS
[Fever] : no fever [Chills] : no chills [Fatigue] : no fatigue [Recent Change In Weight] : ~T no recent weight change [Chest Pain] : no chest pain [Palpitations] : no palpitations [Shortness Of Breath] : no shortness of breath [Abdominal Pain] : no abdominal pain [Constipation] : no constipation [Skin Rash] : no skin rash [Easy Bleeding] : no tendency for easy bleeding [Easy Bruising] : no tendency for easy bruising

## 2022-03-22 NOTE — CONSULT LETTER
[Dear  ___] : Dear  [unfilled], [Consult Letter:] : I had the pleasure of evaluating your patient, [unfilled]. [Please see my note below.] : Please see my note below. [Sincerely,] : Sincerely, [FreeTextEntry3] : John Boateng DO\par Attending Physician,\par Hematology/ Medical Oncology\par [phone] office\par \par

## 2022-03-23 ENCOUNTER — APPOINTMENT (OUTPATIENT)
Dept: OTOLARYNGOLOGY | Facility: CLINIC | Age: 57
End: 2022-03-23
Payer: MEDICARE

## 2022-03-23 DIAGNOSIS — D50.9 IRON DEFICIENCY ANEMIA, UNSPECIFIED: ICD-10-CM

## 2022-03-23 DIAGNOSIS — R42 DIZZINESS AND GIDDINESS: ICD-10-CM

## 2022-03-23 DIAGNOSIS — M26.629 ARTHRALGIA OF TEMPOROMANDIBULAR JOINT,: ICD-10-CM

## 2022-03-23 DIAGNOSIS — G89.29 ARTHRALGIA OF TEMPOROMANDIBULAR JOINT,: ICD-10-CM

## 2022-03-23 PROCEDURE — 99214 OFFICE O/P EST MOD 30 MIN: CPT

## 2022-03-23 NOTE — PHYSICAL EXAM
[Normal] : mucosa is normal [Midline] : trachea located in midline position [de-identified] : left TMJ pain.

## 2022-03-23 NOTE — ASSESSMENT
[FreeTextEntry1] : I personally reviewed, interpreted and discussed patient's MRI images. NO CPA mass.\par \par \par I explained to the patient the pathophysiology of TMJ dysfunction, causing referred otalgia. I showed the impact of an  uneven bite/occlusion. \par During painful episodes, I recommended using slightly warm compresses to relieve the spasm of the masticators muscles, eating soft food, masticating on both sides of the jaw instead of one side,  in addition to using NSAIDs. I also explained the risks of side effects related to NSAIDs including stomach ulcers and recommended gastric protection while using NSAIDs. \par I also discussed the importance of seeing the dentist to align the bite to avoid a recurrence of the problem down the road.\par

## 2022-03-23 NOTE — HISTORY OF PRESENT ILLNESS
[FreeTextEntry1] : Patient presents today following up on dizziness. She admits doing better. She c/o left otalgia for 2 weeks. Seen by primary given antibiotics for 5 days but no improvement.

## 2022-04-05 ENCOUNTER — APPOINTMENT (OUTPATIENT)
Dept: OPHTHALMOLOGY | Facility: CLINIC | Age: 57
End: 2022-04-05

## 2022-04-05 ENCOUNTER — OUTPATIENT (OUTPATIENT)
Dept: OUTPATIENT SERVICES | Facility: HOSPITAL | Age: 57
LOS: 1 days | Discharge: HOME | End: 2022-04-05
Payer: MEDICARE

## 2022-04-05 DIAGNOSIS — Z98.890 OTHER SPECIFIED POSTPROCEDURAL STATES: Chronic | ICD-10-CM

## 2022-04-05 PROCEDURE — 92014 COMPRE OPH EXAM EST PT 1/>: CPT

## 2022-04-05 PROCEDURE — 92134 CPTRZ OPH DX IMG PST SGM RTA: CPT | Mod: 26

## 2022-04-07 DIAGNOSIS — H35.013 CHANGES IN RETINAL VASCULAR APPEARANCE, BILATERAL: ICD-10-CM

## 2022-04-07 DIAGNOSIS — H52.4 PRESBYOPIA: ICD-10-CM

## 2022-04-07 DIAGNOSIS — H01.001 UNSPECIFIED BLEPHARITIS RIGHT UPPER EYELID: ICD-10-CM

## 2022-04-07 DIAGNOSIS — H02.88A MEIBOMIAN GLAND DYSFUNCTION RIGHT EYE, UPPER AND LOWER EYELIDS: ICD-10-CM

## 2022-04-07 DIAGNOSIS — E11.9 TYPE 2 DIABETES MELLITUS WITHOUT COMPLICATIONS: ICD-10-CM

## 2022-07-15 ENCOUNTER — APPOINTMENT (OUTPATIENT)
Dept: HEMATOLOGY ONCOLOGY | Facility: CLINIC | Age: 57
End: 2022-07-15

## 2022-07-20 ENCOUNTER — APPOINTMENT (OUTPATIENT)
Dept: HEMATOLOGY ONCOLOGY | Facility: CLINIC | Age: 57
End: 2022-07-20

## 2022-08-12 ENCOUNTER — APPOINTMENT (OUTPATIENT)
Dept: OTOLARYNGOLOGY | Facility: CLINIC | Age: 57
End: 2022-08-12

## 2022-09-28 ENCOUNTER — APPOINTMENT (OUTPATIENT)
Dept: HEMATOLOGY ONCOLOGY | Facility: CLINIC | Age: 57
End: 2022-09-28

## 2022-09-28 LAB
BASOPHILS # BLD AUTO: 0.01 K/UL
BASOPHILS NFR BLD AUTO: 0.2 %
EOSINOPHIL # BLD AUTO: 0.07 K/UL
EOSINOPHIL NFR BLD AUTO: 1.3 %
HCT VFR BLD CALC: 40 %
HGB BLD-MCNC: 12.5 G/DL
IMM GRANULOCYTES NFR BLD AUTO: 0.2 %
IRON SATN MFR SERPL: 14 %
IRON SERPL-MCNC: 41 UG/DL
LYMPHOCYTES # BLD AUTO: 1.62 K/UL
LYMPHOCYTES NFR BLD AUTO: 30.5 %
MAN DIFF?: NORMAL
MCHC RBC-ENTMCNC: 24.2 PG
MCHC RBC-ENTMCNC: 31.3 G/DL
MCV RBC AUTO: 77.5 FL
MONOCYTES # BLD AUTO: 0.28 K/UL
MONOCYTES NFR BLD AUTO: 5.3 %
NEUTROPHILS # BLD AUTO: 3.32 K/UL
NEUTROPHILS NFR BLD AUTO: 62.5 %
PLATELET # BLD AUTO: 246 K/UL
RBC # BLD: 5.16 M/UL
RBC # FLD: 15.9 %
TIBC SERPL-MCNC: 301 UG/DL
UIBC SERPL-MCNC: 260 UG/DL
WBC # FLD AUTO: 5.31 K/UL

## 2022-09-29 LAB — FERRITIN SERPL-MCNC: 69 NG/ML

## 2022-09-30 ENCOUNTER — OUTPATIENT (OUTPATIENT)
Dept: OUTPATIENT SERVICES | Facility: HOSPITAL | Age: 57
LOS: 1 days | Discharge: HOME | End: 2022-09-30

## 2022-09-30 ENCOUNTER — APPOINTMENT (OUTPATIENT)
Dept: HEMATOLOGY ONCOLOGY | Facility: CLINIC | Age: 57
End: 2022-09-30

## 2022-09-30 VITALS
OXYGEN SATURATION: 98 % | HEIGHT: 65 IN | RESPIRATION RATE: 16 BRPM | TEMPERATURE: 98.2 F | WEIGHT: 180 LBS | HEART RATE: 98 BPM | DIASTOLIC BLOOD PRESSURE: 69 MMHG | BODY MASS INDEX: 29.99 KG/M2 | SYSTOLIC BLOOD PRESSURE: 103 MMHG

## 2022-09-30 DIAGNOSIS — Z98.890 OTHER SPECIFIED POSTPROCEDURAL STATES: Chronic | ICD-10-CM

## 2022-09-30 DIAGNOSIS — D50.9 IRON DEFICIENCY ANEMIA, UNSPECIFIED: ICD-10-CM

## 2022-09-30 PROCEDURE — 99214 OFFICE O/P EST MOD 30 MIN: CPT

## 2022-09-30 NOTE — PHYSICAL EXAM
[Restricted in physically strenuous activity but ambulatory and able to carry out work of a light or sedentary nature] : Status 1- Restricted in physically strenuous activity but ambulatory and able to carry out work of a light or sedentary nature, e.g., light house work, office work [Normal] : affect appropriate [de-identified] : uses seated walker for ambulation

## 2022-09-30 NOTE — CONSULT LETTER
[Dear  ___] : Dear  [unfilled], [Consult Letter:] : I had the pleasure of evaluating your patient, [unfilled]. [Please see my note below.] : Please see my note below. [Sincerely,] : Sincerely, [FreeTextEntry3] : John Boateng DO\par Attending Physician,\par Hematology/ Medical Oncology\par [phone] office\par \par

## 2022-09-30 NOTE — ASSESSMENT
[FreeTextEntry1] : 55 yo woman with PMH of ulcerative colitis here for follow up on history of iron deficiency anemia. She is being seen by a private GI; She followed with Dr. Pak who performed endoscopy and colonoscopy in January 2020 which showed GERD with esophagitis, acute gastritis with and without bleeding and duodenitis. \par \par - no anemia; \par - s/p iv iron replacement (completed in June 2019) with resolution of low iron; iron levels remain decent with ferritin >50 and iron sat <20%; would repeat labs in 2 mos, no iron at this time\par - GI f/u in view of ulcerative colitis  (DR Parmer\par \par RTC in 2 months with CBC, iron studies, ferritin 1-2 days prior

## 2022-09-30 NOTE — HISTORY OF PRESENT ILLNESS
[de-identified] : Ms. SEJAL BARNARD is a 54 year old female here today for evaluation and treatment of Anemia.\par \par The patient was referred by Dr. Quintero.  She presents with her health aide from the Apart[facility] complex she resides on [facility].  She reports that she was seen by her PCP, who referred her due to iron deficiency anemia.  She currently takes iron supplement, 325mg daily and is tolerating without complaint.  She states she feels tired on occasion.  \par \par LAB WORKUP:\par (5/7/19) WBC 6.23, Hgb 13.7, MCV 76.7, RDW 16.1, , Na 147, ALT 42, Ferritin 36, TIBC 349, ironsat 15%, B12 normal\par \par HCM:\par Mammogram (07/2018) patient reports it was benign\par Colonoscopy ([facility] - 4/6/2018) removed 2 polyps, as per patient.  IMPRESSION:  Preparation of the colon was poor.  One 6mm polyp in the cecum, removed peicemeal using a cold biopsy forceps.  Resected and retrieved.  Random biopsies taken from transverse colo, descending, sigmoid and rectum.  Repeat in 1 year [de-identified] : 11/8/19\[facility] Patient is here for a follow-up visit for anemia with aid.  She is feeling well with no complaints.  Most recent CBC is stable with ferritin 111 and ironsat 17%.  Patient denies fever, chills, nausea, vomiting, bleeding or pica.  She does not take oral iron as she had IV iron in the past. She reports her energy has improved slightly as well.  \par \par 2/14/2020 \[facility] Patient is here for a follow-up visit for anemia with aid.  She is feeling well with no complaints. Most recent CBC and iron studies from 2.7.2020 remains stable off oral iron.   Patient denies fever, chills, nausea, vomiting, bleeding or pica.  She followed with Dr. Pak who performed endoscopy which showed GERD with esophagitis, acute gastritis with and without bleeding and duodenitis.  \par \par 8/4/20\[facility] Patient is here for follow up today for anemia. She is accompanied by her aide. She feels well. She denies severe fatigue, shortness of breath, chest pain, palpitations, or decreased energy. She does endorse black colored stools. SHe had an EGD and colonoscopy with Dr. Pak in January 2020 and continues to follow with him. Currently, she reports she is due for a CT abdomen and pelvis with contrast to assess for any bleeding. \par \par 4/2/21\[facility] Patient is here for follow up today for anemia, accompanied by her aide.  She denies severe fatigue, shortness of breath, chest pain, palpitations, or decreased energy.  \par \par 3/22/22\[facility] Patient is here for follow up today for anemia, accompanied by aide.  Reviewed most recent labwork which shows mild leukopenia (normal differential) but no evidence of anemia and iron stores are adequate.  She denies shortness of breath, chest pain, palpitations, dark stools or overt bleeding. Last EGD and colonoscopy was performed by Dr. Pak in January 2020.  \par \par 9/30/22\[facility]

## 2022-10-28 ENCOUNTER — APPOINTMENT (OUTPATIENT)
Dept: OTOLARYNGOLOGY | Facility: CLINIC | Age: 57
End: 2022-10-28

## 2022-10-28 VITALS — WEIGHT: 180 LBS | HEIGHT: 65 IN | BODY MASS INDEX: 29.99 KG/M2

## 2022-10-28 DIAGNOSIS — R42 DIZZINESS AND GIDDINESS: ICD-10-CM

## 2022-10-28 PROCEDURE — 99212 OFFICE O/P EST SF 10 MIN: CPT

## 2022-10-28 NOTE — HISTORY OF PRESENT ILLNESS
[FreeTextEntry1] : Patient presents today c/o clogged ears.  Patient states she is here for excess cerumen removal.  She has no complaints.

## 2022-11-30 ENCOUNTER — APPOINTMENT (OUTPATIENT)
Dept: HEMATOLOGY ONCOLOGY | Facility: CLINIC | Age: 57
End: 2022-11-30

## 2022-11-30 LAB
ANION GAP SERPL CALC-SCNC: 13 MMOL/L
BASOPHILS # BLD AUTO: 0.02 K/UL
BASOPHILS NFR BLD AUTO: 0.3 %
BUN SERPL-MCNC: 24 MG/DL
CALCIUM SERPL-MCNC: 9.8 MG/DL
CHLORIDE SERPL-SCNC: 99 MMOL/L
CO2 SERPL-SCNC: 26 MMOL/L
CREAT SERPL-MCNC: 0.6 MG/DL
EGFR: 105 ML/MIN/1.73M2
EOSINOPHIL # BLD AUTO: 0.06 K/UL
EOSINOPHIL NFR BLD AUTO: 1 %
GLUCOSE SERPL-MCNC: 204 MG/DL
HCT VFR BLD CALC: 42.5 %
HGB BLD-MCNC: 13.9 G/DL
IMM GRANULOCYTES NFR BLD AUTO: 0.3 %
IRON SATN MFR SERPL: 18 %
IRON SERPL-MCNC: 66 UG/DL
LYMPHOCYTES # BLD AUTO: 1.75 K/UL
LYMPHOCYTES NFR BLD AUTO: 28.6 %
MAN DIFF?: NORMAL
MCHC RBC-ENTMCNC: 25.6 PG
MCHC RBC-ENTMCNC: 32.7 G/DL
MCV RBC AUTO: 78.1 FL
MONOCYTES # BLD AUTO: 0.39 K/UL
MONOCYTES NFR BLD AUTO: 6.4 %
NEUTROPHILS # BLD AUTO: 3.87 K/UL
NEUTROPHILS NFR BLD AUTO: 63.4 %
PLATELET # BLD AUTO: 277 K/UL
POTASSIUM SERPL-SCNC: 5 MMOL/L
RBC # BLD: 5.44 M/UL
RBC # FLD: 20.2 %
SODIUM SERPL-SCNC: 138 MMOL/L
TIBC SERPL-MCNC: 368 UG/DL
UIBC SERPL-MCNC: 302 UG/DL
WBC # FLD AUTO: 6.11 K/UL

## 2022-12-01 LAB
ALBUMIN SERPL ELPH-MCNC: 4.5 G/DL
ALP BLD-CCNC: 70 U/L
ALT SERPL-CCNC: 18 U/L
AST SERPL-CCNC: 11 U/L
BILIRUB DIRECT SERPL-MCNC: <0.2 MG/DL
BILIRUB INDIRECT SERPL-MCNC: >0 MG/DL
BILIRUB SERPL-MCNC: 0.2 MG/DL
FERRITIN SERPL-MCNC: 42 NG/ML
PROT SERPL-MCNC: 6.7 G/DL

## 2022-12-02 ENCOUNTER — APPOINTMENT (OUTPATIENT)
Dept: HEMATOLOGY ONCOLOGY | Facility: CLINIC | Age: 57
End: 2022-12-02

## 2022-12-02 VITALS
WEIGHT: 192 LBS | HEIGHT: 65 IN | SYSTOLIC BLOOD PRESSURE: 141 MMHG | DIASTOLIC BLOOD PRESSURE: 82 MMHG | TEMPERATURE: 97.4 F | BODY MASS INDEX: 31.99 KG/M2 | OXYGEN SATURATION: 98 % | HEART RATE: 111 BPM | RESPIRATION RATE: 16 BRPM

## 2022-12-02 PROCEDURE — 99214 OFFICE O/P EST MOD 30 MIN: CPT

## 2022-12-02 NOTE — PHYSICAL EXAM
[Restricted in physically strenuous activity but ambulatory and able to carry out work of a light or sedentary nature] : Status 1- Restricted in physically strenuous activity but ambulatory and able to carry out work of a light or sedentary nature, e.g., light house work, office work [Normal] : affect appropriate [de-identified] : uses seated walker for ambulation

## 2022-12-02 NOTE — CONSULT LETTER
[Dear  ___] : Dear  [unfilled], [Consult Letter:] : I had the pleasure of evaluating your patient, [unfilled]. [Please see my note below.] : Please see my note below. [Sincerely,] : Sincerely, [FreeTextEntry3] : John Boateng DO\par Attending Physician,\par Hematology/ Medical Oncology\par [phone] office\par \par

## 2022-12-02 NOTE — HISTORY OF PRESENT ILLNESS
[de-identified] : Ms. SEJAL BARNARD is a 54 year old female here today for evaluation and treatment of Anemia.\par \par The patient was referred by Dr. Quintero.  She presents with her health aide from the Apartment Housing complex she resides on [facility].  She reports that she was seen by her PCP, who referred her due to iron deficiency anemia.  She currently takes iron supplement, 325mg daily and is tolerating without complaint.  She states she feels tired on occasion.  \par \par LAB WORKUP:\par (5/7/19) WBC 6.23, Hgb 13.7, MCV 76.7, RDW 16.1, , Na 147, ALT 42, Ferritin 36, TIBC 349, ironsat 15%, B12 normal\par \par HCM:\par Mammogram (07/2018) patient reports it was benign\par Colonoscopy ([facility] - 4/6/2018) removed 2 polyps, as per patient.  IMPRESSION:  Preparation of the colon was poor.  One 6mm polyp in the cecum, removed peicemeal using a cold biopsy forceps.  Resected and retrieved.  Random biopsies taken from transverse colo, descending, sigmoid and rectum.  Repeat in 1 year [de-identified] : 11/8/19\[facility] [place] Patient is here for a follow-up visit for anemia with aid.  She is feeling well with no complaints.  Most recent CBC is stable with ferritin 111 and ironsat 17%.  Patient denies fever, chills, nausea, vomiting, bleeding or pica.  She does not take oral iron as she had IV iron in the past. She reports her energy has improved slightly as well.  \par \par 2/14/2020 \[facility] [place] Patient is here for a follow-up visit for anemia with aid.  She is feeling well with no complaints. Most recent CBC and iron studies from 2.7.2020 remains stable off oral iron.   Patient denies fever, chills, nausea, vomiting, bleeding or pica.  She followed with Dr. Pak who performed endoscopy which showed GERD with esophagitis, acute gastritis with and without bleeding and duodenitis.  \par \par 8/4/20\[facility] [place] Patient is here for follow up today for anemia. She is accompanied by her aide. She feels well. She denies severe fatigue, shortness of breath, chest pain, palpitations, or decreased energy. She does endorse black colored stools. SHe had an EGD and colonoscopy with Dr. Pak in January 2020 and continues to follow with him. Currently, she reports she is due for a CT abdomen and pelvis with contrast to assess for any bleeding. \par \par 4/2/21\[facility] [place] Patient is here for follow up today for anemia, accompanied by her aide.  She denies severe fatigue, shortness of breath, chest pain, palpitations, or decreased energy.  \par \par 3/22/22[facility] Patient is here for follow up today for anemia, accompanied by aide.  Reviewed most recent labwork which shows mild leukopenia (normal differential) but no evidence of anemia and iron stores are adequate.  She denies shortness of breath, chest pain, palpitations, dark stools or overt bleeding. Last EGD and colonoscopy was performed by Dr. Pak in January 2020.  \par \par 12/2/22[facility]

## 2022-12-02 NOTE — ASSESSMENT
[FreeTextEntry1] : 57 yo woman with PMH of ulcerative colitis here for follow up on history of iron deficiency anemia. She is being seen by a private GI; She followed with Dr. Pak who performed endoscopy and colonoscopy in January 2020 which showed GERD with esophagitis, acute gastritis with and without bleeding and duodenitis. \par \par - no anemia; \par - s/p iv iron replacement (completed in June 2019) with resolution of low iron; iron levels remain decent with ferritin >50 and iron sat <20%; would repeat labs in 2 mos, no iron at this time\par - GI f/u in view of ulcerative colitis  (DR Parmer\par \par FERRITIN has been slowly decreasing\par recommend to repeat in 6 weeks and see KYLAH Canchola; labs before the visit: if IRON DEFICIENCY gets more pronounced, start venofer 1000mg total\par

## 2023-01-19 ENCOUNTER — OUTPATIENT (OUTPATIENT)
Dept: OUTPATIENT SERVICES | Facility: HOSPITAL | Age: 58
LOS: 1 days | End: 2023-01-19

## 2023-01-19 ENCOUNTER — APPOINTMENT (OUTPATIENT)
Dept: HEMATOLOGY ONCOLOGY | Facility: CLINIC | Age: 58
End: 2023-01-19
Payer: MEDICARE

## 2023-01-19 ENCOUNTER — LABORATORY RESULT (OUTPATIENT)
Age: 58
End: 2023-01-19

## 2023-01-19 VITALS
HEART RATE: 109 BPM | WEIGHT: 196 LBS | BODY MASS INDEX: 32.65 KG/M2 | TEMPERATURE: 97.6 F | HEIGHT: 65 IN | RESPIRATION RATE: 16 BRPM | DIASTOLIC BLOOD PRESSURE: 61 MMHG | OXYGEN SATURATION: 99 % | SYSTOLIC BLOOD PRESSURE: 120 MMHG

## 2023-01-19 DIAGNOSIS — Z98.890 OTHER SPECIFIED POSTPROCEDURAL STATES: Chronic | ICD-10-CM

## 2023-01-19 DIAGNOSIS — D50.9 IRON DEFICIENCY ANEMIA, UNSPECIFIED: ICD-10-CM

## 2023-01-19 LAB
HCT VFR BLD CALC: 41.5 %
HGB BLD-MCNC: 13.2 G/DL
IRON SATN MFR SERPL: 17 %
IRON SERPL-MCNC: 57 UG/DL
MCHC RBC-ENTMCNC: 26.3 PG
MCHC RBC-ENTMCNC: 31.8 G/DL
MCV RBC AUTO: 82.7 FL
PLATELET # BLD AUTO: 271 K/UL
PMV BLD: 9.5 FL
RBC # BLD: 5.02 M/UL
RBC # FLD: 16.1 %
TIBC SERPL-MCNC: 342 UG/DL
UIBC SERPL-MCNC: 285 UG/DL
WBC # FLD AUTO: 5.62 K/UL

## 2023-01-19 PROCEDURE — 99213 OFFICE O/P EST LOW 20 MIN: CPT

## 2023-01-19 NOTE — PHYSICAL EXAM
[Restricted in physically strenuous activity but ambulatory and able to carry out work of a light or sedentary nature] : Status 1- Restricted in physically strenuous activity but ambulatory and able to carry out work of a light or sedentary nature, e.g., light house work, office work [Normal] : affect appropriate [de-identified] : uses seated walker for ambulation

## 2023-01-19 NOTE — HISTORY OF PRESENT ILLNESS
[de-identified] : Ms. SEJAL BARNARD is a 54 year old female here today for evaluation and treatment of Anemia.\par \par The patient was referred by Dr. Quintero.  She presents with her health aide from the Apartment Housing complex she resides on [facility].  She reports that she was seen by her PCP, who referred her due to iron deficiency anemia.  She currently takes iron supplement, 325mg daily and is tolerating without complaint.  She states she feels tired on occasion.  \par \par LAB WORKUP:\par (5/7/19) WBC 6.23, Hgb 13.7, MCV 76.7, RDW 16.1, , Na 147, ALT 42, Ferritin 36, TIBC 349, ironsat 15%, B12 normal\par \par HCM:\par Mammogram (07/2018) patient reports it was benign\par Colonoscopy ([facility] - 4/6/2018) removed 2 polyps, as per patient.  IMPRESSION:  Preparation of the colon was poor.  One 6mm polyp in the cecum, removed peicemeal using a cold biopsy forceps.  Resected and retrieved.  Random biopsies taken from transverse colo, descending, sigmoid and rectum.  Repeat in 1 year [de-identified] : 11/8/19\[facility] Patient is here for a follow-up visit for anemia with aid.  She is feeling well with no complaints.  Most recent CBC is stable with ferritin 111 and ironsat 17%.  Patient denies fever, chills, nausea, vomiting, bleeding or pica.  She does not take oral iron as she had IV iron in the past. She reports her energy has improved slightly as well.  \par \par 2/14/2020 \[facility] Patient is here for a follow-up visit for anemia with aid.  She is feeling well with no complaints. Most recent CBC and iron studies from 2.7.2020 remains stable off oral iron.   Patient denies fever, chills, nausea, vomiting, bleeding or pica.  She followed with Dr. Pak who performed endoscopy which showed GERD with esophagitis, acute gastritis with and without bleeding and duodenitis.  \par \par 8/4/20\[facility] Patient is here for follow up today for anemia. She is accompanied by her aide. She feels well. She denies severe fatigue, shortness of breath, chest pain, palpitations, or decreased energy. She does endorse black colored stools. SHe had an EGD and colonoscopy with Dr. Pak in January 2020 and continues to follow with him. Currently, she reports she is due for a CT abdomen and pelvis with contrast to assess for any bleeding. \par \par 4/2/21\[facility] Patient is here for follow up today for anemia, accompanied by her aide.  She denies severe fatigue, shortness of breath, chest pain, palpitations, or decreased energy.  \par \par 3/22/22\[facility] Patient is here for follow up today for anemia, accompanied by aide.  Reviewed most recent labwork which shows mild leukopenia (normal differential) but no evidence of anemia and iron stores are adequate.  She denies shortness of breath, chest pain, palpitations, dark stools or overt bleeding. Last EGD and colonoscopy was performed by Dr. Pak in January 2020.  \par \par 1/19/23\[facility] Patient is here for follow up today for anemia, accompanied by aide.  Reviewed most recent CBC which shows no evidence of anemia.  She denies shortness of breath, chest pain, palpitations, dizziness, dark stools or overt bleeding. Last EGD and colonoscopy was performed by Dr. Pak in January 2020.  She is due again this year and has followup with Dr. Pak planned for 02/2023.  Patient also has mammography scheduled for next week and knows to call us with any concerns.

## 2023-01-19 NOTE — CONSULT LETTER
[Dear  ___] : Dear  [unfilled], [Consult Letter:] : I had the pleasure of evaluating your patient, [unfilled]. [Please see my note below.] : Please see my note below. [Sincerely,] : Sincerely, [FreeTextEntry3] : John Boateng DO\par Attending Physician,\par Hematology/ Medical Oncology\par [phone] office\par \par

## 2023-01-19 NOTE — ASSESSMENT
[FreeTextEntry1] : 58 yo woman with PMH of ulcerative colitis here for follow up on history of iron deficiency anemia. She is being seen by a private GI; She followed with Dr. Pak who performed endoscopy and colonoscopy in January 2020 which showed GERD with esophagitis, acute gastritis with and without bleeding and duodenitis. \par \par - Reviewed most recent CBC which shows no evidence of anemia, hgb 13.2g/dL\par - s/p iv iron replacement (completed in June 2019) with resolution of low iron\par - followup with GI, Dr. Pak, for management of ulcerative colitis \par - Labwork today: CBC, iron studies, ferritin level ; if iron stores are low, plan to replete with IV venofer again\par \par Encouraged to keep up to date with age appropriate screenings including but not limited to mammography (scheduled next week), GYN pelvic exam, colonoscopy and upper endoscopy if indicated.  \par \par RTC in 3 months with CBC, iron studies, ferritin level prior

## 2023-01-20 LAB — FERRITIN SERPL-MCNC: 33 NG/ML

## 2023-02-07 ENCOUNTER — EMERGENCY (EMERGENCY)
Facility: HOSPITAL | Age: 58
LOS: 0 days | Discharge: HOME | End: 2023-02-08
Attending: EMERGENCY MEDICINE
Payer: MEDICARE

## 2023-02-07 VITALS
OXYGEN SATURATION: 96 % | RESPIRATION RATE: 18 BRPM | SYSTOLIC BLOOD PRESSURE: 129 MMHG | TEMPERATURE: 98 F | DIASTOLIC BLOOD PRESSURE: 64 MMHG | WEIGHT: 149.91 LBS | HEART RATE: 111 BPM

## 2023-02-07 DIAGNOSIS — Z91.040 LATEX ALLERGY STATUS: ICD-10-CM

## 2023-02-07 DIAGNOSIS — E11.42 TYPE 2 DIABETES MELLITUS WITH DIABETIC POLYNEUROPATHY: ICD-10-CM

## 2023-02-07 DIAGNOSIS — E11.65 TYPE 2 DIABETES MELLITUS WITH HYPERGLYCEMIA: ICD-10-CM

## 2023-02-07 DIAGNOSIS — J44.9 CHRONIC OBSTRUCTIVE PULMONARY DISEASE, UNSPECIFIED: ICD-10-CM

## 2023-02-07 DIAGNOSIS — E78.5 HYPERLIPIDEMIA, UNSPECIFIED: ICD-10-CM

## 2023-02-07 DIAGNOSIS — K21.9 GASTRO-ESOPHAGEAL REFLUX DISEASE WITHOUT ESOPHAGITIS: ICD-10-CM

## 2023-02-07 DIAGNOSIS — F32.A DEPRESSION, UNSPECIFIED: ICD-10-CM

## 2023-02-07 DIAGNOSIS — I10 ESSENTIAL (PRIMARY) HYPERTENSION: ICD-10-CM

## 2023-02-07 DIAGNOSIS — Z20.822 CONTACT WITH AND (SUSPECTED) EXPOSURE TO COVID-19: ICD-10-CM

## 2023-02-07 DIAGNOSIS — Z88.0 ALLERGY STATUS TO PENICILLIN: ICD-10-CM

## 2023-02-07 DIAGNOSIS — Z91.013 ALLERGY TO SEAFOOD: ICD-10-CM

## 2023-02-07 DIAGNOSIS — Z79.84 LONG TERM (CURRENT) USE OF ORAL HYPOGLYCEMIC DRUGS: ICD-10-CM

## 2023-02-07 DIAGNOSIS — E03.9 HYPOTHYROIDISM, UNSPECIFIED: ICD-10-CM

## 2023-02-07 DIAGNOSIS — F41.9 ANXIETY DISORDER, UNSPECIFIED: ICD-10-CM

## 2023-02-07 DIAGNOSIS — Z88.1 ALLERGY STATUS TO OTHER ANTIBIOTIC AGENTS STATUS: ICD-10-CM

## 2023-02-07 DIAGNOSIS — R94.31 ABNORMAL ELECTROCARDIOGRAM [ECG] [EKG]: ICD-10-CM

## 2023-02-07 DIAGNOSIS — Z98.890 OTHER SPECIFIED POSTPROCEDURAL STATES: Chronic | ICD-10-CM

## 2023-02-07 DIAGNOSIS — K50.90 CROHN'S DISEASE, UNSPECIFIED, WITHOUT COMPLICATIONS: ICD-10-CM

## 2023-02-07 LAB
ALBUMIN SERPL ELPH-MCNC: 4.1 G/DL — SIGNIFICANT CHANGE UP (ref 3.5–5.2)
ALP SERPL-CCNC: 77 U/L — SIGNIFICANT CHANGE UP (ref 30–115)
ALT FLD-CCNC: 19 U/L — SIGNIFICANT CHANGE UP (ref 0–41)
ANION GAP SERPL CALC-SCNC: 9 MMOL/L — SIGNIFICANT CHANGE UP (ref 7–14)
APPEARANCE UR: CLEAR — SIGNIFICANT CHANGE UP
AST SERPL-CCNC: 10 U/L — SIGNIFICANT CHANGE UP (ref 0–41)
B-OH-BUTYR SERPL-SCNC: <0.2 MMOL/L — SIGNIFICANT CHANGE UP
BACTERIA # UR AUTO: NEGATIVE — SIGNIFICANT CHANGE UP
BASE EXCESS BLDV CALC-SCNC: 2.4 MMOL/L — SIGNIFICANT CHANGE UP (ref -2–3)
BASOPHILS # BLD AUTO: 0.03 K/UL — SIGNIFICANT CHANGE UP (ref 0–0.2)
BASOPHILS NFR BLD AUTO: 0.5 % — SIGNIFICANT CHANGE UP (ref 0–1)
BILIRUB SERPL-MCNC: <0.2 MG/DL — SIGNIFICANT CHANGE UP (ref 0.2–1.2)
BILIRUB UR-MCNC: NEGATIVE — SIGNIFICANT CHANGE UP
BUN SERPL-MCNC: 19 MG/DL — SIGNIFICANT CHANGE UP (ref 10–20)
CA-I SERPL-SCNC: 1.23 MMOL/L — SIGNIFICANT CHANGE UP (ref 1.15–1.33)
CALCIUM SERPL-MCNC: 9.6 MG/DL — SIGNIFICANT CHANGE UP (ref 8.4–10.4)
CHLORIDE SERPL-SCNC: 97 MMOL/L — LOW (ref 98–110)
CO2 SERPL-SCNC: 28 MMOL/L — SIGNIFICANT CHANGE UP (ref 17–32)
COLOR SPEC: COLORLESS — SIGNIFICANT CHANGE UP
CREAT SERPL-MCNC: 0.7 MG/DL — SIGNIFICANT CHANGE UP (ref 0.7–1.5)
DIFF PNL FLD: NEGATIVE — SIGNIFICANT CHANGE UP
EGFR: 101 ML/MIN/1.73M2 — SIGNIFICANT CHANGE UP
EOSINOPHIL # BLD AUTO: 0.06 K/UL — SIGNIFICANT CHANGE UP (ref 0–0.7)
EOSINOPHIL NFR BLD AUTO: 0.9 % — SIGNIFICANT CHANGE UP (ref 0–8)
EPI CELLS # UR: 0 /HPF — SIGNIFICANT CHANGE UP (ref 0–5)
FLUAV AG NPH QL: SIGNIFICANT CHANGE UP
FLUBV AG NPH QL: SIGNIFICANT CHANGE UP
GAS PNL BLDV: 131 MMOL/L — LOW (ref 136–145)
GAS PNL BLDV: SIGNIFICANT CHANGE UP
GAS PNL BLDV: SIGNIFICANT CHANGE UP
GLUCOSE BLDC GLUCOMTR-MCNC: 186 MG/DL — HIGH (ref 70–99)
GLUCOSE SERPL-MCNC: 410 MG/DL — HIGH (ref 70–99)
GLUCOSE UR QL: ABNORMAL
HCO3 BLDV-SCNC: 29 MMOL/L — SIGNIFICANT CHANGE UP (ref 22–29)
HCT VFR BLD CALC: 43 % — SIGNIFICANT CHANGE UP (ref 37–47)
HCT VFR BLDA CALC: 40 % — SIGNIFICANT CHANGE UP (ref 39–51)
HGB BLD CALC-MCNC: 13.2 G/DL — SIGNIFICANT CHANGE UP (ref 12.6–17.4)
HGB BLD-MCNC: 13.9 G/DL — SIGNIFICANT CHANGE UP (ref 12–16)
HYALINE CASTS # UR AUTO: 0 /LPF — SIGNIFICANT CHANGE UP (ref 0–7)
IMM GRANULOCYTES NFR BLD AUTO: 0.5 % — HIGH (ref 0.1–0.3)
KETONES UR-MCNC: NEGATIVE — SIGNIFICANT CHANGE UP
LACTATE BLDV-MCNC: 2.9 MMOL/L — HIGH (ref 0.5–2)
LEUKOCYTE ESTERASE UR-ACNC: ABNORMAL
LYMPHOCYTES # BLD AUTO: 1.62 K/UL — SIGNIFICANT CHANGE UP (ref 1.2–3.4)
LYMPHOCYTES # BLD AUTO: 25.5 % — SIGNIFICANT CHANGE UP (ref 20.5–51.1)
MCHC RBC-ENTMCNC: 26.4 PG — LOW (ref 27–31)
MCHC RBC-ENTMCNC: 32.3 G/DL — SIGNIFICANT CHANGE UP (ref 32–37)
MCV RBC AUTO: 81.6 FL — SIGNIFICANT CHANGE UP (ref 81–99)
MONOCYTES # BLD AUTO: 0.45 K/UL — SIGNIFICANT CHANGE UP (ref 0.1–0.6)
MONOCYTES NFR BLD AUTO: 7.1 % — SIGNIFICANT CHANGE UP (ref 1.7–9.3)
NEUTROPHILS # BLD AUTO: 4.16 K/UL — SIGNIFICANT CHANGE UP (ref 1.4–6.5)
NEUTROPHILS NFR BLD AUTO: 65.5 % — SIGNIFICANT CHANGE UP (ref 42.2–75.2)
NITRITE UR-MCNC: NEGATIVE — SIGNIFICANT CHANGE UP
NRBC # BLD: 0 /100 WBCS — SIGNIFICANT CHANGE UP (ref 0–0)
PCO2 BLDV: 51 MMHG — HIGH (ref 39–42)
PH BLDV: 7.36 — SIGNIFICANT CHANGE UP (ref 7.32–7.43)
PH UR: 5.5 — SIGNIFICANT CHANGE UP (ref 5–8)
PLATELET # BLD AUTO: 271 K/UL — SIGNIFICANT CHANGE UP (ref 130–400)
PO2 BLDV: 36 MMHG — SIGNIFICANT CHANGE UP
POTASSIUM BLDV-SCNC: 4.6 MMOL/L — SIGNIFICANT CHANGE UP (ref 3.5–5.1)
POTASSIUM SERPL-MCNC: 4.8 MMOL/L — SIGNIFICANT CHANGE UP (ref 3.5–5)
POTASSIUM SERPL-SCNC: 4.8 MMOL/L — SIGNIFICANT CHANGE UP (ref 3.5–5)
PROT SERPL-MCNC: 6.4 G/DL — SIGNIFICANT CHANGE UP (ref 6–8)
PROT UR-MCNC: NEGATIVE — SIGNIFICANT CHANGE UP
RBC # BLD: 5.27 M/UL — SIGNIFICANT CHANGE UP (ref 4.2–5.4)
RBC # FLD: 14.7 % — HIGH (ref 11.5–14.5)
RBC CASTS # UR COMP ASSIST: 0 /HPF — SIGNIFICANT CHANGE UP (ref 0–4)
RSV RNA NPH QL NAA+NON-PROBE: SIGNIFICANT CHANGE UP
SAO2 % BLDV: 48.7 % — SIGNIFICANT CHANGE UP
SARS-COV-2 RNA SPEC QL NAA+PROBE: SIGNIFICANT CHANGE UP
SODIUM SERPL-SCNC: 134 MMOL/L — LOW (ref 135–146)
SP GR SPEC: 1.01 — SIGNIFICANT CHANGE UP (ref 1.01–1.03)
TROPONIN T SERPL-MCNC: <0.01 NG/ML — SIGNIFICANT CHANGE UP
UROBILINOGEN FLD QL: SIGNIFICANT CHANGE UP
WBC # BLD: 6.35 K/UL — SIGNIFICANT CHANGE UP (ref 4.8–10.8)
WBC # FLD AUTO: 6.35 K/UL — SIGNIFICANT CHANGE UP (ref 4.8–10.8)
WBC UR QL: 20 /HPF — HIGH (ref 0–5)

## 2023-02-07 PROCEDURE — 93017 CV STRESS TEST TRACING ONLY: CPT

## 2023-02-07 PROCEDURE — 0241U: CPT

## 2023-02-07 PROCEDURE — A9500: CPT

## 2023-02-07 PROCEDURE — 78452 HT MUSCLE IMAGE SPECT MULT: CPT | Mod: ME

## 2023-02-07 PROCEDURE — 83605 ASSAY OF LACTIC ACID: CPT

## 2023-02-07 PROCEDURE — 87086 URINE CULTURE/COLONY COUNT: CPT

## 2023-02-07 PROCEDURE — G0378: CPT

## 2023-02-07 PROCEDURE — 85014 HEMATOCRIT: CPT

## 2023-02-07 PROCEDURE — 84484 ASSAY OF TROPONIN QUANT: CPT

## 2023-02-07 PROCEDURE — 99223 1ST HOSP IP/OBS HIGH 75: CPT

## 2023-02-07 PROCEDURE — 82330 ASSAY OF CALCIUM: CPT

## 2023-02-07 PROCEDURE — 93005 ELECTROCARDIOGRAM TRACING: CPT

## 2023-02-07 PROCEDURE — 96374 THER/PROPH/DIAG INJ IV PUSH: CPT

## 2023-02-07 PROCEDURE — 80053 COMPREHEN METABOLIC PANEL: CPT

## 2023-02-07 PROCEDURE — 85018 HEMOGLOBIN: CPT

## 2023-02-07 PROCEDURE — 84132 ASSAY OF SERUM POTASSIUM: CPT

## 2023-02-07 PROCEDURE — 82962 GLUCOSE BLOOD TEST: CPT

## 2023-02-07 PROCEDURE — 81001 URINALYSIS AUTO W/SCOPE: CPT

## 2023-02-07 PROCEDURE — 82803 BLOOD GASES ANY COMBINATION: CPT

## 2023-02-07 PROCEDURE — 93010 ELECTROCARDIOGRAM REPORT: CPT

## 2023-02-07 PROCEDURE — 85025 COMPLETE CBC W/AUTO DIFF WBC: CPT

## 2023-02-07 PROCEDURE — 82010 KETONE BODYS QUAN: CPT

## 2023-02-07 PROCEDURE — G1004: CPT

## 2023-02-07 PROCEDURE — 99284 EMERGENCY DEPT VISIT MOD MDM: CPT | Mod: 25

## 2023-02-07 PROCEDURE — 36415 COLL VENOUS BLD VENIPUNCTURE: CPT

## 2023-02-07 PROCEDURE — 93306 TTE W/DOPPLER COMPLETE: CPT

## 2023-02-07 PROCEDURE — 84295 ASSAY OF SERUM SODIUM: CPT

## 2023-02-07 PROCEDURE — 96375 TX/PRO/DX INJ NEW DRUG ADDON: CPT

## 2023-02-07 RX ORDER — PANTOPRAZOLE SODIUM 20 MG/1
40 TABLET, DELAYED RELEASE ORAL
Refills: 0 | Status: DISCONTINUED | OUTPATIENT
Start: 2023-02-07 | End: 2023-02-08

## 2023-02-07 RX ORDER — LEVOTHYROXINE SODIUM 125 MCG
100 TABLET ORAL DAILY
Refills: 0 | Status: DISCONTINUED | OUTPATIENT
Start: 2023-02-07 | End: 2023-02-08

## 2023-02-07 RX ORDER — MECLIZINE HCL 12.5 MG
25 TABLET ORAL DAILY
Refills: 0 | Status: DISCONTINUED | OUTPATIENT
Start: 2023-02-07 | End: 2023-02-08

## 2023-02-07 RX ORDER — GABAPENTIN 400 MG/1
100 CAPSULE ORAL THREE TIMES A DAY
Refills: 0 | Status: DISCONTINUED | OUTPATIENT
Start: 2023-02-07 | End: 2023-02-08

## 2023-02-07 RX ORDER — SERTRALINE 25 MG/1
50 TABLET, FILM COATED ORAL DAILY
Refills: 0 | Status: DISCONTINUED | OUTPATIENT
Start: 2023-02-07 | End: 2023-02-08

## 2023-02-07 RX ORDER — LORATADINE 10 MG/1
10 TABLET ORAL DAILY
Refills: 0 | Status: DISCONTINUED | OUTPATIENT
Start: 2023-02-07 | End: 2023-02-08

## 2023-02-07 RX ORDER — SODIUM CHLORIDE 9 MG/ML
1000 INJECTION INTRAMUSCULAR; INTRAVENOUS; SUBCUTANEOUS
Refills: 0 | Status: DISCONTINUED | OUTPATIENT
Start: 2023-02-07 | End: 2023-02-08

## 2023-02-07 RX ORDER — ATORVASTATIN CALCIUM 80 MG/1
40 TABLET, FILM COATED ORAL AT BEDTIME
Refills: 0 | Status: DISCONTINUED | OUTPATIENT
Start: 2023-02-07 | End: 2023-02-08

## 2023-02-07 RX ORDER — METFORMIN HYDROCHLORIDE 850 MG/1
1000 TABLET ORAL
Refills: 0 | Status: DISCONTINUED | OUTPATIENT
Start: 2023-02-07 | End: 2023-02-08

## 2023-02-07 RX ORDER — INSULIN HUMAN 100 [IU]/ML
7 INJECTION, SOLUTION SUBCUTANEOUS ONCE
Refills: 0 | Status: COMPLETED | OUTPATIENT
Start: 2023-02-07 | End: 2023-02-07

## 2023-02-07 RX ORDER — MIRTAZAPINE 45 MG/1
15 TABLET, ORALLY DISINTEGRATING ORAL DAILY
Refills: 0 | Status: DISCONTINUED | OUTPATIENT
Start: 2023-02-07 | End: 2023-02-08

## 2023-02-07 RX ORDER — INSULIN HUMAN 100 [IU]/ML
7 INJECTION, SOLUTION SUBCUTANEOUS ONCE
Refills: 0 | Status: DISCONTINUED | OUTPATIENT
Start: 2023-02-07 | End: 2023-02-07

## 2023-02-07 RX ORDER — HALOPERIDOL DECANOATE 100 MG/ML
10 INJECTION INTRAMUSCULAR DAILY
Refills: 0 | Status: DISCONTINUED | OUTPATIENT
Start: 2023-02-07 | End: 2023-02-08

## 2023-02-07 RX ORDER — SODIUM CHLORIDE 9 MG/ML
1000 INJECTION, SOLUTION INTRAVENOUS ONCE
Refills: 0 | Status: COMPLETED | OUTPATIENT
Start: 2023-02-07 | End: 2023-02-07

## 2023-02-07 RX ADMIN — SODIUM CHLORIDE 100 MILLILITER(S): 9 INJECTION INTRAMUSCULAR; INTRAVENOUS; SUBCUTANEOUS at 21:28

## 2023-02-07 RX ADMIN — SODIUM CHLORIDE 1000 MILLILITER(S): 9 INJECTION, SOLUTION INTRAVENOUS at 14:25

## 2023-02-07 RX ADMIN — ATORVASTATIN CALCIUM 40 MILLIGRAM(S): 80 TABLET, FILM COATED ORAL at 21:30

## 2023-02-07 RX ADMIN — GABAPENTIN 100 MILLIGRAM(S): 400 CAPSULE ORAL at 21:29

## 2023-02-07 RX ADMIN — MIRTAZAPINE 15 MILLIGRAM(S): 45 TABLET, ORALLY DISINTEGRATING ORAL at 22:01

## 2023-02-07 RX ADMIN — HALOPERIDOL DECANOATE 10 MILLIGRAM(S): 100 INJECTION INTRAMUSCULAR at 22:00

## 2023-02-07 RX ADMIN — Medication 25 MILLIGRAM(S): at 22:01

## 2023-02-07 RX ADMIN — Medication 10 MILLIGRAM(S): at 22:00

## 2023-02-07 RX ADMIN — INSULIN HUMAN 7 UNIT(S): 100 INJECTION, SOLUTION SUBCUTANEOUS at 14:13

## 2023-02-07 RX ADMIN — SERTRALINE 50 MILLIGRAM(S): 25 TABLET, FILM COATED ORAL at 22:00

## 2023-02-07 RX ADMIN — METFORMIN HYDROCHLORIDE 1000 MILLIGRAM(S): 850 TABLET ORAL at 22:00

## 2023-02-07 NOTE — ED PROVIDER NOTE - PROGRESS NOTE DETAILS
glipizide 5mg QD verified by pharmacy today. 5d supply sent to [facility]. tn - pt neg for acute dka; called pharmacy; pt meds were delivered to her home; [facility] will not cover her medications; new t wave inversions on ekg; no cp or sob; will send trop

## 2023-02-07 NOTE — ED CDU PROVIDER INITIAL DAY NOTE - ATTENDING APP SHARED VISIT CONTRIBUTION OF CARE
61-year-old female past Diabetes, asthma, COPD, hypothyroid presents with episode of hyperglycemia..  While in the ED labs EKG, UA bland.  No signs of DKA.  EKG shows no ischemic changes.  Patient placed in ops for cardiac evaluation.  Patient denies any chest pain or shortness of breath.  Cardiologist is Dr. Mcgee.  No recent cardiac evaluation.    CONSTITUTIONAL: Well-developed; well-nourished; in no acute distress.   SKIN: warm, dry  HEAD: Normocephalic; atraumatic.  EYES: PERRL, EOMI, no conjunctival erythema  ENT: No nasal discharge; airway clear.  NECK: Supple; non tender.  CARD: S1, S2 normal;  Regular rate and rhythm.   RESP: No wheezes, rales or rhonchi.  ABD: soft non tender, non distended, no rebound or guarding  EXT: Normal ROM.  5/5 strength in all 4 extremities   LYMPH: No acute cervical adenopathy.  NEURO: Alert, oriented, grossly unremarkable. neurovascularly intact  PSYCH: Cooperative, appropriate.

## 2023-02-07 NOTE — ED PROVIDER NOTE - CARE PLAN
Principal Discharge DX:	Elevated troponin  Secondary Diagnosis:	Hyperglycemia   1 Principal Discharge DX:	Abnormal EKG  Secondary Diagnosis:	Hyperglycemia

## 2023-02-07 NOTE — ED ADULT NURSE NOTE - NSIMPLEMENTINTERV_GEN_ALL_ED
Implemented All Fall with Harm Risk Interventions:  [place] to call system. Call bell, personal items and telephone within reach. Instruct patient to call for assistance. Room bathroom lighting operational. Non-slip footwear when patient is off stretcher. Physically safe environment: no spills, clutter or unnecessary equipment. Stretcher in lowest position, wheels locked, appropriate side rails in place. Provide visual cue, wrist band, yellow gown, etc. Monitor gait and stability. Monitor for mental status changes and reorient to person, place, and time. Review medications for side effects contributing to fall risk. Reinforce activity limits and safety measures with patient and family. Provide visual clues: red socks.

## 2023-02-07 NOTE — ED PROVIDER NOTE - CLINICAL SUMMARY MEDICAL DECISION MAKING FREE TEXT BOX
57-year-old female with h/o DM, COPD, Crohn's, depression, anxiety, schizophrenia, ANISHA, hypothyroid, HLD, GERD, dizziness, in ER with c/o of elevated BS after running out of her glipizide yesterday.  Initial fingerstick in ; labs reviewed - no anion gap, beta hydroxybutyrate negative, pH 7.36.  EKG with new T wave inversions in 1 and aVL, changed from prior EKG 1/2021.  Patient denies any CP/SOB.  trop neg. Repeat  after IVF and insulin.  Patient placed in EDOU for evaluation of new EKG changes.

## 2023-02-07 NOTE — ED PROVIDER NOTE - ATTENDING CONTRIBUTION TO CARE
57-year-old female in ER with HHA, with history of DM, COPD, Crohn's, depression, anxiety, schizophrenia, ANISHA, hypothyroid, HLD, GERD, dizziness, in ER with c/o of elevated BS.  Patient states she did not receive her prescription for glipizide pharmacy which is correct yesterday.  FS at home this morning was 299, and it was 400 later in the day.  Patient denies any abdominal pain, no N/V/D.  No F/C.  No urinary symptoms.  No CP/SOB.  No HA.  + Dizziness (has had this before), no syncope/near syncope.  PE - nad, nc/at, eomi, perrl, op - clear, mmm, neck supple, cta b/l, no w/r/r, rrr, abd- soft, nt/nd, nabs, from x 4, no LE swelling/tenderness, A&O x 3, cn 2-12 intact, no focal motor/sensory deficits.   -Check labs, FS, insulin, reeval

## 2023-02-07 NOTE — ED CDU PROVIDER INITIAL DAY NOTE - CLINICAL SUMMARY MEDICAL DECISION MAKING FREE TEXT BOX
Patient presents with hyperglycemia.  Ruled out for DKA.  EKG showed ischemic changes placed in ops for cardiac evaluation.

## 2023-02-07 NOTE — ED PROVIDER NOTE - PHYSICAL EXAMINATION
CONSTITUTIONAL: nontoxic appearing, in no acute distress, obese, NAD  HEAD:  normocephalic, atraumatic  EYES:  no conjunctival injection, no eye discharge, tracking well  ENT:  tympanic membranes intact bilaterally, moist mucous membranes, no oropharyngeal ulcerations or lesions, no tonsillar swelling or erythema, no tonsillar exudates  NECK:  supple, no masses, no tender anterior/posterior cervical lymphadenopathy  CV:  regular rate and rhythm, cap refill < 2 seconds  RESP:  normal respiratory effort, lungs clear to auscultation bilaterally, no wheezes, no crackles, no retractions, no stridor  ABD:  soft, nontender, nondistended, no masses, no organomegaly  LYMPH:  no significant lymphadenopathy  MSK/NEURO:  normal movement, normal tone  SKIN:  warm, dry, no rash

## 2023-02-07 NOTE — ED CDU PROVIDER INITIAL DAY NOTE - NS ED ATTENDING STATEMENT MOD
This was a shared visit with the CONTRERAS. I reviewed and verified the documentation and independently performed the documented:

## 2023-02-07 NOTE — ED PROVIDER NOTE - OBJECTIVE STATEMENT
57 y F History of htn, hld, depression, anxiety, dizziness on meclizine, diabetes recently placed on glipizide however was not delivered her home glipizide yesterday and skipped a dose. Patient presents for hyperglycemia. Home glucose 299 which prompted ED evaluation today. Patient denies any symptoms.

## 2023-02-08 VITALS
TEMPERATURE: 98 F | OXYGEN SATURATION: 96 % | SYSTOLIC BLOOD PRESSURE: 121 MMHG | HEART RATE: 107 BPM | RESPIRATION RATE: 18 BRPM | DIASTOLIC BLOOD PRESSURE: 65 MMHG

## 2023-02-08 LAB — TROPONIN T SERPL-MCNC: <0.01 NG/ML — SIGNIFICANT CHANGE UP

## 2023-02-08 PROCEDURE — G1004: CPT

## 2023-02-08 PROCEDURE — 78452 HT MUSCLE IMAGE SPECT MULT: CPT | Mod: 26,ME

## 2023-02-08 PROCEDURE — 99238 HOSP IP/OBS DSCHRG MGMT 30/<: CPT

## 2023-02-08 PROCEDURE — 93016 CV STRESS TEST SUPVJ ONLY: CPT

## 2023-02-08 PROCEDURE — 93018 CV STRESS TEST I&R ONLY: CPT

## 2023-02-08 PROCEDURE — 93306 TTE W/DOPPLER COMPLETE: CPT | Mod: 26

## 2023-02-08 RX ORDER — ADENOSINE 3 MG/ML
60 INJECTION INTRAVENOUS ONCE
Refills: 0 | Status: COMPLETED | OUTPATIENT
Start: 2023-02-08 | End: 2023-02-08

## 2023-02-08 RX ADMIN — METFORMIN HYDROCHLORIDE 1000 MILLIGRAM(S): 850 TABLET ORAL at 06:36

## 2023-02-08 RX ADMIN — GABAPENTIN 100 MILLIGRAM(S): 400 CAPSULE ORAL at 17:34

## 2023-02-08 RX ADMIN — ADENOSINE 600 MILLIGRAM(S): 3 INJECTION INTRAVENOUS at 13:45

## 2023-02-08 RX ADMIN — SERTRALINE 50 MILLIGRAM(S): 25 TABLET, FILM COATED ORAL at 11:30

## 2023-02-08 RX ADMIN — HALOPERIDOL DECANOATE 10 MILLIGRAM(S): 100 INJECTION INTRAMUSCULAR at 11:29

## 2023-02-08 RX ADMIN — Medication 10 MILLIGRAM(S): at 08:14

## 2023-02-08 RX ADMIN — MIRTAZAPINE 15 MILLIGRAM(S): 45 TABLET, ORALLY DISINTEGRATING ORAL at 11:30

## 2023-02-08 RX ADMIN — LORATADINE 10 MILLIGRAM(S): 10 TABLET ORAL at 11:31

## 2023-02-08 RX ADMIN — PANTOPRAZOLE SODIUM 40 MILLIGRAM(S): 20 TABLET, DELAYED RELEASE ORAL at 08:14

## 2023-02-08 RX ADMIN — Medication 10 MILLIGRAM(S): at 11:32

## 2023-02-08 RX ADMIN — Medication 25 MILLIGRAM(S): at 11:31

## 2023-02-08 RX ADMIN — Medication 100 MICROGRAM(S): at 06:36

## 2023-02-08 RX ADMIN — GABAPENTIN 100 MILLIGRAM(S): 400 CAPSULE ORAL at 06:36

## 2023-02-08 NOTE — ED CDU PROVIDER DISPOSITION NOTE - CARE PROVIDER_API CALL
your PMD,   Phone: (   )    -  Fax: (   )    -  Follow Up Time: 1-3 Days    Dionisio Mcgee)  Cardiovascular Disease; Nuclear Cardiology  [address] [facility] [address]  [place] [address]  Phone: [phone]  Fax: [phone]  Follow Up Time: 1-3 Days

## 2023-02-08 NOTE — ED CDU PROVIDER DISPOSITION NOTE - CLINICAL COURSE
Patient presents with hyperglycemia.  Ruled out for DKA.  EKG showed ischemic changes placed in ops for cardiac evaluation. Patient had a nuclear stress test without any acute ischemic changes. Has old scar on imaging. Results discussed with patient. Understands to follow up with pmd and cardiologist. Return precautions discussed.

## 2023-02-08 NOTE — ED CDU PROVIDER SUBSEQUENT DAY NOTE - PROGRESS NOTE DETAILS
Patient comfortable,  awaiting nuc stress test, echo Nuc stress test results noted. Will dc home to f/u with PMD and Dr Mcgee

## 2023-02-08 NOTE — ED CDU PROVIDER DISPOSITION NOTE - PATIENT PORTAL LINK FT
You can access the FollowMyHealth Patient Portal offered by [facility] by registering at the following website: [email or web address]. By joining [employer]’s FollowMyHealth portal, you will also be able to view your health information using other applications (apps) compatible with our system.

## 2023-02-08 NOTE — ED CDU PROVIDER DISPOSITION NOTE - PROVIDER TOKENS
FREE:[LAST:[your PMD],PHONE:[(   )    -],FAX:[(   )    -],FOLLOWUP:[1-3 Days]],PROVIDER:[TOKEN:[[id number]],FOLLOWUP:[1-3 Days]]

## 2023-02-09 LAB
CULTURE RESULTS: SIGNIFICANT CHANGE UP
GLUCOSE BLDC GLUCOMTR-MCNC: 290 MG/DL — HIGH (ref 70–99)
SPECIMEN SOURCE: SIGNIFICANT CHANGE UP

## 2023-02-27 NOTE — PATIENT PROFILE ADULT - NSPROEXTENSIONSOFSELF_GEN_A_NUR
Patient: Enma Aguilar  MRN: [id number]  YOB: 1942      History of Present Illness:  Enma Aguilar is a 80 year old female presenting to the office today for follow-up.  She is doing very well.  She had 2 distinct episodes of URI symptoms over the winter but has finally recovered.  She is now taking trazodone to help with insomnia.  She has not had any recurrent chest pain since starting on very low-dose Ranexa.  There is no significant leg swelling.  She remains active.  She has a new boyfriend and is becoming much more active.  Current Outpatient Medications   Medication Sig Dispense Refill   • sertraline (ZOLOFT) 50 MG tablet Take 50 mg by mouth daily.     • traZODone (DESYREL) 50 MG tablet Take 50 mg by mouth nightly.     • zolpidem (AMBIEN) 10 MG tablet Take 10 mg by mouth.     • amLODIPine (NORVASC) 10 MG tablet Take 1 tablet by mouth daily. 90 tablet 3   • atorvastatin (LIPITOR) 40 MG tablet Take 1 tablet by mouth daily. 90 tablet 3   • ranolazine (RANEXA) 500 MG 12 hr tablet Take 1 tablet by mouth 2 times daily. (Patient taking differently: Take 500 mg by mouth in the morning and 500 mg in the evening. Take 0.5 tablet) 180 tablet 3   • Cholecalciferol (VITAMIN D3 PO) Take 1,000 mg by mouth daily.     • meloxicam (MOBIC) 7.5 MG tablet Take 7.5 mg by mouth daily.      • aspirin 81 MG EC tablet every 24 hours.     • Calcium 600 MG tablet Take 1 tablet by mouth 2 times daily.      • Turmeric Curcumin 500 MG Cap as directed     • pregabalin (LYRICA) 150 MG capsule Take 1 tablet by mouth.     • pantoprazole (PROTONIX) 40 MG tablet Take 1 tablet by mouth 2 times daily.        No current facility-administered medications for this visit.       Review of Systems   Constitutional: Negative.    HENT: Negative.    Eyes: Negative.    Respiratory: Negative.  Negative for shortness of breath.    Cardiovascular: Negative.  Negative for chest pain and palpitations.   Gastrointestinal: Negative.     Endocrine: Negative.    Genitourinary: Negative.    Musculoskeletal: Negative.    Skin: Negative.    Neurological: Positive for headaches. Negative for syncope.     Review of Systems components negative unless otherwise noted in HPI.     /70 (BP Location: LUE - Left upper extremity, Patient Position: Sitting, Cuff Size: Regular)   Pulse 64   Ht 5' 3\" (1.6 m)   Wt 68 kg (150 lb)   BMI 26.57 kg/m²   BSA 1.71 m²     Physical Exam  Constitutional:       General: She is not in acute distress.     Appearance: She is well-developed. She is not diaphoretic.   HENT:      Head: Normocephalic and atraumatic.   Eyes:      Conjunctiva/sclera: Conjunctivae normal.      Pupils: Pupils are equal, round, and reactive to light.   Neck:      Thyroid: No thyromegaly.      Vascular: No JVD.   Cardiovascular:      Rate and Rhythm: Normal rate and regular rhythm.      Heart sounds: Normal heart sounds. No murmur heard.    No friction rub. No gallop.   Pulmonary:      Effort: Pulmonary effort is normal. No respiratory distress.      Breath sounds: Normal breath sounds. No wheezing or rales.   Chest:      Chest wall: No tenderness.   Abdominal:      General: Bowel sounds are normal. There is no distension.      Palpations: Abdomen is soft.   Musculoskeletal:         General: No tenderness. Normal range of motion.      Cervical back: Normal range of motion and neck supple.   Skin:     General: Skin is warm and dry.      Findings: No erythema.   Neurological:      Mental Status: She is alert and oriented to person, place, and time.         Lab Results   Component Value Date    SODIUM 139 07/11/2022    POTASSIUM 4.5 07/11/2022    CHLORIDE 106 07/11/2022    CO2 29 07/11/2022    BUN 21 (H) 07/11/2022    CREATININE 1.12 (H) 07/11/2022       Lab Results   Component Value Date    CHOLESTEROL 151 07/11/2022    TRIGLYCERIDE 133 07/11/2022    HDL 46 (L) 07/11/2022    CALCLDL 78 07/11/2022    GPT 13 07/11/2022    AST 18 07/11/2022        Lab Results   Component Value Date    HGBA1C 5.7 (H) 07/11/2022    TSH 1.790 07/11/2022        Testing:    PTCA with stent on 2/5/21  Findings:  1.  75% eccentric stenosis of the early portion of the mid LAD followed by a secondary area of 50 to 60% eccentric stenosis later in the mid LAD  2.  OCT guided PCI of the mid LAD with a drug-eluting stent eventually postdilated at 3.25 mm distally and 3.5 mm proximally  3.  Balloon angioplasty of the second diagonal artery utilizing a kissing balloon technique because of plaque shifting with residual 30% narrowing at the origin of the diagonal artery  4.  NIGHAT grade III flow in the LAD and diagonal arteries     Exercise Stress Test on 9/11/2019   Impression:   -The stress electrocardiogram was normal.   -Stress and rest SPECT images demonstrate homogeneous tracer distribution throughout the myocardium. Gated SPECT imaging reveals normal myocardial thickening and wall motion. The left ventricular ejection fraction was normal (77%).     Lower Extremity Venous Duplex Bilateral on 3/15/2018  Impression:  Deep vein thrombosis of the right popliteal vein extending of the right posterior tibial vein.  No other bilateral lower extremity deep vein thrombosis identified.    Assessment and Plan:    Syncopal episodes  -Occurred in 10/2019 and treated at [facility]. Her potassium was low at that time, her blood alcohol was elevated, and she had taken flexeril that day. It is likely related to vasovagal syncope.  -She develops vagal symptoms when lifting her head up  -stable    Laceration of popliteal artery  -She had iatrogenic laceration of her popliteal artery during recent knee replacement. It was repaired surgically   -clinically stable    CAD in native artery  -chronically abnormal EKG showing septal Q waves  -2017: Moderate nonobstructive disease of the LAD with negative FFR assessment  -Exercise Cardiolite stress test  in 9/2019  was negative for ischemia.   -still  having intermittent angina/GONZALEZ with light activities while at other times can do vigorous activities without any symptoms  -2/2021: PCI of LAD and diag because of recurrent angina but did not feel better  - low dose ranexa (250 bid)  -continue to increase activity  -suspect she has ischemia from non-obstructive disease    Back pain with radiation  -stable    Tremor  - intention timor    Varicose veins of legs  -clinically stable  -intermittent edema  -probably also exacerbated by norvasc    Benign essential hypertension  -blood pressure is stable    Hyperlipidemia  -back on Lipitor with improvement in LDL  -She was initially very reluctant to take statin.  She is now willing to stay on statin  -Increase Lipitor to 40    Abnormal EKG  -chronically abnormal EKG showing septal Q waves    Radiculopathy of cervicothoracic region  -Has symptoms of upper and lower radiculopathy after sitting for prolonged periods  -in addition to LS disease, also has history of cervical stenosis       none

## 2023-03-13 ENCOUNTER — RX RENEWAL (OUTPATIENT)
Age: 58
End: 2023-03-13

## 2023-03-13 DIAGNOSIS — D63.1 CHRONIC KIDNEY DISEASE, UNSPECIFIED: ICD-10-CM

## 2023-03-13 DIAGNOSIS — D52.0 DIETARY FOLATE DEFICIENCY ANEMIA: ICD-10-CM

## 2023-03-13 DIAGNOSIS — N18.9 CHRONIC KIDNEY DISEASE, UNSPECIFIED: ICD-10-CM

## 2023-03-17 ENCOUNTER — RX RENEWAL (OUTPATIENT)
Age: 58
End: 2023-03-17

## 2023-03-17 RX ORDER — MECLIZINE HYDROCHLORIDE 25 MG/1
25 TABLET ORAL 3 TIMES DAILY
Qty: 90 | Refills: 4 | Status: ACTIVE | COMMUNITY
Start: 2023-03-17 | End: 1900-01-01

## 2023-03-20 ENCOUNTER — APPOINTMENT (OUTPATIENT)
Dept: OTOLARYNGOLOGY | Facility: CLINIC | Age: 58
End: 2023-03-20
Payer: MEDICARE

## 2023-03-20 DIAGNOSIS — J34.89 OTHER SPECIFIED DISORDERS OF NOSE AND NASAL SINUSES: ICD-10-CM

## 2023-03-20 PROCEDURE — 31231 NASAL ENDOSCOPY DX: CPT

## 2023-03-20 PROCEDURE — 99213 OFFICE O/P EST LOW 20 MIN: CPT | Mod: 25

## 2023-03-20 PROCEDURE — 69210 REMOVE IMPACTED EAR WAX UNI: CPT

## 2023-03-20 RX ORDER — SODIUM CHLORIDE 0.65 %
0.65 DROPS NASAL AT BEDTIME
Qty: 2 | Refills: 0 | Status: ACTIVE | COMMUNITY
Start: 2023-03-20 | End: 1900-01-01

## 2023-03-20 NOTE — ED PROVIDER NOTE - PSYCHIATRIC, MLM
The patient is a 4y6m Male complaining of  Alert and oriented to person, place, time/situation. normal mood and affect. no apparent risk to self or others.

## 2023-03-20 NOTE — HISTORY OF PRESENT ILLNESS
[FreeTextEntry1] : Patient presents today following up on clogged ears. Also having epistaxis daily. Both nostrils. Usually lasts about 5-10 mins.

## 2023-03-20 NOTE — PHYSICAL EXAM
[de-identified] : right impacted wax cleaned with curette [Nasal Endoscopy Performed] : nasal endoscopy was performed, see procedure section for findings [Normal] : mucosa is normal [Midline] : trachea located in midline position

## 2023-04-17 ENCOUNTER — OUTPATIENT (OUTPATIENT)
Dept: OUTPATIENT SERVICES | Facility: HOSPITAL | Age: 58
LOS: 1 days | End: 2023-04-17
Payer: MEDICARE

## 2023-04-17 ENCOUNTER — APPOINTMENT (OUTPATIENT)
Dept: HEMATOLOGY ONCOLOGY | Facility: CLINIC | Age: 58
End: 2023-04-17

## 2023-04-17 ENCOUNTER — LABORATORY RESULT (OUTPATIENT)
Age: 58
End: 2023-04-17

## 2023-04-17 DIAGNOSIS — D50.9 IRON DEFICIENCY ANEMIA, UNSPECIFIED: ICD-10-CM

## 2023-04-17 DIAGNOSIS — Z98.890 OTHER SPECIFIED POSTPROCEDURAL STATES: Chronic | ICD-10-CM

## 2023-04-17 LAB
HCT VFR BLD CALC: 40.6 %
HGB BLD-MCNC: 12.5 G/DL
MCHC RBC-ENTMCNC: 24.9 PG
MCHC RBC-ENTMCNC: 30.8 G/DL
MCV RBC AUTO: 80.9 FL
PLATELET # BLD AUTO: 265 K/UL
PMV BLD: 9.4 FL
RBC # BLD: 5.02 M/UL
RBC # FLD: 15.5 %
WBC # FLD AUTO: 5.87 K/UL

## 2023-04-17 PROCEDURE — 82728 ASSAY OF FERRITIN: CPT

## 2023-04-17 PROCEDURE — 83540 ASSAY OF IRON: CPT

## 2023-04-17 PROCEDURE — 83550 IRON BINDING TEST: CPT

## 2023-04-17 PROCEDURE — 85027 COMPLETE CBC AUTOMATED: CPT

## 2023-04-17 NOTE — ED PROVIDER NOTE - INPATIENT RESIDENT/ACP NOTIFIED
Quality 111:Pneumonia Vaccination Status For Older Adults: Pneumococcal vaccine (PPSV23) was not administered on or after patient’s 60th birthday and before the end of the measurement period, reason not otherwise specified Detail Level: Detailed Quality 134: Screening For Clinical Depression And Follow-Up Plan: The patient was screened for depression and the screen was negative and no follow up required Quality 130: Documentation Of Current Medications In The Medical Record: Current Medications Documented Quality 110: Preventive Care And Screening: Influenza Immunization: Influenza immunization was not ordered or administered, reason not given Quality 431: Preventive Care And Screening: Unhealthy Alcohol Use - Screening: Patient not identified as an unhealthy alcohol user when screened for unhealthy alcohol use using a systematic screening method Quality 226: Preventive Care And Screening: Tobacco Use: Screening And Cessation Intervention: Patient screened for tobacco use and is an ex/non-smoker Quality 47: Advance Care Plan: Advance care planning not documented, reason not otherwise specified. mar

## 2023-04-18 ENCOUNTER — APPOINTMENT (OUTPATIENT)
Dept: HEMATOLOGY ONCOLOGY | Facility: CLINIC | Age: 58
End: 2023-04-18
Payer: MEDICARE

## 2023-04-18 ENCOUNTER — OUTPATIENT (OUTPATIENT)
Dept: OUTPATIENT SERVICES | Facility: HOSPITAL | Age: 58
LOS: 1 days | End: 2023-04-18
Payer: MEDICAID

## 2023-04-18 ENCOUNTER — APPOINTMENT (OUTPATIENT)
Dept: HEMATOLOGY ONCOLOGY | Facility: CLINIC | Age: 58
End: 2023-04-18

## 2023-04-18 VITALS
WEIGHT: 214 LBS | SYSTOLIC BLOOD PRESSURE: 131 MMHG | RESPIRATION RATE: 16 BRPM | BODY MASS INDEX: 35.65 KG/M2 | OXYGEN SATURATION: 99 % | DIASTOLIC BLOOD PRESSURE: 71 MMHG | HEIGHT: 65 IN | TEMPERATURE: 97.8 F | HEART RATE: 100 BPM

## 2023-04-18 DIAGNOSIS — D50.9 IRON DEFICIENCY ANEMIA, UNSPECIFIED: ICD-10-CM

## 2023-04-18 DIAGNOSIS — Z98.890 OTHER SPECIFIED POSTPROCEDURAL STATES: Chronic | ICD-10-CM

## 2023-04-18 LAB
FERRITIN SERPL-MCNC: 32 NG/ML
IRON SATN MFR SERPL: 15 %
IRON SERPL-MCNC: 48 UG/DL
TIBC SERPL-MCNC: 328 UG/DL
UIBC SERPL-MCNC: 280 UG/DL

## 2023-04-18 PROCEDURE — 99213 OFFICE O/P EST LOW 20 MIN: CPT

## 2023-04-18 NOTE — HISTORY OF PRESENT ILLNESS
[de-identified] : Ms. SEJAL BARNARD is a 54 year old female here today for evaluation and treatment of Anemia.\par \par The patient was referred by Dr. Quintero.  She presents with her health aide from the [facility] complex she resides on [facility] [address].  She reports that she was seen by her PCP, who referred her due to iron deficiency anemia.  She currently takes iron supplement, 325mg daily and is tolerating without complaint.  She states she feels tired on occasion.  \par \par LAB WORKUP:\par (5/7/19) WBC 6.23, Hgb 13.7, MCV 76.7, RDW 16.1, , Na 147, ALT 42, Ferritin 36, TIBC 349, ironsat 15%, B12 normal\par \par HCM:\par Mammogram (07/2018) patient reports it was benign\par Colonoscopy ([facility] - 4/6/2018) removed 2 polyps, as per patient.  IMPRESSION:  Preparation of the colon was poor.  One 6mm polyp in the cecum, removed peicemeal using a cold biopsy forceps.  Resected and retrieved.  Random biopsies taken from transverse colo, descending, sigmoid and rectum.  Repeat in 1 year [de-identified] : 11/8/19\[facility] Patient is here for a follow-up visit for anemia with aid.  She is feeling well with no complaints.  Most recent CBC is stable with ferritin 111 and ironsat 17%.  Patient denies fever, chills, nausea, vomiting, bleeding or pica.  She does not take oral iron as she had IV iron in the past. She reports her energy has improved slightly as well.  \[facility] \[facility] 2/14/2020 \[facility] Patient is here for a follow-up visit for anemia with aid.  She is feeling well with no complaints. Most recent CBC and iron studies from 2.7.2020 remains stable off oral iron.   Patient denies fever, chills, nausea, vomiting, bleeding or pica.  She followed with Dr. Pak who performed endoscopy which showed GERD with esophagitis, acute gastritis with and without bleeding and duodenitis.  \par \[facility] 8/4/20[facility] Patient is here for follow up today for anemia. She is accompanied by her aide. She feels well. She denies severe fatigue, shortness of breath, chest pain, palpitations, or decreased energy. She does endorse black colored stools. SHe had an EGD and colonoscopy with Dr. Pak in January 2020 and continues to follow with him. Currently, she reports she is due for a CT abdomen and pelvis with contrast to assess for any bleeding. \par \[facility] 4/2/21\[facility] Patient is here for follow up today for anemia, accompanied by her aide.  She denies severe fatigue, shortness of breath, chest pain, palpitations, or decreased energy.  \[facility] \[facility] 3/22/22[facility] Patient is here for follow up today for anemia, accompanied by aide.  Reviewed most recent labwork which shows mild leukopenia (normal differential) but no evidence of anemia and iron stores are adequate.  She denies shortness of breath, chest pain, palpitations, dark stools or overt bleeding. Last EGD and colonoscopy was performed by Dr. Pak in January 2020.  \[facility] \[facility] 12/2/22[facility] Patient is here for follow up today for anemia, accompanied by aide.  Reviewed most recent labwork which shows no evidence of anemia; ferritin 32, ironsat 15%, TIBC 328.  She denies shortness of breath, chest pain, palpitations, dark stools or overt bleeding. Last EGD and colonoscopy was performed by Dr. Pak in January 2020.  She feels more tired.  She is gaining weight.

## 2023-04-18 NOTE — REVIEW OF SYSTEMS
[Negative] : Allergic/Immunologic [Recent Change In Weight] : ~T recent weight change [Fever] : no fever [Chills] : no chills [Fatigue] : no fatigue [Chest Pain] : no chest pain [Palpitations] : no palpitations [Shortness Of Breath] : no shortness of breath [Abdominal Pain] : no abdominal pain [Constipation] : no constipation [Skin Rash] : no skin rash [Easy Bleeding] : no tendency for easy bleeding [Easy Bruising] : no tendency for easy bruising [FreeTextEntry2] : 18lb weight gain in 3 months

## 2023-04-18 NOTE — CONSULT LETTER
[Dear  ___] : Dear  [unfilled], [Consult Letter:] : I had the pleasure of evaluating your patient, [unfilled]. [Please see my note below.] : Please see my note below. [Sincerely,] : Sincerely, [FreeTextEntry3] : John Boateng DO\par Attending Physician,\par Hematology/ Medical Oncology\par [phone] office\par \par

## 2023-04-18 NOTE — PHYSICAL EXAM
[Restricted in physically strenuous activity but ambulatory and able to carry out work of a light or sedentary nature] : Status 1- Restricted in physically strenuous activity but ambulatory and able to carry out work of a light or sedentary nature, e.g., light house work, office work [Normal] : affect appropriate [de-identified] : uses seated walker for ambulation

## 2023-04-18 NOTE — ASSESSMENT
[FreeTextEntry1] : 59 yo woman with PMH of ulcerative colitis here for follow up on history of iron deficiency anemia. She is being seen by a private GI; She followed with Dr. Pak who performed endoscopy and colonoscopy in January 2020 which showed GERD with esophagitis, acute gastritis with and without bleeding and duodenitis. \par \par - no anemia\par - s/p iv iron replacement (completed in June 2019) with resolution of low iron\par - iron levels remain decent , aim to keep ferritin >50 and iron sat >20%\par - would repeat labs in 2 mos, no iron at this time\par - followup with GI, Dr. Parmer, as scheduled in view of ulcerative colitis \par \par RTC in 3 months with SMART NP with CBC, BMP, LFTs, iron studies, ferritin level 2 days prior \par seen/ examined w KYLAH Canchola note reviewed; case disucssed

## 2023-05-03 ENCOUNTER — RX RENEWAL (OUTPATIENT)
Age: 58
End: 2023-05-03

## 2023-05-22 ENCOUNTER — RX RENEWAL (OUTPATIENT)
Age: 58
End: 2023-05-22

## 2023-06-01 ENCOUNTER — OUTPATIENT (OUTPATIENT)
Dept: OUTPATIENT SERVICES | Facility: HOSPITAL | Age: 58
LOS: 1 days | End: 2023-06-01
Payer: MEDICARE

## 2023-06-01 ENCOUNTER — APPOINTMENT (OUTPATIENT)
Dept: OPHTHALMOLOGY | Facility: CLINIC | Age: 58
End: 2023-06-01
Payer: MEDICARE

## 2023-06-01 DIAGNOSIS — H53.8 OTHER VISUAL DISTURBANCES: ICD-10-CM

## 2023-06-01 DIAGNOSIS — Z98.890 OTHER SPECIFIED POSTPROCEDURAL STATES: Chronic | ICD-10-CM

## 2023-06-01 PROCEDURE — 92014 COMPRE OPH EXAM EST PT 1/>: CPT

## 2023-06-06 DIAGNOSIS — H35.013 CHANGES IN RETINAL VASCULAR APPEARANCE, BILATERAL: ICD-10-CM

## 2023-06-06 DIAGNOSIS — H02.88A MEIBOMIAN GLAND DYSFUNCTION RIGHT EYE, UPPER AND LOWER EYELIDS: ICD-10-CM

## 2023-06-06 DIAGNOSIS — H26.9 UNSPECIFIED CATARACT: ICD-10-CM

## 2023-06-06 DIAGNOSIS — Q13.2 OTHER CONGENITAL MALFORMATIONS OF IRIS: ICD-10-CM

## 2023-06-06 DIAGNOSIS — H16.229 KERATOCONJUNCTIVITIS SICCA, NOT SPECIFIED AS SJOGREN'S, UNSPECIFIED EYE: ICD-10-CM

## 2023-06-06 DIAGNOSIS — E11.9 TYPE 2 DIABETES MELLITUS WITHOUT COMPLICATIONS: ICD-10-CM

## 2023-06-27 NOTE — DISCHARGE NOTE PROVIDER - NSDCDCMDCOMP_GEN_ALL_CORE
Addended by: JACINTA DUNBAR on: 6/27/2023 02:14 PM     Modules accepted: Orders     This document is complete and the patient is ready for discharge.

## 2023-07-17 ENCOUNTER — LABORATORY RESULT (OUTPATIENT)
Age: 58
End: 2023-07-17

## 2023-07-17 ENCOUNTER — OUTPATIENT (OUTPATIENT)
Dept: OUTPATIENT SERVICES | Facility: HOSPITAL | Age: 58
LOS: 1 days | End: 2023-07-17
Payer: MEDICARE

## 2023-07-17 ENCOUNTER — APPOINTMENT (OUTPATIENT)
Dept: HEMATOLOGY ONCOLOGY | Facility: CLINIC | Age: 58
End: 2023-07-17

## 2023-07-17 DIAGNOSIS — Z98.890 OTHER SPECIFIED POSTPROCEDURAL STATES: Chronic | ICD-10-CM

## 2023-07-17 DIAGNOSIS — D50.9 IRON DEFICIENCY ANEMIA, UNSPECIFIED: ICD-10-CM

## 2023-07-17 LAB
HCT VFR BLD CALC: 35.6 %
HGB BLD-MCNC: 11.1 G/DL
MCHC RBC-ENTMCNC: 24.2 PG
MCHC RBC-ENTMCNC: 31.2 G/DL
MCV RBC AUTO: 77.6 FL
PLATELET # BLD AUTO: 224 K/UL
PMV BLD: 9.5 FL
RBC # BLD: 4.59 M/UL
RBC # FLD: 17 %
WBC # FLD AUTO: 4.74 K/UL

## 2023-07-17 PROCEDURE — 36415 COLL VENOUS BLD VENIPUNCTURE: CPT

## 2023-07-17 PROCEDURE — 82728 ASSAY OF FERRITIN: CPT

## 2023-07-17 PROCEDURE — 83540 ASSAY OF IRON: CPT

## 2023-07-17 PROCEDURE — 85027 COMPLETE CBC AUTOMATED: CPT

## 2023-07-17 PROCEDURE — 83550 IRON BINDING TEST: CPT

## 2023-07-18 LAB
FERRITIN SERPL-MCNC: 17 NG/ML
IRON SATN MFR SERPL: 11 %
IRON SERPL-MCNC: 35 UG/DL
TIBC SERPL-MCNC: 305 UG/DL
UIBC SERPL-MCNC: 270 UG/DL

## 2023-07-19 ENCOUNTER — OUTPATIENT (OUTPATIENT)
Dept: OUTPATIENT SERVICES | Facility: HOSPITAL | Age: 58
LOS: 1 days | End: 2023-07-19
Payer: MEDICARE

## 2023-07-19 ENCOUNTER — APPOINTMENT (OUTPATIENT)
Dept: OPHTHALMOLOGY | Facility: CLINIC | Age: 58
End: 2023-07-19
Payer: MEDICARE

## 2023-07-19 DIAGNOSIS — H53.8 OTHER VISUAL DISTURBANCES: ICD-10-CM

## 2023-07-19 DIAGNOSIS — Z98.890 OTHER SPECIFIED POSTPROCEDURAL STATES: Chronic | ICD-10-CM

## 2023-07-19 PROCEDURE — 92012 INTRM OPH EXAM EST PATIENT: CPT

## 2023-07-20 ENCOUNTER — APPOINTMENT (OUTPATIENT)
Dept: HEMATOLOGY ONCOLOGY | Facility: CLINIC | Age: 58
End: 2023-07-20
Payer: MEDICARE

## 2023-07-20 ENCOUNTER — OUTPATIENT (OUTPATIENT)
Dept: OUTPATIENT SERVICES | Facility: HOSPITAL | Age: 58
LOS: 1 days | End: 2023-07-20
Payer: MEDICARE

## 2023-07-20 VITALS
TEMPERATURE: 97.1 F | SYSTOLIC BLOOD PRESSURE: 123 MMHG | BODY MASS INDEX: 35.34 KG/M2 | DIASTOLIC BLOOD PRESSURE: 59 MMHG | HEIGHT: 64 IN | HEART RATE: 86 BPM | WEIGHT: 207 LBS | OXYGEN SATURATION: 95 % | RESPIRATION RATE: 16 BRPM

## 2023-07-20 DIAGNOSIS — D50.9 IRON DEFICIENCY ANEMIA, UNSPECIFIED: ICD-10-CM

## 2023-07-20 DIAGNOSIS — Z98.890 OTHER SPECIFIED POSTPROCEDURAL STATES: Chronic | ICD-10-CM

## 2023-07-20 PROCEDURE — 99214 OFFICE O/P EST MOD 30 MIN: CPT

## 2023-07-20 RX ORDER — FERROUS SULFATE 325(65) MG
200 (65 FE) TABLET ORAL DAILY
Refills: 0 | Status: DISCONTINUED | COMMUNITY
End: 2023-07-20

## 2023-07-20 NOTE — PHYSICAL EXAM
[Restricted in physically strenuous activity but ambulatory and able to carry out work of a light or sedentary nature] : Status 1- Restricted in physically strenuous activity but ambulatory and able to carry out work of a light or sedentary nature, e.g., light house work, office work [Normal] : affect appropriate [de-identified] : uses seated walker for ambulation

## 2023-07-20 NOTE — REVIEW OF SYSTEMS
[Recent Change In Weight] : ~T recent weight change [Negative] : Allergic/Immunologic [Fever] : no fever [Chills] : no chills [Fatigue] : fatigue [Chest Pain] : no chest pain [Palpitations] : no palpitations [Shortness Of Breath] : no shortness of breath [Abdominal Pain] : no abdominal pain [Constipation] : no constipation [Skin Rash] : no skin rash [Easy Bleeding] : no tendency for easy bleeding [Easy Bruising] : no tendency for easy bruising [FreeTextEntry2] : 7lb weight loss in 3 months via portion control

## 2023-07-20 NOTE — HISTORY OF PRESENT ILLNESS
[de-identified] : Ms. SEJAL BARNARD is a 54 year old female here today for evaluation and treatment of Anemia.\par \par The patient was referred by Dr. Quintero.  She presents with her health aide from the [facility]ment [facility] complex she resides on [facility].  She reports that she was seen by her PCP, who referred her due to iron deficiency anemia.  She currently takes iron supplement, 325mg daily and is tolerating without complaint.  She states she feels tired on occasion.  \par \par LAB WORKUP:\par (5/7/19) WBC 6.23, Hgb 13.7, MCV 76.7, RDW 16.1, , Na 147, ALT 42, Ferritin 36, TIBC 349, ironsat 15%, B12 normal\par \par HCM:\par Mammogram (07/2018) patient reports it was benign\par Colonoscopy ([facility] - 4/6/2018) removed 2 polyps, as per patient.  IMPRESSION:  Preparation of the colon was poor.  One 6mm polyp in the cecum, removed peicemeal using a cold biopsy forceps.  Resected and retrieved.  Random biopsies taken from transverse colo, descending, sigmoid and rectum.  Repeat in 1 year [de-identified] : 11/8/19\[facility] Patient is here for a follow-up visit for anemia with aid.  She is feeling well with no complaints.  Most recent CBC is stable with ferritin 111 and ironsat 17%.  Patient denies fever, chills, nausea, vomiting, bleeding or pica.  She does not take oral iron as she had IV iron in the past. She reports her energy has improved slightly as well.  \[facility] \[facility] 2/14/2020 \[facility] Patient is here for a follow-up visit for anemia with aid.  She is feeling well with no complaints. Most recent CBC and iron studies from 2.7.2020 remains stable off oral iron.   Patient denies fever, chills, nausea, vomiting, bleeding or pica.  She followed with Dr. Pak who performed endoscopy which showed GERD with esophagitis, acute gastritis with and without bleeding and duodenitis.  \par \[facility] 8/4/20[facility] Patient is here for follow up today for anemia. She is accompanied by her aide. She feels well. She denies severe fatigue, shortness of breath, chest pain, palpitations, or decreased energy. She does endorse black colored stools. SHe had an EGD and colonoscopy with Dr. Pak in January 2020 and continues to follow with him. Currently, she reports she is due for a CT abdomen and pelvis with contrast to assess for any bleeding. \par \[facility] 4/2/21\[facility] Patient is here for follow up today for anemia, accompanied by her aide.  She denies severe fatigue, shortness of breath, chest pain, palpitations, or decreased energy.  \[facility] \[facility] 3/22/22[facility] Patient is here for follow up today for anemia, accompanied by aide.  Reviewed most recent labwork which shows mild leukopenia (normal differential) but no evidence of anemia and iron stores are adequate.  She denies shortness of breath, chest pain, palpitations, dark stools or overt bleeding. Last EGD and colonoscopy was performed by Dr. Pak in January 2020.  \[facility] \[facility] 12/2/22[facility] Patient is here for follow up today for anemia, accompanied by aide.  Reviewed most recent labwork which shows no evidence of anemia; ferritin 32, ironsat 15%, TIBC 328.  She denies shortness of breath, chest pain, palpitations, dark stools or overt bleeding. Last EGD and colonoscopy was performed by Dr. Pak in January 2020.  She feels more tired.  She is gaining weight.  \par \par 7/20/23\par Patient is here for follow up today for anemia, accompanied by aide.  Reviewed most recent labwork which shows mild leukopoenia (normal differential) + anemia with hgb 11.1g/dL; ferritin 17, ironsat 11%, TIBC 305.  She denies fevers, chills, shortness of breath, chest pain, palpitations, dizziness, dark stools or overt bleeding.  Last EGD and colonoscopy was performed by Dr. Pak in January 2020.  She had repeat colonoscopy last week on 7/10 but it was unsuccessful due to poor prep; rescheduled for 9/18.  She endorses fatigue.  She is lost about 7lbs in the last 3 months via portion control.

## 2023-07-20 NOTE — CONSULT LETTER
[Dear  ___] : Dear  [unfilled], [Consult Letter:] : I had the pleasure of evaluating your patient, [unfilled]. [Please see my note below.] : Please see my note below. [Sincerely,] : Sincerely, [FreeTextEntry3] : John Boateng DO\par Attending Physician,\par Hematology/ Medical Oncology\par [phone] office\par \par

## 2023-07-20 NOTE — ASSESSMENT
[FreeTextEntry1] : 57 yo woman with PMH of ulcerative colitis here for follow up on history of iron deficiency anemia. She is being seen by a private GI; She followed with Dr. Pak who performed endoscopy and colonoscopy in January 2020 which showed GERD with esophagitis, acute gastritis with and without bleeding and duodenitis. \par - she is on Vitamin B12 + folic acid daily \par - s/p iv iron, completed in June 2019 with resolution of low iron\par - aim to keep ferritin >50 and iron sat >20%\par - START IV Venofer 200mg weekly x5, orders written + possible side effects discussed\par - followup with GI, Dr. Parmer, as scheduled for repeat colonoscopy and in view of ulcerative colitis \par \par Encouraged to keep up to date with age appropriate screenings including but not limited to GYN pelvic exam, colonoscopy and upper endoscopy.\par \par RTC 5 weeks post final venofer with Dr. Boateng with CBC, iron studies, ferritin 1-2 days prior

## 2023-07-24 ENCOUNTER — APPOINTMENT (OUTPATIENT)
Dept: INFUSION THERAPY | Facility: CLINIC | Age: 58
End: 2023-07-24

## 2023-07-24 ENCOUNTER — OUTPATIENT (OUTPATIENT)
Dept: OUTPATIENT SERVICES | Facility: HOSPITAL | Age: 58
LOS: 1 days | End: 2023-07-24
Payer: MEDICARE

## 2023-07-24 DIAGNOSIS — D50.9 IRON DEFICIENCY ANEMIA, UNSPECIFIED: ICD-10-CM

## 2023-07-24 DIAGNOSIS — D64.9 ANEMIA, UNSPECIFIED: ICD-10-CM

## 2023-07-24 DIAGNOSIS — Z98.890 OTHER SPECIFIED POSTPROCEDURAL STATES: Chronic | ICD-10-CM

## 2023-07-24 PROCEDURE — 96365 THER/PROPH/DIAG IV INF INIT: CPT

## 2023-07-24 RX ORDER — IRON SUCROSE 20 MG/ML
200 INJECTION, SOLUTION INTRAVENOUS ONCE
Refills: 0 | Status: COMPLETED | OUTPATIENT
Start: 2023-07-24 | End: 2023-07-24

## 2023-07-24 RX ADMIN — IRON SUCROSE 220 MILLIGRAM(S): 20 INJECTION, SOLUTION INTRAVENOUS at 13:19

## 2023-07-24 RX ADMIN — IRON SUCROSE 200 MILLIGRAM(S): 20 INJECTION, SOLUTION INTRAVENOUS at 14:00

## 2023-07-25 DIAGNOSIS — H02.88A MEIBOMIAN GLAND DYSFUNCTION RIGHT EYE, UPPER AND LOWER EYELIDS: ICD-10-CM

## 2023-07-25 DIAGNOSIS — Q13.2 OTHER CONGENITAL MALFORMATIONS OF IRIS: ICD-10-CM

## 2023-07-25 DIAGNOSIS — E11.9 TYPE 2 DIABETES MELLITUS WITHOUT COMPLICATIONS: ICD-10-CM

## 2023-07-25 DIAGNOSIS — H16.229 KERATOCONJUNCTIVITIS SICCA, NOT SPECIFIED AS SJOGREN'S, UNSPECIFIED EYE: ICD-10-CM

## 2023-07-25 DIAGNOSIS — H26.9 UNSPECIFIED CATARACT: ICD-10-CM

## 2023-07-25 DIAGNOSIS — D64.9 ANEMIA, UNSPECIFIED: ICD-10-CM

## 2023-07-31 ENCOUNTER — APPOINTMENT (OUTPATIENT)
Dept: OBGYN | Facility: CLINIC | Age: 58
End: 2023-07-31

## 2023-08-01 ENCOUNTER — OUTPATIENT (OUTPATIENT)
Dept: OUTPATIENT SERVICES | Facility: HOSPITAL | Age: 58
LOS: 1 days | End: 2023-08-01
Payer: MEDICARE

## 2023-08-01 ENCOUNTER — APPOINTMENT (OUTPATIENT)
Dept: INFUSION THERAPY | Facility: CLINIC | Age: 58
End: 2023-08-01

## 2023-08-01 VITALS
HEART RATE: 114 BPM | TEMPERATURE: 97.5 F | SYSTOLIC BLOOD PRESSURE: 122 MMHG | DIASTOLIC BLOOD PRESSURE: 60 MMHG | RESPIRATION RATE: 16 BRPM

## 2023-08-01 DIAGNOSIS — D64.9 ANEMIA, UNSPECIFIED: ICD-10-CM

## 2023-08-01 DIAGNOSIS — Z98.890 OTHER SPECIFIED POSTPROCEDURAL STATES: Chronic | ICD-10-CM

## 2023-08-01 PROCEDURE — 96365 THER/PROPH/DIAG IV INF INIT: CPT

## 2023-08-01 RX ORDER — IRON SUCROSE 20 MG/ML
200 INJECTION, SOLUTION INTRAVENOUS ONCE
Refills: 0 | Status: COMPLETED | OUTPATIENT
Start: 2023-08-01 | End: 2023-08-01

## 2023-08-01 RX ADMIN — IRON SUCROSE 200 MILLIGRAM(S): 20 INJECTION, SOLUTION INTRAVENOUS at 15:15

## 2023-08-01 RX ADMIN — IRON SUCROSE 220 MILLIGRAM(S): 20 INJECTION, SOLUTION INTRAVENOUS at 14:38

## 2023-08-02 DIAGNOSIS — D64.9 ANEMIA, UNSPECIFIED: ICD-10-CM

## 2023-08-08 ENCOUNTER — OUTPATIENT (OUTPATIENT)
Dept: OUTPATIENT SERVICES | Facility: HOSPITAL | Age: 58
LOS: 1 days | End: 2023-08-08
Payer: MEDICARE

## 2023-08-08 ENCOUNTER — APPOINTMENT (OUTPATIENT)
Dept: INFUSION THERAPY | Facility: CLINIC | Age: 58
End: 2023-08-08

## 2023-08-08 DIAGNOSIS — D64.9 ANEMIA, UNSPECIFIED: ICD-10-CM

## 2023-08-08 DIAGNOSIS — Z98.890 OTHER SPECIFIED POSTPROCEDURAL STATES: Chronic | ICD-10-CM

## 2023-08-08 PROCEDURE — 96365 THER/PROPH/DIAG IV INF INIT: CPT

## 2023-08-08 RX ORDER — IRON SUCROSE 20 MG/ML
200 INJECTION, SOLUTION INTRAVENOUS ONCE
Refills: 0 | Status: COMPLETED | OUTPATIENT
Start: 2023-08-08 | End: 2023-08-08

## 2023-08-08 RX ADMIN — IRON SUCROSE 220 MILLIGRAM(S): 20 INJECTION, SOLUTION INTRAVENOUS at 11:10

## 2023-08-08 RX ADMIN — IRON SUCROSE 200 MILLIGRAM(S): 20 INJECTION, SOLUTION INTRAVENOUS at 11:40

## 2023-08-15 ENCOUNTER — OUTPATIENT (OUTPATIENT)
Dept: OUTPATIENT SERVICES | Facility: HOSPITAL | Age: 58
LOS: 1 days | End: 2023-08-15
Payer: MEDICARE

## 2023-08-15 ENCOUNTER — APPOINTMENT (OUTPATIENT)
Dept: INFUSION THERAPY | Facility: CLINIC | Age: 58
End: 2023-08-15

## 2023-08-15 DIAGNOSIS — D64.9 ANEMIA, UNSPECIFIED: ICD-10-CM

## 2023-08-15 DIAGNOSIS — Z98.890 OTHER SPECIFIED POSTPROCEDURAL STATES: Chronic | ICD-10-CM

## 2023-08-15 PROCEDURE — 96365 THER/PROPH/DIAG IV INF INIT: CPT

## 2023-08-15 RX ORDER — IRON SUCROSE 20 MG/ML
200 INJECTION, SOLUTION INTRAVENOUS ONCE
Refills: 0 | Status: COMPLETED | OUTPATIENT
Start: 2023-08-15 | End: 2023-08-15

## 2023-08-15 RX ADMIN — IRON SUCROSE 220 MILLIGRAM(S): 20 INJECTION, SOLUTION INTRAVENOUS at 11:58

## 2023-08-22 ENCOUNTER — APPOINTMENT (OUTPATIENT)
Dept: INFUSION THERAPY | Facility: CLINIC | Age: 58
End: 2023-08-22

## 2023-09-05 NOTE — PATIENT PROFILE ADULT - FLU SEASON?
HISTORY OF PRESENT ILLNESS  Manpreet Hays is a 45 y.o. female. HPI Comments: Left arm has been hurting a little lately, but is mainly here for f/u of htn. Hasn't taken med in many months. denies sscp, is not following a diet, isn't exercising although does work as a . Has been having h/a but no TIA sx, no SSCP. Arm Pain          ROS    Physical Exam   Constitutional: She appears well-developed and well-nourished. No distress. Neck: No thyromegaly present. Cardiovascular: Normal rate, regular rhythm and normal heart sounds. Exam reveals no gallop and no friction rub. No murmur heard. No carotid bruits. Pulmonary/Chest: Breath sounds normal.   Musculoskeletal: She exhibits no edema. Lymphadenopathy:     She has no cervical adenopathy. ASSESSMENT and PLAN  Htn, check renal functions, lipids and restart HCTZ. Discussed low na, high K+ diet.
Requests flu vaccine; denies fever, egg allergy. Immunization given per protocol and recorded in [address]. VIS information sheet given, explained possible S/E. Reviewed sx indicating need to be seen in ER. Pt had no adverse reaction at time of discharge. AVS printed and reviewed. Discussed and given handouts for  Low Na , reading labels and dash diet. Reviewed new e script for HCTZ and stress f/u and not running out of meds. Discussion assisted by CAV , Precious Dumont.
Results for orders placed or performed in visit on 02/16/17   AMB POC GLUCOSE BLOOD, BY GLUCOSE MONITORING DEVICE   Result Value Ref Range    Glucose POC 73 nonfasting mg/dL     Coordination of Care  1. Have you been to the ER, urgent care clinic since your last visit? Hospitalized since your last visit? No    2. Have you seen or consulted any other health care providers outside of the [address] [place] [address] since your last visit? Include any pap smears or colon screening. No    Medications  Medication Reconciliation Performed: no  Patient does need refills     Learning Assessment Complete?  yes
PRE-OP DIAGNOSIS:  Osteoarthritis of left knee 05-Sep-2023 19:13:19  Shelli Jean Baptiste  
Yes...

## 2023-09-08 ENCOUNTER — APPOINTMENT (OUTPATIENT)
Dept: OTOLARYNGOLOGY | Facility: CLINIC | Age: 58
End: 2023-09-08

## 2023-09-19 ENCOUNTER — APPOINTMENT (OUTPATIENT)
Dept: HEMATOLOGY ONCOLOGY | Facility: CLINIC | Age: 58
End: 2023-09-19

## 2023-09-22 ENCOUNTER — APPOINTMENT (OUTPATIENT)
Dept: HEMATOLOGY ONCOLOGY | Facility: CLINIC | Age: 58
End: 2023-09-22

## 2023-10-02 NOTE — ED PROVIDER NOTE - NS ED ATTENDING STATEMENT MOD
I have personally performed a face to face diagnostic evaluation on this patient. I have reviewed the ACP note and agree with the history, exam and plan of care, except as noted. Azathioprine Pregnancy And Lactation Text: This medication is Pregnancy Category D and isn't considered safe during pregnancy. It is unknown if this medication is excreted in breast milk.

## 2023-12-05 ENCOUNTER — APPOINTMENT (OUTPATIENT)
Dept: OTOLARYNGOLOGY | Facility: CLINIC | Age: 58
End: 2023-12-05
Payer: MEDICARE

## 2023-12-05 DIAGNOSIS — H92.02 OTALGIA, LEFT EAR: ICD-10-CM

## 2023-12-05 DIAGNOSIS — R04.0 EPISTAXIS: ICD-10-CM

## 2023-12-05 DIAGNOSIS — J30.9 ALLERGIC RHINITIS, UNSPECIFIED: ICD-10-CM

## 2023-12-05 PROCEDURE — 99213 OFFICE O/P EST LOW 20 MIN: CPT | Mod: 25

## 2023-12-05 PROCEDURE — G0268 REMOVAL OF IMPACTED WAX MD: CPT

## 2023-12-06 ENCOUNTER — OUTPATIENT (OUTPATIENT)
Dept: OUTPATIENT SERVICES | Facility: HOSPITAL | Age: 58
LOS: 1 days | End: 2023-12-06
Payer: MEDICARE

## 2023-12-06 ENCOUNTER — LABORATORY RESULT (OUTPATIENT)
Age: 58
End: 2023-12-06

## 2023-12-06 ENCOUNTER — APPOINTMENT (OUTPATIENT)
Dept: INFUSION THERAPY | Facility: CLINIC | Age: 58
End: 2023-12-06

## 2023-12-06 DIAGNOSIS — Z98.890 OTHER SPECIFIED POSTPROCEDURAL STATES: Chronic | ICD-10-CM

## 2023-12-06 DIAGNOSIS — D50.9 IRON DEFICIENCY ANEMIA, UNSPECIFIED: ICD-10-CM

## 2023-12-06 LAB
HCT VFR BLD CALC: 45.5 %
HGB BLD-MCNC: 14.7 G/DL
MCHC RBC-ENTMCNC: 25.4 PG
MCHC RBC-ENTMCNC: 32.3 G/DL
MCV RBC AUTO: 78.6 FL
PLATELET # BLD AUTO: 272 K/UL
PMV BLD: 9.4 FL
RBC # BLD: 5.79 M/UL
RBC # FLD: 17.1 %
WBC # FLD AUTO: 8.37 K/UL

## 2023-12-06 PROCEDURE — 36415 COLL VENOUS BLD VENIPUNCTURE: CPT

## 2023-12-06 PROCEDURE — 83550 IRON BINDING TEST: CPT

## 2023-12-06 PROCEDURE — 82728 ASSAY OF FERRITIN: CPT

## 2023-12-06 PROCEDURE — 83540 ASSAY OF IRON: CPT

## 2023-12-06 PROCEDURE — 85027 COMPLETE CBC AUTOMATED: CPT

## 2023-12-07 DIAGNOSIS — D50.9 IRON DEFICIENCY ANEMIA, UNSPECIFIED: ICD-10-CM

## 2023-12-07 LAB
FERRITIN SERPL-MCNC: 83 NG/ML
IRON SATN MFR SERPL: 13 %
IRON SERPL-MCNC: 42 UG/DL
TIBC SERPL-MCNC: 317 UG/DL
UIBC SERPL-MCNC: 275 UG/DL

## 2024-01-03 ENCOUNTER — APPOINTMENT (OUTPATIENT)
Dept: OTOLARYNGOLOGY | Facility: CLINIC | Age: 59
End: 2024-01-03
Payer: MEDICARE

## 2024-01-03 ENCOUNTER — APPOINTMENT (OUTPATIENT)
Dept: OTOLARYNGOLOGY | Facility: CLINIC | Age: 59
End: 2024-01-03
Payer: MEDICAID

## 2024-01-03 DIAGNOSIS — H90.5 UNSPECIFIED SENSORINEURAL HEARING LOSS: ICD-10-CM

## 2024-01-03 DIAGNOSIS — H65.93 UNSPECIFIED NONSUPPURATIVE OTITIS MEDIA, BILATERAL: ICD-10-CM

## 2024-01-03 PROCEDURE — 92557 COMPREHENSIVE HEARING TEST: CPT

## 2024-01-03 PROCEDURE — 99212 OFFICE O/P EST SF 10 MIN: CPT | Mod: 25

## 2024-01-03 PROCEDURE — 92550 TYMPANOMETRY & REFLEX THRESH: CPT | Mod: 52

## 2024-01-03 NOTE — ASSESSMENT
[FreeTextEntry1] : I reviewed, interpreted, and discussed the Audiogram done today. WNL. OME cleared up.  RTC in 6M

## 2024-01-03 NOTE — HISTORY OF PRESENT ILLNESS
[FreeTextEntry1] : Patient SNHl and allergic rhinitis .  Bilateral  ear feel clogged , she denies any ear pain.  No  recent sickness. Occasionally  hearing  gets  muffled.

## 2024-01-11 NOTE — PROCEDURE
Subjective:      Patient ID: Dexter Cifuentes Jr. is a 41 y.o. male.    Vitals:  weight is 131.5 kg (290 lb). His oral temperature is 99.8 °F (37.7 °C). His blood pressure is 105/73 and his pulse is 114 (abnormal). His respiration is 21 (abnormal) and oxygen saturation is 100%.     Chief Complaint: Cough    Cough  This is a new problem. Episode onset: 5 days. The problem has been gradually worsening. The problem occurs every few minutes. The cough is Productive of sputum. Associated symptoms include chills, nasal congestion, shortness of breath and sweats. Pertinent negatives include no ear pain, fever, headaches or sore throat. Associated symptoms comments: Feels like he has mucus in his throat, hurts his ribs to cough  . Treatments tried: dayquil, nyquil. The treatment provided no relief. His past medical history is significant for asthma and bronchitis. There is no history of pneumonia.       Constitution: Positive for chills. Negative for fever.   HENT:  Negative for ear pain and sore throat.    Respiratory:  Positive for cough and shortness of breath.    Neurological:  Negative for headaches.      Objective:     Physical Exam   Constitutional: He is oriented to person, place, and time. normal  HENT:   Head: Normocephalic.   Ears:   Right Ear: Tympanic membrane normal.   Nose: Nose normal.   Pulmonary/Chest: He has wheezes.   Abdominal: Normal appearance.   Musculoskeletal: Normal range of motion.         General: Normal range of motion.   Neurological: no focal deficit. He is alert, oriented to person, place, and time and at baseline.   Nursing note and vitals reviewed.      Assessment:     1. Cough, unspecified type        Plan:       Cough, unspecified type    Other orders  -     methylPREDNISolone sod suc(PF) injection 125 mg  -     predniSONE (DELTASONE) 20 MG tablet; Take 1 tablet (20 mg total) by mouth once daily. for 5 days  Dispense: 5 tablet; Refill: 0                     [No] : not [Sensing Amplitude ___mv] : sensing amplitude was [unfilled] mv [de-identified] : 79BPM [de-identified] : [employer] [de-identified] : LINQ [de-identified] : [id number] [de-identified] : 12/5/18 [de-identified] : Good [de-identified] : No events.

## 2024-01-31 ENCOUNTER — APPOINTMENT (OUTPATIENT)
Dept: OPHTHALMOLOGY | Facility: CLINIC | Age: 59
End: 2024-01-31
Payer: MEDICARE

## 2024-01-31 ENCOUNTER — OUTPATIENT (OUTPATIENT)
Dept: OUTPATIENT SERVICES | Facility: HOSPITAL | Age: 59
LOS: 1 days | End: 2024-01-31
Payer: MEDICARE

## 2024-01-31 DIAGNOSIS — H53.8 OTHER VISUAL DISTURBANCES: ICD-10-CM

## 2024-01-31 DIAGNOSIS — H16.229 KERATOCONJUNCTIVITIS SICCA, NOT SPECIFIED AS SJOGREN'S, UNSPECIFIED EYE: ICD-10-CM

## 2024-01-31 DIAGNOSIS — Z98.890 OTHER SPECIFIED POSTPROCEDURAL STATES: Chronic | ICD-10-CM

## 2024-01-31 DIAGNOSIS — H26.9 UNSPECIFIED CATARACT: ICD-10-CM

## 2024-01-31 DIAGNOSIS — H02.88A MEIBOMIAN GLAND DYSFUNCTION RIGHT EYE, UPPER AND LOWER EYELIDS: ICD-10-CM

## 2024-01-31 DIAGNOSIS — Q13.2 OTHER CONGENITAL MALFORMATIONS OF IRIS: ICD-10-CM

## 2024-01-31 PROCEDURE — 92012 INTRM OPH EXAM EST PATIENT: CPT

## 2024-02-16 ENCOUNTER — APPOINTMENT (OUTPATIENT)
Dept: HEMATOLOGY ONCOLOGY | Facility: CLINIC | Age: 59
End: 2024-02-16

## 2024-02-16 ENCOUNTER — OUTPATIENT (OUTPATIENT)
Dept: OUTPATIENT SERVICES | Facility: HOSPITAL | Age: 59
LOS: 1 days | End: 2024-02-16
Payer: MEDICARE

## 2024-02-16 DIAGNOSIS — Z98.890 OTHER SPECIFIED POSTPROCEDURAL STATES: Chronic | ICD-10-CM

## 2024-02-16 DIAGNOSIS — D50.9 IRON DEFICIENCY ANEMIA, UNSPECIFIED: ICD-10-CM

## 2024-02-16 LAB
ALBUMIN SERPL ELPH-MCNC: 4.2 G/DL
ALP BLD-CCNC: 98 U/L
ALT SERPL-CCNC: 22 U/L
ANION GAP SERPL CALC-SCNC: 18 MMOL/L
AST SERPL-CCNC: 14 U/L
BASOPHILS # BLD AUTO: 0.02 K/UL
BASOPHILS NFR BLD AUTO: 0.4 %
BILIRUB DIRECT SERPL-MCNC: <0.2 MG/DL
BILIRUB INDIRECT SERPL-MCNC: >0 MG/DL
BILIRUB SERPL-MCNC: 0.2 MG/DL
BUN SERPL-MCNC: 13 MG/DL
CALCIUM SERPL-MCNC: 9.6 MG/DL
CHLORIDE SERPL-SCNC: 99 MMOL/L
CO2 SERPL-SCNC: 23 MMOL/L
CREAT SERPL-MCNC: 0.6 MG/DL
EGFR: 105 ML/MIN/1.73M2
EOSINOPHIL # BLD AUTO: 0.05 K/UL
EOSINOPHIL NFR BLD AUTO: 0.9 %
GLUCOSE SERPL-MCNC: 211 MG/DL
HCT VFR BLD CALC: 43.9 %
HGB BLD-MCNC: 13.8 G/DL
IMM GRANULOCYTES NFR BLD AUTO: 0.4 %
IRON SATN MFR SERPL: 16 %
IRON SERPL-MCNC: 49 UG/DL
LYMPHOCYTES # BLD AUTO: 1.94 K/UL
LYMPHOCYTES NFR BLD AUTO: 35 %
MAN DIFF?: NORMAL
MCHC RBC-ENTMCNC: 25.3 PG
MCHC RBC-ENTMCNC: 31.4 G/DL
MCV RBC AUTO: 80.4 FL
MONOCYTES # BLD AUTO: 0.31 K/UL
MONOCYTES NFR BLD AUTO: 5.6 %
NEUTROPHILS # BLD AUTO: 3.2 K/UL
NEUTROPHILS NFR BLD AUTO: 57.7 %
PLATELET # BLD AUTO: 267 K/UL
PMV BLD AUTO: 0 /100 WBCS
POTASSIUM SERPL-SCNC: 5 MMOL/L
PROT SERPL-MCNC: 6.4 G/DL
RBC # BLD: 5.46 M/UL
RBC # FLD: 15.7 %
SODIUM SERPL-SCNC: 140 MMOL/L
TIBC SERPL-MCNC: 298 UG/DL
UIBC SERPL-MCNC: 249 UG/DL
WBC # FLD AUTO: 5.54 K/UL

## 2024-02-16 PROCEDURE — 83540 ASSAY OF IRON: CPT

## 2024-02-16 PROCEDURE — 83550 IRON BINDING TEST: CPT

## 2024-02-16 PROCEDURE — 80076 HEPATIC FUNCTION PANEL: CPT

## 2024-02-16 PROCEDURE — 36415 COLL VENOUS BLD VENIPUNCTURE: CPT

## 2024-02-16 PROCEDURE — 85027 COMPLETE CBC AUTOMATED: CPT

## 2024-02-16 PROCEDURE — 80048 BASIC METABOLIC PNL TOTAL CA: CPT

## 2024-02-16 PROCEDURE — 82728 ASSAY OF FERRITIN: CPT

## 2024-02-17 DIAGNOSIS — D50.9 IRON DEFICIENCY ANEMIA, UNSPECIFIED: ICD-10-CM

## 2024-02-20 ENCOUNTER — APPOINTMENT (OUTPATIENT)
Dept: HEMATOLOGY ONCOLOGY | Facility: CLINIC | Age: 59
End: 2024-02-20
Payer: MEDICARE

## 2024-02-20 ENCOUNTER — OUTPATIENT (OUTPATIENT)
Dept: OUTPATIENT SERVICES | Facility: HOSPITAL | Age: 59
LOS: 1 days | End: 2024-02-20
Payer: MEDICARE

## 2024-02-20 VITALS
SYSTOLIC BLOOD PRESSURE: 125 MMHG | WEIGHT: 203 LBS | DIASTOLIC BLOOD PRESSURE: 63 MMHG | TEMPERATURE: 98.1 F | HEART RATE: 98 BPM | HEIGHT: 64 IN | BODY MASS INDEX: 34.66 KG/M2

## 2024-02-20 DIAGNOSIS — Z98.890 OTHER SPECIFIED POSTPROCEDURAL STATES: Chronic | ICD-10-CM

## 2024-02-20 DIAGNOSIS — D50.9 IRON DEFICIENCY ANEMIA, UNSPECIFIED: ICD-10-CM

## 2024-02-20 LAB — FERRITIN SERPL-MCNC: 99 NG/ML

## 2024-02-20 PROCEDURE — 99214 OFFICE O/P EST MOD 30 MIN: CPT

## 2024-02-20 NOTE — ASSESSMENT
[FreeTextEntry1] : 59 yo woman with PMH of ulcerative colitis here for follow up on history of iron deficiency anemia. She is being seen by a private GI; She followed with Dr. Pak who performed endoscopy and colonoscopy in January 2020 which showed GERD with esophagitis, acute gastritis with and without bleeding and duodenitis.  - she is on Vitamin B12 + folic acid daily  - s/p iv iron, completed in June 2019 with resolution of low iron - aim to keep ferritin >50 and iron sat >20% - no additional IV iron needed at this time - followup with GI, Dr. Parmer, as scheduled for repeat colonoscopy and in view of ulcerative colitis   Encouraged to keep up to date with age appropriate screenings including but not limited to GYN pelvic exam, colonoscopy and upper endoscopy.  RTC 5 weeks post final venofer with Dr. Boateng with CBC, iron studies, ferritin 1-2 days prior  seen/ examined w/ NP Sushant;note reviewed. case discussed; agree w/ above

## 2024-02-20 NOTE — PHYSICAL EXAM
[Restricted in physically strenuous activity but ambulatory and able to carry out work of a light or sedentary nature] : Status 1- Restricted in physically strenuous activity but ambulatory and able to carry out work of a light or sedentary nature, e.g., light house work, office work [Normal] : affect appropriate [de-identified] : uses seated walker for ambulation

## 2024-02-20 NOTE — CONSULT LETTER
[Dear  ___] : Dear  [unfilled], [Consult Letter:] : I had the pleasure of evaluating your patient, [unfilled]. [Please see my note below.] : Please see my note below. [Sincerely,] : Sincerely, [FreeTextEntry3] : John Boateng DO\par  Attending Physician,\par  Hematology/ Medical Oncology\par  [phone] office\par  \par

## 2024-02-20 NOTE — HISTORY OF PRESENT ILLNESS
[de-identified] : Ms. SEJAL BARNARD is a 54 year old female here today for evaluation and treatment of Anemia.\par  \par  The patient was referred by Dr. Quintero.  She presents with her health aide from the Apartment Housing complex she resides on [facility].  She reports that she was seen by her PCP, who referred her due to iron deficiency anemia.  She currently takes iron supplement, 325mg daily and is tolerating without complaint.  She states she feels tired on occasion.  \par  \par  LAB WORKUP:\par  (5/7/19) WBC 6.23, Hgb 13.7, MCV 76.7, RDW 16.1, , Na 147, ALT 42, Ferritin 36, TIBC 349, ironsat 15%, B12 normal\par  \par  HCM:\par  Mammogram (07/2018) patient reports it was benign\par  Colonoscopy ([facility] - 4/6/2018) removed 2 polyps, as per patient.  IMPRESSION:  Preparation of the colon was poor.  One 6mm polyp in the cecum, removed peicemeal using a cold biopsy forceps.  Resected and retrieved.  Random biopsies taken from transverse colo, descending, sigmoid and rectum.  Repeat in 1 year [de-identified] : 11/8/19 Patient is here for a follow-up visit for anemia with aid.  She is feeling well with no complaints.  Most recent CBC is stable with ferritin 111 and ironsat 17%.  Patient denies fever, chills, nausea, vomiting, bleeding or pica.  She does not take oral iron as she had IV iron in the past. She reports her energy has improved slightly as well.    2/14/2020  Patient is here for a follow-up visit for anemia with aid.  She is feeling well with no complaints. Most recent CBC and iron studies from 2.7.2020 remains stable off oral iron.   Patient denies fever, chills, nausea, vomiting, bleeding or pica.  She followed with Dr. Pak who performed endoscopy which showed GERD with esophagitis, acute gastritis with and without bleeding and duodenitis.    8/4/20 Patient is here for follow up today for anemia. She is accompanied by her aide. She feels well. She denies severe fatigue, shortness of breath, chest pain, palpitations, or decreased energy. She does endorse black colored stools. SHe had an EGD and colonoscopy with Dr. Pak in January 2020 and continues to follow with him. Currently, she reports she is due for a CT abdomen and pelvis with contrast to assess for any bleeding.   4/2/21 Patient is here for follow up today for anemia, accompanied by her aide.  She denies severe fatigue, shortness of breath, chest pain, palpitations, or decreased energy.    3/22/22 Patient is here for follow up today for anemia, accompanied by aide.  Reviewed most recent labwork which shows mild leukopenia (normal differential) but no evidence of anemia and iron stores are adequate.  She denies shortness of breath, chest pain, palpitations, dark stools or overt bleeding. Last EGD and colonoscopy was performed by Dr. Pak in January 2020.    12/2/22 Patient is here for follow up today for anemia, accompanied by aide.  Reviewed most recent labwork which shows no evidence of anemia; ferritin 32, ironsat 15%, TIBC 328.  She denies shortness of breath, chest pain, palpitations, dark stools or overt bleeding. Last EGD and colonoscopy was performed by Dr. Pak in January 2020.  She feels more tired.  She is gaining weight.    7/20/23 Patient is here for follow up today for anemia, accompanied by aide.  Reviewed most recent labwork which shows mild leukopoenia (normal differential) + anemia with hgb 11.1g/dL; ferritin 17, ironsat 11%, TIBC 305.  She denies fevers, chills, shortness of breath, chest pain, palpitations, dizziness, dark stools or overt bleeding.  Last EGD and colonoscopy was performed by Dr. Pak in January 2020.  She had repeat colonoscopy last week on 7/10 but it was unsuccessful due to poor prep; rescheduled for 9/18.  She endorses fatigue.  She is lost about 7lbs in the last 3 months via portion control.    2/20/24 Patient is here for follow up today for anemia, accompanied by aide.  Reviewed most recent labwork which shows mild leukopoenia (normal differential) + anemia with hgb 13.8g/dL; ferritin 99, ironsat 16%, TIBC 298.  She denies fevers, chills, shortness of breath, chest pain, palpitations, dizziness, dark stools or overt bleeding.  She had repeat colonoscopy in 7/2023 but it was unsuccessful due to poor prep; rescheduled for 9/18.  She endorses fatigue.

## 2024-02-20 NOTE — REVIEW OF SYSTEMS
[Fatigue] : fatigue [Recent Change In Weight] : ~T recent weight change [Negative] : Allergic/Immunologic [Fever] : no fever [Chills] : no chills [Chest Pain] : no chest pain [Palpitations] : no palpitations [Shortness Of Breath] : no shortness of breath [Abdominal Pain] : no abdominal pain [Constipation] : no constipation [Skin Rash] : no skin rash [Easy Bleeding] : no tendency for easy bleeding [Easy Bruising] : no tendency for easy bruising [FreeTextEntry2] : 7lb weight loss in 3 months via portion control

## 2024-02-22 ENCOUNTER — APPOINTMENT (OUTPATIENT)
Dept: PAIN MANAGEMENT | Facility: CLINIC | Age: 59
End: 2024-02-22
Payer: MEDICARE

## 2024-02-22 PROCEDURE — 99204 OFFICE O/P NEW MOD 45 MIN: CPT

## 2024-02-22 NOTE — ASSESSMENT
[FreeTextEntry1] : This is a 58-year-old female complains of low back pain with occasional radicular features in the left lower extremity.  She was previously under the care of a pain management provide underwent trigger point and medial branch block injections with no relief of her symptoms.  I will provide her a physical therapy prescription with the hope it provides relief of her symptoms.  If no relief with physical therapy, we would consider a LT L4-5 TFESI. Patient will follow up in 6 weeks for reassessment. All this patients questions were answered and the conversation was understood well.  Physical therapy of the lumbar spine 2-3 times a week for 4-8 weeks stressing a home exercise program of walking, shoulder girdle strengthening,  swimming, elliptical , recumbent bike, [place] and Yoga. Use things that heat like hot shower or icy heat before rehab, exercising and at the beginning of the day, and ice (ice in a bag never directly on the skin) after activity and at the end of the day.  I, Lexis Peck, attest that this documentation has been prepared under the direction and in the presence of Provider Stephanie Boyd MD.   Thank you for allowing me to assist in the management of this patient.    Best Regards,    Stephanie Boyd M.D., FAAPMR   Diplomate, American Board of Physical Medicine and Rehabilitation Diplomate, American Board of Pain Medicine  Diplomate, American Board of Pain Management

## 2024-02-22 NOTE — PHYSICAL EXAM
[Normal Coordination] : normal coordination [Normal DTR UE/LE] : normal DTR UE/LE  [Normal Sensation] : normal sensation [Normal Mood and Affect] : normal mood and affect [Oriented] : oriented [Able to Communicate] : able to communicate [Well Nourished] : well nourished [Well Developed] : well developed [Flexion] : flexion [Extension] : extension [] : patient ambulates with assistive device [FreeTextEntry8] : R>L

## 2024-02-22 NOTE — DATA REVIEWED
[FreeTextEntry1] : MRI of the lumbar spine dated 4/26/22 demonstrates central disc extrusion at T10-11 which contacts the ventral cord at the midline without significant cord deformity. Right paracentral disc extrusion at T11-12 producing marked anterior thecal sac effacement on the right without cord deformity. Mild disc bulging and mild left neural foraminal narrowing at L3-4.Central disc protrusion L4-5 which mildly indents the anterior thecal sac. Mild retrolisthesis and mild bilateral neural foraminal narrowing is also identified at this level. Transitional vertebral body which will be regarded as a partially sacralized L5.  UDS: No data obtained today.  [place] REGISTRY: Checked.

## 2024-02-22 NOTE — HISTORY OF PRESENT ILLNESS
[FreeTextEntry1] : This is a 58-year-old female here to establish care for lower back pain. She reports occasional radicular features into the left leg. The pain travels down to the knee. She was previously treated by another pain management provider who performed trigger point injections and medial branch blocks which provided no relief of her symptoms. MRI of the lumbar spine from 2022 was reviewed and is documented below. Patient states she never trialed epidural steroid injections nor physical therapy in the past. She is interested treating her pain conservatively at this time.

## 2024-03-15 ENCOUNTER — EMERGENCY (EMERGENCY)
Facility: HOSPITAL | Age: 59
LOS: 0 days | Discharge: HOME | End: 2024-03-15
Attending: EMERGENCY MEDICINE
Payer: MEDICARE

## 2024-03-15 VITALS
WEIGHT: 201.94 LBS | OXYGEN SATURATION: 98 % | HEART RATE: 93 BPM | TEMPERATURE: 96 F | HEIGHT: 65 IN | DIASTOLIC BLOOD PRESSURE: 62 MMHG | RESPIRATION RATE: 20 BRPM | SYSTOLIC BLOOD PRESSURE: 114 MMHG

## 2024-03-15 DIAGNOSIS — Z87.442 PERSONAL HISTORY OF URINARY CALCULI: ICD-10-CM

## 2024-03-15 DIAGNOSIS — N39.0 URINARY TRACT INFECTION, SITE NOT SPECIFIED: ICD-10-CM

## 2024-03-15 DIAGNOSIS — R30.0 DYSURIA: ICD-10-CM

## 2024-03-15 DIAGNOSIS — Z88.1 ALLERGY STATUS TO OTHER ANTIBIOTIC AGENTS STATUS: ICD-10-CM

## 2024-03-15 DIAGNOSIS — I10 ESSENTIAL (PRIMARY) HYPERTENSION: ICD-10-CM

## 2024-03-15 DIAGNOSIS — Z91.040 LATEX ALLERGY STATUS: ICD-10-CM

## 2024-03-15 DIAGNOSIS — Z88.8 ALLERGY STATUS TO OTHER DRUGS, MEDICAMENTS AND BIOLOGICAL SUBSTANCES: ICD-10-CM

## 2024-03-15 DIAGNOSIS — E78.5 HYPERLIPIDEMIA, UNSPECIFIED: ICD-10-CM

## 2024-03-15 DIAGNOSIS — E03.9 HYPOTHYROIDISM, UNSPECIFIED: ICD-10-CM

## 2024-03-15 DIAGNOSIS — E11.9 TYPE 2 DIABETES MELLITUS WITHOUT COMPLICATIONS: ICD-10-CM

## 2024-03-15 DIAGNOSIS — Z91.013 ALLERGY TO SEAFOOD: ICD-10-CM

## 2024-03-15 DIAGNOSIS — R10.9 UNSPECIFIED ABDOMINAL PAIN: ICD-10-CM

## 2024-03-15 DIAGNOSIS — Z91.011 ALLERGY TO MILK PRODUCTS: ICD-10-CM

## 2024-03-15 DIAGNOSIS — Z98.890 OTHER SPECIFIED POSTPROCEDURAL STATES: Chronic | ICD-10-CM

## 2024-03-15 DIAGNOSIS — Z88.5 ALLERGY STATUS TO NARCOTIC AGENT: ICD-10-CM

## 2024-03-15 DIAGNOSIS — Z87.440 PERSONAL HISTORY OF URINARY (TRACT) INFECTIONS: ICD-10-CM

## 2024-03-15 DIAGNOSIS — Z88.0 ALLERGY STATUS TO PENICILLIN: ICD-10-CM

## 2024-03-15 LAB
ALBUMIN SERPL ELPH-MCNC: 4.2 G/DL — SIGNIFICANT CHANGE UP (ref 3.5–5.2)
ALP SERPL-CCNC: 96 U/L — SIGNIFICANT CHANGE UP (ref 30–115)
ALT FLD-CCNC: 21 U/L — SIGNIFICANT CHANGE UP (ref 0–41)
ANION GAP SERPL CALC-SCNC: 15 MMOL/L — HIGH (ref 7–14)
APPEARANCE UR: CLEAR — SIGNIFICANT CHANGE UP
AST SERPL-CCNC: 16 U/L — SIGNIFICANT CHANGE UP (ref 0–41)
BASOPHILS # BLD AUTO: 0.01 K/UL — SIGNIFICANT CHANGE UP (ref 0–0.2)
BASOPHILS NFR BLD AUTO: 0.2 % — SIGNIFICANT CHANGE UP (ref 0–1)
BILIRUB SERPL-MCNC: 0.3 MG/DL — SIGNIFICANT CHANGE UP (ref 0.2–1.2)
BILIRUB UR-MCNC: NEGATIVE — SIGNIFICANT CHANGE UP
BUN SERPL-MCNC: 10 MG/DL — SIGNIFICANT CHANGE UP (ref 10–20)
CALCIUM SERPL-MCNC: 9.5 MG/DL — SIGNIFICANT CHANGE UP (ref 8.4–10.5)
CHLORIDE SERPL-SCNC: 98 MMOL/L — SIGNIFICANT CHANGE UP (ref 98–110)
CO2 SERPL-SCNC: 26 MMOL/L — SIGNIFICANT CHANGE UP (ref 17–32)
COLOR SPEC: YELLOW — SIGNIFICANT CHANGE UP
CREAT SERPL-MCNC: 0.6 MG/DL — LOW (ref 0.7–1.5)
DIFF PNL FLD: NEGATIVE — SIGNIFICANT CHANGE UP
EGFR: 105 ML/MIN/1.73M2 — SIGNIFICANT CHANGE UP
EOSINOPHIL # BLD AUTO: 0.06 K/UL — SIGNIFICANT CHANGE UP (ref 0–0.7)
EOSINOPHIL NFR BLD AUTO: 1 % — SIGNIFICANT CHANGE UP (ref 0–8)
GLUCOSE SERPL-MCNC: 231 MG/DL — HIGH (ref 70–99)
GLUCOSE UR QL: 500 MG/DL
HCT VFR BLD CALC: 43 % — SIGNIFICANT CHANGE UP (ref 37–47)
HGB BLD-MCNC: 13.8 G/DL — SIGNIFICANT CHANGE UP (ref 12–16)
IMM GRANULOCYTES NFR BLD AUTO: 0.3 % — SIGNIFICANT CHANGE UP (ref 0.1–0.3)
KETONES UR-MCNC: NEGATIVE MG/DL — SIGNIFICANT CHANGE UP
LEUKOCYTE ESTERASE UR-ACNC: ABNORMAL
LIDOCAIN IGE QN: 11 U/L — SIGNIFICANT CHANGE UP (ref 7–60)
LYMPHOCYTES # BLD AUTO: 2.23 K/UL — SIGNIFICANT CHANGE UP (ref 1.2–3.4)
LYMPHOCYTES # BLD AUTO: 36.7 % — SIGNIFICANT CHANGE UP (ref 20.5–51.1)
MCHC RBC-ENTMCNC: 25.6 PG — LOW (ref 27–31)
MCHC RBC-ENTMCNC: 32.1 G/DL — SIGNIFICANT CHANGE UP (ref 32–37)
MCV RBC AUTO: 79.6 FL — LOW (ref 81–99)
MONOCYTES # BLD AUTO: 0.4 K/UL — SIGNIFICANT CHANGE UP (ref 0.1–0.6)
MONOCYTES NFR BLD AUTO: 6.6 % — SIGNIFICANT CHANGE UP (ref 1.7–9.3)
NEUTROPHILS # BLD AUTO: 3.36 K/UL — SIGNIFICANT CHANGE UP (ref 1.4–6.5)
NEUTROPHILS NFR BLD AUTO: 55.2 % — SIGNIFICANT CHANGE UP (ref 42.2–75.2)
NITRITE UR-MCNC: NEGATIVE — SIGNIFICANT CHANGE UP
NRBC # BLD: 0 /100 WBCS — SIGNIFICANT CHANGE UP (ref 0–0)
PH UR: 6 — SIGNIFICANT CHANGE UP (ref 5–8)
PLATELET # BLD AUTO: 249 K/UL — SIGNIFICANT CHANGE UP (ref 130–400)
PMV BLD: 9.8 FL — SIGNIFICANT CHANGE UP (ref 7.4–10.4)
POTASSIUM SERPL-MCNC: 4 MMOL/L — SIGNIFICANT CHANGE UP (ref 3.5–5)
POTASSIUM SERPL-SCNC: 4 MMOL/L — SIGNIFICANT CHANGE UP (ref 3.5–5)
PROT SERPL-MCNC: 6.7 G/DL — SIGNIFICANT CHANGE UP (ref 6–8)
PROT UR-MCNC: NEGATIVE MG/DL — SIGNIFICANT CHANGE UP
RBC # BLD: 5.4 M/UL — SIGNIFICANT CHANGE UP (ref 4.2–5.4)
RBC # FLD: 16 % — HIGH (ref 11.5–14.5)
SODIUM SERPL-SCNC: 139 MMOL/L — SIGNIFICANT CHANGE UP (ref 135–146)
SP GR SPEC: 1.02 — SIGNIFICANT CHANGE UP (ref 1–1.03)
UROBILINOGEN FLD QL: 0.2 MG/DL — SIGNIFICANT CHANGE UP (ref 0.2–1)
WBC # BLD: 6.08 K/UL — SIGNIFICANT CHANGE UP (ref 4.8–10.8)
WBC # FLD AUTO: 6.08 K/UL — SIGNIFICANT CHANGE UP (ref 4.8–10.8)

## 2024-03-15 PROCEDURE — 99284 EMERGENCY DEPT VISIT MOD MDM: CPT | Mod: 25

## 2024-03-15 PROCEDURE — 74176 CT ABD & PELVIS W/O CONTRAST: CPT | Mod: MC

## 2024-03-15 PROCEDURE — 36415 COLL VENOUS BLD VENIPUNCTURE: CPT

## 2024-03-15 PROCEDURE — 80053 COMPREHEN METABOLIC PANEL: CPT

## 2024-03-15 PROCEDURE — 99284 EMERGENCY DEPT VISIT MOD MDM: CPT

## 2024-03-15 PROCEDURE — 85025 COMPLETE CBC W/AUTO DIFF WBC: CPT

## 2024-03-15 PROCEDURE — 96374 THER/PROPH/DIAG INJ IV PUSH: CPT

## 2024-03-15 PROCEDURE — 83690 ASSAY OF LIPASE: CPT

## 2024-03-15 PROCEDURE — 74176 CT ABD & PELVIS W/O CONTRAST: CPT | Mod: 26,MC

## 2024-03-15 PROCEDURE — 81001 URINALYSIS AUTO W/SCOPE: CPT

## 2024-03-15 PROCEDURE — 87086 URINE CULTURE/COLONY COUNT: CPT

## 2024-03-15 RX ORDER — KETOROLAC TROMETHAMINE 30 MG/ML
30 SYRINGE (ML) INJECTION ONCE
Refills: 0 | Status: DISCONTINUED | OUTPATIENT
Start: 2024-03-15 | End: 2024-03-15

## 2024-03-15 RX ORDER — NITROFURANTOIN MACROCRYSTAL 50 MG
1 CAPSULE ORAL
Qty: 10 | Refills: 0
Start: 2024-03-15 | End: 2024-03-19

## 2024-03-15 RX ADMIN — Medication 30 MILLIGRAM(S): at 14:19

## 2024-03-15 NOTE — ED PROVIDER NOTE - PATIENT PORTAL LINK FT
You can access the FollowMyHealth Patient Portal offered by [facility] by registering at the following website: [email or web address]. By joining [facility] FollowMyHealth portal, you will also be able to view your health information using other applications (apps) compatible with our system.

## 2024-03-15 NOTE — ED PROVIDER NOTE - ATTENDING CONTRIBUTION TO CARE
58-year-old female to ED with flank pain no fevers no sick contacts or travels.  Went to   ED yesterday for evaluation at another hospital but did not have extensive workup done left.  Returns today for dysuria and flank pain with history of urinary tract infection and kidney stones. Afebrile vital signs stable exam as noted clear lungs bilaterally conjunctiva pink HEENT normal, regular rate and rhythm abdomen soft nontender, ,Flank pain,  and neuro nonfocal.

## 2024-03-15 NOTE — ED PROVIDER NOTE - NSFOLLOWUPINSTRUCTIONS_ED_ALL_ED_FT
Please follow up with your primary care physician. Return to the emergency department if your symptoms do not resolve and/or worsen. If you've been prescribed medications, please take your medications as prescribed.  ------------------------------------------------------------------------------------------------------------------------  Urinary Tract Infection    A urinary tract infection (UTI) is an infection of any part of the urinary tract, which includes the kidneys, ureters, bladder, and urethra. Risk factors include ignoring your need to urinate, wiping back to front if female, being an uncircumcised male, and having diabetes or a weak immune system. Symptoms include frequent urination, pain or burning with urination, foul smelling urine, cloudy urine, pain in the lower abdomen, blood in the urine, and fever. If you were prescribed an antibiotic medicine, take it as told by your health care provider. Do not stop taking the antibiotic even if you start to feel better.    SEEK IMMEDIATE MEDICAL CARE IF YOU HAVE ANY OF THE FOLLOWING SYMPTOMS: severe back or abdominal pain, fever, inability to keep fluids or medicine down, dizziness/lightheadedness, or a change in mental status.

## 2024-03-15 NOTE — ED PROVIDER NOTE - PHYSICAL EXAMINATION
GENERAL: Well-developed; well-nourished  SKIN: warm, well perfused  EYES: no conjunctival erythema, ocular motions intact and appropriate, no nystagmus  ENT: No nasal discharge; airway clear.  CARD: Regular rate and rhythm.   RESP: LCTAB  ABD: soft, nontender. + cva tenderness on the left  Upper EXT: Normal ROM.   Lower EXT:  ambulates well independently

## 2024-03-17 LAB
CULTURE RESULTS: SIGNIFICANT CHANGE UP
SPECIMEN SOURCE: SIGNIFICANT CHANGE UP

## 2024-03-28 ENCOUNTER — APPOINTMENT (OUTPATIENT)
Dept: OBGYN | Facility: CLINIC | Age: 59
End: 2024-03-28
Payer: MEDICARE

## 2024-03-28 VITALS
BODY MASS INDEX: 34.66 KG/M2 | HEIGHT: 64 IN | WEIGHT: 203 LBS | SYSTOLIC BLOOD PRESSURE: 125 MMHG | DIASTOLIC BLOOD PRESSURE: 73 MMHG | HEART RATE: 120 BPM

## 2024-03-28 DIAGNOSIS — Z01.419 ENCOUNTER FOR GYNECOLOGICAL EXAMINATION (GENERAL) (ROUTINE) W/OUT ABNORMAL FINDINGS: ICD-10-CM

## 2024-03-28 PROCEDURE — 99386 PREV VISIT NEW AGE 40-64: CPT

## 2024-03-28 NOTE — DISCUSSION/SUMMARY
[FreeTextEntry1] : Pap done Self breast exam stressed Prescribed yearly bilateral screening mammogram Advised consultation with urogynecologist Follow-up yearly or as needed

## 2024-03-28 NOTE — HISTORY OF PRESENT ILLNESS
[FreeTextEntry1] : Patient is 58 years old para 0-0-0-0 last menstrual period 20 years ago. She denies postmenopausal bleeding. Patient states that she has had 3 urinary tract infections in 1 year and notes involuntary loss of urine on coughing and sneezing

## 2024-03-28 NOTE — PHYSICAL EXAM
[Chaperone Present] : A chaperone was present in the examining room during all aspects of the physical examination [FreeTextEntry1] : Cynthia Muniz [Appropriately responsive] : appropriately responsive [Alert] : alert [No Acute Distress] : no acute distress [No Lymphadenopathy] : no lymphadenopathy [Regular Rate Rhythm] : regular rate rhythm [No Murmurs] : no murmurs [Clear to Auscultation B/L] : clear to auscultation bilaterally [Soft] : soft [Non-tender] : non-tender [Non-distended] : non-distended [No HSM] : No HSM [No Lesions] : no lesions [No Mass] : no mass [Oriented x3] : oriented x3 [Examination Of The Breasts] : a normal appearance [No Masses] : no breast masses were palpable [Labia Majora] : normal [Labia Minora] : normal [Normal] : normal [Uterine Adnexae] : normal

## 2024-04-04 ENCOUNTER — APPOINTMENT (OUTPATIENT)
Dept: PAIN MANAGEMENT | Facility: CLINIC | Age: 59
End: 2024-04-04
Payer: MEDICARE

## 2024-04-04 DIAGNOSIS — M54.50 LOW BACK PAIN, UNSPECIFIED: ICD-10-CM

## 2024-04-04 PROCEDURE — 99213 OFFICE O/P EST LOW 20 MIN: CPT

## 2024-04-04 NOTE — PHYSICAL EXAM
[Normal Coordination] : normal coordination [Normal DTR UE/LE] : normal DTR UE/LE  [Normal Sensation] : normal sensation [Normal Mood and Affect] : normal mood and affect [Oriented] : oriented [Able to Communicate] : able to communicate [Well Developed] : well developed [Well Nourished] : well nourished [Flexion] : flexion [Extension] : extension [] : patient ambulates with assistive device [FreeTextEntry8] : R>L

## 2024-04-04 NOTE — HISTORY OF PRESENT ILLNESS
[FreeTextEntry1] : ORIGINAL PRESENTATION: This is a 58-year-old female here to establish care for lower back pain. She reports occasional radicular features into the left leg. The pain travels down to the knee. She was previously treated by another pain management provider who performed trigger point injections and medial branch blocks which provided no relief of her symptoms. MRI of the lumbar spine from 2022 was reviewed and is documented below. Patient states she never trialed epidural steroid injections nor physical therapy in the past. She is interested treating her pain conservatively at this time.  PATIENT PRESENTS FOR FOLLOW UP: She is under our care for lower back pain. She reports occasional radicular features into the left leg. The pain travels down to the knee. The pain has not responded to conservative care, including medications, stretching, as well as active modalities, such as 6 weeks of physical therapy. The option to trial injection therapy was discussed and she is agreeable.

## 2024-04-04 NOTE — ASSESSMENT
[FreeTextEntry1] : This is a 58-year-old female complains of low back pain with occasional radicular features in the left lower extremity.  She was previously under the care of a pain management provide underwent trigger point and medial branch block injections with no relief of her symptoms. The pain has not responded to conservative care, including medications, stretching, as well as active modalities, such as physical therapy.  We will proceed with a LT L4-5 TFESI x1 NO mac. In the interim, I will send Amitriptyline 50 mg, Lidocaine patches and Meloxicam to the pharmacy for symptom control. All this patients questions were answered and the conversation was understood well.   Patient had a MRI that shows a radicular component along with pain referred into the lower extremity. Patient has trialed rehab (home exercise, physical therapy or chiropractic care) and medications. I will schedule a Left L4-L5 TFESI.  Risk, benefits, pros and cons of procedure were explained to the patient using models and diagrams and their questions were answered.   I, Lexis Peck, attest that this documentation has been prepared under the direction and in the presence of Provider Stephanie Boyd MD.   Thank you for allowing me to assist in the management of this patient.    Best Regards,    Stephanie Boyd M.D., FAAPMR   Diplomate, American Board of Physical Medicine and Rehabilitation Diplomate, American Board of Pain Medicine  Diplomate, American Board of Pain Management

## 2024-04-16 ENCOUNTER — APPOINTMENT (OUTPATIENT)
Dept: PAIN MANAGEMENT | Facility: CLINIC | Age: 59
End: 2024-04-16
Payer: MEDICARE

## 2024-04-16 PROCEDURE — 64483 NJX AA&/STRD TFRM EPI L/S 1: CPT | Mod: LT

## 2024-04-16 PROCEDURE — 94761 N-INVAS EAR/PLS OXIMETRY MLT: CPT

## 2024-04-16 PROCEDURE — 93770 DETERMINATION VENOUS PRESS: CPT

## 2024-04-16 PROCEDURE — 93040 RHYTHM ECG WITH REPORT: CPT | Mod: 79

## 2024-04-16 NOTE — PROCEDURE
[FreeTextEntry3] : SELECTIVE TRANSFORAMINAL LUMBAR EPIDURAL NERVE ROOT INJECTION UNDER FLUOROSCOPY   Date:  2024  Patient: Tiki Hurtado  :  1965   Preoperative Diagnosis: Right L4 radiculitis  Procedure: Selective Left L4-5 Transforaminal Lumbar Epidural Nerve Root Injection under Fluoroscopy  Physician: Stephanie Boyd MD, FAAPMR    Anesthesia: See nurses notes/Local/Cold spray  Medical Necessity:  Failure of conservative management.  Indication for Fluoroscopy:  This procedure requires the precise placement of the spinal needle into the specific paravertebral foramen.  It is the only way to accurately and safely perform the injection.  CONSENT: The possible complications including infection, bleeding, nerve damage, or failure of the procedure; though unusual, are theoretically possible. The patient was educated about the of the procedure and alternative therapies. All questions were answered and the patient freely gave consent to proceed.   Monitoring:  Patient had continuous blood pressure, EKG, and pulse oximetry throughout the case. See nurse's notes.  PROCEDURE NOTE:  The patient was then positioned on the fluoroscopy table in the prone position with a pillow beneath the pelvis to reduce lumbar lordosis. The lumbar area was prepped with betadine solution and draped in the usual manner. A time out was performed.  Cold spray was used to localize the area. The fluoroscope was used to identify the L3///L4///L5 vertebral body on the AP projection. It was then rotated into an oblique projection until the superior articulating process of the L5 (inferior) vertebra is projected beneath the 6 o'clock position of the L4 (superior) vertebrae. The 22 gauge 5inch needle was inserted in the skin at a point overlying the superior articulating process of the inferior vertebra and aimed for the 6 o'clock position of the superior vertebrae's pedicle.  After the needle contacted bone, a lateral projection was obtained to insure that the needle tip was in proximity with the vertebral body. Paresthesias were not noted.  One ml of Omnipaque 240 was injected and a neurogram was obtained. Following demonstration of the neurogram, 1 ml of Preservative free normal saline and 1 ml of depomedrol (80mg) was injected. The small volume and relatively high concentration was chosen to preserve selectivity and diagnostic value of the injection. There was no CSF or heme identified. There were no signs of, intravascular block or hypotension. The needle was removed intact. A band aid was place on the site.     Epidurogram: The nerve root was observed in its outline on the neurogram. Distal and proximal spread was noted pointing away from epidural fibrosis/scarring.   Complications: None. The patient tolerated the procedure well.     Disposition: I have examined the patient and there are no new physical findings since the original presentation.  Sensory and motor function were intact. The patient met discharge criteria see nurses notes. The discharge instruction sheet was reviewed and given to the patient. The patient was discharged home with a . If patient gets sustained relief will have patient do modified planks 3 times a day on carpet or yoga mat starting at 5 seconds building up to 1 minute without grimacing/Valsalva and walking.      Comment: 1st SNRI today, depending on effectiveness would schedule 2nd SNRI in 1-2 weeks vs caudal epidural steroid vs follow up in office depending on insurances. Follow up office. Call if any problems     This document was electronically signed by:    Stephanie Boyd MD, FAAPMR Diplomate, American Board of Physical Medicine and Rehabilitation Diplomate, American Board of Pain Medicine

## 2024-04-30 ENCOUNTER — APPOINTMENT (OUTPATIENT)
Dept: OTOLARYNGOLOGY | Facility: CLINIC | Age: 59
End: 2024-04-30
Payer: MEDICARE

## 2024-04-30 DIAGNOSIS — H93.8X3 OTHER SPECIFIED DISORDERS OF EAR, BILATERAL: ICD-10-CM

## 2024-04-30 PROCEDURE — 99213 OFFICE O/P EST LOW 20 MIN: CPT | Mod: 25

## 2024-04-30 PROCEDURE — 69210 REMOVE IMPACTED EAR WAX UNI: CPT

## 2024-04-30 NOTE — HISTORY OF PRESENT ILLNESS
[FreeTextEntry1] : Patient is following up today for c/o clogged ears. She states since their last visit their symptoms have stayed the same and she believes she has a wax build up. . She is otherwise okay.  No further complaints

## 2024-04-30 NOTE — PHYSICAL EXAM
[de-identified] : Bilateral impacted wax cleaned with curette [Normal] : mucosa is normal [Midline] : trachea located in midline position

## 2024-05-02 ENCOUNTER — APPOINTMENT (OUTPATIENT)
Dept: PAIN MANAGEMENT | Facility: CLINIC | Age: 59
End: 2024-05-02
Payer: MEDICARE

## 2024-05-02 PROCEDURE — 99214 OFFICE O/P EST MOD 30 MIN: CPT

## 2024-05-02 NOTE — HISTORY OF PRESENT ILLNESS
[FreeTextEntry1] : ORIGINAL PRESENTATION: This is a 59-year-old female here to establish care for lower back pain. She reports occasional radicular features into the left leg. The pain travels down to the knee. She was previously treated by another pain management provider who performed trigger point injections and medial branch blocks which provided no relief of her symptoms. MRI of the lumbar spine from 2022 was reviewed and is documented below. Patient states she never trialed epidural steroid injections nor physical therapy in the past. She is interested treating her pain conservatively at this time.  PATIENT PRESENTS FOR FOLLOW UP: She presents for a revisit. She is s/p a left L4-5 TFESI on 4/16/24 with 50% relief. She reports her lower back pain has subsided. Her left leg pain has resolved. She is now experiencing pain down her right leg and wishes to undergo another injection to target the right side. We will schedule her for a Right L4-5 TFESI without MAC. She continues to be medically managed with Amitriptyline 50 mg, Lidocaine patches and Meloxicam.

## 2024-05-02 NOTE — ASSESSMENT
[FreeTextEntry1] : This is a 59-year-old female complains of low back pain with occasional radicular features. She recently underwent a LT L4-5 TFESI with 50% relief. Today, she is complaining of pain radiating down the right leg. Shewill proceed with a RT L4-5 TFESI x1 without MAC. She will continue on Amitriptyline 50 mg, Lidocaine patches and Meloxicam. All this patient's questions were answered and the conversation was understood well.   Patient had a MRI that shows a radicular component along with pain referred into the lower extremity. Patient has trialed rehab (home exercise, physical therapy or chiropractic care) and medications. I will schedule a Right L4-L5 TFESI.  Risk, benefits, pros and cons of procedure were explained to the patient using models and diagrams and their questions were answered.   MELONY Mai MD

## 2024-05-03 ENCOUNTER — RX RENEWAL (OUTPATIENT)
Age: 59
End: 2024-05-03

## 2024-05-03 RX ORDER — FEXOFENADINE HYDROCHLORIDE 180 MG/1
180 TABLET ORAL
Qty: 30 | Refills: 5 | Status: ACTIVE | COMMUNITY
Start: 2021-06-25 | End: 1900-01-01

## 2024-05-07 ENCOUNTER — APPOINTMENT (OUTPATIENT)
Dept: OBGYN | Facility: CLINIC | Age: 59
End: 2024-05-07

## 2024-05-21 ENCOUNTER — APPOINTMENT (OUTPATIENT)
Dept: PAIN MANAGEMENT | Facility: CLINIC | Age: 59
End: 2024-05-21
Payer: MEDICARE

## 2024-05-21 DIAGNOSIS — M54.16 RADICULOPATHY, LUMBAR REGION: ICD-10-CM

## 2024-05-21 PROCEDURE — 93040 RHYTHM ECG WITH REPORT: CPT | Mod: 79

## 2024-05-21 PROCEDURE — 93770 DETERMINATION VENOUS PRESS: CPT

## 2024-05-21 PROCEDURE — 94761 N-INVAS EAR/PLS OXIMETRY MLT: CPT | Mod: 59

## 2024-05-21 PROCEDURE — 64483 NJX AA&/STRD TFRM EPI L/S 1: CPT | Mod: RT

## 2024-05-21 NOTE — PROCEDURE
[FreeTextEntry3] : SELECTIVE TRANSFORAMINAL LUMBAR EPIDURAL NERVE ROOT INJECTION UNDER FLUOROSCOPY   Date:  2024  Patient: Tiki Hurtado  :  1965   Preoperative Diagnosis: Right L4 radiculitis  Procedure: Selective Right L4-5 Transforaminal Lumbar Epidural Nerve Root Injection under Fluoroscopy  Physician: Stephanie Boyd MD, FAAPMR    Anesthesia: See nurses notes/Local/Cold spray  Medical Necessity:  Failure of conservative management.  Indication for Fluoroscopy:  This procedure requires the precise placement of the spinal needle into the specific paravertebral foramen.  It is the only way to accurately and safely perform the injection.  CONSENT: The possible complications including infection, bleeding, nerve damage, or failure of the procedure; though unusual, are theoretically possible. The patient was educated about the of the procedure and alternative therapies. All questions were answered and the patient freely gave consent to proceed.   Monitoring:  Patient had continuous blood pressure, EKG, and pulse oximetry throughout the case. See nurse's notes.  PROCEDURE NOTE:  The patient was then positioned on the fluoroscopy table in the prone position with a pillow beneath the pelvis to reduce lumbar lordosis. The lumbar area was prepped with betadine solution and draped in the usual manner. A time out was performed.  Cold spray was used to localize the area. The fluoroscope was used to identify the L3///L4///L5 vertebral body on the AP projection. It was then rotated into an oblique projection until the superior articulating process of the L5 (inferior) vertebra is projected beneath the 6 o'clock position of the L4 (superior) vertebrae. The 22 gauge 5inch needle was inserted in the skin at a point overlying the superior articulating process of the inferior vertebra and aimed for the 6 o'clock position of the superior vertebrae's pedicle.  After the needle contacted bone, a lateral projection was obtained to insure that the needle tip was in proximity with the vertebral body. Paresthesias were not noted.  One ml of Omnipaque 240 was injected and a neurogram was obtained. Following demonstration of the neurogram, 1 ml of Preservative free normal saline and 1 ml of depomedrol (80mg) was injected. The small volume and relatively high concentration was chosen to preserve selectivity and diagnostic value of the injection. There was no CSF or heme identified. There were no signs of, intravascular block or hypotension. The needle was removed intact. A band aid was place on the site.     Epidurogram: The nerve root was observed in its outline on the neurogram. Distal and proximal spread was noted pointing away from epidural fibrosis/scarring.   Complications: None. The patient tolerated the procedure well.     Disposition: I have examined the patient and there are no new physical findings since the original presentation.  Sensory and motor function were intact. The patient met discharge criteria see nurses notes. The discharge instruction sheet was reviewed and given to the patient. The patient was discharged home with a . If patient gets sustained relief will have patient do modified planks 3 times a day on carpet or yoga mat starting at 5 seconds building up to 1 minute without grimacing/Valsalva and walking.      Comment: 2nd SNRI today, depending on effectiveness would schedule 3rd SNRI in 1-2 weeks vs caudal epidural steroid vs follow up in office depending on insurances. Follow up office. Call if any problems     This document was electronically signed by:    Stephanie Boyd MD, FAAPMR Diplomate, American Board of Physical Medicine and Rehabilitation Diplomate, American Board of Pain Medicine

## 2024-05-23 ENCOUNTER — APPOINTMENT (OUTPATIENT)
Dept: OPHTHALMOLOGY | Facility: CLINIC | Age: 59
End: 2024-05-23
Payer: MEDICARE

## 2024-05-23 ENCOUNTER — OUTPATIENT (OUTPATIENT)
Dept: OUTPATIENT SERVICES | Facility: HOSPITAL | Age: 59
LOS: 1 days | End: 2024-05-23
Payer: MEDICARE

## 2024-05-23 DIAGNOSIS — H35.013 CHANGES IN RETINAL VASCULAR APPEARANCE, BILATERAL: ICD-10-CM

## 2024-05-23 DIAGNOSIS — H26.9 UNSPECIFIED CATARACT: ICD-10-CM

## 2024-05-23 DIAGNOSIS — E11.9 TYPE 2 DIABETES MELLITUS WITHOUT COMPLICATIONS: ICD-10-CM

## 2024-05-23 DIAGNOSIS — H01.003 UNSPECIFIED BLEPHARITIS RIGHT EYE, UNSPECIFIED EYELID: ICD-10-CM

## 2024-05-23 DIAGNOSIS — H16.229 KERATOCONJUNCTIVITIS SICCA, NOT SPECIFIED AS SJOGREN'S, UNSPECIFIED EYE: ICD-10-CM

## 2024-05-23 DIAGNOSIS — Q13.2 OTHER CONGENITAL MALFORMATIONS OF IRIS: ICD-10-CM

## 2024-05-23 DIAGNOSIS — Z98.890 OTHER SPECIFIED POSTPROCEDURAL STATES: Chronic | ICD-10-CM

## 2024-05-23 DIAGNOSIS — H02.88A MEIBOMIAN GLAND DYSFUNCTION RIGHT EYE, UPPER AND LOWER EYELIDS: ICD-10-CM

## 2024-05-23 DIAGNOSIS — H53.8 OTHER VISUAL DISTURBANCES: ICD-10-CM

## 2024-05-23 PROCEDURE — 99213 OFFICE O/P EST LOW 20 MIN: CPT

## 2024-05-30 ENCOUNTER — APPOINTMENT (OUTPATIENT)
Dept: PAIN MANAGEMENT | Facility: CLINIC | Age: 59
End: 2024-05-30

## 2024-05-31 ENCOUNTER — APPOINTMENT (OUTPATIENT)
Dept: UROGYNECOLOGY | Facility: CLINIC | Age: 59
End: 2024-05-31
Payer: MEDICARE

## 2024-05-31 VITALS
BODY MASS INDEX: 34.66 KG/M2 | SYSTOLIC BLOOD PRESSURE: 102 MMHG | HEART RATE: 112 BPM | DIASTOLIC BLOOD PRESSURE: 67 MMHG | WEIGHT: 203 LBS | HEIGHT: 64 IN

## 2024-05-31 DIAGNOSIS — N39.0 URINARY TRACT INFECTION, SITE NOT SPECIFIED: ICD-10-CM

## 2024-05-31 DIAGNOSIS — R39.15 URGENCY OF URINATION: ICD-10-CM

## 2024-05-31 DIAGNOSIS — R15.9 FULL INCONTINENCE OF FECES: ICD-10-CM

## 2024-05-31 DIAGNOSIS — H61.23 IMPACTED CERUMEN, BILATERAL: ICD-10-CM

## 2024-05-31 DIAGNOSIS — F32.A DEPRESSION, UNSPECIFIED: ICD-10-CM

## 2024-05-31 DIAGNOSIS — N39.41 URGE INCONTINENCE: ICD-10-CM

## 2024-05-31 DIAGNOSIS — E07.9 DISORDER OF THYROID, UNSPECIFIED: ICD-10-CM

## 2024-05-31 DIAGNOSIS — R35.1 NOCTURIA: ICD-10-CM

## 2024-05-31 DIAGNOSIS — R51.9 HEADACHE, UNSPECIFIED: ICD-10-CM

## 2024-05-31 DIAGNOSIS — Z82.49 FAMILY HISTORY OF ISCHEMIC HEART DISEASE AND OTHER DISEASES OF THE CIRCULATORY SYSTEM: ICD-10-CM

## 2024-05-31 DIAGNOSIS — R15.2 FULL INCONTINENCE OF FECES: ICD-10-CM

## 2024-05-31 DIAGNOSIS — Z87.448 PERSONAL HISTORY OF OTHER DISEASES OF URINARY SYSTEM: ICD-10-CM

## 2024-05-31 DIAGNOSIS — Z86.73 PERSONAL HISTORY OF TRANSIENT ISCHEMIC ATTACK (TIA), AND CEREBRAL INFARCTION W/OUT RESIDUAL DEFICITS: ICD-10-CM

## 2024-05-31 PROCEDURE — 99205 OFFICE O/P NEW HI 60 MIN: CPT | Mod: 25

## 2024-05-31 PROCEDURE — 99215 OFFICE O/P EST HI 40 MIN: CPT | Mod: 25

## 2024-05-31 PROCEDURE — 99459 PELVIC EXAMINATION: CPT

## 2024-05-31 PROCEDURE — 51701 INSERT BLADDER CATHETER: CPT

## 2024-05-31 RX ORDER — MIDODRINE HYDROCHLORIDE 10 MG/1
10 TABLET ORAL
Refills: 0 | Status: ACTIVE | COMMUNITY

## 2024-05-31 RX ORDER — PANTOPRAZOLE SODIUM 40 MG/1
40 TABLET, DELAYED RELEASE ORAL DAILY
Refills: 0 | Status: COMPLETED | COMMUNITY
End: 2024-05-31

## 2024-05-31 RX ORDER — METFORMIN HYDROCHLORIDE 500 MG/1
500 TABLET, COATED ORAL TWICE DAILY
Refills: 0 | Status: COMPLETED | COMMUNITY
End: 2024-05-31

## 2024-05-31 RX ORDER — METFORMIN HYDROCHLORIDE 1000 MG/1
1000 TABLET, COATED ORAL
Refills: 0 | Status: ACTIVE | COMMUNITY

## 2024-05-31 RX ORDER — MIDODRINE HYDROCHLORIDE 5 MG/1
5 TABLET ORAL 3 TIMES DAILY
Refills: 0 | Status: COMPLETED | COMMUNITY
End: 2024-05-31

## 2024-05-31 RX ORDER — MESALAMINE 375 MG/1
0.38 CAPSULE, EXTENDED RELEASE ORAL AT BEDTIME
Refills: 0 | Status: COMPLETED | COMMUNITY
End: 2024-05-31

## 2024-05-31 RX ORDER — FOLIC ACID 1 MG/1
1 TABLET ORAL DAILY
Qty: 30 | Refills: 4 | Status: COMPLETED | COMMUNITY
Start: 2023-03-13 | End: 2024-05-31

## 2024-06-03 PROBLEM — R35.1 NOCTURIA: Status: ACTIVE | Noted: 2024-06-03

## 2024-06-03 PROBLEM — N39.41 URGE INCONTINENCE OF URINE: Status: ACTIVE | Noted: 2024-06-03

## 2024-06-03 PROBLEM — R15.9 INCONTINENCE OF FECES WITH FECAL URGENCY: Status: ACTIVE | Noted: 2024-06-03

## 2024-06-03 LAB
APPEARANCE: CLEAR
BILIRUBIN URINE: NEGATIVE
BLOOD URINE: NEGATIVE
COLOR: YELLOW
GLUCOSE QUALITATIVE U: NEGATIVE MG/DL
KETONES URINE: NEGATIVE MG/DL
LEUKOCYTE ESTERASE URINE: NEGATIVE
NITRITE URINE: NEGATIVE
PH URINE: 6
PROTEIN URINE: NEGATIVE MG/DL
SPECIFIC GRAVITY URINE: 1.01
URINE CULTURE <10: NORMAL
UROBILINOGEN URINE: 0.2 MG/DL

## 2024-06-03 NOTE — REASON FOR VISIT
[TextEntry] : Reason for visit: New Pt  Voids per day:   8 Voids per night:   4 Urge incontinence Yes Stress incontinence: Yes Constipation: Yes  Fecal incontinence: No Vaginal bulge: No

## 2024-06-03 NOTE — ASSESSMENT
[FreeTextEntry1] : Dominic - Discussed possible etiology with patient. Discussed workup consisting of imaging and cystoscopy. Discussed treatment options. Will start with Ellura and D-mannose. She will call with symptoms of UTI and will return for cystoscopy. Of note, she did have an elevated PVR today and we will recheck her PVR at her cysto appt.  FI - Discussed etiology and consequences, including Dominic. Discussed bowel regimen. Patient will start with fiber supplementation.  Urinary urgency and UUI - If patient continues to have these sxs will discuss OAB tx options.

## 2024-06-03 NOTE — COUNSELING
[FreeTextEntry1] : Please return to see Dr. Johnson for cystoscopy (in about 1 month).  Please obtain an ultrasound of your kidneys.  Please start taking D-mannose (2 grams daily) and cranberry extract (containing at least 36 PACs)  For better bowel emptying please use Benefiber start with 2 tablespoons daily and as needed add 1 teaspoon of Miralax titrate up or down to effect.

## 2024-06-03 NOTE — HISTORY OF PRESENT ILLNESS
[FreeTextEntry1] : 59 year para 0 presents with complaints of frequent UTIs. Typical sxs are dysuria and bladder pain. Currently has these symptoms.  Pelvic organ prolapse: no bulge, no pressure/heaviness  Stress urinary incontinence: 7 x/week no prior incontinence procedures  Overactive bladder syndrome: daily frequency 8 x/day, 4 x/night,  no urgency,  7+ x/week UUI episodes,    2 pads/day     Bladder irritants include soda ,    Prior OAB meds no  Voiding dysfunction: no Incomplete bladder emptying, no hesitancy  Lower urinary tract/vaginal symptoms: 8 UTIs per year, no hematuria, + dysuria, +bladder pain  1 BM/week   + constipation (takes colace)   Fecal incontinence + (twice a week with diarrhea)  Sexually active no   Pelvic pain no   Vaginal dryness no   LMP age 41   PMB no  PMSH: DM 2 CVA

## 2024-06-03 NOTE — PHYSICAL EXAM
[Chaperone Present] : A chaperone was present in the examining room during all aspects of the physical examination [[id number]] : A chaperone was present during the pelvic exam. [FreeTextEntry1] : Void: 200 cc  PVR: 340 cc  Urethra was prepped in sterile fashion and then a sterile 14F catheter was used by me to drain the bladder for her symptoms of Nocturia. Patient tolerated the procedure well  empty cough stress test +  atrophy +  urethral caruncle  -  vestibular tenderness -  prolapse -  urethral hypermobility -  pelvic floor dysfunction -  urethral tenderness -  bladder tenderness -  cervix Normal  uterus Normal  adnexa nonpalpable  Decreased sphincter tone  enterocele -  Absent rectal squeeze  0/5 Kegel

## 2024-06-12 ENCOUNTER — APPOINTMENT (OUTPATIENT)
Dept: PAIN MANAGEMENT | Facility: CLINIC | Age: 59
End: 2024-06-12

## 2024-06-17 ENCOUNTER — OUTPATIENT (OUTPATIENT)
Dept: OUTPATIENT SERVICES | Facility: HOSPITAL | Age: 59
LOS: 1 days | End: 2024-06-17
Payer: MEDICARE

## 2024-06-17 ENCOUNTER — LABORATORY RESULT (OUTPATIENT)
Age: 59
End: 2024-06-17

## 2024-06-17 ENCOUNTER — APPOINTMENT (OUTPATIENT)
Age: 59
End: 2024-06-17

## 2024-06-17 DIAGNOSIS — Z98.890 OTHER SPECIFIED POSTPROCEDURAL STATES: Chronic | ICD-10-CM

## 2024-06-17 DIAGNOSIS — N18.9 CHRONIC KIDNEY DISEASE, UNSPECIFIED: ICD-10-CM

## 2024-06-17 LAB
ALBUMIN SERPL ELPH-MCNC: 4.3 G/DL
ALP BLD-CCNC: 125 U/L
ALT SERPL-CCNC: 40 U/L
AST SERPL-CCNC: 12 U/L
BILIRUB DIRECT SERPL-MCNC: <0.2 MG/DL
BILIRUB INDIRECT SERPL-MCNC: NORMAL MG/DL
BILIRUB SERPL-MCNC: <0.2 MG/DL
HCT VFR BLD CALC: 42.5 %
HGB BLD-MCNC: 13.5 G/DL
IRON SATN MFR SERPL: 14 %
IRON SERPL-MCNC: 45 UG/DL
MCHC RBC-ENTMCNC: 25.3 PG
MCHC RBC-ENTMCNC: 31.8 G/DL
MCV RBC AUTO: 79.7 FL
PLATELET # BLD AUTO: 239 K/UL
PMV BLD: 9.7 FL
PROT SERPL-MCNC: 6.5 G/DL
RBC # BLD: 5.33 M/UL
RBC # FLD: 17.3 %
TIBC SERPL-MCNC: 318 UG/DL
UIBC SERPL-MCNC: 273 UG/DL
WBC # FLD AUTO: 7.49 K/UL

## 2024-06-17 PROCEDURE — 82607 VITAMIN B-12: CPT

## 2024-06-17 PROCEDURE — 82746 ASSAY OF FOLIC ACID SERUM: CPT

## 2024-06-17 PROCEDURE — 85027 COMPLETE CBC AUTOMATED: CPT

## 2024-06-17 PROCEDURE — 83550 IRON BINDING TEST: CPT

## 2024-06-17 PROCEDURE — 80076 HEPATIC FUNCTION PANEL: CPT

## 2024-06-17 PROCEDURE — 36415 COLL VENOUS BLD VENIPUNCTURE: CPT

## 2024-06-17 PROCEDURE — 82728 ASSAY OF FERRITIN: CPT

## 2024-06-17 PROCEDURE — 80048 BASIC METABOLIC PNL TOTAL CA: CPT

## 2024-06-17 PROCEDURE — 83921 ORGANIC ACID SINGLE QUANT: CPT

## 2024-06-17 PROCEDURE — 83540 ASSAY OF IRON: CPT

## 2024-06-18 DIAGNOSIS — N18.9 CHRONIC KIDNEY DISEASE, UNSPECIFIED: ICD-10-CM

## 2024-06-18 LAB
ANION GAP SERPL CALC-SCNC: 15 MMOL/L
BUN SERPL-MCNC: 20 MG/DL
CALCIUM SERPL-MCNC: 9.6 MG/DL
CHLORIDE SERPL-SCNC: 100 MMOL/L
CO2 SERPL-SCNC: 26 MMOL/L
CREAT SERPL-MCNC: 0.7 MG/DL
EGFR: 100 ML/MIN/1.73M2
FERRITIN SERPL-MCNC: 61 NG/ML
FOLATE SERPL-MCNC: 7.8 NG/ML
GLUCOSE SERPL-MCNC: 361 MG/DL
POTASSIUM SERPL-SCNC: 4.6 MMOL/L
SODIUM SERPL-SCNC: 141 MMOL/L
VIT B12 SERPL-MCNC: 336 PG/ML

## 2024-06-19 ENCOUNTER — APPOINTMENT (OUTPATIENT)
Age: 59
End: 2024-06-19
Payer: MEDICARE

## 2024-06-19 ENCOUNTER — OUTPATIENT (OUTPATIENT)
Dept: OUTPATIENT SERVICES | Facility: HOSPITAL | Age: 59
LOS: 1 days | End: 2024-06-19
Payer: MEDICARE

## 2024-06-19 VITALS
SYSTOLIC BLOOD PRESSURE: 116 MMHG | HEIGHT: 64 IN | DIASTOLIC BLOOD PRESSURE: 74 MMHG | WEIGHT: 207 LBS | HEART RATE: 69 BPM | TEMPERATURE: 97.5 F | BODY MASS INDEX: 35.34 KG/M2

## 2024-06-19 DIAGNOSIS — D50.9 IRON DEFICIENCY ANEMIA, UNSPECIFIED: ICD-10-CM

## 2024-06-19 DIAGNOSIS — Z98.890 OTHER SPECIFIED POSTPROCEDURAL STATES: Chronic | ICD-10-CM

## 2024-06-19 DIAGNOSIS — N18.9 CHRONIC KIDNEY DISEASE, UNSPECIFIED: ICD-10-CM

## 2024-06-19 PROCEDURE — 99214 OFFICE O/P EST MOD 30 MIN: CPT

## 2024-06-19 NOTE — PHYSICAL EXAM
[Restricted in physically strenuous activity but ambulatory and able to carry out work of a light or sedentary nature] : Status 1- Restricted in physically strenuous activity but ambulatory and able to carry out work of a light or sedentary nature, e.g., light house work, office work [Normal] : affect appropriate [de-identified] : uses seated walker for ambulation

## 2024-06-19 NOTE — HISTORY OF PRESENT ILLNESS
[de-identified] : Ms. SEJAL BARNARD is a 54 year old female here today for evaluation and treatment of Anemia.\par  \par  The patient was referred by Dr. Quintero.  She presents with her health aide from the [facility] complex she resides on [facility].  She reports that she was seen by her PCP, who referred her due to iron deficiency anemia.  She currently takes iron supplement, 325mg daily and is tolerating without complaint.  She states she feels tired on occasion.  \par  \par  LAB WORKUP:\par  (5/7/19) WBC 6.23, Hgb 13.7, MCV 76.7, RDW 16.1, , Na 147, ALT 42, Ferritin 36, TIBC 349, ironsat 15%, B12 normal\par  \par  HCM:\par  Mammogram (07/2018) patient reports it was benign\par  Colonoscopy ([facility] - 4/6/2018) removed 2 polyps, as per patient.  IMPRESSION:  Preparation of the colon was poor.  One 6mm polyp in the cecum, removed peicemeal using a cold biopsy forceps.  Resected and retrieved.  Random biopsies taken from transverse colo, descending, sigmoid and rectum.  Repeat in 1 year [de-identified] : 11/8/19 Patient is here for a follow-up visit for anemia with aid.  She is feeling well with no complaints.  Most recent CBC is stable with ferritin 111 and ironsat 17%.  Patient denies fever, chills, nausea, vomiting, bleeding or pica.  She does not take oral iron as she had IV iron in the past. She reports her energy has improved slightly as well.    2/14/2020  Patient is here for a follow-up visit for anemia with aid.  She is feeling well with no complaints. Most recent CBC and iron studies from 2.7.2020 remains stable off oral iron.   Patient denies fever, chills, nausea, vomiting, bleeding or pica.  She followed with Dr. Pak who performed endoscopy which showed GERD with esophagitis, acute gastritis with and without bleeding and duodenitis.    8/4/20 Patient is here for follow up today for anemia. She is accompanied by her aide. She feels well. She denies severe fatigue, shortness of breath, chest pain, palpitations, or decreased energy. She does endorse black colored stools. SHe had an EGD and colonoscopy with Dr. Pak in January 2020 and continues to follow with him. Currently, she reports she is due for a CT abdomen and pelvis with contrast to assess for any bleeding.   4/2/21 Patient is here for follow up today for anemia, accompanied by her aide.  She denies severe fatigue, shortness of breath, chest pain, palpitations, or decreased energy.    3/22/22 Patient is here for follow up today for anemia, accompanied by aide.  Reviewed most recent labwork which shows mild leukopenia (normal differential) but no evidence of anemia and iron stores are adequate.  She denies shortness of breath, chest pain, palpitations, dark stools or overt bleeding. Last EGD and colonoscopy was performed by Dr. Pak in January 2020.    12/2/22 Patient is here for follow up today for anemia, accompanied by aide.  Reviewed most recent labwork which shows no evidence of anemia; ferritin 32, ironsat 15%, TIBC 328.  She denies shortness of breath, chest pain, palpitations, dark stools or overt bleeding. Last EGD and colonoscopy was performed by Dr. Pak in January 2020.  She feels more tired.  She is gaining weight.    7/20/23 Patient is here for follow up today for anemia, accompanied by aide.  Reviewed most recent labwork which shows mild leukopoenia (normal differential) + anemia with hgb 11.1g/dL; ferritin 17, ironsat 11%, TIBC 305.  She denies fevers, chills, shortness of breath, chest pain, palpitations, dizziness, dark stools or overt bleeding.  Last EGD and colonoscopy was performed by Dr. Pak in January 2020.  She had repeat colonoscopy last week on 7/10 but it was unsuccessful due to poor prep; rescheduled for 9/18.  She endorses fatigue.  She is lost about 7lbs in the last 3 months via portion control.    2/20/24 Patient is here for follow up today for anemia, accompanied by aide.  Reviewed most recent labwork which shows mild leukopoenia (normal differential) + anemia with hgb 13.8g/dL; ferritin 99, ironsat 16%, TIBC 298.  She denies fevers, chills, shortness of breath, chest pain, palpitations, dizziness, dark stools or overt bleeding.  She had repeat colonoscopy in 7/2023 but it was unsuccessful due to poor prep; rescheduled for 9/18.  She endorses fatigue.    6/19/24 Patient is here for follow up today for anemia, accompanied by aide.  Reviewed most recent labwork which shows mild anemia with hgb 13.5g/dL; ferritin 61, ironsat 16%, TIBC 298.  She denies fevers, chills, shortness of breath, chest pain, palpitations, dizziness or overt bleeding.  She had repeat colonoscopy in 7/2023 but it was unsuccessful due to poor prep; rescheduled for 9/18.  She endorses fatigue.  GYN exam was performed in 04/2024.

## 2024-06-19 NOTE — CONSULT LETTER
[Dear  ___] : Dear  [unfilled], [Consult Letter:] : I had the pleasure of evaluating your patient, [unfilled]. [Please see my note below.] : Please see my note below. [Sincerely,] : Sincerely, [FreeTextEntry3] : John Boateng DO\par  Attending Physician,\par  Hematology/ Medical Oncology\par  [phone] office\par  \par

## 2024-06-19 NOTE — ASSESSMENT
[FreeTextEntry1] : 57 yo woman with PMH of ulcerative colitis here for follow up on history of iron deficiency anemia. She is being seen by a private GI; She followed with Dr. Pak who performed endoscopy and colonoscopy in January 2020 which showed GERD with esophagitis, acute gastritis with and without bleeding and duodenitis.  - she is on Vitamin B12 + folic acid daily  - s/p iv iron, completed in June 2019 with resolution of low iron - aim to keep ferritin >50 and iron sat >20% - patient would like to check iron stores in 6 weeks since no anemia and feeling well besides baseline fatigue; no additional IV iron needed at this time but will likely need to replete in 6 weeks  - followup with GI, Dr. Parmer, as scheduled for repeat colonoscopy and in view of ulcerative colitis   Encouraged to keep up to date with age appropriate screenings including but not limited to GYN pelvic exam, colonoscopy and upper endoscopy.  Labwork in 6 weeks: CBC, iron studies, ferritin  RTC 3 months with Dr. Boateng with CBC, BMP, LFTs, iron studies, ferritin 1-2 days prior

## 2024-07-01 LAB — METHYLMALONATE SERPL-SCNC: 147 NMOL/L

## 2024-07-02 ENCOUNTER — APPOINTMENT (OUTPATIENT)
Dept: UROGYNECOLOGY | Facility: CLINIC | Age: 59
End: 2024-07-02

## 2024-07-11 ENCOUNTER — APPOINTMENT (OUTPATIENT)
Dept: PAIN MANAGEMENT | Facility: CLINIC | Age: 59
End: 2024-07-11
Payer: MEDICARE

## 2024-07-11 DIAGNOSIS — M54.16 RADICULOPATHY, LUMBAR REGION: ICD-10-CM

## 2024-07-11 DIAGNOSIS — R42 DIZZINESS AND GIDDINESS: ICD-10-CM

## 2024-07-11 DIAGNOSIS — M54.50 LOW BACK PAIN, UNSPECIFIED: ICD-10-CM

## 2024-07-11 PROCEDURE — 99214 OFFICE O/P EST MOD 30 MIN: CPT

## 2024-07-11 RX ORDER — DICLOFENAC SODIUM 75 MG/1
75 TABLET, DELAYED RELEASE ORAL
Qty: 60 | Refills: 0 | Status: ACTIVE | COMMUNITY
Start: 2024-07-11 | End: 1900-01-01

## 2024-07-30 ENCOUNTER — APPOINTMENT (OUTPATIENT)
Age: 59
End: 2024-07-30

## 2024-07-30 ENCOUNTER — LABORATORY RESULT (OUTPATIENT)
Age: 59
End: 2024-07-30

## 2024-07-30 ENCOUNTER — OUTPATIENT (OUTPATIENT)
Dept: OUTPATIENT SERVICES | Facility: HOSPITAL | Age: 59
LOS: 1 days | End: 2024-07-30
Payer: MEDICARE

## 2024-07-30 DIAGNOSIS — D50.9 IRON DEFICIENCY ANEMIA, UNSPECIFIED: ICD-10-CM

## 2024-07-30 LAB
HCT VFR BLD CALC: 40.5 %
HGB BLD-MCNC: 12.8 G/DL
MCHC RBC-ENTMCNC: 24.9 PG
MCHC RBC-ENTMCNC: 31.6 G/DL
MCV RBC AUTO: 78.6 FL
PLATELET # BLD AUTO: 254 K/UL
PMV BLD: 9.7 FL
RBC # BLD: 5.15 M/UL
RBC # FLD: 17.1 %
WBC # FLD AUTO: 5.32 K/UL

## 2024-07-30 PROCEDURE — 82728 ASSAY OF FERRITIN: CPT

## 2024-07-30 PROCEDURE — 80048 BASIC METABOLIC PNL TOTAL CA: CPT

## 2024-07-30 PROCEDURE — 83540 ASSAY OF IRON: CPT

## 2024-07-30 PROCEDURE — 36415 COLL VENOUS BLD VENIPUNCTURE: CPT

## 2024-07-30 PROCEDURE — 83550 IRON BINDING TEST: CPT

## 2024-07-30 PROCEDURE — 80076 HEPATIC FUNCTION PANEL: CPT

## 2024-07-30 PROCEDURE — 85027 COMPLETE CBC AUTOMATED: CPT

## 2024-07-31 DIAGNOSIS — D50.9 IRON DEFICIENCY ANEMIA, UNSPECIFIED: ICD-10-CM

## 2024-07-31 LAB
ALBUMIN SERPL ELPH-MCNC: 4.5 G/DL
ALP BLD-CCNC: 90 U/L
ALT SERPL-CCNC: 28 U/L
ANION GAP SERPL CALC-SCNC: 14 MMOL/L
AST SERPL-CCNC: 17 U/L
BILIRUB DIRECT SERPL-MCNC: <0.2 MG/DL
BILIRUB INDIRECT SERPL-MCNC: >0 MG/DL
BILIRUB SERPL-MCNC: 0.2 MG/DL
BUN SERPL-MCNC: 17 MG/DL
CALCIUM SERPL-MCNC: 9.3 MG/DL
CHLORIDE SERPL-SCNC: 103 MMOL/L
CO2 SERPL-SCNC: 23 MMOL/L
CREAT SERPL-MCNC: 0.7 MG/DL
EGFR: 100 ML/MIN/1.73M2
FERRITIN SERPL-MCNC: 82 NG/ML
GLUCOSE SERPL-MCNC: 239 MG/DL
IRON SATN MFR SERPL: 17 %
IRON SERPL-MCNC: 50 UG/DL
POTASSIUM SERPL-SCNC: 4.1 MMOL/L
PROT SERPL-MCNC: 6.2 G/DL
SODIUM SERPL-SCNC: 140 MMOL/L
TIBC SERPL-MCNC: 293 UG/DL
UIBC SERPL-MCNC: 243 UG/DL

## 2024-09-12 ENCOUNTER — APPOINTMENT (OUTPATIENT)
Dept: PAIN MANAGEMENT | Facility: CLINIC | Age: 59
End: 2024-09-12
Payer: MEDICARE

## 2024-09-12 DIAGNOSIS — M54.50 LOW BACK PAIN, UNSPECIFIED: ICD-10-CM

## 2024-09-12 DIAGNOSIS — M47.817 SPONDYLOSIS W/OUT MYELOPATHY OR RADICULOPATHY, LUMBOSACRAL REGION: ICD-10-CM

## 2024-09-12 PROCEDURE — 99214 OFFICE O/P EST MOD 30 MIN: CPT

## 2024-09-12 NOTE — PHYSICAL EXAM
[Normal Coordination] : normal coordination [Normal DTR UE/LE] : normal DTR UE/LE  [Normal Sensation] : normal sensation [Normal Mood and Affect] : normal mood and affect [Oriented] : oriented [Able to Communicate] : able to communicate [Well Developed] : well developed [Well Nourished] : well nourished [Extension] : extension [] : patient ambulates with assistive device [FreeTextEntry8] : R>L [de-identified] : R > L

## 2024-09-12 NOTE — ASSESSMENT
[FreeTextEntry1] : This is a 59-year-old female complains of low back pain with occasional radicular features. She is s/p a left L4-5 TFESI on 4/16/24 with 50% relief. She reports her lower back pain had subsided. Her left leg pain had resolved. She was then complaining of pain down her right leg and therefore underwent a Right L4-5 TFESI without MAC on 5/21/24. The injection gave her >50% relief of her right leg pain. Today, she complains of localized lower back pain. The pain travels across the lower back made worse with facet loading. She is not experiencing any radicular features at the present time. Pain is an 8/10 on the pain scale. We will proceed with a diagnostic B/L L4-S1 MBB x1 NO mac with the hope it provides relief. Patient will follow up in 1-2 weeks post injection for reassessment. All this patients questions were answered and the conversation was understood well.  Patient had paravertebral tenderness and pain on spinal movement with facet loading. Patient has trialed rehab (home exercise, physical therapy or chiropractic care) and medications. I will schedule a bilateral diagnostic lumbar medial branch injection L4-S1, if 70% immediate relief for greater than 30 minutes or 50% greater than 24 hours, would schedule RFA at lumbar medial branches. If greater than 50% intermediate relief for 30 minutes, would schedule a second diagnostic lumbar medial branch block, and if greater than 50% intermediate relief for 30 minutes, would schedule a RFA at lumbar medial branches.  Risk, benefits, pros and cons of procedure were explained to the patient using models and diagrams and their questions were answered.  Entered by Lexis Peck, acting as scribe for Dr. Boyd.  Documentation recorded by the scribe, in my presence, accurately reflects the service I personally performed, and the decisions made by me with my edits as appropriate.     Thank you for allowing me to assist in the management of this patient.     Best Regards,     Stephanie Boyd M.D., FAAPMR     Diplomate, American Board of Physical Medicine and Rehabilitation Diplomate, American Board of Pain Medicine

## 2024-09-12 NOTE — HISTORY OF PRESENT ILLNESS
[FreeTextEntry1] : ORIGINAL PRESENTATION: This is a 59-year-old female here to establish care for lower back pain. She reports occasional radicular features into the left leg. The pain travels down to the knee. She was previously treated by another pain management provider who performed trigger point injections and medial branch blocks which provided no relief of her symptoms. MRI of the lumbar spine from 2022 was reviewed and is documented below. Patient states she never trialed epidural steroid injections nor physical therapy in the past. She is interested treating her pain conservatively at this time.  PATIENT PRESENTS FOR FOLLOW UP: She is under our care for lower back pain with radicular features into the left leg. She previously underwent a left L4-5 TFESI on 4/16/24 with 50% relief. She reports her lower back pain had subsided. Her left leg pain had resolved. She was then complaining of pain down her right leg and, therefore, underwent a Right L4-5 TFESI without MAC on 5/21/24. The injection gave her >50% relief of her right leg pain. She is now able to carry able to carry out her ADLs without radiculopathy. She is attending physical therapy in the home and is not able to continue with outside PT. She states that her lower back pain is an 8/10 on the pain scale. She is not currently experiencing radicular features into the lower extremities. The pain is localized across the lower back. Right sided pain is most persistent. The option to trial a diagnostic medial branch block was discussed in detail and she is agreeable to move forward.   She continues to be medically managed with Amitriptyline 50 mg, Lidocaine patches and Diclofenac 75 mg BID.

## 2024-09-20 ENCOUNTER — APPOINTMENT (OUTPATIENT)
Dept: OTOLARYNGOLOGY | Facility: CLINIC | Age: 59
End: 2024-09-20
Payer: MEDICARE

## 2024-09-20 VITALS — BODY MASS INDEX: 35.34 KG/M2 | WEIGHT: 207 LBS | HEIGHT: 64 IN

## 2024-09-20 DIAGNOSIS — H61.23 IMPACTED CERUMEN, BILATERAL: ICD-10-CM

## 2024-09-20 DIAGNOSIS — H93.8X3 OTHER SPECIFIED DISORDERS OF EAR, BILATERAL: ICD-10-CM

## 2024-09-20 DIAGNOSIS — H93.8X9 OTHER SPECIFIED DISORDERS OF EAR, UNSPECIFIED EAR: ICD-10-CM

## 2024-09-20 PROCEDURE — 99213 OFFICE O/P EST LOW 20 MIN: CPT | Mod: 25

## 2024-09-20 NOTE — PHYSICAL EXAM
[Normal] : mucosa is normal [Midline] : trachea located in midline position [de-identified] : B/L ear wax removed wtih curette

## 2024-09-20 NOTE — REASON FOR VISIT
[Subsequent Evaluation] : a subsequent evaluation for [FreeTextEntry2] : clogged ears, ear infection

## 2024-09-20 NOTE — ASSESSMENT
[FreeTextEntry1] : Wax found and cleaned. Cleaning well tolerated by patient. Patient felt improvement in cloginess after cleaning.  I explained to the patient the pathophysiology of Eustachian tube dysfunction and its implications on hearing and ear pressure. I used a drawing to show the anatomy.

## 2024-09-20 NOTE — HISTORY OF PRESENT ILLNESS
[FreeTextEntry1] : Patient returns today c/o clogged ears, ear infection. States that she is hearing popping in ears. Denies pain. Recently had 2 ear infections in 3 weeks. Went to Urgent Care and told she has B/L inner ear infection. Prescribed ear drops with improvement.

## 2024-09-23 ENCOUNTER — APPOINTMENT (OUTPATIENT)
Age: 59
End: 2024-09-23

## 2024-09-30 ENCOUNTER — OUTPATIENT (OUTPATIENT)
Dept: OUTPATIENT SERVICES | Facility: HOSPITAL | Age: 59
LOS: 1 days | End: 2024-09-30
Payer: MEDICARE

## 2024-09-30 ENCOUNTER — APPOINTMENT (OUTPATIENT)
Age: 59
End: 2024-09-30

## 2024-09-30 ENCOUNTER — LABORATORY RESULT (OUTPATIENT)
Age: 59
End: 2024-09-30

## 2024-09-30 DIAGNOSIS — D50.9 IRON DEFICIENCY ANEMIA, UNSPECIFIED: ICD-10-CM

## 2024-09-30 DIAGNOSIS — Z98.890 OTHER SPECIFIED POSTPROCEDURAL STATES: Chronic | ICD-10-CM

## 2024-09-30 LAB
ALBUMIN SERPL ELPH-MCNC: 4.4 G/DL
ALP BLD-CCNC: 94 U/L
ALT SERPL-CCNC: 17 U/L
ANION GAP SERPL CALC-SCNC: 14 MMOL/L
AST SERPL-CCNC: 11 U/L
BILIRUB DIRECT SERPL-MCNC: <0.2 MG/DL
BILIRUB INDIRECT SERPL-MCNC: >0 MG/DL
BILIRUB SERPL-MCNC: 0.2 MG/DL
BUN SERPL-MCNC: 15 MG/DL
CALCIUM SERPL-MCNC: 9.5 MG/DL
CHLORIDE SERPL-SCNC: 102 MMOL/L
CO2 SERPL-SCNC: 26 MMOL/L
CREAT SERPL-MCNC: 0.7 MG/DL
EGFR: 100 ML/MIN/1.73M2
GLUCOSE SERPL-MCNC: 142 MG/DL
HCT VFR BLD CALC: 42 %
HGB BLD-MCNC: 13.5 G/DL
IRON SATN MFR SERPL: 15 %
IRON SERPL-MCNC: 45 UG/DL
MCHC RBC-ENTMCNC: 25 PG
MCHC RBC-ENTMCNC: 32.1 G/DL
MCV RBC AUTO: 77.6 FL
PLATELET # BLD AUTO: 291 K/UL
PMV BLD: 9.6 FL
POTASSIUM SERPL-SCNC: 4.5 MMOL/L
PROT SERPL-MCNC: 6.5 G/DL
RBC # BLD: 5.41 M/UL
RBC # FLD: 16.8 %
SODIUM SERPL-SCNC: 142 MMOL/L
TIBC SERPL-MCNC: 297 UG/DL
UIBC SERPL-MCNC: 252 UG/DL
WBC # FLD AUTO: 5.36 K/UL

## 2024-09-30 PROCEDURE — 83540 ASSAY OF IRON: CPT

## 2024-09-30 PROCEDURE — 83550 IRON BINDING TEST: CPT

## 2024-09-30 PROCEDURE — 82728 ASSAY OF FERRITIN: CPT

## 2024-09-30 PROCEDURE — 80076 HEPATIC FUNCTION PANEL: CPT

## 2024-09-30 PROCEDURE — 85027 COMPLETE CBC AUTOMATED: CPT

## 2024-09-30 PROCEDURE — 80048 BASIC METABOLIC PNL TOTAL CA: CPT

## 2024-09-30 PROCEDURE — 36415 COLL VENOUS BLD VENIPUNCTURE: CPT

## 2024-10-01 ENCOUNTER — APPOINTMENT (OUTPATIENT)
Dept: PAIN MANAGEMENT | Facility: CLINIC | Age: 59
End: 2024-10-01
Payer: MEDICARE

## 2024-10-01 DIAGNOSIS — M47.817 SPONDYLOSIS W/OUT MYELOPATHY OR RADICULOPATHY, LUMBOSACRAL REGION: ICD-10-CM

## 2024-10-01 DIAGNOSIS — D50.9 IRON DEFICIENCY ANEMIA, UNSPECIFIED: ICD-10-CM

## 2024-10-01 LAB — FERRITIN SERPL-MCNC: 55 NG/ML

## 2024-10-01 PROCEDURE — 64494 INJ PARAVERT F JNT L/S 2 LEV: CPT | Mod: 50

## 2024-10-01 PROCEDURE — 94761 N-INVAS EAR/PLS OXIMETRY MLT: CPT

## 2024-10-01 PROCEDURE — 64493 INJ PARAVERT F JNT L/S 1 LEV: CPT | Mod: 50

## 2024-10-01 PROCEDURE — 93040 RHYTHM ECG WITH REPORT: CPT | Mod: 79

## 2024-10-01 PROCEDURE — 93770 DETERMINATION VENOUS PRESS: CPT

## 2024-10-01 NOTE — PROCEDURE
[FreeTextEntry3] : LUMBAR MEDIAL BRANCH INJECTION UNDER FLUOROSCOPY   Date:  10/01/2024  Patient: Tiki Hurtado  :  1965  Preoperative Diagnosis: Lumbar spondylosis; facet arthropathy L4 - S1  Postoperative Diagnosis: Same  Procedure: Diagnostic Lumbar median branch nerve injection, bilateral L4 - S1 under fluoroscopy  Physician: Stephanie Boyd MD, FAAPMR  Anesthesia: See Nurses note. Lidocaine/Cold spray   Medical Necessity:  Failure of conservative management.  Indication for Fluoroscopy:  This procedure requires the precise placement of the spinal needle.  It is the only way to accurately and safely perform the injection.  CONSENT: The possible complications including infection, bleeding, nerve damage, Hospital admission, death or failure of the procedure; though unusual, are theoretically possible. The patient was educated about the of the procedure and alternative therapies. All questions were answered and the patient freely gave consent to proceed.   Monitoring:  Patient had continuous blood pressure, EKG, and pulse oximetry throughout the case. See nurse's notes.    Procedure Note:   After obtaining written consent, the patient was placed on the fluoroscopic table in the prone position. A betadine prep was performed and the area surrounded by sterile drapes. A time out was performed. Fluoroscopy unit was positioned over the patient and images of the spine (AP and oblique views) obtained.  The entry sites for the above left L4-S1 levels median branch were determined and cold spray was used to localize the area. At each level a 22guage 3.5  inch spinal needle was placed at the level of the median branch to the facet under fluoroscopic guidance.  A dose of Lidocaine 2% 1cc was administered at each level. The needles at each level were withdrawn following administration of the medication intact. There were no signs of, intravascular block or hypotension. The needle was removed intact. A band aid was place on the site. The right side was done in similar fashion.     Complications: None. The patient tolerated the procedure well.      Disposition: I have examined the patient and there are no new physical findings since the original presentation.  Sensory and motor function were intact. The patient met discharge criteria see nurses notes. The discharge instruction sheet was reviewed and given to the patient. The patient was discharged home with a .     Comment: If 70% relief greater than 2 hours or 50% greater than 24 hours would proceed to RFA. If 50% less than 24 hours would repeat to confirm for RFA. Follow in office. Call if any problems.     Indication for RFA: The pt had a positive response to medial branch diagnostic injections and is considered a good candidate for the thermal RF ablation procedure. The diagnostic injection provided at least 50% reduction in pain for the duration of the local anesthetic.  The following criteria have been met: 1) failed response to at least 3 months of conservative management; 2) patient has LBP that is non-radicular, suggesting facet joint origin supported by absence of nerve root compression documented on the medical record on H&P and radiographic evaluation; 3) minimum of 6 months has elapsed since any prior RF treatments. If prior ablation therapy has been performed, it provided at least 50% relief for minimum of 10-12 weeks; 4) no prior spinal fusion at the vertebral level being treated;        This document was electronically signed by:   Stephanie Boyd MD, FAAPMR Diplomate, American Board of Physical Medicine and Rehabilitation Diplomate, American Board of Pain Medicine

## 2024-10-03 ENCOUNTER — OUTPATIENT (OUTPATIENT)
Dept: OUTPATIENT SERVICES | Facility: HOSPITAL | Age: 59
LOS: 1 days | End: 2024-10-03
Payer: MEDICARE

## 2024-10-03 ENCOUNTER — APPOINTMENT (OUTPATIENT)
Age: 59
End: 2024-10-03
Payer: MEDICARE

## 2024-10-03 VITALS
SYSTOLIC BLOOD PRESSURE: 115 MMHG | RESPIRATION RATE: 16 BRPM | WEIGHT: 204 LBS | BODY MASS INDEX: 34.83 KG/M2 | HEART RATE: 97 BPM | HEIGHT: 64 IN | DIASTOLIC BLOOD PRESSURE: 76 MMHG | TEMPERATURE: 97.4 F

## 2024-10-03 DIAGNOSIS — D50.9 IRON DEFICIENCY ANEMIA, UNSPECIFIED: ICD-10-CM

## 2024-10-03 DIAGNOSIS — Z98.890 OTHER SPECIFIED POSTPROCEDURAL STATES: Chronic | ICD-10-CM

## 2024-10-03 DIAGNOSIS — Z86.2 PERSONAL HISTORY OF DISEASES OF THE BLOOD AND BLOOD-FORMING ORGANS AND CERTAIN DISORDERS INVOLVING THE IMMUNE MECHANISM: ICD-10-CM

## 2024-10-03 PROCEDURE — 99214 OFFICE O/P EST MOD 30 MIN: CPT

## 2024-10-03 NOTE — PHYSICAL EXAM
[Restricted in physically strenuous activity but ambulatory and able to carry out work of a light or sedentary nature] : Status 1- Restricted in physically strenuous activity but ambulatory and able to carry out work of a light or sedentary nature, e.g., light house work, office work [Normal] : affect appropriate [de-identified] : uses seated walker for ambulation

## 2024-10-03 NOTE — CONSULT LETTER
[Dear  ___] : Dear  [unfilled], [Consult Letter:] : I had the pleasure of evaluating your patient, [unfilled]. [Please see my note below.] : Please see my note below. [Sincerely,] : Sincerely, [FreeTextEntry3] : John Boateng DO\par  Attending Physician,\par  Hematology/ Medical Oncology\par  [phone] office\par  \par

## 2024-10-03 NOTE — PHYSICAL EXAM
[Restricted in physically strenuous activity but ambulatory and able to carry out work of a light or sedentary nature] : Status 1- Restricted in physically strenuous activity but ambulatory and able to carry out work of a light or sedentary nature, e.g., light house work, office work [Normal] : affect appropriate [de-identified] : uses seated walker for ambulation

## 2024-10-03 NOTE — HISTORY OF PRESENT ILLNESS
[de-identified] : Ms. SEJAL BARNARD is a 54 year old female here today for evaluation and treatment of Anemia.\par  \par  The patient was referred by Dr. Quintero.  She presents with her health aide from the Apart[facility] complex she resides on [facility].  She reports that she was seen by her PCP, who referred her due to iron deficiency anemia.  She currently takes iron supplement, 325mg daily and is tolerating without complaint.  She states she feels tired on occasion.  \par  \par  LAB WORKUP:\par  (5/7/19) WBC 6.23, Hgb 13.7, MCV 76.7, RDW 16.1, , Na 147, ALT 42, Ferritin 36, TIBC 349, ironsat 15%, B12 normal\par  \par  HCM:\par  Mammogram (07/2018) patient reports it was benign\par  Colonoscopy ([facility] - 4/6/2018) removed 2 polyps, as per patient.  IMPRESSION:  Preparation of the colon was poor.  One 6mm polyp in the cecum, removed peicemeal using a cold biopsy forceps.  Resected and retrieved.  Random biopsies taken from transverse colo, descending, sigmoid and rectum.  Repeat in 1 year [de-identified] : 11/8/19 Patient is here for a follow-up visit for anemia with aid.  She is feeling well with no complaints.  Most recent CBC is stable with ferritin 111 and ironsat 17%.  Patient denies fever, chills, nausea, vomiting, bleeding or pica.  She does not take oral iron as she had IV iron in the past. She reports her energy has improved slightly as well.    2/14/2020  Patient is here for a follow-up visit for anemia with aid.  She is feeling well with no complaints. Most recent CBC and iron studies from 2.7.2020 remains stable off oral iron.   Patient denies fever, chills, nausea, vomiting, bleeding or pica.  She followed with Dr. Pak who performed endoscopy which showed GERD with esophagitis, acute gastritis with and without bleeding and duodenitis.    8/4/20 Patient is here for follow up today for anemia. She is accompanied by her aide. She feels well. She denies severe fatigue, shortness of breath, chest pain, palpitations, or decreased energy. She does endorse black colored stools. SHe had an EGD and colonoscopy with Dr. Pak in January 2020 and continues to follow with him. Currently, she reports she is due for a CT abdomen and pelvis with contrast to assess for any bleeding.   4/2/21 Patient is here for follow up today for anemia, accompanied by her aide.  She denies severe fatigue, shortness of breath, chest pain, palpitations, or decreased energy.    3/22/22 Patient is here for follow up today for anemia, accompanied by aide.  Reviewed most recent labwork which shows mild leukopenia (normal differential) but no evidence of anemia and iron stores are adequate.  She denies shortness of breath, chest pain, palpitations, dark stools or overt bleeding. Last EGD and colonoscopy was performed by Dr. Pak in January 2020.    12/2/22 Patient is here for follow up today for anemia, accompanied by aide.  Reviewed most recent labwork which shows no evidence of anemia; ferritin 32, ironsat 15%, TIBC 328.  She denies shortness of breath, chest pain, palpitations, dark stools or overt bleeding. Last EGD and colonoscopy was performed by Dr. Pak in January 2020.  She feels more tired.  She is gaining weight.    7/20/23 Patient is here for follow up today for anemia, accompanied by aide.  Reviewed most recent labwork which shows mild leukopoenia (normal differential) + anemia with hgb 11.1g/dL; ferritin 17, ironsat 11%, TIBC 305.  She denies fevers, chills, shortness of breath, chest pain, palpitations, dizziness, dark stools or overt bleeding.  Last EGD and colonoscopy was performed by Dr. Pak in January 2020.  She had repeat colonoscopy last week on 7/10 but it was unsuccessful due to poor prep; rescheduled for 9/18.  She endorses fatigue.  She is lost about 7lbs in the last 3 months via portion control.    2/20/24 Patient is here for follow up today for anemia, accompanied by aide.  Reviewed most recent labwork which shows mild leukopoenia (normal differential) + anemia with hgb 13.8g/dL; ferritin 99, ironsat 16%, TIBC 298.  She denies fevers, chills, shortness of breath, chest pain, palpitations, dizziness, dark stools or overt bleeding.  She had repeat colonoscopy in 7/2023 but it was unsuccessful due to poor prep; rescheduled for 9/18.  She endorses fatigue.    6/19/24 Patient is here for follow up today for anemia, accompanied by aide.  Reviewed most recent labwork which shows mild anemia with hgb 13.5g/dL; ferritin 61, ironsat 16%, TIBC 298.  She denies fevers, chills, shortness of breath, chest pain, palpitations, dizziness or overt bleeding.  She had repeat colonoscopy in 7/2023 but it was unsuccessful due to poor prep; rescheduled for 9/18.  She endorses fatigue.  GYN exam was performed in 04/2024.    10/3/24 Patient is here for follow up today for anemia, accompanied by aide.  Reviewed most recent labwork which shows mild anemia with hgb 13.5g/dL; ferritin 55, ironsat 15%, TIBC 297.  She denies fevers, chills, shortness of breath, chest pain, palpitations, dizziness or overt bleeding.  She endorses fatigue and occasional black stool.  She had repeat colonoscopy in 7/2023 but it was unsuccessful due to poor prep; she is not rescheduled yet.  GYN exam was performed in 04/2024.

## 2024-10-03 NOTE — CONSULT LETTER
[Dear  ___] : Dear  [unfilled], [Consult Letter:] : I had the pleasure of evaluating your patient, [unfilled]. [Please see my note below.] : Please see my note below. [Sincerely,] : Sincerely, [FreeTextEntry3] : John Boateng DO\par  Attending Physician,\par  Hematology/ Medical Oncology\par  [phone] office\par  \par

## 2024-10-03 NOTE — BEGINNING OF VISIT
[0] : 2) Feeling down, depressed, or hopeless: Not at all (0) [PHQ-2 Negative] : PHQ-2 Negative [PHQ-9 Negative] : PHQ-9 Negative [[id number]] : 0 [Pain Scale: ___] : On a scale of 1-10, today the patient's pain is a(n) [unfilled]. [Never] : Never [Reviewed, no changes] : Reviewed, no changes [Abdominal Pain] : no abdominal pain [Vomiting] : no vomiting [Constipation] : no constipation [Diarrhea Character] : Diarrhea: Grade 0

## 2024-10-03 NOTE — ASSESSMENT
[FreeTextEntry1] : 57 yo woman with PMH of ulcerative colitis here for follow up on history of iron deficiency anemia. She is being seen by a private GI; She followed with Dr. Pak who performed endoscopy and colonoscopy in January 2020 which showed GERD with esophagitis, acute gastritis with and without bleeding and duodenitis.  - she is on Vitamin B12 + folic acid daily  - s/p iv iron, completed in June 2019 with resolution of low iron - reiterated importance for followup with GI, Dr. Parmer, to discuss repeat colonoscopy and in view of ulcerative colitis and dropping iron stores  - aim to keep ferritin >50 and iron sat >20% - patient would like to check iron stores in 6-8 weeks since no anemia and feeling well besides baseline fatigue; no additional IV iron needed at this time but will likely need to replete in 6 weeks   Encouraged to keep up to date with age appropriate screenings including but not limited to GYN pelvic exam, colonoscopy and upper endoscopy.  RTC 2 months with Dr. Boateng with CBC, BMP, LFTs, iron studies, ferritin 2 days prior

## 2024-10-03 NOTE — HISTORY OF PRESENT ILLNESS
[de-identified] : Ms. SEJAL BARNARD is a 54 year old female here today for evaluation and treatment of Anemia.\par  \par  The patient was referred by Dr. Quintero.  She presents with her health aide from the Apartment [place] complex she resides on [facility].  She reports that she was seen by her PCP, who referred her due to iron deficiency anemia.  She currently takes iron supplement, 325mg daily and is tolerating without complaint.  She states she feels tired on occasion.  \par  \par  LAB WORKUP:\par  (5/7/19) WBC 6.23, Hgb 13.7, MCV 76.7, RDW 16.1, , Na 147, ALT 42, Ferritin 36, TIBC 349, ironsat 15%, B12 normal\par  \par  HCM:\par  Mammogram (07/2018) patient reports it was benign\par  Colonoscopy ([facility] - 4/6/2018) removed 2 polyps, as per patient.  IMPRESSION:  Preparation of the colon was poor.  One 6mm polyp in the cecum, removed peicemeal using a cold biopsy forceps.  Resected and retrieved.  Random biopsies taken from transverse colo, descending, sigmoid and rectum.  Repeat in 1 year [de-identified] : 11/8/19 Patient is here for a follow-up visit for anemia with aid.  She is feeling well with no complaints.  Most recent CBC is stable with ferritin 111 and ironsat 17%.  Patient denies fever, chills, nausea, vomiting, bleeding or pica.  She does not take oral iron as she had IV iron in the past. She reports her energy has improved slightly as well.    2/14/2020  Patient is here for a follow-up visit for anemia with aid.  She is feeling well with no complaints. Most recent CBC and iron studies from 2.7.2020 remains stable off oral iron.   Patient denies fever, chills, nausea, vomiting, bleeding or pica.  She followed with Dr. Pak who performed endoscopy which showed GERD with esophagitis, acute gastritis with and without bleeding and duodenitis.    8/4/20 Patient is here for follow up today for anemia. She is accompanied by her aide. She feels well. She denies severe fatigue, shortness of breath, chest pain, palpitations, or decreased energy. She does endorse black colored stools. SHe had an EGD and colonoscopy with Dr. Pak in January 2020 and continues to follow with him. Currently, she reports she is due for a CT abdomen and pelvis with contrast to assess for any bleeding.   4/2/21 Patient is here for follow up today for anemia, accompanied by her aide.  She denies severe fatigue, shortness of breath, chest pain, palpitations, or decreased energy.    3/22/22 Patient is here for follow up today for anemia, accompanied by aide.  Reviewed most recent labwork which shows mild leukopenia (normal differential) but no evidence of anemia and iron stores are adequate.  She denies shortness of breath, chest pain, palpitations, dark stools or overt bleeding. Last EGD and colonoscopy was performed by Dr. Pak in January 2020.    12/2/22 Patient is here for follow up today for anemia, accompanied by aide.  Reviewed most recent labwork which shows no evidence of anemia; ferritin 32, ironsat 15%, TIBC 328.  She denies shortness of breath, chest pain, palpitations, dark stools or overt bleeding. Last EGD and colonoscopy was performed by Dr. Pak in January 2020.  She feels more tired.  She is gaining weight.    7/20/23 Patient is here for follow up today for anemia, accompanied by aide.  Reviewed most recent labwork which shows mild leukopoenia (normal differential) + anemia with hgb 11.1g/dL; ferritin 17, ironsat 11%, TIBC 305.  She denies fevers, chills, shortness of breath, chest pain, palpitations, dizziness, dark stools or overt bleeding.  Last EGD and colonoscopy was performed by Dr. Pak in January 2020.  She had repeat colonoscopy last week on 7/10 but it was unsuccessful due to poor prep; rescheduled for 9/18.  She endorses fatigue.  She is lost about 7lbs in the last 3 months via portion control.    2/20/24 Patient is here for follow up today for anemia, accompanied by aide.  Reviewed most recent labwork which shows mild leukopoenia (normal differential) + anemia with hgb 13.8g/dL; ferritin 99, ironsat 16%, TIBC 298.  She denies fevers, chills, shortness of breath, chest pain, palpitations, dizziness, dark stools or overt bleeding.  She had repeat colonoscopy in 7/2023 but it was unsuccessful due to poor prep; rescheduled for 9/18.  She endorses fatigue.    6/19/24 Patient is here for follow up today for anemia, accompanied by aide.  Reviewed most recent labwork which shows mild anemia with hgb 13.5g/dL; ferritin 61, ironsat 16%, TIBC 298.  She denies fevers, chills, shortness of breath, chest pain, palpitations, dizziness or overt bleeding.  She had repeat colonoscopy in 7/2023 but it was unsuccessful due to poor prep; rescheduled for 9/18.  She endorses fatigue.  GYN exam was performed in 04/2024.    10/3/24 Patient is here for follow up today for anemia, accompanied by aide.  Reviewed most recent labwork which shows mild anemia with hgb 13.5g/dL; ferritin 55, ironsat 15%, TIBC 297.  She denies fevers, chills, shortness of breath, chest pain, palpitations, dizziness or overt bleeding.  She endorses fatigue and occasional black stool.  She had repeat colonoscopy in 7/2023 but it was unsuccessful due to poor prep; she is not rescheduled yet.  GYN exam was performed in 04/2024.

## 2024-10-03 NOTE — CONSULT LETTER
[Dear  ___] : Dear  [unfilled], [Consult Letter:] : I had the pleasure of evaluating your patient, [unfilled]. [Please see my note below.] : Please see my note below. [Sincerely,] : Sincerely, [FreeTextEntry3] : John Boateng DO\par  Attending Physician,\par  Hematology/ Medical Oncology\par  [phone] office\par  \par

## 2024-10-03 NOTE — PHYSICAL EXAM
[Restricted in physically strenuous activity but ambulatory and able to carry out work of a light or sedentary nature] : Status 1- Restricted in physically strenuous activity but ambulatory and able to carry out work of a light or sedentary nature, e.g., light house work, office work [Normal] : affect appropriate [de-identified] : uses seated walker for ambulation

## 2024-10-03 NOTE — BEGINNING OF VISIT
[0] : 2) Feeling down, depressed, or hopeless: Not at all (0) [PHQ-2 Negative] : PHQ-2 Negative [PHQ-9 Negative] : PHQ-9 Negative [[id number]] : 0 [Pain Scale: ___] : On a scale of 1-10, today the patient's pain is a(n) [unfilled]. [Never] : Never [Reviewed, no changes] : Reviewed, no changes [Abdominal Pain] : no abdominal pain [Vomiting] : no vomiting [Constipation] : no constipation [Diarrhea Character] : Diarrhea: Grade 0

## 2024-10-03 NOTE — HISTORY OF PRESENT ILLNESS
[de-identified] : Ms. SEJAL BARNARD is a 54 year old female here today for evaluation and treatment of Anemia.\par  \par  The patient was referred by Dr. Quintero.  She presents with her health aide from the Apartment Housing complex she resides on [facility].  She reports that she was seen by her PCP, who referred her due to iron deficiency anemia.  She currently takes iron supplement, 325mg daily and is tolerating without complaint.  She states she feels tired on occasion.  \par  \par  LAB WORKUP:\par  (5/7/19) WBC 6.23, Hgb 13.7, MCV 76.7, RDW 16.1, , Na 147, ALT 42, Ferritin 36, TIBC 349, ironsat 15%, B12 normal\par  \par  HCM:\par  Mammogram (07/2018) patient reports it was benign\par  Colonoscopy ([facility] - 4/6/2018) removed 2 polyps, as per patient.  IMPRESSION:  Preparation of the colon was poor.  One 6mm polyp in the cecum, removed peicemeal using a cold biopsy forceps.  Resected and retrieved.  Random biopsies taken from transverse colo, descending, sigmoid and rectum.  Repeat in 1 year [de-identified] : 11/8/19 Patient is here for a follow-up visit for anemia with aid.  She is feeling well with no complaints.  Most recent CBC is stable with ferritin 111 and ironsat 17%.  Patient denies fever, chills, nausea, vomiting, bleeding or pica.  She does not take oral iron as she had IV iron in the past. She reports her energy has improved slightly as well.    2/14/2020  Patient is here for a follow-up visit for anemia with aid.  She is feeling well with no complaints. Most recent CBC and iron studies from 2.7.2020 remains stable off oral iron.   Patient denies fever, chills, nausea, vomiting, bleeding or pica.  She followed with Dr. Pak who performed endoscopy which showed GERD with esophagitis, acute gastritis with and without bleeding and duodenitis.    8/4/20 Patient is here for follow up today for anemia. She is accompanied by her aide. She feels well. She denies severe fatigue, shortness of breath, chest pain, palpitations, or decreased energy. She does endorse black colored stools. SHe had an EGD and colonoscopy with Dr. Pak in January 2020 and continues to follow with him. Currently, she reports she is due for a CT abdomen and pelvis with contrast to assess for any bleeding.   4/2/21 Patient is here for follow up today for anemia, accompanied by her aide.  She denies severe fatigue, shortness of breath, chest pain, palpitations, or decreased energy.    3/22/22 Patient is here for follow up today for anemia, accompanied by aide.  Reviewed most recent labwork which shows mild leukopenia (normal differential) but no evidence of anemia and iron stores are adequate.  She denies shortness of breath, chest pain, palpitations, dark stools or overt bleeding. Last EGD and colonoscopy was performed by Dr. Pak in January 2020.    12/2/22 Patient is here for follow up today for anemia, accompanied by aide.  Reviewed most recent labwork which shows no evidence of anemia; ferritin 32, ironsat 15%, TIBC 328.  She denies shortness of breath, chest pain, palpitations, dark stools or overt bleeding. Last EGD and colonoscopy was performed by Dr. Pak in January 2020.  She feels more tired.  She is gaining weight.    7/20/23 Patient is here for follow up today for anemia, accompanied by aide.  Reviewed most recent labwork which shows mild leukopoenia (normal differential) + anemia with hgb 11.1g/dL; ferritin 17, ironsat 11%, TIBC 305.  She denies fevers, chills, shortness of breath, chest pain, palpitations, dizziness, dark stools or overt bleeding.  Last EGD and colonoscopy was performed by Dr. Pak in January 2020.  She had repeat colonoscopy last week on 7/10 but it was unsuccessful due to poor prep; rescheduled for 9/18.  She endorses fatigue.  She is lost about 7lbs in the last 3 months via portion control.    2/20/24 Patient is here for follow up today for anemia, accompanied by aide.  Reviewed most recent labwork which shows mild leukopoenia (normal differential) + anemia with hgb 13.8g/dL; ferritin 99, ironsat 16%, TIBC 298.  She denies fevers, chills, shortness of breath, chest pain, palpitations, dizziness, dark stools or overt bleeding.  She had repeat colonoscopy in 7/2023 but it was unsuccessful due to poor prep; rescheduled for 9/18.  She endorses fatigue.    6/19/24 Patient is here for follow up today for anemia, accompanied by aide.  Reviewed most recent labwork which shows mild anemia with hgb 13.5g/dL; ferritin 61, ironsat 16%, TIBC 298.  She denies fevers, chills, shortness of breath, chest pain, palpitations, dizziness or overt bleeding.  She had repeat colonoscopy in 7/2023 but it was unsuccessful due to poor prep; rescheduled for 9/18.  She endorses fatigue.  GYN exam was performed in 04/2024.    10/3/24 Patient is here for follow up today for anemia, accompanied by aide.  Reviewed most recent labwork which shows mild anemia with hgb 13.5g/dL; ferritin 55, ironsat 15%, TIBC 297.  She denies fevers, chills, shortness of breath, chest pain, palpitations, dizziness or overt bleeding.  She endorses fatigue and occasional black stool.  She had repeat colonoscopy in 7/2023 but it was unsuccessful due to poor prep; she is not rescheduled yet.  GYN exam was performed in 04/2024.

## 2024-10-03 NOTE — BEGINNING OF VISIT
[0] : 2) Feeling down, depressed, or hopeless: Not at all (0) [PHQ-2 Negative] : PHQ-2 Negative [PHQ-9 Negative] : PHQ-9 Negative [[id number]] : 0 [Pain Scale: ___] : On a scale of 1-10, today the patient's pain is a(n) [unfilled]. [Never] : Never [Reviewed, no changes] : Reviewed, no changes [Abdominal Pain] : no abdominal pain [Vomiting] : no vomiting [Constipation] : no constipation [Diarrhea Character] : Diarrhea: Grade 0

## 2024-10-03 NOTE — ASSESSMENT
[FreeTextEntry1] : 59 yo woman with PMH of ulcerative colitis here for follow up on history of iron deficiency anemia. She is being seen by a private GI; She followed with Dr. Pak who performed endoscopy and colonoscopy in January 2020 which showed GERD with esophagitis, acute gastritis with and without bleeding and duodenitis.  - she is on Vitamin B12 + folic acid daily  - s/p iv iron, completed in June 2019 with resolution of low iron - reiterated importance for followup with GI, Dr. Parmer, to discuss repeat colonoscopy and in view of ulcerative colitis and dropping iron stores  - aim to keep ferritin >50 and iron sat >20% - patient would like to check iron stores in 6-8 weeks since no anemia and feeling well besides baseline fatigue; no additional IV iron needed at this time but will likely need to replete in 6 weeks   Encouraged to keep up to date with age appropriate screenings including but not limited to GYN pelvic exam, colonoscopy and upper endoscopy.  RTC 2 months with Dr. Boateng with CBC, BMP, LFTs, iron studies, ferritin 2 days prior

## 2024-10-04 DIAGNOSIS — D50.9 IRON DEFICIENCY ANEMIA, UNSPECIFIED: ICD-10-CM

## 2024-10-15 ENCOUNTER — APPOINTMENT (OUTPATIENT)
Dept: PAIN MANAGEMENT | Facility: CLINIC | Age: 59
End: 2024-10-15
Payer: MEDICARE

## 2024-10-15 DIAGNOSIS — M54.50 LOW BACK PAIN, UNSPECIFIED: ICD-10-CM

## 2024-10-15 DIAGNOSIS — M54.16 RADICULOPATHY, LUMBAR REGION: ICD-10-CM

## 2024-10-15 DIAGNOSIS — M47.817 SPONDYLOSIS W/OUT MYELOPATHY OR RADICULOPATHY, LUMBOSACRAL REGION: ICD-10-CM

## 2024-10-15 PROCEDURE — 99214 OFFICE O/P EST MOD 30 MIN: CPT

## 2024-10-18 NOTE — HISTORY OF PRESENT ILLNESS
Left Voicemail (1st Attempt) and Sent Mychart (1st Attempt) for the patient to call back and reschedule the following:    Appointment type: Return Esophageal - any NEW slot OK  Provider(s): JOSE ARMANDO Aquino  Date: 1/23/25  Specialty phone number: [phone]    Additonal Notes: OK to reschedule using any NEW appointment slot per Sharron Arcos      
Patient confirmed scheduled appointment:     Date: 1/3/25  Time: 9:15 AM  Visit type: Return Esophageal  Provider:  JOSE ARMANDO Aquino  Location: [facility]    [email or web address]  
[de-identified] : Patient here today following up on recent ER visit. Patient states she went to the hospital due to her dizziness. Patient was experiencing room spinning started on 12/18. Patient admits dizziness also occurred last year.  She had a negative CT and MR head. Upon discharge no recent episodes. \par Her balance is back to baseline now. no more spinning.

## 2024-11-22 ENCOUNTER — OUTPATIENT (OUTPATIENT)
Dept: OUTPATIENT SERVICES | Facility: HOSPITAL | Age: 59
LOS: 1 days | End: 2024-11-22
Payer: MEDICARE

## 2024-11-22 ENCOUNTER — APPOINTMENT (OUTPATIENT)
Dept: OPHTHALMOLOGY | Facility: CLINIC | Age: 59
End: 2024-11-22
Payer: MEDICARE

## 2024-11-22 DIAGNOSIS — H53.8 OTHER VISUAL DISTURBANCES: ICD-10-CM

## 2024-11-22 PROCEDURE — 92012 INTRM OPH EXAM EST PATIENT: CPT

## 2024-12-02 ENCOUNTER — APPOINTMENT (OUTPATIENT)
Dept: OTOLARYNGOLOGY | Facility: CLINIC | Age: 59
End: 2024-12-02

## 2024-12-05 ENCOUNTER — APPOINTMENT (OUTPATIENT)
Age: 59
End: 2024-12-05

## 2024-12-05 ENCOUNTER — OUTPATIENT (OUTPATIENT)
Dept: OUTPATIENT SERVICES | Facility: HOSPITAL | Age: 59
LOS: 1 days | End: 2024-12-05
Payer: MEDICARE

## 2024-12-05 ENCOUNTER — LABORATORY RESULT (OUTPATIENT)
Age: 59
End: 2024-12-05

## 2024-12-05 DIAGNOSIS — Z98.890 OTHER SPECIFIED POSTPROCEDURAL STATES: Chronic | ICD-10-CM

## 2024-12-05 DIAGNOSIS — D50.9 IRON DEFICIENCY ANEMIA, UNSPECIFIED: ICD-10-CM

## 2024-12-05 LAB
ALBUMIN SERPL ELPH-MCNC: 4.1 G/DL
ALP BLD-CCNC: 98 U/L
ALT SERPL-CCNC: 21 U/L
ANION GAP SERPL CALC-SCNC: 13 MMOL/L
AST SERPL-CCNC: 14 U/L
BILIRUB DIRECT SERPL-MCNC: <0.2 MG/DL
BILIRUB INDIRECT SERPL-MCNC: >0.1 MG/DL
BILIRUB SERPL-MCNC: 0.3 MG/DL
BUN SERPL-MCNC: 14 MG/DL
CALCIUM SERPL-MCNC: 9.5 MG/DL
CHLORIDE SERPL-SCNC: 98 MMOL/L
CO2 SERPL-SCNC: 26 MMOL/L
CREAT SERPL-MCNC: 0.6 MG/DL
EGFR: 104 ML/MIN/1.73M2
GLUCOSE SERPL-MCNC: 262 MG/DL
HCT VFR BLD CALC: 41.6 %
HGB BLD-MCNC: 13.2 G/DL
IRON SATN MFR SERPL: 10 %
IRON SERPL-MCNC: 29 UG/DL
MCHC RBC-ENTMCNC: 24.2 PG
MCHC RBC-ENTMCNC: 31.7 G/DL
MCV RBC AUTO: 76.3 FL
PLATELET # BLD AUTO: 294 K/UL
PMV BLD: 9.3 FL
POTASSIUM SERPL-SCNC: 4.3 MMOL/L
PROT SERPL-MCNC: 6.2 G/DL
RBC # BLD: 5.45 M/UL
RBC # FLD: 18.1 %
SODIUM SERPL-SCNC: 137 MMOL/L
TIBC SERPL-MCNC: 288 UG/DL
UIBC SERPL-MCNC: 259 UG/DL
WBC # FLD AUTO: 6.53 K/UL

## 2024-12-05 PROCEDURE — 83540 ASSAY OF IRON: CPT

## 2024-12-05 PROCEDURE — 85027 COMPLETE CBC AUTOMATED: CPT

## 2024-12-05 PROCEDURE — 36415 COLL VENOUS BLD VENIPUNCTURE: CPT

## 2024-12-05 PROCEDURE — 82728 ASSAY OF FERRITIN: CPT

## 2024-12-05 PROCEDURE — 80076 HEPATIC FUNCTION PANEL: CPT

## 2024-12-05 PROCEDURE — 80048 BASIC METABOLIC PNL TOTAL CA: CPT

## 2024-12-05 PROCEDURE — 83550 IRON BINDING TEST: CPT

## 2024-12-06 DIAGNOSIS — D50.9 IRON DEFICIENCY ANEMIA, UNSPECIFIED: ICD-10-CM

## 2024-12-09 ENCOUNTER — APPOINTMENT (OUTPATIENT)
Age: 59
End: 2024-12-09
Payer: MEDICARE

## 2024-12-09 ENCOUNTER — OUTPATIENT (OUTPATIENT)
Dept: OUTPATIENT SERVICES | Facility: HOSPITAL | Age: 59
LOS: 1 days | End: 2024-12-09
Payer: MEDICARE

## 2024-12-09 VITALS
DIASTOLIC BLOOD PRESSURE: 75 MMHG | WEIGHT: 202 LBS | OXYGEN SATURATION: 99 % | RESPIRATION RATE: 16 BRPM | HEART RATE: 109 BPM | BODY MASS INDEX: 34.49 KG/M2 | SYSTOLIC BLOOD PRESSURE: 127 MMHG | HEIGHT: 64 IN

## 2024-12-09 DIAGNOSIS — D50.9 IRON DEFICIENCY ANEMIA, UNSPECIFIED: ICD-10-CM

## 2024-12-09 DIAGNOSIS — Z98.890 OTHER SPECIFIED POSTPROCEDURAL STATES: Chronic | ICD-10-CM

## 2024-12-09 LAB — FERRITIN SERPL-MCNC: 54 NG/ML

## 2024-12-09 PROCEDURE — 99214 OFFICE O/P EST MOD 30 MIN: CPT

## 2024-12-09 PROCEDURE — G2211 COMPLEX E/M VISIT ADD ON: CPT

## 2024-12-19 ENCOUNTER — APPOINTMENT (OUTPATIENT)
Age: 59
End: 2024-12-19

## 2024-12-23 ENCOUNTER — TRANSCRIPTION ENCOUNTER (OUTPATIENT)
Age: 59
End: 2024-12-23

## 2024-12-23 ENCOUNTER — OUTPATIENT (OUTPATIENT)
Dept: OUTPATIENT SERVICES | Facility: HOSPITAL | Age: 59
LOS: 1 days | Discharge: HOME | End: 2024-12-23
Payer: MEDICARE

## 2024-12-23 VITALS
SYSTOLIC BLOOD PRESSURE: 130 MMHG | DIASTOLIC BLOOD PRESSURE: 76 MMHG | TEMPERATURE: 98 F | OXYGEN SATURATION: 98 % | HEIGHT: 64 IN | RESPIRATION RATE: 18 BRPM | HEART RATE: 64 BPM | WEIGHT: 199.96 LBS

## 2024-12-23 VITALS
RESPIRATION RATE: 17 BRPM | SYSTOLIC BLOOD PRESSURE: 140 MMHG | DIASTOLIC BLOOD PRESSURE: 65 MMHG | HEART RATE: 78 BPM | OXYGEN SATURATION: 98 %

## 2024-12-23 DIAGNOSIS — Z12.11 ENCOUNTER FOR SCREENING FOR MALIGNANT NEOPLASM OF COLON: ICD-10-CM

## 2024-12-23 DIAGNOSIS — K64.1 SECOND DEGREE HEMORRHOIDS: ICD-10-CM

## 2024-12-23 DIAGNOSIS — Z98.890 OTHER SPECIFIED POSTPROCEDURAL STATES: Chronic | ICD-10-CM

## 2024-12-23 LAB — GLUCOSE BLDC GLUCOMTR-MCNC: 183 MG/DL — HIGH (ref 70–99)

## 2024-12-23 PROCEDURE — 82962 GLUCOSE BLOOD TEST: CPT

## 2024-12-23 RX ORDER — INDOMETHACIN 75 MG/1
1 CAPSULE, EXTENDED RELEASE ORAL
Refills: 0 | DISCHARGE

## 2024-12-23 RX ORDER — ONDANSETRON HYDROCHLORIDE 4 MG/1
1 TABLET, FILM COATED ORAL
Refills: 0 | DISCHARGE

## 2024-12-23 RX ORDER — FREMANEZUMAB-VFRM 225 MG/1.5ML
225 INJECTION SUBCUTANEOUS
Refills: 0 | DISCHARGE

## 2024-12-23 RX ORDER — TRAZODONE HYDROCHLORIDE 150 MG/1
0 TABLET ORAL
Refills: 0 | DISCHARGE

## 2024-12-23 RX ORDER — UBROGEPANT 100 MG/1
1 TABLET ORAL
Refills: 0 | DISCHARGE

## 2024-12-23 RX ORDER — GABAPENTIN 300 MG/1
1 CAPSULE ORAL
Refills: 0 | DISCHARGE

## 2024-12-23 RX ORDER — FAMOTIDINE 20 MG/1
1 TABLET, FILM COATED ORAL
Refills: 0 | DISCHARGE

## 2024-12-23 RX ORDER — FEXOFENADINE HCL 60 MG
1 CAPSULE ORAL
Refills: 0 | DISCHARGE

## 2024-12-23 RX ORDER — AMITRIPTYLINE HYDROCHLORIDE 50 MG/1
1 TABLET ORAL
Refills: 0 | DISCHARGE

## 2024-12-23 RX ORDER — INSULIN GLARGINE 100 [IU]/ML
0 INJECTION, SOLUTION SUBCUTANEOUS
Refills: 0 | DISCHARGE

## 2024-12-23 RX ORDER — BREXPIPRAZOLE 1 MG/1
1 TABLET ORAL
Refills: 0 | DISCHARGE

## 2024-12-23 RX ORDER — SERTRALINE HYDROCHLORIDE 100 MG/1
1 TABLET, FILM COATED ORAL
Refills: 0 | DISCHARGE

## 2024-12-23 RX ORDER — FLUTICASONE PROPIONATE AND SALMETEROL XINAFOATE 45; 21 UG/1; UG/1
1 AEROSOL, METERED RESPIRATORY (INHALATION)
Refills: 0 | DISCHARGE

## 2024-12-23 RX ORDER — LEVOTHYROXINE SODIUM 150 MCG
1 TABLET ORAL
Refills: 0 | DISCHARGE

## 2024-12-23 RX ORDER — OMEPRAZOLE 20 MG/1
1 CAPSULE, DELAYED RELEASE ORAL
Refills: 0 | DISCHARGE

## 2024-12-23 RX ORDER — RAMELTEON 8 MG/1
1 TABLET ORAL
Refills: 0 | DISCHARGE

## 2024-12-23 RX ORDER — DICYCLOMINE HYDROCHLORIDE 10 MG/1
1 CAPSULE ORAL
Refills: 0 | DISCHARGE

## 2024-12-23 NOTE — ASU PATIENT PROFILE, ADULT - FALL HARM RISK - HARM RISK INTERVENTIONS
Assistance with ambulation/Assistance OOB with selected safe patient handling equipment/Communicate Risk of Fall with Harm to all staff/Discuss with provider need for PT consult/Monitor gait and stability/Provide patient with walking aids - walker, cane, crutches/Reinforce activity limits and safety measures with patient and family/Sit up slowly, dangle for a short time, stand at bedside before walking/Tailored Fall Risk Interventions/Visual Cue: Yellow wristband and red socks/Bed in lowest position, wheels locked, appropriate side rails in place/Call bell, personal items and telephone in reach/Instruct patient to call for assistance before getting out of bed or chair/Non-slip footwear when patient is out of bed/[place] to call system/Physically safe environment - no spills, clutter or unnecessary equipment/Purposeful Proactive Rounding/Room/bathroom lighting operational, light cord in reach

## 2024-12-23 NOTE — CHART NOTE - NSCHARTNOTEFT_GEN_A_CORE
PACU ANESTHESIA ADMISSION NOTE    Procedure:   Post op diagnosis:      ____  Intubated  TV:______       Rate: ______      FiO2: ______  __x__  Patent Airway  __x__  Full return of protective reflexes  _x___  Full recovery from anesthesia / back to baseline     Vitals:       Sat:        98             BP:             111/60       R:          12  P: 81  T:     98.1        Mental Status:    _x___ Awake    ____ Alert     ____ Drowsy    ____ Sedated    Nausea/Vomiting:   __x__ NO    ____ Yes,   See Post - Op Orders          Pain Scale (0-10):    ____ Treatment:   _x___ None      ____ See Post - Op/PCA Orders    Post - Operative Fluids:    ____ Oral     __x__ See Post - Op Orders    Plan: Discharge:     __x__Home         _____Floor       _____Critical Care      _____  Other:_________________    Comments: no complications

## 2024-12-23 NOTE — ASU DISCHARGE PLAN (ADULT/PEDIATRIC) - FINANCIAL ASSISTANCE
[facility] provides services at a reduced cost to those who are determined to be eligible through [facility]’s financial assistance program. Information regarding [facility]’s financial assistance program can be found by going to [email or web address] or by calling [phone].

## 2024-12-23 NOTE — ASU DISCHARGE PLAN (ADULT/PEDIATRIC) - NS MD DC FALL RISK RISK
For information on Fall & Injury Prevention, visit: [email or web address] OR  [email or web address] OR  https://www.cdc.gov/steadi/patient.html

## 2024-12-23 NOTE — ASU PATIENT PROFILE, ADULT - NS PRO LAST MENSTRUAL
not applicable Pt. with ESRD on HD TIW (MWF) admitted to [facility] due to altered mentation and markedly elevated blood pressure with missed HD. Last outpatient HD was on 1/31/22 via [facility]. Pt missed HD session on 2/3/22. Pt goes to satellite dialysis unit and follows with Dr. Abarca. On admission, it was discussed with the patient's mother that he will require RRT/HD, risks and benefits associated with RRT/HD explained at length. HD consent (verbal) obtained and kept in patients chart. He was admitted to the MICU and received urgent HD on 2/4/23. His blood pressures have markedly improved with HD and resumption of home BP medications. He is clinically stable. Will arrange for maintenance HD today. Monitor BP and labs. Dose meds as per HD. Pt. with ESRD on HD TIW (MWF) admitted to [facility] due to altered mental status, uncontrolled HTN, and missed HD. Last outpatient HD at [facility] was on 1/31/22 via [facility]. Pt. missed his HD treatment on 2/3/22. Pt. was initially admitted to the MICU and received urgent HD on 2/4/23. His blood pressures have markedly improved with HD and resumption of home BP medications. He is clinically stable. Will arrange for maintenance HD today. Monitor BP and labs. Dose meds as per HD.

## 2024-12-23 NOTE — ASU PATIENT PROFILE, ADULT - VISION (WITH CORRECTIVE LENSES IF THE PATIENT USUALLY WEARS THEM):
Partially impaired: cannot see medication labels or newsprint, but can see obstacles in path, and the surrounding layout; can count fingers at arm's length
83

## 2024-12-26 ENCOUNTER — APPOINTMENT (OUTPATIENT)
Age: 59
End: 2024-12-26

## 2024-12-30 DIAGNOSIS — G47.33 OBSTRUCTIVE SLEEP APNEA (ADULT) (PEDIATRIC): ICD-10-CM

## 2024-12-30 DIAGNOSIS — E78.00 PURE HYPERCHOLESTEROLEMIA, UNSPECIFIED: ICD-10-CM

## 2024-12-30 DIAGNOSIS — F41.9 ANXIETY DISORDER, UNSPECIFIED: ICD-10-CM

## 2024-12-30 DIAGNOSIS — Z88.0 ALLERGY STATUS TO PENICILLIN: ICD-10-CM

## 2024-12-30 DIAGNOSIS — E03.9 HYPOTHYROIDISM, UNSPECIFIED: ICD-10-CM

## 2024-12-30 DIAGNOSIS — K64.4 RESIDUAL HEMORRHOIDAL SKIN TAGS: ICD-10-CM

## 2024-12-30 DIAGNOSIS — G43.909 MIGRAINE, UNSPECIFIED, NOT INTRACTABLE, WITHOUT STATUS MIGRAINOSUS: ICD-10-CM

## 2024-12-30 DIAGNOSIS — J44.9 CHRONIC OBSTRUCTIVE PULMONARY DISEASE, UNSPECIFIED: ICD-10-CM

## 2024-12-30 DIAGNOSIS — Z79.4 LONG TERM (CURRENT) USE OF INSULIN: ICD-10-CM

## 2024-12-30 DIAGNOSIS — Z79.51 LONG TERM (CURRENT) USE OF INHALED STEROIDS: ICD-10-CM

## 2024-12-30 DIAGNOSIS — Z91.013 ALLERGY TO SEAFOOD: ICD-10-CM

## 2024-12-30 DIAGNOSIS — E11.42 TYPE 2 DIABETES MELLITUS WITH DIABETIC POLYNEUROPATHY: ICD-10-CM

## 2024-12-30 DIAGNOSIS — Z88.1 ALLERGY STATUS TO OTHER ANTIBIOTIC AGENTS: ICD-10-CM

## 2024-12-30 DIAGNOSIS — F32.A DEPRESSION, UNSPECIFIED: ICD-10-CM

## 2024-12-30 DIAGNOSIS — Z79.84 LONG TERM (CURRENT) USE OF ORAL HYPOGLYCEMIC DRUGS: ICD-10-CM

## 2024-12-30 DIAGNOSIS — K21.9 GASTRO-ESOPHAGEAL REFLUX DISEASE WITHOUT ESOPHAGITIS: ICD-10-CM

## 2024-12-30 DIAGNOSIS — Z91.040 LATEX ALLERGY STATUS: ICD-10-CM

## 2024-12-30 DIAGNOSIS — Z88.5 ALLERGY STATUS TO NARCOTIC AGENT: ICD-10-CM

## 2024-12-30 DIAGNOSIS — K64.8 OTHER HEMORRHOIDS: ICD-10-CM

## 2025-01-03 ENCOUNTER — APPOINTMENT (OUTPATIENT)
Age: 60
End: 2025-01-03

## 2025-01-10 ENCOUNTER — APPOINTMENT (OUTPATIENT)
Age: 60
End: 2025-01-10

## 2025-01-14 ENCOUNTER — APPOINTMENT (OUTPATIENT)
Dept: PAIN MANAGEMENT | Facility: CLINIC | Age: 60
End: 2025-01-14
Payer: MEDICARE

## 2025-01-14 DIAGNOSIS — M54.50 LOW BACK PAIN, UNSPECIFIED: ICD-10-CM

## 2025-01-14 DIAGNOSIS — M54.16 RADICULOPATHY, LUMBAR REGION: ICD-10-CM

## 2025-01-14 DIAGNOSIS — M47.817 SPONDYLOSIS W/OUT MYELOPATHY OR RADICULOPATHY, LUMBOSACRAL REGION: ICD-10-CM

## 2025-01-14 PROCEDURE — 99213 OFFICE O/P EST LOW 20 MIN: CPT

## 2025-01-17 ENCOUNTER — APPOINTMENT (OUTPATIENT)
Age: 60
End: 2025-01-17

## 2025-01-27 ENCOUNTER — APPOINTMENT (OUTPATIENT)
Dept: OTOLARYNGOLOGY | Facility: CLINIC | Age: 60
End: 2025-01-27
Payer: MEDICARE

## 2025-01-27 DIAGNOSIS — H61.23 IMPACTED CERUMEN, BILATERAL: ICD-10-CM

## 2025-01-27 DIAGNOSIS — H93.8X3 OTHER SPECIFIED DISORDERS OF EAR, BILATERAL: ICD-10-CM

## 2025-01-27 DIAGNOSIS — R05.9 COUGH, UNSPECIFIED: ICD-10-CM

## 2025-01-27 DIAGNOSIS — J34.89 OTHER SPECIFIED DISORDERS OF NOSE AND NASAL SINUSES: ICD-10-CM

## 2025-01-27 PROCEDURE — 99214 OFFICE O/P EST MOD 30 MIN: CPT | Mod: 25

## 2025-01-27 PROCEDURE — 31231 NASAL ENDOSCOPY DX: CPT

## 2025-01-27 RX ORDER — AZITHROMYCIN 250 MG/1
250 TABLET, FILM COATED ORAL
Qty: 6 | Refills: 0 | Status: ACTIVE | COMMUNITY
Start: 2025-01-27 | End: 1900-01-01

## 2025-02-03 NOTE — HISTORY OF PRESENT ILLNESS
[facility] Progress Note  Richard Hines DO  [phone] Office  [phone] Fax    2/3/2025      Assessment & Plan:     Diabetic foot infection with osteomyelitis involving the left fifth metatarsal head.  Status post I&D 2025.  Operative note and pathology pending.  Preliminary cultures growing MSSA, GBS, and anaerobes.  Narrow antibiotics to ceftriaxone and metronidazole.    Further recommendations to follow  Leukocytosis.  Due to above.  Stable.  Follow trend  VINCE.  Vancomycin stopped.  Follow trend.  Monitor closely on antibiotics  Diabetes mellitus.  Hemoglobin A1c 6.6  Obesity.  BMI 39  Tobacco abuse          Subjective:     No complaints    Objective:     Vitals: /74   Pulse 73   Temp 97.9 °F (36.6 °C) (Oral)   Resp 17   Ht 1.626 m (5' 4\")   Wt 104.3 kg (230 lb)   SpO2 93%   BMI 39.48 kg/m²      Tmax:  Temp (24hrs), Av.3 °F (36.8 °C), Min:97.5 °F (36.4 °C), Max:99.7 °F (37.6 °C)      Exam:   Patient is intubated:  no    Physical Examination:   General:  Alert, cooperative, no distress   Head:  Normocephalic, atraumatic.   Eyes:  Conjunctivae clear   Neck: Supple       Lungs:   No distress.     Chest wall:     Heart:     Abdomen:    non-distended   Extremities: Moves all.  Foot dressed   Skin: No acute rash on exposed skin   Neurologic: CNII-XII intact. Normal strength     Labs:        Invalid input(s): \"ITNL\"   No results for input(s): \"CPK\", \"CKMB\" in the last 72 hours.    Invalid input(s): \"TROIQ\"  Recent Labs     25  0230 25  0354 25  1830 25  0123    135*  --   --    K 3.5 3.8  --   --     105  --   --    CO2 24 25  --   --    BUN 11 18  --   --    PHOS  --  2.7  --  2.1*   MG  --  1.9  --  1.8   WBC 16.5* 16.9*  --  17.4*   HGB 7.2* 6.7* 7.9* 7.2*   HCT 25.7* 24.2* 28.1* 25.9*    370  --  402*     No results for input(s): \"INR\", \"APTT\" in the last 72 hours.    Invalid input(s): \"PTP\"  Needs: urine analysis, 
urine sodium, protein and creatinine  No results found for: \"KU\"      Cultures:     No results found for: \"SDES\"  No components found for: \"CULT\"    Radiology:     Medications       [unfilled]        Case discussed with:    A total time of 35 minutes was spent on today's encounter.  Greater than 50% of the time was spent on the following:  Preparing for visit and chart review.  Obtaining and/or reviewing separately obtained history  Performing a medically appropriate exam and/or evaluation  Counseling and educating a patient/family/caregiver as noted above  Placing relevant orders  Referring and communicating with other professionals (not separately reported)  Independently interpreting results (not separately reported) and communicating results to the patient/family/caregiver  Care coordination (not separately reported) as noted above  Documenting clinical information in the electronic health records (e.g. problem list, visit note) on the day of the encounter       Richard Hines DO              
[de-identified] : Ms. SEJAL BARNARD is a 54 year old female here today for evaluation and treatment of Anemia.\par \par The patient was referred by Dr. Quintero.  She presents with her health aide from the Apartment Housing complex she resides on [facility].  She reports that she was seen by her PCP, who referred her due to iron deficiency anemia.  She currently takes iron supplement, 325mg daily and is tolerating without complaint.  She states she feels tired on occasion.  \par \par LAB WORKUP:\par (5/7/19) WBC 6.23, Hgb 13.7, MCV 76.7, RDW 16.1, , Na 147, ALT 42, Ferritin 36, TIBC 349, ironsat 15%, B12 normal\par \par HCM:\par Mammogram (07/2018) patient reports it was benign\par Colonoscopy ([facility] - 2018) removed 2 polyps, as per patient\par 
yes...

## 2025-03-11 NOTE — ED ADULT TRIAGE NOTE - ACCOMPANIED BY
Marina called today hoping to discuss the results of her recent lab work. She would like a call back at [phone].     Hannah Rosenberg RN on 3/11/2025 at 4:30 PM    
Self

## 2025-04-15 ENCOUNTER — APPOINTMENT (OUTPATIENT)
Dept: PAIN MANAGEMENT | Facility: CLINIC | Age: 60
End: 2025-04-15
Payer: MEDICARE

## 2025-04-15 DIAGNOSIS — M54.50 LOW BACK PAIN, UNSPECIFIED: ICD-10-CM

## 2025-04-15 DIAGNOSIS — S42.301A UNSPECIFIED FRACTURE OF SHAFT OF HUMERUS, RIGHT ARM, INITIAL ENCOUNTER FOR CLOSED FRACTURE: ICD-10-CM

## 2025-04-15 DIAGNOSIS — M47.817 SPONDYLOSIS W/OUT MYELOPATHY OR RADICULOPATHY, LUMBOSACRAL REGION: ICD-10-CM

## 2025-04-15 DIAGNOSIS — M54.16 RADICULOPATHY, LUMBAR REGION: ICD-10-CM

## 2025-04-15 PROCEDURE — 99213 OFFICE O/P EST LOW 20 MIN: CPT

## 2025-05-13 NOTE — OB HISTORY
[Normal Amount/Duration] : was of a normal amount and duration [Regular Cycle Intervals] : periods have been regular [Menarche Age: ____] : age at menarche was [unfilled] [Menopause Age: ____] : age at menopause was [unfilled] [Spotting Between  Menses] : no spotting between menses [id number]:1-3 days|| ||00\01||False;

## 2025-06-12 ENCOUNTER — EMERGENCY (EMERGENCY)
Facility: HOSPITAL | Age: 60
LOS: 0 days | Discharge: HOME | End: 2025-06-12
Attending: EMERGENCY MEDICINE
Payer: MEDICARE

## 2025-06-12 VITALS
OXYGEN SATURATION: 98 % | RESPIRATION RATE: 18 BRPM | TEMPERATURE: 98 F | SYSTOLIC BLOOD PRESSURE: 124 MMHG | HEART RATE: 79 BPM | DIASTOLIC BLOOD PRESSURE: 70 MMHG

## 2025-06-12 VITALS
DIASTOLIC BLOOD PRESSURE: 59 MMHG | HEART RATE: 87 BPM | OXYGEN SATURATION: 93 % | RESPIRATION RATE: 16 BRPM | WEIGHT: 188.05 LBS | SYSTOLIC BLOOD PRESSURE: 119 MMHG

## 2025-06-12 DIAGNOSIS — Z98.890 OTHER SPECIFIED POSTPROCEDURAL STATES: Chronic | ICD-10-CM

## 2025-06-12 LAB
ALBUMIN SERPL ELPH-MCNC: 4.1 G/DL — SIGNIFICANT CHANGE UP (ref 3.5–5.2)
ALP SERPL-CCNC: 73 U/L — SIGNIFICANT CHANGE UP (ref 30–115)
ALT FLD-CCNC: 17 U/L — SIGNIFICANT CHANGE UP (ref 0–41)
APPEARANCE UR: CLEAR — SIGNIFICANT CHANGE UP
AST SERPL-CCNC: 17 U/L — SIGNIFICANT CHANGE UP (ref 0–41)
B-OH-BUTYR SERPL-SCNC: 0.2 MMOL/L — SIGNIFICANT CHANGE UP
BASOPHILS # BLD AUTO: 0.02 K/UL — SIGNIFICANT CHANGE UP (ref 0–0.2)
BASOPHILS NFR BLD AUTO: 0.3 % — SIGNIFICANT CHANGE UP (ref 0–1)
BILIRUB SERPL-MCNC: 0.2 MG/DL — SIGNIFICANT CHANGE UP (ref 0.2–1.2)
BILIRUB UR-MCNC: NEGATIVE — SIGNIFICANT CHANGE UP
BUN SERPL-MCNC: 15 MG/DL — SIGNIFICANT CHANGE UP (ref 10–20)
CALCIUM SERPL-MCNC: 9.1 MG/DL — SIGNIFICANT CHANGE UP (ref 8.4–10.5)
CHLORIDE SERPL-SCNC: 106 MMOL/L — SIGNIFICANT CHANGE UP (ref 98–110)
CO2 SERPL-SCNC: 21 MMOL/L — SIGNIFICANT CHANGE UP (ref 17–32)
COLOR SPEC: YELLOW — SIGNIFICANT CHANGE UP
CREAT SERPL-MCNC: 0.5 MG/DL — LOW (ref 0.7–1.5)
DIFF PNL FLD: NEGATIVE — SIGNIFICANT CHANGE UP
EGFR: 108 ML/MIN/1.73M2 — SIGNIFICANT CHANGE UP
EGFR: 108 ML/MIN/1.73M2 — SIGNIFICANT CHANGE UP
EOSINOPHIL # BLD AUTO: 0.1 K/UL — SIGNIFICANT CHANGE UP (ref 0–0.7)
EOSINOPHIL NFR BLD AUTO: 1.4 % — SIGNIFICANT CHANGE UP (ref 0–8)
GAS PNL BLDV: SIGNIFICANT CHANGE UP
GLUCOSE SERPL-MCNC: 96 MG/DL — SIGNIFICANT CHANGE UP (ref 70–99)
GLUCOSE UR QL: NEGATIVE MG/DL — SIGNIFICANT CHANGE UP
HCT VFR BLD CALC: 41.8 % — SIGNIFICANT CHANGE UP (ref 37–47)
HGB BLD-MCNC: 13.2 G/DL — SIGNIFICANT CHANGE UP (ref 12–16)
IMM GRANULOCYTES NFR BLD AUTO: 0.4 % — HIGH (ref 0.1–0.3)
KETONES UR QL: NEGATIVE MG/DL — SIGNIFICANT CHANGE UP
LACTATE SERPL-SCNC: 3 MMOL/L — HIGH (ref 0.7–2)
LEUKOCYTE ESTERASE UR-ACNC: NEGATIVE — SIGNIFICANT CHANGE UP
LYMPHOCYTES # BLD AUTO: 0.93 K/UL — LOW (ref 1.2–3.4)
LYMPHOCYTES # BLD AUTO: 13 % — LOW (ref 20.5–51.1)
MAGNESIUM SERPL-MCNC: 1.4 MG/DL — LOW (ref 1.8–2.4)
MCHC RBC-ENTMCNC: 25.3 PG — LOW (ref 27–31)
MCHC RBC-ENTMCNC: 31.6 G/DL — LOW (ref 32–37)
MCV RBC AUTO: 80.1 FL — LOW (ref 81–99)
MONOCYTES # BLD AUTO: 0.5 K/UL — SIGNIFICANT CHANGE UP (ref 0.1–0.6)
MONOCYTES NFR BLD AUTO: 7 % — SIGNIFICANT CHANGE UP (ref 1.7–9.3)
NEUTROPHILS # BLD AUTO: 5.58 K/UL — SIGNIFICANT CHANGE UP (ref 1.4–6.5)
NEUTROPHILS NFR BLD AUTO: 77.9 % — HIGH (ref 42.2–75.2)
NITRITE UR-MCNC: NEGATIVE — SIGNIFICANT CHANGE UP
NRBC BLD AUTO-RTO: 0 /100 WBCS — SIGNIFICANT CHANGE UP (ref 0–0)
PH UR: 7 — SIGNIFICANT CHANGE UP (ref 5–8)
PHOSPHATE SERPL-MCNC: 2.9 MG/DL — SIGNIFICANT CHANGE UP (ref 2.1–4.9)
PLATELET # BLD AUTO: 244 K/UL — SIGNIFICANT CHANGE UP (ref 130–400)
PMV BLD: 9.7 FL — SIGNIFICANT CHANGE UP (ref 7.4–10.4)
POTASSIUM SERPL-MCNC: 4.3 MMOL/L — SIGNIFICANT CHANGE UP (ref 3.5–5)
POTASSIUM SERPL-SCNC: 4.3 MMOL/L — SIGNIFICANT CHANGE UP (ref 3.5–5)
PROT SERPL-MCNC: 5.9 G/DL — LOW (ref 6–8)
PROT UR-MCNC: NEGATIVE MG/DL — SIGNIFICANT CHANGE UP
RBC # BLD: 5.22 M/UL — SIGNIFICANT CHANGE UP (ref 4.2–5.4)
RBC # FLD: 16.7 % — HIGH (ref 11.5–14.5)
SODIUM SERPL-SCNC: 142 MMOL/L — SIGNIFICANT CHANGE UP (ref 135–146)
SP GR SPEC: 1.01 — SIGNIFICANT CHANGE UP (ref 1–1.03)
UROBILINOGEN FLD QL: 0.2 MG/DL — SIGNIFICANT CHANGE UP (ref 0.2–1)
WBC # BLD: 7.16 K/UL — SIGNIFICANT CHANGE UP (ref 4.8–10.8)
WBC # FLD AUTO: 7.16 K/UL — SIGNIFICANT CHANGE UP (ref 4.8–10.8)

## 2025-06-12 PROCEDURE — 99285 EMERGENCY DEPT VISIT HI MDM: CPT | Mod: 25

## 2025-06-12 PROCEDURE — 93010 ELECTROCARDIOGRAM REPORT: CPT

## 2025-06-12 PROCEDURE — 83605 ASSAY OF LACTIC ACID: CPT

## 2025-06-12 PROCEDURE — 85014 HEMATOCRIT: CPT

## 2025-06-12 PROCEDURE — 82330 ASSAY OF CALCIUM: CPT

## 2025-06-12 PROCEDURE — 96365 THER/PROPH/DIAG IV INF INIT: CPT

## 2025-06-12 PROCEDURE — 81003 URINALYSIS AUTO W/O SCOPE: CPT

## 2025-06-12 PROCEDURE — 83735 ASSAY OF MAGNESIUM: CPT

## 2025-06-12 PROCEDURE — 84295 ASSAY OF SERUM SODIUM: CPT

## 2025-06-12 PROCEDURE — 36415 COLL VENOUS BLD VENIPUNCTURE: CPT

## 2025-06-12 PROCEDURE — 82962 GLUCOSE BLOOD TEST: CPT

## 2025-06-12 PROCEDURE — 82803 BLOOD GASES ANY COMBINATION: CPT

## 2025-06-12 PROCEDURE — 84100 ASSAY OF PHOSPHORUS: CPT

## 2025-06-12 PROCEDURE — 71045 X-RAY EXAM CHEST 1 VIEW: CPT | Mod: 26

## 2025-06-12 PROCEDURE — 93005 ELECTROCARDIOGRAM TRACING: CPT

## 2025-06-12 PROCEDURE — 80053 COMPREHEN METABOLIC PANEL: CPT

## 2025-06-12 PROCEDURE — 71045 X-RAY EXAM CHEST 1 VIEW: CPT

## 2025-06-12 PROCEDURE — 84132 ASSAY OF SERUM POTASSIUM: CPT

## 2025-06-12 PROCEDURE — 82010 KETONE BODYS QUAN: CPT

## 2025-06-12 PROCEDURE — 85025 COMPLETE CBC W/AUTO DIFF WBC: CPT

## 2025-06-12 PROCEDURE — 99285 EMERGENCY DEPT VISIT HI MDM: CPT

## 2025-06-12 PROCEDURE — 96366 THER/PROPH/DIAG IV INF ADDON: CPT

## 2025-06-12 PROCEDURE — 85018 HEMOGLOBIN: CPT

## 2025-06-12 RX ORDER — MAGNESIUM SULFATE 500 MG/ML
2 SYRINGE (ML) INJECTION ONCE
Refills: 0 | Status: COMPLETED | OUTPATIENT
Start: 2025-06-12 | End: 2025-06-12

## 2025-06-12 RX ORDER — SODIUM CHLORIDE 9 G/1000ML
1000 INJECTION, SOLUTION INTRAVENOUS ONCE
Refills: 0 | Status: COMPLETED | OUTPATIENT
Start: 2025-06-12 | End: 2025-06-12

## 2025-06-12 RX ORDER — ACETAMINOPHEN 500 MG/5ML
650 LIQUID (ML) ORAL ONCE
Refills: 0 | Status: COMPLETED | OUTPATIENT
Start: 2025-06-12 | End: 2025-06-12

## 2025-06-12 RX ADMIN — Medication 2 GRAM(S): at 09:00

## 2025-06-12 RX ADMIN — Medication 650 MILLIGRAM(S): at 07:06

## 2025-06-12 RX ADMIN — SODIUM CHLORIDE 1000 MILLILITER(S): 9 INJECTION, SOLUTION INTRAVENOUS at 07:02

## 2025-06-12 RX ADMIN — Medication 25 GRAM(S): at 07:07

## 2025-06-12 NOTE — ED PROVIDER NOTE - ATTENDING CONTRIBUTION TO CARE
I have personally seen and examined this patient.  I have fully participated in the care of this patient. I have reviewed all pertinent clinical information, including history, physical exam, plan and the resident phycisian's note and agree except as noted.    pt w hypoglycemia, on insulin/metformin/monjaro (no oral sulfs), given glucose at home and with ems, felt better. denies fever, chills, cp, sob, ab pain, n, v, d. sts had insulin injection yest and ate dinner without issue.    pt in nad, gcs 15, aaox4, perrl, eomi, neck sup, ctab, rrr, ab soft nt nd. no focal def.

## 2025-06-12 NOTE — ED PROVIDER NOTE - CLINICAL SUMMARY MEDICAL DECISION MAKING FREE TEXT BOX
Received patient from Dr. Mcgrath awaiting repeat fingerstick and dispo.  Patient is a 60-year-old woman history of diabetes along with multiple other problems who presented to the ED from [facility] due to hypoglycemia.  Labs okay, patient with no complaints, tolerating p.o.  UA negative for infection, EKG nonischemic.  Patient observed for 6 hours with no further hypoglycemia and was discharged back to the SNF.

## 2025-06-12 NOTE — ED PROVIDER NOTE - OBJECTIVE STATEMENT
60y F presents COPD, ANISHA, schizophrenia, crohn, migraine, hypothyroid presents to the ed from [facility] due to hypoglycemia. According to EMS nursing home measured her glucose found to be in the 40s then given 1 glucose packet, measured again at 58 and given another glucose packet. Pt not on home O2, she was saturating at 87% according to EMS and they put her 10L NC and she went upward to 96%.  Pt denies nausea, vomiting, diarrhea, sob, chest pain, falls, trauma, headache. 60y F presents COPD, IDDM, ANISHA, schizophrenia, crohn, migraine, hypothyroid presents to the ed from [facility] due to hypoglycemia. According to EMS nursing home measured her glucose found to be in the 40s then given 1 glucose packet, measured again at 58 and given another glucose packet. Pt not on home O2, she was saturating at 87% according to EMS and they put her 10L NC and she went upward to 96%.  Pt denies nausea, vomiting, diarrhea, sob, chest pain, falls, trauma, headache.

## 2025-06-12 NOTE — ED PROVIDER NOTE - PATIENT PORTAL LINK FT
You can access the FollowMyHealth Patient Portal offered by [facility] by registering at the following website: [email or web address]. By joining [employer]’s FollowMyHealth portal, you will also be able to view your health information using other applications (apps) compatible with our system.

## 2025-06-12 NOTE — ED PROVIDER NOTE - PROGRESS NOTE DETAILS
Authored by: Dr. Sera Conde  Spoke with supervising nurse from [facility] whom stated he found the pt unresponsive and checked her glucose measured at 41, gave a packet of glucose, measured again at 58 and gave another packet then  called  EMS. He states the pt began to wake up with each packet. Authored by: Dr. Sera Conde  Pt tolerating P.O. states she's feeling better Authored by: Dr. Sera Conde  Pt tolerating P.O. states she's feeling better.

## 2025-06-12 NOTE — ED PROVIDER NOTE - CARE PROVIDER_API CALL
RADHA BROWN  [address]  [place] [address]  Phone: [phone]  Fax: [phone]  Follow Up Time: 1-3 Days

## 2025-06-12 NOTE — ED ADULT TRIAGE NOTE - CHIEF COMPLAINT QUOTE
pt biba from nursing home for hypoglycemia . FS on scene 58 , glucose given , Fs in triage is 91 mg.dl.

## 2025-06-12 NOTE — ED PROVIDER NOTE - PHYSICAL EXAMINATION
CONSTITUTIONAL: well-appearing, well nourished, non-toxic, NAD  HEAD: NCAT  EYES: EOMI, no scleral icterus  ENT: Dry mucous membranes  NECK: Full ROM.   CARD: RRR  RESP: clear to ausculation b/l  ABD: soft, non-tender, No CVA tenderness  EXT: Full ROM  NEURO: normal motor. normal sensory. CN II-XII intact.   PSYCH: Cooperative, appropriate.

## 2025-06-12 NOTE — ED PROVIDER NOTE - CARE PLAN
Assessment and plan of treatment:	hypoglycemia  check labs, supportive care   1 Principal Discharge DX:	Hypoglycemia  Assessment and plan of treatment:	hypoglycemia  check labs, supportive care

## 2025-06-12 NOTE — ED ADULT NURSE REASSESSMENT NOTE - NS ED NURSE REASSESS COMMENT FT1
Received patient from day shift to continue with the plan of care. Patient is resting on stretcher, chest rise and fall noted. Rousable to verbal and tactile stimuli. CCM and o2 monitoring continues. Safety and comfort measures maintained.

## 2025-06-12 NOTE — ED ADULT NURSE NOTE - NSFALLHARMRISKINTERV_ED_ALL_ED

## 2025-07-16 ENCOUNTER — APPOINTMENT (OUTPATIENT)
Dept: OBGYN | Facility: CLINIC | Age: 60
End: 2025-07-16
Payer: MEDICARE

## 2025-07-16 VITALS
SYSTOLIC BLOOD PRESSURE: 113 MMHG | WEIGHT: 188 LBS | DIASTOLIC BLOOD PRESSURE: 80 MMHG | HEIGHT: 64 IN | BODY MASS INDEX: 32.1 KG/M2

## 2025-07-16 PROCEDURE — G0101: CPT

## 2025-07-24 ENCOUNTER — APPOINTMENT (OUTPATIENT)
Dept: OPHTHALMOLOGY | Facility: CLINIC | Age: 60
End: 2025-07-24